# Patient Record
Sex: FEMALE | Race: WHITE | NOT HISPANIC OR LATINO | Employment: UNEMPLOYED | URBAN - METROPOLITAN AREA
[De-identification: names, ages, dates, MRNs, and addresses within clinical notes are randomized per-mention and may not be internally consistent; named-entity substitution may affect disease eponyms.]

---

## 2018-01-08 LAB — EXTERNAL GROUP B STREP ANTIGEN: NEGATIVE

## 2018-06-20 LAB
EXTERNAL RUBELLA QUALITATIVE: NORMAL
EXTERNAL SYPHILIS RPR SCREEN: NORMAL
HIV1 P24 AG SERPL QL IA: NEGATIVE

## 2018-11-07 ENCOUNTER — HOSPITAL ENCOUNTER (INPATIENT)
Facility: HOSPITAL | Age: 28
LOS: 3 days | Discharge: HOME | End: 2018-11-10
Attending: OBSTETRICS & GYNECOLOGY | Admitting: SURGERY

## 2018-11-07 ENCOUNTER — APPOINTMENT (OUTPATIENT)
Dept: ULTRASOUND IMAGING | Facility: HOSPITAL | Age: 28
End: 2018-11-07

## 2018-11-07 DIAGNOSIS — K80.40 CALCULUS OF BILE DUCT WITH CHOLECYSTITIS WITHOUT OBSTRUCTION, UNSPECIFIED CHOLECYSTITIS ACUITY: Primary | ICD-10-CM

## 2018-11-07 PROBLEM — R74.01 TRANSAMINITIS: Status: ACTIVE | Noted: 2018-11-07

## 2018-11-07 LAB
ABO GROUP BLD: NORMAL
ALBUMIN SERPL-MCNC: 3.1 G/DL (ref 3.5–5.2)
ALBUMIN/GLOB SERPL: 1 G/DL
ALP SERPL-CCNC: 172 U/L (ref 39–117)
ALT SERPL W P-5'-P-CCNC: 68 U/L (ref 1–33)
AMYLASE SERPL-CCNC: 102 U/L (ref 28–100)
ANION GAP SERPL CALCULATED.3IONS-SCNC: 11.8 MMOL/L
AST SERPL-CCNC: 110 U/L (ref 1–32)
BASOPHILS # BLD AUTO: 0.01 10*3/MM3 (ref 0–0.2)
BASOPHILS NFR BLD AUTO: 0.1 % (ref 0–1.5)
BILIRUB SERPL-MCNC: 0.5 MG/DL (ref 0.1–1.2)
BLD GP AB SCN SERPL QL: NEGATIVE
BUN BLD-MCNC: 7 MG/DL (ref 6–20)
BUN/CREAT SERPL: 13 (ref 7–25)
CALCIUM SPEC-SCNC: 9 MG/DL (ref 8.6–10.5)
CHLORIDE SERPL-SCNC: 104 MMOL/L (ref 98–107)
CO2 SERPL-SCNC: 25.2 MMOL/L (ref 22–29)
CREAT BLD-MCNC: 0.54 MG/DL (ref 0.57–1)
DEPRECATED RDW RBC AUTO: 41.8 FL (ref 37–54)
EOSINOPHIL # BLD AUTO: 0.11 10*3/MM3 (ref 0–0.7)
EOSINOPHIL NFR BLD AUTO: 1 % (ref 0.3–6.2)
ERYTHROCYTE [DISTWIDTH] IN BLOOD BY AUTOMATED COUNT: 12.4 % (ref 11.7–13)
GFR SERPL CREATININE-BSD FRML MDRD: 133 ML/MIN/1.73
GLOBULIN UR ELPH-MCNC: 3.2 GM/DL
GLUCOSE BLD-MCNC: 72 MG/DL (ref 65–99)
HAV IGM SERPL QL IA: NORMAL
HBV CORE IGM SERPL QL IA: NORMAL
HBV SURFACE AG SERPL QL IA: NORMAL
HCT VFR BLD AUTO: 31.7 % (ref 35.6–45.5)
HCV AB SER DONR QL: NORMAL
HGB BLD-MCNC: 10.7 G/DL (ref 11.9–15.5)
IMM GRANULOCYTES # BLD: 0.03 10*3/MM3 (ref 0–0.03)
IMM GRANULOCYTES NFR BLD: 0.3 % (ref 0–0.5)
LIPASE SERPL-CCNC: 63 U/L (ref 13–60)
LYMPHOCYTES # BLD AUTO: 1.64 10*3/MM3 (ref 0.9–4.8)
LYMPHOCYTES NFR BLD AUTO: 15.3 % (ref 19.6–45.3)
MCH RBC QN AUTO: 31.1 PG (ref 26.9–32)
MCHC RBC AUTO-ENTMCNC: 33.8 G/DL (ref 32.4–36.3)
MCV RBC AUTO: 92.2 FL (ref 80.5–98.2)
MONOCYTES # BLD AUTO: 0.57 10*3/MM3 (ref 0.2–1.2)
MONOCYTES NFR BLD AUTO: 5.3 % (ref 5–12)
NEUTROPHILS # BLD AUTO: 8.42 10*3/MM3 (ref 1.9–8.1)
NEUTROPHILS NFR BLD AUTO: 78.3 % (ref 42.7–76)
PLATELET # BLD AUTO: 148 10*3/MM3 (ref 140–500)
PMV BLD AUTO: 9.3 FL (ref 6–12)
POTASSIUM BLD-SCNC: 3.9 MMOL/L (ref 3.5–5.2)
PROT SERPL-MCNC: 6.3 G/DL (ref 6–8.5)
RBC # BLD AUTO: 3.44 10*6/MM3 (ref 3.9–5.2)
RH BLD: POSITIVE
SODIUM BLD-SCNC: 141 MMOL/L (ref 136–145)
T&S EXPIRATION DATE: NORMAL
WBC NRBC COR # BLD: 10.75 10*3/MM3 (ref 4.5–10.7)

## 2018-11-07 PROCEDURE — 59025 FETAL NON-STRESS TEST: CPT

## 2018-11-07 PROCEDURE — 76705 ECHO EXAM OF ABDOMEN: CPT

## 2018-11-07 PROCEDURE — 86850 RBC ANTIBODY SCREEN: CPT | Performed by: OBSTETRICS & GYNECOLOGY

## 2018-11-07 PROCEDURE — 86900 BLOOD TYPING SEROLOGIC ABO: CPT | Performed by: OBSTETRICS & GYNECOLOGY

## 2018-11-07 PROCEDURE — 85025 COMPLETE CBC W/AUTO DIFF WBC: CPT | Performed by: OBSTETRICS & GYNECOLOGY

## 2018-11-07 PROCEDURE — 82150 ASSAY OF AMYLASE: CPT | Performed by: OBSTETRICS & GYNECOLOGY

## 2018-11-07 PROCEDURE — 80053 COMPREHEN METABOLIC PANEL: CPT | Performed by: OBSTETRICS & GYNECOLOGY

## 2018-11-07 PROCEDURE — 83690 ASSAY OF LIPASE: CPT | Performed by: OBSTETRICS & GYNECOLOGY

## 2018-11-07 PROCEDURE — 86901 BLOOD TYPING SEROLOGIC RH(D): CPT | Performed by: OBSTETRICS & GYNECOLOGY

## 2018-11-07 PROCEDURE — 80074 ACUTE HEPATITIS PANEL: CPT | Performed by: OBSTETRICS & GYNECOLOGY

## 2018-11-07 RX ORDER — LIDOCAINE HYDROCHLORIDE 10 MG/ML
5 INJECTION, SOLUTION EPIDURAL; INFILTRATION; INTRACAUDAL; PERINEURAL AS NEEDED
Status: DISCONTINUED | OUTPATIENT
Start: 2018-11-07 | End: 2018-11-11 | Stop reason: HOSPADM

## 2018-11-07 RX ORDER — SERTRALINE HYDROCHLORIDE 100 MG/1
100 TABLET, FILM COATED ORAL DAILY
COMMUNITY
End: 2019-09-24

## 2018-11-07 RX ORDER — SODIUM CHLORIDE 0.9 % (FLUSH) 0.9 %
3 SYRINGE (ML) INJECTION EVERY 12 HOURS SCHEDULED
Status: DISCONTINUED | OUTPATIENT
Start: 2018-11-07 | End: 2018-11-11 | Stop reason: HOSPADM

## 2018-11-07 RX ORDER — SODIUM CHLORIDE, SODIUM LACTATE, POTASSIUM CHLORIDE, CALCIUM CHLORIDE 600; 310; 30; 20 MG/100ML; MG/100ML; MG/100ML; MG/100ML
125 INJECTION, SOLUTION INTRAVENOUS CONTINUOUS
Status: DISCONTINUED | OUTPATIENT
Start: 2018-11-07 | End: 2018-11-07

## 2018-11-07 RX ORDER — SODIUM CHLORIDE 0.9 % (FLUSH) 0.9 %
3-10 SYRINGE (ML) INJECTION AS NEEDED
Status: DISCONTINUED | OUTPATIENT
Start: 2018-11-07 | End: 2018-11-11 | Stop reason: HOSPADM

## 2018-11-07 RX ORDER — PRENATAL VIT NO.126/IRON/FOLIC 28MG-0.8MG
1 TABLET ORAL DAILY
COMMUNITY
End: 2019-09-24

## 2018-11-07 RX ORDER — RANITIDINE 150 MG/1
150 TABLET ORAL 2 TIMES DAILY PRN
COMMUNITY
End: 2019-09-24

## 2018-11-07 RX ORDER — FAMOTIDINE 20 MG/1
20 TABLET, FILM COATED ORAL 2 TIMES DAILY
Status: DISCONTINUED | OUTPATIENT
Start: 2018-11-07 | End: 2018-11-11 | Stop reason: HOSPADM

## 2018-11-07 RX ADMIN — FAMOTIDINE 20 MG: 20 TABLET, FILM COATED ORAL at 21:20

## 2018-11-07 RX ADMIN — SODIUM CHLORIDE, POTASSIUM CHLORIDE, SODIUM LACTATE AND CALCIUM CHLORIDE 125 ML/HR: 600; 310; 30; 20 INJECTION, SOLUTION INTRAVENOUS at 18:08

## 2018-11-07 NOTE — H&P
[facility]  Obstetric History and Physical    Patient Name: Sherry Quevedo  :  1990  MRN:  [id number]        RUQ pain    Subjective     Patient is a 28 y.o. female  currently at 27wks seen in the office this morning for RUQ and epigastric pain, worsening over the last month and especially the last few days.  Exacerbated by eating, relieved with tylenol (has had OTC strength about twice daily for last couple of days). Not in significant pain currently but hasn't had much to eat today, last PO at noon.  Radiates in to back and right shoulder. Denies fevers/chills. No sick contacts.Labs done in clinic this morning significant for elevated LFTs, elevated amylase and lipase, appropriate mild leukocytosis for pregnancy.  Good FM. No UCs.           Her prenatal care is otherwise uncomplicated       Past Medical History: No past medical history on file.   Past Surgical History No past surgical history on file.   Family History: No family history on file.   Social History:  has no tobacco history on file.   has no alcohol history on file.   has no drug history on file.    Allergies:     Patient has no allergy information on record.     Past OB History:       Obstetric History       T0      L0     SAB0   TAB0   Ectopic0   Molar0   Multiple0   Live Births0       # Outcome Date GA Lbr Darian/2nd Weight Sex Delivery Anes PTL Lv   1 Current                     Objective       Vital Signs Range for the last 24 hours  Temperature:     Temp Source:     BP: BP: ()/()    Pulse:     Respirations:               Physical Exam:        General :    Alert and cooperative in NAD   Lungs:   Clear to auscultation bilaterally   CV:   Regular rate and rhythm   Abdomen:  Gravid, non-tender, no masses. Mild TTP epigastric and RUQ   Vaginal exam: deferred     LE:   min edema                   A/P:  27wks gestation with RUQ/epigastric pain, transaminitis, and elevated LFTs.  Possible etiologies include gallstone  pancreatitis.  Will order US RUQ and pancreas, trend labs, consult GI and/or gen surg in AM depending on imaging        Problem List Items Addressed This Visit     None                  Temi Burns MD  11/7/2018  4:38 PM

## 2018-11-08 PROBLEM — K80.40 CALCULUS OF BILE DUCT WITH CHOLECYSTITIS WITHOUT OBSTRUCTION: Status: ACTIVE | Noted: 2018-11-07

## 2018-11-08 LAB
ALBUMIN SERPL-MCNC: 2.8 G/DL (ref 3.5–5.2)
ALBUMIN/GLOB SERPL: 0.9 G/DL
ALP SERPL-CCNC: 138 U/L (ref 39–117)
ALT SERPL W P-5'-P-CCNC: 48 U/L (ref 1–33)
AMYLASE SERPL-CCNC: 75 U/L (ref 28–100)
ANION GAP SERPL CALCULATED.3IONS-SCNC: 11 MMOL/L
AST SERPL-CCNC: 57 U/L (ref 1–32)
BILIRUB SERPL-MCNC: 0.3 MG/DL (ref 0.1–1.2)
BUN BLD-MCNC: 6 MG/DL (ref 6–20)
BUN/CREAT SERPL: 12 (ref 7–25)
CALCIUM SPEC-SCNC: 8.2 MG/DL (ref 8.6–10.5)
CHLORIDE SERPL-SCNC: 106 MMOL/L (ref 98–107)
CO2 SERPL-SCNC: 23 MMOL/L (ref 22–29)
CREAT BLD-MCNC: 0.5 MG/DL (ref 0.57–1)
GFR SERPL CREATININE-BSD FRML MDRD: 146 ML/MIN/1.73
GLOBULIN UR ELPH-MCNC: 3 GM/DL
GLUCOSE BLD-MCNC: 84 MG/DL (ref 65–99)
LIPASE SERPL-CCNC: 45 U/L (ref 13–60)
POTASSIUM BLD-SCNC: 3.3 MMOL/L (ref 3.5–5.2)
PROT SERPL-MCNC: 5.8 G/DL (ref 6–8.5)
SODIUM BLD-SCNC: 140 MMOL/L (ref 136–145)

## 2018-11-08 PROCEDURE — 59025 FETAL NON-STRESS TEST: CPT

## 2018-11-08 PROCEDURE — 80053 COMPREHEN METABOLIC PANEL: CPT | Performed by: OBSTETRICS & GYNECOLOGY

## 2018-11-08 PROCEDURE — 83690 ASSAY OF LIPASE: CPT | Performed by: OBSTETRICS & GYNECOLOGY

## 2018-11-08 PROCEDURE — 99253 IP/OBS CNSLTJ NEW/EST LOW 45: CPT | Performed by: SURGERY

## 2018-11-08 PROCEDURE — 82150 ASSAY OF AMYLASE: CPT | Performed by: OBSTETRICS & GYNECOLOGY

## 2018-11-08 PROCEDURE — 99253 IP/OBS CNSLTJ NEW/EST LOW 45: CPT | Performed by: INTERNAL MEDICINE

## 2018-11-08 RX ORDER — POLYETHYLENE GLYCOL 3350 17 G/17G
17 POWDER, FOR SOLUTION ORAL DAILY
Status: DISCONTINUED | OUTPATIENT
Start: 2018-11-08 | End: 2018-11-11 | Stop reason: HOSPADM

## 2018-11-08 RX ORDER — SERTRALINE HYDROCHLORIDE 100 MG/1
100 TABLET, FILM COATED ORAL NIGHTLY
Status: DISCONTINUED | OUTPATIENT
Start: 2018-11-08 | End: 2018-11-11 | Stop reason: HOSPADM

## 2018-11-08 RX ORDER — SODIUM CHLORIDE, SODIUM LACTATE, POTASSIUM CHLORIDE, CALCIUM CHLORIDE 600; 310; 30; 20 MG/100ML; MG/100ML; MG/100ML; MG/100ML
150 INJECTION, SOLUTION INTRAVENOUS CONTINUOUS
Status: DISCONTINUED | OUTPATIENT
Start: 2018-11-09 | End: 2018-11-11 | Stop reason: HOSPADM

## 2018-11-08 RX ADMIN — POLYETHYLENE GLYCOL 3350 17 G: 17 POWDER, FOR SOLUTION ORAL at 16:08

## 2018-11-08 RX ADMIN — DOXYLAMINE SUCCINATE 25 MG: 25 TABLET ORAL at 00:29

## 2018-11-08 RX ADMIN — FAMOTIDINE 20 MG: 20 TABLET, FILM COATED ORAL at 08:48

## 2018-11-08 RX ADMIN — Medication 3 ML: at 08:49

## 2018-11-08 RX ADMIN — FAMOTIDINE 20 MG: 20 TABLET, FILM COATED ORAL at 20:26

## 2018-11-08 RX ADMIN — SERTRALINE HYDROCHLORIDE 100 MG: 100 TABLET, FILM COATED ORAL at 20:27

## 2018-11-08 NOTE — NURSING NOTE
Dr. Fraire at patient's bedside for evaluation. Discussed with patient that Dr. Guzman recommended that a general surgeon see the patient for their opinion for her gallbladder. All questions answered, patient states understanding. Order received for general surgery consult with Dr. Ramos and miralax once a day scheduled. r/v

## 2018-11-08 NOTE — PROGRESS NOTES
[facility]  Obstetric Progress Note    Patient Name: Sherry Quevedo  :  1990  MRN:  [id number]  Hospital Day: 1  EGA: 27w2d      Subjective  27w2d OB admitted yesterday for severe RUQ pain. LFT's, amylase and lipase elevated. Pt states she is feeling better today, reports no pain at this time. Denies N/V/D, states she feels fine. Reports good FM.         Objective     VS:  Vital Signs Range for the last 24 hours  Temperature: Temp:  [97.8 °F (36.6 °C)-98.7 °F (37.1 °C)] 97.8 °F (36.6 °C)   Temp Source: Temp src: Oral   BP: BP: ()/(57-72) 104/58   Pulse: Heart Rate:  [81-91] 85   Respirations: Resp:  [18] 18                   Physical Examination:     General :  Alert in NAD  Abdomen: Gravid, Fundus -        Lab results reviewed:  CBC:   Lab Results   Component Value Date    WBC 10.75 (H) 2018    HGB 10.7 (L) 2018    HCT 31.7 (L) 2018            A/P:  27w2d OB. Feels better today, denies GI symptoms and pain. Gallbladder US with cholelithiasis. Liver enzymes improved today. GI consulted, waiting on recs. NST pending.      Transaminitis              LEONELA Goodman  2018  10:24 AM   Patient seen and examined. Agree with above assessment. Rec to see Gen Surgeon to review u/s findings and rec of treatment/  Britta Fraire MD  2018  12:27 PM

## 2018-11-08 NOTE — PLAN OF CARE
Problem: Patient Care Overview  Goal: Plan of Care Review  Outcome: Ongoing (interventions implemented as appropriate)   18   Plan of Care Review   Progress improving   Coping/Psychosocial   Plan of Care Reviewed With patient;spouse   OTHER   Outcome Summary RUQ pain pt. reports now that she has no pain when asked, us of gallbladder done, am labs drawn. once results of am labs in then arrange for a GI consult today, pt. denies h/a or blurred vision, pt. denies VB, LOF or contractions     Goal: Individualization and Mutuality  Outcome: Ongoing (interventions implemented as appropriate)   18 1850   Individualization   Patient Specific Preferences Pain control less than 5 out of 10 on pain scale   Patient Specific Goals (Include Timeframe) Remain pain free   Patient Specific Interventions Ultrasound and labs as ordered   Mutuality/Individual Preferences   What Anxieties, Fears, Concerns, or Questions Do You Have About Your Care? Return of debilitating pain   How Would You and/or Your Support Person Like to Participate in Your Care? Remain at bedside; be involved in care   Mutuality/Individual Preferences   How to Address Anxieties/Fears Keep informed of plan of care     Goal: Interprofessional Rounds/Family Conf  Outcome: Ongoing (interventions implemented as appropriate)   18   Interdisciplinary Rounds/Family Conf   Participants nursing;patient;physician       Problem: Prenatal Patient, Hospitalized (Adult,Obstetrics,Pediatric)  Goal: Signs and Symptoms of Listed Potential Problems Will be Absent, Minimized or Managed (Prenatal Patient, Hospitalized)  Outcome: Ongoing (interventions implemented as appropriate)   18   Goal/Outcome Evaluation   Problems Assessed (Prenatal Patient) all   Problems Present (Prenatal Patient)  mood and anxiety disorders;other (see comments)  (RUQ pain)

## 2018-11-08 NOTE — NON STRESS TEST
Sherry Quevedo, a  at 27w1d with an ZACKERY of 2019, by Patient Reported, was seen at [facility] for a nonstress test.    Chief Complaint   Patient presents with   • Abdominal Pain     Pt states that she started having epigastric pain that woke her up around 0300 today.  Pt states that it felt like pressure on her chest that radiated to her righ shoulder and back. Pt reports that pain was 10/10 on pain scale.  Rates pain 3/10 right now.  Pt went to office, had labs drawn, and was sent over to L&D for inpatient observation.       Patient Active Problem List   Diagnosis   • Transaminitis       Start Time:   Stop Time:     Interpretation A  Nonstress Test Interpretation A: Reactive (18 1800 : Tiffani Cuellar RN)

## 2018-11-08 NOTE — CONSULTS
Cc: Right upper quadrant pain, cholelithiasis    History of presenting illness:   This is a very nice, generally healthy 28-year-old lady who is 27 weeks pregnant and who describes some occasional on and off epigastric abdominal discomfort but the severe onset of right upper quadrant pain with shortness of breath and pain into her back after eating pizza about 4 days ago.  It lasted about 2 hours and then resolved and then returned a couple of days later, lasting longer and feeling even more intense.  This prompted a visit to her obstetrician and subsequent admission for evaluation.  She was found to have elevation of her liver function studies and on ultrasound a finding of stones and possible sludge although no clear evidence for acute cholecystitis.  Currently she is feeling better although she has not really been eating much.    Past Medical History: Anxiety, depression, gestational diabetes    Past Surgical History: Laparoscopic ovarian cyst resection, no other abdominal surgery    Medications: Reviewed and reconciled in Epic    Allergies: Reviewed and reconciled in Epic    Social History: Patient is a nonsmoker, denies alcohol use    Family History: Negative for colorectal cancer, patient is unaware if there is any gallbladder disease in her family.    Review of Systems:  Constitutional: Positive for decreased appetite, negative for fever or chills  Neck: no swollen glands or dysphagia or odynophagia  Respiratory: negative for SOB, cough, hemoptysis or wheezing  Cardiovascular: negative for chest pain, palpitations or peripheral edema  Gastrointestinal: Positive for nausea, abdominal pain, bloating, negative for constipation or diarrhea      Physical Exam:    General: alert and oriented, appropriate, no acute distress  Neck: Supple without lymphadenopathy or thyromegaly, trachea is in the midline  Respiratory: Lungs are clear bilaterally without wheezing, no use of accessory muscles is noted  Cardiovascular:  Regular rate and rhythm without murmur, no peripheral edema  Gastrointestinal: Soft, benign, prominent uterus several centimeters above the umbilicus, but no mass, no guarding, no rebound and no evidence of jaundice    Laboratory data: Alkaline phosphatase 138 AST 57 ALT 48 albumin 2.8.  Lipase was very mildly elevated at 63 8 admission but has since normalized.  CBC unremarkable.    Imaging data: Gallbladder ultrasound reviewed and demonstrates cholelithiasis.  Question of sludge is also noted.  No significant gallbladder wall thickening is seen.      Assessment and plan:   -Symptomatic cholelithiasis  -27 week intrauterine pregnancy    Options discussed with the patient.  I explained to her that there is solid evidence suggesting the relative safety of cholecystectomy in the second trimester.  There is of course an increased possibility for  labor, but this is also the case with recurrent symptomatic cholelithiasis and worse with acute cholecystitis or other sequela of untreated gallbladder disease.  I told the patient that an attempt at dietary management is an option, but the patient and her  prefer to go ahead with cholecystectomy.  I have scheduled her for tomorrow.  The risks in addition to  labor including bleeding, infection, injury to the liver, gallbladder, biliary system, stomach, intestinal tract, vascular structures noted potential problems including the possible need for conversion to an open operation were discussed and they're willing to proceed.      Khanh Lassiter MD, FACS  General, Minimally Invasive and Endoscopic Surgery  [facility]    [address], [address]  [place], [place], [address]  P: [phone]  F: [phone]

## 2018-11-08 NOTE — PLAN OF CARE
Problem: Patient Care Overview  Goal: Plan of Care Review  Outcome: Ongoing (interventions implemented as appropriate)   11/08/18 1835   Plan of Care Review   Progress improving   Coping/Psychosocial   Plan of Care Reviewed With patient     Goal: Individualization and Mutuality  Outcome: Ongoing (interventions implemented as appropriate)   11/07/18 1850 11/08/18 1835   Individualization   Patient Specific Goals (Include Timeframe) Remain pain free --    Patient Specific Interventions --  npo after midnight, lr at 150 cc/hr after midnight tonight, patient will have lap choley tomorrow per general surgery   Mutuality/Individual Preferences   How to Address Anxieties/Fears Keep informed of plan of care --        Problem: Prenatal Patient, Hospitalized (Adult,Obstetrics,Pediatric)  Goal: Signs and Symptoms of Listed Potential Problems Will be Absent, Minimized or Managed (Prenatal Patient, Hospitalized)  Outcome: Ongoing (interventions implemented as appropriate)   11/08/18 1835   Goal/Outcome Evaluation   Problems Assessed (Prenatal Patient) all   Problems Present (Prenatal Patient) none

## 2018-11-08 NOTE — CONSULTS
[facility]  Initial Inpatient Consult Note    Referring Provider: Dr. MATTHEW Santana    Reason for Consultation: Epigastric pain, elevated lipase    Subjective     History of present illness:    28 y.o. female , 27 week pregnant admitted 18 with a one month h/o RUQ abdominal pain with worsening that started this past . The two times that her RUQ abdominal pain got worse in the past 4 days was after she ate pizza. She had nausea with this but no vomiting and no fevers. No jaundice. She is chronically constipated. No diarrhea, some rectal bleeding. She has had 3 colonoscopies in her life, the last one about 6 yrs ago. No melena. Denies NSAID use. Rare ETOH. Her weight has been increasing. Rare Tylenol use. Yesterday her total bili was 0.5, alk phos 172, [id number] and ALT 68, amylase 102 and lipase 63. Today total bili 0.3; alk [id number]; ast 57; alt 48, amylase and lipase are normal. She had an US of the gallbladder last night that showed gallstones with sludge. No ductal dilation. Hepatitis profile negative for hep A, hep B, and hep C. No itching. She did not have anything like this in previous pregnancies. NO itching or elevated LFT's in previous pregnancies.    Past Medical History:  Past Medical History:   Diagnosis Date   • Anxiety    • Depression     History of PPD   • Gestational diabetes     with first baby     Past Surgical History:  Past Surgical History:   Procedure Laterality Date   • ADENOIDECTOMY     • OVARIAN CYST SURGERY     • TONSILLECTOMY     • WISDOM TOOTH EXTRACTION        Social History:   Social History   Substance Use Topics   • Smoking status: Never Smoker   • Smokeless tobacco: Never Used   • Alcohol use No      Family History:  History reviewed. No pertinent family history.    Home Meds:  Prescriptions Prior to Admission   Medication Sig Dispense Refill Last Dose   • Prenatal Vit-Fe Fumarate-FA (PRENATAL, CLASSIC, VITAMIN) 28-0.8 MG tablet tablet  Take 1 tablet by mouth Daily.   11/6/2018 at Unknown time   • raNITIdine (ZANTAC) 150 MG tablet Take 150 mg by mouth 2 (Two) Times a Day As Needed for Heartburn.   11/7/2018 at Unknown time   • sertraline (ZOLOFT) 100 MG tablet Take 100 mg by mouth Daily.   11/6/2018 at Unknown time     Current Meds:     famotidine 20 mg Oral BID   sodium chloride 3 mL Intravenous Q12H     Allergies:  Allergies   Allergen Reactions   • Lortab [Hydrocodone-Acetaminophen] Itching   • Tramadol Nausea And Vomiting     Review of Systems  The following systems were reviewed and negative;  respiratory, cardiovascular, musculoskeletal and neurological     Objective     Vital Signs  Temp:  [97.8 °F (36.6 °C)-98.7 °F (37.1 °C)] 97.8 °F (36.6 °C)  Heart Rate:  [81-91] 85  Resp:  [18] 18  BP: ()/(57-72) 104/58  Physical Exam:  General Appearance:    Alert, cooperative, in no acute distress   Head:    Normocephalic, without obvious abnormality, atraumatic   Eyes:            Lids and lashes normal, conjunctivae and sclerae normal, no   icterus   Throat:   No oral lesions, no thrush, oral mucosa moist   Neck:   No adenopathy, supple, trachea midline, no thyromegaly, no   carotid bruit, no JVD   Lungs:     Clear to auscultation,respirations regular, even and                   unlabored    Heart:    Regular rhythm and normal rate, normal S1 and S2, no            murmur, no gallop, no rub, no click   Chest Wall:    No abnormalities observed   Abdomen:     Normal bowel sounds, no masses, no organomegaly, soft        non-tender, non-distended, no guarding, no rebound                 tenderness   Rectal:     Deferred   Extremities:   no edema, no cyanosis, no redness   Skin:   No bleeding, bruising or rash   Lymph nodes:   No palpable adenopathy   Psychiatric:  Judgement and insight: normal   Orientation to person place and time: normal   Mood and affect: normal   Results Review:   I reviewed the patient's new clinical results.      Results from  last 7 days  Lab Units 18  1807   WBC 10*3/mm3 10.75*   HEMOGLOBIN g/dL 10.7*   HEMATOCRIT % 31.7*   PLATELETS 10*3/mm3 148       Results from last 7 days  Lab Units 18  0619 18  1807   SODIUM mmol/L 140 141   POTASSIUM mmol/L 3.3* 3.9   CHLORIDE mmol/L 106 104   CO2 mmol/L 23.0 25.2   BUN mg/dL 6 7   CREATININE mg/dL 0.50* 0.54*   CALCIUM mg/dL 8.2* 9.0   BILIRUBIN mg/dL 0.3 0.5   ALK PHOS U/L 138* 172*   ALT (SGPT) U/L 48* 68*   AST (SGOT) U/L 57* 110*   GLUCOSE mg/dL 84 72           Lab Results  Lab Value Date/Time   LIPASE 45 2018 06   LIPASE 63 (H) 2018 1807       Radiology:  US Gallbladder   Final Result   1. Cholelithiasis.           This report was finalized on 2018 5:52 AM by Macario Mohan M.D.              Assessment/Plan   Patient Active Problem List   Diagnosis   • Transaminitis       I discussed the patients findings and my recommendations with patient, family and nursing staff.    Assessment:  1) 29 yo female  27 week pregnant female  2) One month h/o RUQ abdominal pain worse in the last 4 days. Could this be symptomatic gallbladder disease?    Recommendations:  1) I would have a General Surgeon see the patient to consider cholecystectomy?  2) I will check ebstein sparks viral serology  3) Recheck lft's.    Delano Guzman MD

## 2018-11-08 NOTE — PLAN OF CARE
Problem: Patient Care Overview  Goal: Plan of Care Review  Outcome: Ongoing (interventions implemented as appropriate)   11/07/18 1700 11/07/18 1850   Plan of Care Review   Progress --  improving   Coping/Psychosocial   Plan of Care Reviewed With patient;spouse --      Goal: Individualization and Mutuality  Outcome: Ongoing (interventions implemented as appropriate)   11/07/18 1850   Individualization   Patient Specific Preferences Pain control less than 5 out of 10 on pain scale   Patient Specific Goals (Include Timeframe) Remain pain free   Patient Specific Interventions Ultrasound and labs as ordered   Mutuality/Individual Preferences   What Anxieties, Fears, Concerns, or Questions Do You Have About Your Care? Return of debilitating pain   How Would You and/or Your Support Person Like to Participate in Your Care? Remain at bedside; be involved in care   Mutuality/Individual Preferences   How to Address Anxieties/Fears Keep informed of plan of care     Goal: Discharge Needs Assessment  Outcome: Ongoing (interventions implemented as appropriate)   11/07/18 1907   Discharge Needs Assessment   Readmission Within the Last 30 Days no previous admission in last 30 days   Concerns to be Addressed no discharge needs identified   Patient/Family Anticipates Transition to home   Patient/Family Anticipated Services at Transition none   Transportation Concerns car, none   Transportation Anticipated car, drives self;family or friend will provide   Anticipated Changes Related to Illness none   Disability   Equipment Currently Used at Home none       Problem: Prenatal Patient, Hospitalized (Adult,Obstetrics,Pediatric)  Goal: Signs and Symptoms of Listed Potential Problems Will be Absent, Minimized or Managed (Prenatal Patient, Hospitalized)  Outcome: Ongoing (interventions implemented as appropriate)   11/07/18 1850   Goal/Outcome Evaluation   Problems Assessed (Prenatal Patient) all   Problems Present (Prenatal Patient) none

## 2018-11-09 ENCOUNTER — ANESTHESIA EVENT (OUTPATIENT)
Dept: PERIOP | Facility: HOSPITAL | Age: 28
End: 2018-11-09

## 2018-11-09 ENCOUNTER — ANESTHESIA (OUTPATIENT)
Dept: PERIOP | Facility: HOSPITAL | Age: 28
End: 2018-11-09

## 2018-11-09 LAB
ALBUMIN SERPL-MCNC: 2.9 G/DL (ref 3.5–5.2)
ALBUMIN/GLOB SERPL: 1 G/DL
ALP SERPL-CCNC: 116 U/L (ref 39–117)
ALT SERPL W P-5'-P-CCNC: 30 U/L (ref 1–33)
ANION GAP SERPL CALCULATED.3IONS-SCNC: 10.3 MMOL/L
AST SERPL-CCNC: 22 U/L (ref 1–32)
BILIRUB SERPL-MCNC: <0.2 MG/DL (ref 0.1–1.2)
BUN BLD-MCNC: 5 MG/DL (ref 6–20)
BUN/CREAT SERPL: 10 (ref 7–25)
CALCIUM SPEC-SCNC: 8.4 MG/DL (ref 8.6–10.5)
CHLORIDE SERPL-SCNC: 105 MMOL/L (ref 98–107)
CO2 SERPL-SCNC: 24.7 MMOL/L (ref 22–29)
CREAT BLD-MCNC: 0.5 MG/DL (ref 0.57–1)
GFR SERPL CREATININE-BSD FRML MDRD: 146 ML/MIN/1.73
GLOBULIN UR ELPH-MCNC: 2.8 GM/DL
GLUCOSE BLD-MCNC: 80 MG/DL (ref 65–99)
POTASSIUM BLD-SCNC: 3.5 MMOL/L (ref 3.5–5.2)
PROT SERPL-MCNC: 5.7 G/DL (ref 6–8.5)
SODIUM BLD-SCNC: 140 MMOL/L (ref 136–145)

## 2018-11-09 PROCEDURE — 86665 EPSTEIN-BARR CAPSID VCA: CPT | Performed by: INTERNAL MEDICINE

## 2018-11-09 PROCEDURE — 47562 LAPAROSCOPIC CHOLECYSTECTOMY: CPT | Performed by: SURGERY

## 2018-11-09 PROCEDURE — 25010000002 FENTANYL CITRATE (PF) 100 MCG/2ML SOLUTION

## 2018-11-09 PROCEDURE — 25010000002 PROPOFOL 10 MG/ML EMULSION: Performed by: NURSE ANESTHETIST, CERTIFIED REGISTERED

## 2018-11-09 PROCEDURE — 25010000002 FENTANYL CITRATE (PF) 100 MCG/2ML SOLUTION: Performed by: ANESTHESIOLOGY

## 2018-11-09 PROCEDURE — 25010000002 MIDAZOLAM PER 1 MG: Performed by: ANESTHESIOLOGY

## 2018-11-09 PROCEDURE — 80053 COMPREHEN METABOLIC PANEL: CPT | Performed by: INTERNAL MEDICINE

## 2018-11-09 PROCEDURE — 99232 SBSQ HOSP IP/OBS MODERATE 35: CPT | Performed by: INTERNAL MEDICINE

## 2018-11-09 PROCEDURE — 25010000002 FENTANYL CITRATE (PF) 100 MCG/2ML SOLUTION: Performed by: NURSE ANESTHETIST, CERTIFIED REGISTERED

## 2018-11-09 PROCEDURE — 25010000002 PHENYLEPHRINE PER 1 ML: Performed by: NURSE ANESTHETIST, CERTIFIED REGISTERED

## 2018-11-09 PROCEDURE — 25010000002 ONDANSETRON PER 1 MG: Performed by: NURSE ANESTHETIST, CERTIFIED REGISTERED

## 2018-11-09 PROCEDURE — 0 IOTHALAMATE 60 % SOLUTION: Performed by: SURGERY

## 2018-11-09 PROCEDURE — 86645 CMV ANTIBODY IGM: CPT | Performed by: INTERNAL MEDICINE

## 2018-11-09 PROCEDURE — 25010000003 CEFAZOLIN IN DEXTROSE 2-4 GM/100ML-% SOLUTION: Performed by: SURGERY

## 2018-11-09 PROCEDURE — 88304 TISSUE EXAM BY PATHOLOGIST: CPT | Performed by: SURGERY

## 2018-11-09 PROCEDURE — 0FT44ZZ RESECTION OF GALLBLADDER, PERCUTANEOUS ENDOSCOPIC APPROACH: ICD-10-PCS | Performed by: SURGERY

## 2018-11-09 PROCEDURE — 86644 CMV ANTIBODY: CPT | Performed by: INTERNAL MEDICINE

## 2018-11-09 PROCEDURE — 25010000002 DEXAMETHASONE PER 1 MG: Performed by: NURSE ANESTHETIST, CERTIFIED REGISTERED

## 2018-11-09 PROCEDURE — 25010000002 BUTORPHANOL PER 1 MG: Performed by: SURGERY

## 2018-11-09 RX ORDER — LIDOCAINE HYDROCHLORIDE 10 MG/ML
0.5 INJECTION, SOLUTION EPIDURAL; INFILTRATION; INTRACAUDAL; PERINEURAL ONCE AS NEEDED
Status: DISCONTINUED | OUTPATIENT
Start: 2018-11-09 | End: 2018-11-09 | Stop reason: HOSPADM

## 2018-11-09 RX ORDER — IBUPROFEN 600 MG/1
600 TABLET ORAL EVERY 8 HOURS SCHEDULED
Status: DISCONTINUED | OUTPATIENT
Start: 2018-11-09 | End: 2018-11-11 | Stop reason: HOSPADM

## 2018-11-09 RX ORDER — SODIUM CHLORIDE 0.9 % (FLUSH) 0.9 %
1-10 SYRINGE (ML) INJECTION AS NEEDED
Status: DISCONTINUED | OUTPATIENT
Start: 2018-11-09 | End: 2018-11-09 | Stop reason: HOSPADM

## 2018-11-09 RX ORDER — FENTANYL CITRATE 50 UG/ML
INJECTION, SOLUTION INTRAMUSCULAR; INTRAVENOUS
Status: COMPLETED
Start: 2018-11-09 | End: 2018-11-09

## 2018-11-09 RX ORDER — LIDOCAINE HYDROCHLORIDE 20 MG/ML
INJECTION, SOLUTION INFILTRATION; PERINEURAL AS NEEDED
Status: DISCONTINUED | OUTPATIENT
Start: 2018-11-09 | End: 2018-11-09 | Stop reason: SURG

## 2018-11-09 RX ORDER — NALOXONE HCL 0.4 MG/ML
0.2 VIAL (ML) INJECTION AS NEEDED
Status: DISCONTINUED | OUTPATIENT
Start: 2018-11-09 | End: 2018-11-09 | Stop reason: HOSPADM

## 2018-11-09 RX ORDER — PROPOFOL 10 MG/ML
VIAL (ML) INTRAVENOUS AS NEEDED
Status: DISCONTINUED | OUTPATIENT
Start: 2018-11-09 | End: 2018-11-09 | Stop reason: SURG

## 2018-11-09 RX ORDER — CEFAZOLIN SODIUM 2 G/100ML
2 INJECTION, SOLUTION INTRAVENOUS ONCE
Status: COMPLETED | OUTPATIENT
Start: 2018-11-09 | End: 2018-11-09

## 2018-11-09 RX ORDER — ROCURONIUM BROMIDE 10 MG/ML
INJECTION, SOLUTION INTRAVENOUS AS NEEDED
Status: DISCONTINUED | OUTPATIENT
Start: 2018-11-09 | End: 2018-11-09 | Stop reason: SURG

## 2018-11-09 RX ORDER — SODIUM CHLORIDE 9 MG/ML
INJECTION, SOLUTION INTRAVENOUS AS NEEDED
Status: DISCONTINUED | OUTPATIENT
Start: 2018-11-09 | End: 2018-11-09 | Stop reason: HOSPADM

## 2018-11-09 RX ORDER — PROMETHAZINE HYDROCHLORIDE 25 MG/ML
12.5 INJECTION, SOLUTION INTRAMUSCULAR; INTRAVENOUS ONCE AS NEEDED
Status: DISCONTINUED | OUTPATIENT
Start: 2018-11-09 | End: 2018-11-09 | Stop reason: HOSPADM

## 2018-11-09 RX ORDER — FENTANYL CITRATE 50 UG/ML
50 INJECTION, SOLUTION INTRAMUSCULAR; INTRAVENOUS
Status: DISCONTINUED | OUTPATIENT
Start: 2018-11-09 | End: 2018-11-09 | Stop reason: HOSPADM

## 2018-11-09 RX ORDER — MIDAZOLAM HYDROCHLORIDE 1 MG/ML
1 INJECTION INTRAMUSCULAR; INTRAVENOUS
Status: DISCONTINUED | OUTPATIENT
Start: 2018-11-09 | End: 2018-11-09 | Stop reason: HOSPADM

## 2018-11-09 RX ORDER — PROMETHAZINE HYDROCHLORIDE 25 MG/1
25 TABLET ORAL ONCE AS NEEDED
Status: DISCONTINUED | OUTPATIENT
Start: 2018-11-09 | End: 2018-11-09 | Stop reason: HOSPADM

## 2018-11-09 RX ORDER — PROMETHAZINE HYDROCHLORIDE 25 MG/1
25 SUPPOSITORY RECTAL ONCE AS NEEDED
Status: DISCONTINUED | OUTPATIENT
Start: 2018-11-09 | End: 2018-11-09 | Stop reason: HOSPADM

## 2018-11-09 RX ORDER — LABETALOL HYDROCHLORIDE 5 MG/ML
5 INJECTION, SOLUTION INTRAVENOUS
Status: DISCONTINUED | OUTPATIENT
Start: 2018-11-09 | End: 2018-11-09 | Stop reason: HOSPADM

## 2018-11-09 RX ORDER — DIPHENHYDRAMINE HYDROCHLORIDE 50 MG/ML
12.5 INJECTION INTRAMUSCULAR; INTRAVENOUS
Status: DISCONTINUED | OUTPATIENT
Start: 2018-11-09 | End: 2018-11-09 | Stop reason: HOSPADM

## 2018-11-09 RX ORDER — FLUMAZENIL 0.1 MG/ML
0.2 INJECTION INTRAVENOUS AS NEEDED
Status: DISCONTINUED | OUTPATIENT
Start: 2018-11-09 | End: 2018-11-09 | Stop reason: HOSPADM

## 2018-11-09 RX ORDER — EPHEDRINE SULFATE 50 MG/ML
INJECTION, SOLUTION INTRAVENOUS AS NEEDED
Status: DISCONTINUED | OUTPATIENT
Start: 2018-11-09 | End: 2018-11-09 | Stop reason: SURG

## 2018-11-09 RX ORDER — HYDROMORPHONE HYDROCHLORIDE 1 MG/ML
0.5 INJECTION, SOLUTION INTRAMUSCULAR; INTRAVENOUS; SUBCUTANEOUS
Status: DISCONTINUED | OUTPATIENT
Start: 2018-11-09 | End: 2018-11-09 | Stop reason: HOSPADM

## 2018-11-09 RX ORDER — FAMOTIDINE 10 MG/ML
20 INJECTION, SOLUTION INTRAVENOUS ONCE
Status: COMPLETED | OUTPATIENT
Start: 2018-11-09 | End: 2018-11-09

## 2018-11-09 RX ORDER — ONDANSETRON 2 MG/ML
INJECTION INTRAMUSCULAR; INTRAVENOUS AS NEEDED
Status: DISCONTINUED | OUTPATIENT
Start: 2018-11-09 | End: 2018-11-09 | Stop reason: SURG

## 2018-11-09 RX ORDER — EPHEDRINE SULFATE 50 MG/ML
5 INJECTION, SOLUTION INTRAVENOUS ONCE AS NEEDED
Status: DISCONTINUED | OUTPATIENT
Start: 2018-11-09 | End: 2018-11-09 | Stop reason: HOSPADM

## 2018-11-09 RX ORDER — BUPIVACAINE HYDROCHLORIDE 2.5 MG/ML
INJECTION, SOLUTION EPIDURAL; INFILTRATION; INTRACAUDAL AS NEEDED
Status: DISCONTINUED | OUTPATIENT
Start: 2018-11-09 | End: 2018-11-09 | Stop reason: HOSPADM

## 2018-11-09 RX ORDER — DEXAMETHASONE SODIUM PHOSPHATE 10 MG/ML
INJECTION INTRAMUSCULAR; INTRAVENOUS AS NEEDED
Status: DISCONTINUED | OUTPATIENT
Start: 2018-11-09 | End: 2018-11-09 | Stop reason: SURG

## 2018-11-09 RX ORDER — SODIUM CHLORIDE, SODIUM LACTATE, POTASSIUM CHLORIDE, CALCIUM CHLORIDE 600; 310; 30; 20 MG/100ML; MG/100ML; MG/100ML; MG/100ML
9 INJECTION, SOLUTION INTRAVENOUS CONTINUOUS
Status: DISCONTINUED | OUTPATIENT
Start: 2018-11-09 | End: 2018-11-09

## 2018-11-09 RX ORDER — ONDANSETRON 2 MG/ML
4 INJECTION INTRAMUSCULAR; INTRAVENOUS ONCE AS NEEDED
Status: DISCONTINUED | OUTPATIENT
Start: 2018-11-09 | End: 2018-11-09 | Stop reason: HOSPADM

## 2018-11-09 RX ORDER — MAGNESIUM HYDROXIDE 1200 MG/15ML
LIQUID ORAL AS NEEDED
Status: DISCONTINUED | OUTPATIENT
Start: 2018-11-09 | End: 2018-11-09 | Stop reason: HOSPADM

## 2018-11-09 RX ORDER — MIDAZOLAM HYDROCHLORIDE 1 MG/ML
2 INJECTION INTRAMUSCULAR; INTRAVENOUS
Status: DISCONTINUED | OUTPATIENT
Start: 2018-11-09 | End: 2018-11-09 | Stop reason: HOSPADM

## 2018-11-09 RX ORDER — PROMETHAZINE HYDROCHLORIDE 25 MG/1
12.5 TABLET ORAL ONCE AS NEEDED
Status: DISCONTINUED | OUTPATIENT
Start: 2018-11-09 | End: 2018-11-09 | Stop reason: HOSPADM

## 2018-11-09 RX ADMIN — BUTORPHANOL TARTRATE 2 MG: 2 INJECTION, SOLUTION INTRAMUSCULAR; INTRAVENOUS at 23:29

## 2018-11-09 RX ADMIN — SODIUM CHLORIDE, POTASSIUM CHLORIDE, SODIUM LACTATE AND CALCIUM CHLORIDE 150 ML/HR: 600; 310; 30; 20 INJECTION, SOLUTION INTRAVENOUS at 00:10

## 2018-11-09 RX ADMIN — SODIUM CHLORIDE, POTASSIUM CHLORIDE, SODIUM LACTATE AND CALCIUM CHLORIDE 150 ML/HR: 600; 310; 30; 20 INJECTION, SOLUTION INTRAVENOUS at 18:18

## 2018-11-09 RX ADMIN — ROCURONIUM BROMIDE 35 MG: 10 INJECTION INTRAVENOUS at 14:29

## 2018-11-09 RX ADMIN — SODIUM CHLORIDE, POTASSIUM CHLORIDE, SODIUM LACTATE AND CALCIUM CHLORIDE 150 ML/HR: 600; 310; 30; 20 INJECTION, SOLUTION INTRAVENOUS at 08:43

## 2018-11-09 RX ADMIN — FENTANYL CITRATE 25 MCG: 50 INJECTION INTRAMUSCULAR; INTRAVENOUS at 14:53

## 2018-11-09 RX ADMIN — EPHEDRINE SULFATE 10 MG: 50 INJECTION INTRAMUSCULAR; INTRAVENOUS; SUBCUTANEOUS at 14:41

## 2018-11-09 RX ADMIN — MIDAZOLAM 1 MG: 1 INJECTION INTRAMUSCULAR; INTRAVENOUS at 13:35

## 2018-11-09 RX ADMIN — FENTANYL CITRATE 75 MCG: 50 INJECTION INTRAMUSCULAR; INTRAVENOUS at 14:28

## 2018-11-09 RX ADMIN — BUTORPHANOL TARTRATE 2 MG: 2 INJECTION, SOLUTION INTRAMUSCULAR; INTRAVENOUS at 17:40

## 2018-11-09 RX ADMIN — PHENYLEPHRINE HYDROCHLORIDE 100 MCG: 10 INJECTION INTRAVENOUS at 14:43

## 2018-11-09 RX ADMIN — Medication 3 ML: at 00:08

## 2018-11-09 RX ADMIN — FENTANYL CITRATE 50 MCG: 50 INJECTION, SOLUTION INTRAMUSCULAR; INTRAVENOUS at 16:05

## 2018-11-09 RX ADMIN — CEFAZOLIN SODIUM 2 G: 2 INJECTION, SOLUTION INTRAVENOUS at 14:31

## 2018-11-09 RX ADMIN — FAMOTIDINE 20 MG: 20 TABLET, FILM COATED ORAL at 21:19

## 2018-11-09 RX ADMIN — LIDOCAINE HYDROCHLORIDE 100 MG: 20 INJECTION, SOLUTION INFILTRATION; PERINEURAL at 14:28

## 2018-11-09 RX ADMIN — SODIUM CHLORIDE, POTASSIUM CHLORIDE, SODIUM LACTATE AND CALCIUM CHLORIDE 9 ML/HR: 600; 310; 30; 20 INJECTION, SOLUTION INTRAVENOUS at 11:07

## 2018-11-09 RX ADMIN — FAMOTIDINE 20 MG: 10 INJECTION, SOLUTION INTRAVENOUS at 11:33

## 2018-11-09 RX ADMIN — SERTRALINE HYDROCHLORIDE 100 MG: 100 TABLET, FILM COATED ORAL at 21:21

## 2018-11-09 RX ADMIN — SUGAMMADEX 200 MG: 100 INJECTION, SOLUTION INTRAVENOUS at 15:21

## 2018-11-09 RX ADMIN — ONDANSETRON 4 MG: 2 INJECTION INTRAMUSCULAR; INTRAVENOUS at 15:16

## 2018-11-09 RX ADMIN — DEXAMETHASONE SODIUM PHOSPHATE 8 MG: 10 INJECTION INTRAMUSCULAR; INTRAVENOUS at 14:48

## 2018-11-09 RX ADMIN — PROPOFOL 150 MG: 10 INJECTION, EMULSION INTRAVENOUS at 14:28

## 2018-11-09 RX ADMIN — IBUPROFEN 600 MG: 600 TABLET ORAL at 21:20

## 2018-11-09 RX ADMIN — FAMOTIDINE 20 MG: 20 TABLET, FILM COATED ORAL at 09:29

## 2018-11-09 RX ADMIN — ROCURONIUM BROMIDE 15 MG: 10 INJECTION INTRAVENOUS at 14:28

## 2018-11-09 RX ADMIN — FENTANYL CITRATE 25 MCG: 50 INJECTION, SOLUTION INTRAMUSCULAR; INTRAVENOUS at 16:30

## 2018-11-09 NOTE — NON STRESS TEST
Sherry Quevedo, a  at 27w2d with an ZACKERY of 2019, by Patient Reported, was seen at [facility] for a nonstress test.    Chief Complaint   Patient presents with   • Abdominal Pain     Pt states that she started having epigastric pain that woke her up around 0300 today.  Pt states that it felt like pressure on her chest that radiated to her righ shoulder and back. Pt reports that pain was 10/10 on pain scale.  Rates pain 3/10 right now.  Pt went to office, had labs drawn, and was sent over to L&D for inpatient observation.       Patient Active Problem List   Diagnosis   • Transaminitis   • Calculus of bile duct with cholecystitis without obstruction       Start Time: 1032  Stop Time: 1131

## 2018-11-09 NOTE — PLAN OF CARE
Problem: Patient Care Overview  Goal: Plan of Care Review  Outcome: Ongoing (interventions implemented as appropriate)   11/09/18 0427   Plan of Care Review   Progress no change   Coping/Psychosocial   Plan of Care Reviewed With patient;spouse   OTHER   Outcome Summary RNST. +FM. Denies LOF, VB, CYXs. Denies pain at this time. Medications as ordered. AM labs to be drawn. Scheduled for cholecystectomy this afternoon. Pt NPO since 0000.      Goal: Individualization and Mutuality  Outcome: Ongoing (interventions implemented as appropriate)   11/07/18 1850 11/08/18 1835   Individualization   Patient Specific Preferences Pain control less than 5 out of 10 on pain scale --    Patient Specific Goals (Include Timeframe) Remain pain free --    Patient Specific Interventions --  npo after midnight, lr at 150 cc/hr after midnight tonight, patient will have lap choley tomorrow per general surgery   Mutuality/Individual Preferences   What Anxieties, Fears, Concerns, or Questions Do You Have About Your Care? Return of debilitating pain --    How Would You and/or Your Support Person Like to Participate in Your Care? Remain at bedside; be involved in care --    Mutuality/Individual Preferences   How to Address Anxieties/Fears Keep informed of plan of care --      Goal: Discharge Needs Assessment  Outcome: Ongoing (interventions implemented as appropriate)   11/07/18 1907 11/07/18 2325   Discharge Needs Assessment   Readmission Within the Last 30 Days no previous admission in last 30 days --    Concerns to be Addressed no discharge needs identified --    Patient/Family Anticipates Transition to --  home   Patient/Family Anticipated Services at Transition --  none   Transportation Concerns --  car, none   Transportation Anticipated --  car, drives self   Anticipated Changes Related to Illness none --    Disability   Equipment Currently Used at Home none --      Goal: Interprofessional Rounds/Family Conf  Outcome: Ongoing  (interventions implemented as appropriate)   11/09/18 0427   Interdisciplinary Rounds/Family Conf   Participants nursing;patient;pharmacy;physician       Problem: Prenatal Patient, Hospitalized (Adult,Obstetrics,Pediatric)  Goal: Signs and Symptoms of Listed Potential Problems Will be Absent, Minimized or Managed (Prenatal Patient, Hospitalized)  Outcome: Ongoing (interventions implemented as appropriate)   11/09/18 0427   Goal/Outcome Evaluation   Problems Assessed (Prenatal Patient) all   Problems Present (Prenatal Patient) other (see comments)  (CHoolecystitis)       Problem: Pain, Acute (Adult)  Goal: Identify Related Risk Factors and Signs and Symptoms  Outcome: Ongoing (interventions implemented as appropriate)   11/09/18 0427   Pain, Acute (Adult)   Related Risk Factors (Acute Pain) disease process   Signs and Symptoms (Acute Pain) verbalization of pain descriptors     Goal: Acceptable Pain Control/Comfort Level  Outcome: Ongoing (interventions implemented as appropriate)   11/09/18 0427   Pain, Acute (Adult)   Acceptable Pain Control/Comfort Level making progress toward outcome

## 2018-11-09 NOTE — ANESTHESIA PREPROCEDURE EVALUATION
Anesthesia Evaluation     Patient summary reviewed and Nursing notes reviewed   NPO Solid Status: > 8 hours  NPO Liquid Status: > 2 hours           Airway   Mallampati: II  TM distance: >3 FB  Neck ROM: full  Dental - normal exam     Pulmonary - negative pulmonary ROS and normal exam   Cardiovascular - negative cardio ROS and normal exam        Neuro/Psych  (+) psychiatric history Anxiety and Depression,     GI/Hepatic/Renal/Endo - negative ROS     Musculoskeletal (-) negative ROS    Abdominal    Substance History - negative use     OB/GYN    (+) Pregnant,     Comment: 27 wks      Other                      Anesthesia Plan    ASA 2     general   (27 wks Pregnant)  Anesthetic plan, all risks, benefits, and alternatives have been provided, discussed and informed consent has been obtained with: patient.

## 2018-11-09 NOTE — ANESTHESIA PROCEDURE NOTES
Airway  Urgency: elective    Airway not difficult    General Information and Staff    Patient location during procedure: OR  CRNA: ROBERTO CARLOS CHILDRESS    Indications and Patient Condition  Indications for airway management: airway protection    Preoxygenated: yes  MILS maintained throughout  Mask difficulty assessment: 0 - not attempted    Final Airway Details  Final airway type: endotracheal airway      Successful airway: ETT    Successful intubation technique: direct laryngoscopy and RSI  Blade: Humaira  Blade size: 3  ETT size: 7.0 mm  Cormack-Lehane Classification: grade I - full view of glottis  Placement verified by: chest auscultation   Measured from: teeth  ETT to teeth (cm): 20  Number of attempts at approach: 1

## 2018-11-09 NOTE — PROGRESS NOTES
[facility]  Inpatient Progress Note    Reason for Follow Up:  Cholelithiasis, biliary colic    Subjective     Interval History:   She feels well this morning, discussed her case with Dr. Lassiter last night and she will proceed with surgery    Current Facility-Administered Medications:   •  doxylamine (UNISOM) tablet 25 mg, 25 mg, Oral, Nightly PRN, Temi Burns MD, 25 mg at 11/08/18 0029  •  famotidine (PEPCID) tablet 20 mg, 20 mg, Oral, BID, Temi Burns MD, 20 mg at 11/08/18 2026  •  lactated ringers infusion, 150 mL/hr, Intravenous, Continuous, Britta Fraire MD, Last Rate: 150 mL/hr at 11/09/18 0843, 150 mL/hr at 11/09/18 0843  •  lidocaine PF 1% (XYLOCAINE) injection 5 mL, 5 mL, Intradermal, PRN, Temi Burns MD  •  polyethylene glycol (MIRALAX) powder 17 g, 17 g, Oral, Daily, Britta Fraire MD, 17 g at 11/08/18 1608  •  sertraline (ZOLOFT) tablet 100 mg, 100 mg, Oral, Nightly, Britta Fraire MD, 100 mg at 11/08/18 2027  •  sodium chloride 0.9 % flush 3 mL, 3 mL, Intravenous, Q12H, Temi Burns MD, 3 mL at 11/09/18 0008  •  sodium chloride 0.9 % flush 3-10 mL, 3-10 mL, Intravenous, PRN, Temi Burns MD  Review of Systems:    She has a bit of nausea all other systems reviewed and negative    Objective     Vital Signs  Temp:  [98 °F (36.7 °C)-98.4 °F (36.9 °C)] 98.4 °F (36.9 °C)  Heart Rate:  [80-97] 86  Resp:  [16-18] 16  BP: ()/(61-70) 99/61  Body mass index is 31.53 kg/m².    Intake/Output Summary (Last 24 hours) at 11/09/18 0855  Last data filed at 11/09/18 0633   Gross per 24 hour   Intake              788 ml   Output                0 ml   Net              788 ml     No intake/output data recorded.     Physical Exam:   General: patient awake, alert and cooperative   Eyes: Normal lids and lashes, no scleral icterus   Neck: supple, normal ROM   Skin: warm and dry, not jaundiced   Cardiovascular: regular rhythm and rate, no murmurs  auscultated   Pulm: clear to auscultation bilaterally, regular and unlabored   Abdomen: soft, nontender, nondistended; normal bowel sounds   Rectal: deferred   Extremities: no rash or edema   Psychiatric: Normal mood and behavior; memory intact     Results Review:     I reviewed the patient's new clinical results.      Results from last 7 days  Lab Units 18  1807   WBC 10*3/mm3 10.75*   HEMOGLOBIN g/dL 10.7*   HEMATOCRIT % 31.7*   PLATELETS 10*3/mm3 148       Results from last 7 days  Lab Units 18  0601 18  0619 18  1807   SODIUM mmol/L 140 140 141   POTASSIUM mmol/L 3.5 3.3* 3.9   CHLORIDE mmol/L 105 106 104   CO2 mmol/L 24.7 23.0 25.2   BUN mg/dL 5* 6 7   CREATININE mg/dL 0.50* 0.50* 0.54*   CALCIUM mg/dL 8.4* 8.2* 9.0   BILIRUBIN mg/dL <0.2 0.3 0.5   ALK PHOS U/L 116 138* 172*   ALT (SGPT) U/L 30 48* 68*   AST (SGOT) U/L 22 57* 110*   GLUCOSE mg/dL 80 84 72           Lab Results  Lab Value Date/Time   LIPASE 45 2018 0619   LIPASE 63 (H) 2018 1807       Radiology:  US Gallbladder   Final Result   1. Cholelithiasis.           This report was finalized on 2018 5:52 AM by Macario Mohan M.D.              Assessment/Plan     Patient Active Problem List   Diagnosis   • Transaminitis   • Calculus of bile duct with cholecystitis without obstruction      Assessment:  1) 29 yo female  27 week pregnant female  2) One month h/o RUQ abdominal pain worse in the last 4 days.   3) symptomatic cholelithiasis    Proceed with surgery today, we will see as needed, likely home tomorrow    I discussed the patients findings and my recommendations with patient, family and nursing staff.    Bassam Salazar MD

## 2018-11-09 NOTE — NON STRESS TEST
Reason for test: Antepartum  Date of Test: 2018  Time frame of test:   RN NST Interpretation: Reactive    Sherry Quevedo, donny  at 27w2d with an ZACKERY of 2019, by Patient Reported, was seen at [facility] ANTEPARTUM UNIT for a nonstress test.    Chief Complaint   Patient presents with   • Abdominal Pain     Pt states that she started having epigastric pain that woke her up around 0300 today.  Pt states that it felt like pressure on her chest that radiated to her righ shoulder and back. Pt reports that pain was 10/10 on pain scale.  Rates pain 3/10 right now.  Pt went to office, had labs drawn, and was sent over to L&D for inpatient observation.       Patient Active Problem List   Diagnosis   • Transaminitis   • Calculus of bile duct with cholecystitis without obstruction

## 2018-11-09 NOTE — ANESTHESIA POSTPROCEDURE EVALUATION
"Patient: Sherry Quevedo    Procedure Summary     Date:  11/09/18 Room / Location:  [facility] OR  / [facility] MAIN OR    Anesthesia Start:  1425 Anesthesia Stop:  1545    Procedure:  Laparoscopic cholecystectomy with intraoperative cholangiogram, possible open (N/A Abdomen) Diagnosis:       Calculus of bile duct with cholecystitis without obstruction, unspecified cholecystitis acuity      (Calculus of bile duct with cholecystitis without obstruction, unspecified cholecystitis acuity [K80.40])    Surgeon:  Khanh Lassiter MD Provider:  Hernandez Bailon MD    Anesthesia Type:  general ASA Status:  2          Anesthesia Type: general  Last vitals  BP   108/65 (11/09/18 1630)   Temp   37.1 °C (98.7 °F) (11/09/18 1630)   Pulse   101 (11/09/18 1630)   Resp   16 (11/09/18 1630)     SpO2   94 % (11/09/18 1630)     Post Anesthesia Care and Evaluation    Patient location during evaluation: bedside  Patient participation: complete - patient participated  Level of consciousness: awake and alert  Pain management: adequate  Airway patency: patent  Anesthetic complications: No anesthetic complications    Cardiovascular status: acceptable  Respiratory status: acceptable  Hydration status: acceptable    Comments: /65   Pulse 101   Temp 37.1 °C (98.7 °F)   Resp 16   Ht 160 cm (63\")   Wt 80.7 kg (178 lb)   SpO2 94%   Breastfeeding? No   BMI 31.53 kg/m²       "

## 2018-11-09 NOTE — NURSING NOTE
Preop holding called and RN informed nurse there that pt showered last night but did not use hibiclens or chlorhexadine wipes.  RN reported pt did not need to shower again this AM due to showered last night using her own soap.

## 2018-11-09 NOTE — OP NOTE
Operative Note :   Khanh Lassiter MD    Sherry Quevedo  1990    Procedure Date: 11/09/18    Pre-op Diagnosis:  Calculus of bile duct with cholecystitis without obstruction, unspecified cholecystitis acuity [K80.40]    Post-Op Diagnosis:  Same    Procedure:   · Laparoscopic cholecystectomy    Surgeon: Khanh Lassiter MD    Assistant: Moose Isbell MD    Anesthesia:  General (general endotracheal tube)    EBL:   minimal    Specimens:   Gallbladder and contents    Indications:  · 28-year-old young lady who is just over 27 weeks pregnant with symptomatic cholelithiasis.  The risks and benefits of surgical intervention were discussed in detail and the patient expressed understanding.    Findings:   · None    Recommendations:   · Routine postoperative care, including monitoring as felt appropriate by the obstetric service.    Description of procedure:    After obtaining informed consent, patient was brought to the operating room and placed under a general anesthetic.  The patient's abdomen was sterilely prepped and draped.  Supraumbilical incision was made and carried through the subcutaneous tissue.  The midline fascia was identified and incised for approximately 1 cm.  #1 Vicryl suture was placed on each side of the fascia in a U stitch pattern.  The peritoneum was then bluntly penetrated and a Cowan trocar was inserted and the abdomen was insufflated with CO2.  Cursory examination of the abdomen was unremarkable.  The uterus was noted to be several centimeters above the umbilicus.  The round ligament on the right side did appear to be stretched.  There are no distinct abnormalities noted.  Additional 5 mm trochars were then added, one in the epigastrium and 2 in the right upper quadrant.  The gallbladder was then grasped and retracted cephalad.  A second retractor was placed on the infundibulum and retracted laterally.  The peritoneum overlying the cystic triangle was incised and peeled down.  The cystic  duct and cystic artery were exposed.  There was actually a large cystic artery branch running along the cystic duct.  This was  from the cystic duct and divided.  The remainder of the triangle was completely cleared out and then the lower half of the gallbladder was mobilized off the bed of the liver, giving us the critical view of safety.  2 clips were then placed proximally and one distally on both the cystic duct and the cystic artery and these were then divided between the clips.  Because of the patient's pregnancy status and normal total bilirubin I elected to forego cholangiogram at this time.  The gallbladder was then removed from the bed of the liver using electrocautery and then was placed in an Endo Catch bag and was removed.  The liver was then lifted up and inspected.  Any small bleeders on the surface were cauterized.  The clips on the cystic duct and cystic artery stumps were in good position.  Trochars were then removed under direct visualization.  The abdomen was desufflated.  The supraumbilical fascial incision was reapproximated with #1 Vicryl suture.  Skin incisions were closed with 4-0 Monocryl.  Sterile dressings were applied and the patient was awakened and brought back to recovery in stable condition.    Khanh Lassiter MD  General and Endoscopic Surgery  [facility]    [address], [address]  [place], [place], [address]  P: [phone]  F: [phone]

## 2018-11-09 NOTE — NURSING NOTE
RN spoke with April in preop holding and informed her patient and  looked over consents but they did not sign them yet due to still had questions for Dr Lassiter.  RN also informed temp currently 99.3 but pt has not been running a fever.  RN also informed no H&P from Dr Lassiter on chart yet.  RN informed MD told this RN he would speak again to patient and  before surgery to answer their questions then consents could be signed.

## 2018-11-09 NOTE — NON STRESS TEST
Sherry Quevedo, a  at 27w3d with an ZACKERY of 2019, by Patient Reported, was seen at [facility] for a nonstress test.    Chief Complaint   Patient presents with   • Abdominal Pain     Pt states that she started having epigastric pain that woke her up around 0300 today.  Pt states that it felt like pressure on her chest that radiated to her righ shoulder and back. Pt reports that pain was 10/10 on pain scale.  Rates pain 3/10 right now.  Pt went to office, had labs drawn, and was sent over to L&D for inpatient observation.       Patient Active Problem List   Diagnosis   • Transaminitis   • Calculus of bile duct with cholecystitis without obstruction       Start Time: 933 Stop Time: 100  Interpretation A  Nonstress Test Interpretation A: Reactive (18 1004 : Margarita García, RN)

## 2018-11-10 VITALS
SYSTOLIC BLOOD PRESSURE: 109 MMHG | HEART RATE: 93 BPM | HEIGHT: 63 IN | BODY MASS INDEX: 31.54 KG/M2 | DIASTOLIC BLOOD PRESSURE: 70 MMHG | TEMPERATURE: 98.2 F | OXYGEN SATURATION: 98 % | WEIGHT: 178 LBS | RESPIRATION RATE: 18 BRPM

## 2018-11-10 PROBLEM — K80.40 CALCULUS OF BILE DUCT WITH CHOLECYSTITIS WITHOUT OBSTRUCTION: Status: RESOLVED | Noted: 2018-11-07 | Resolved: 2018-11-10

## 2018-11-10 PROBLEM — R74.01 TRANSAMINITIS: Status: RESOLVED | Noted: 2018-11-07 | Resolved: 2018-11-10

## 2018-11-10 LAB
CMV IGG SERPL IA-ACNC: <0.6 U/ML (ref 0–0.59)
CMV IGM SERPL IA-ACNC: <30 AU/ML (ref 0–29.9)
EBV VCA IGG SER-ACNC: 354 U/ML (ref 0–17.9)
EBV VCA IGM SER-ACNC: <36 U/ML (ref 0–35.9)

## 2018-11-10 PROCEDURE — 25010000002 ONDANSETRON PER 1 MG

## 2018-11-10 PROCEDURE — 99024 POSTOP FOLLOW-UP VISIT: CPT | Performed by: SURGERY

## 2018-11-10 PROCEDURE — 25010000002 BUTORPHANOL PER 1 MG: Performed by: SURGERY

## 2018-11-10 RX ORDER — ONDANSETRON 2 MG/ML
INJECTION INTRAMUSCULAR; INTRAVENOUS
Status: COMPLETED
Start: 2018-11-10 | End: 2018-11-10

## 2018-11-10 RX ORDER — OXYCODONE HYDROCHLORIDE AND ACETAMINOPHEN 5; 325 MG/1; MG/1
2 TABLET ORAL EVERY 4 HOURS PRN
Status: DISCONTINUED | OUTPATIENT
Start: 2018-11-10 | End: 2018-11-11 | Stop reason: HOSPADM

## 2018-11-10 RX ORDER — SIMETHICONE 80 MG
80 TABLET,CHEWABLE ORAL 4 TIMES DAILY PRN
Status: DISCONTINUED | OUTPATIENT
Start: 2018-11-10 | End: 2018-11-11 | Stop reason: HOSPADM

## 2018-11-10 RX ORDER — OXYCODONE HYDROCHLORIDE AND ACETAMINOPHEN 5; 325 MG/1; MG/1
2 TABLET ORAL EVERY 4 HOURS PRN
Qty: 30 TABLET | Refills: 0 | Status: SHIPPED | OUTPATIENT
Start: 2018-11-10 | End: 2018-11-21

## 2018-11-10 RX ORDER — DIPHENHYDRAMINE HCL 25 MG
25 CAPSULE ORAL EVERY 6 HOURS PRN
Status: DISCONTINUED | OUTPATIENT
Start: 2018-11-10 | End: 2018-11-11 | Stop reason: HOSPADM

## 2018-11-10 RX ORDER — ONDANSETRON 2 MG/ML
4 INJECTION INTRAMUSCULAR; INTRAVENOUS EVERY 6 HOURS PRN
Status: DISCONTINUED | OUTPATIENT
Start: 2018-11-10 | End: 2018-11-11 | Stop reason: HOSPADM

## 2018-11-10 RX ADMIN — OXYCODONE HYDROCHLORIDE AND ACETAMINOPHEN 2 TABLET: 5; 325 TABLET ORAL at 10:19

## 2018-11-10 RX ADMIN — BUTORPHANOL TARTRATE 2 MG: 2 INJECTION, SOLUTION INTRAMUSCULAR; INTRAVENOUS at 12:09

## 2018-11-10 RX ADMIN — IBUPROFEN 600 MG: 600 TABLET ORAL at 05:41

## 2018-11-10 RX ADMIN — SODIUM CHLORIDE, POTASSIUM CHLORIDE, SODIUM LACTATE AND CALCIUM CHLORIDE 150 ML/HR: 600; 310; 30; 20 INJECTION, SOLUTION INTRAVENOUS at 15:57

## 2018-11-10 RX ADMIN — SODIUM CHLORIDE, POTASSIUM CHLORIDE, SODIUM LACTATE AND CALCIUM CHLORIDE 150 ML/HR: 600; 310; 30; 20 INJECTION, SOLUTION INTRAVENOUS at 08:44

## 2018-11-10 RX ADMIN — SIMETHICONE CHEW TAB 80 MG 80 MG: 80 TABLET ORAL at 10:20

## 2018-11-10 RX ADMIN — ONDANSETRON 2 MG: 2 INJECTION INTRAMUSCULAR; INTRAVENOUS at 03:19

## 2018-11-10 RX ADMIN — OXYCODONE HYDROCHLORIDE AND ACETAMINOPHEN 2 TABLET: 5; 325 TABLET ORAL at 20:41

## 2018-11-10 RX ADMIN — POLYETHYLENE GLYCOL 3350 17 G: 17 POWDER, FOR SOLUTION ORAL at 08:41

## 2018-11-10 RX ADMIN — BUTORPHANOL TARTRATE 2 MG: 2 INJECTION, SOLUTION INTRAMUSCULAR; INTRAVENOUS at 03:25

## 2018-11-10 RX ADMIN — IBUPROFEN 600 MG: 600 TABLET ORAL at 15:57

## 2018-11-10 RX ADMIN — FAMOTIDINE 20 MG: 20 TABLET, FILM COATED ORAL at 08:41

## 2018-11-10 RX ADMIN — BUTORPHANOL TARTRATE 2 MG: 2 INJECTION, SOLUTION INTRAMUSCULAR; INTRAVENOUS at 07:23

## 2018-11-10 RX ADMIN — SODIUM CHLORIDE, POTASSIUM CHLORIDE, SODIUM LACTATE AND CALCIUM CHLORIDE 150 ML/HR: 600; 310; 30; 20 INJECTION, SOLUTION INTRAVENOUS at 01:01

## 2018-11-10 RX ADMIN — ONDANSETRON HYDROCHLORIDE 2 MG: 2 SOLUTION INTRAMUSCULAR; INTRAVENOUS at 03:19

## 2018-11-10 NOTE — NON STRESS TEST
Sherry Quevedo, a  at 27w3d with an ZACKERY of 2019, by Patient Reported, was seen at [facility] ANTEPARTUM UNIT for a nonstress test.    Chief Complaint   Patient presents with   • Abdominal Pain     Pt states that she started having epigastric pain that woke her up around 0300 today.  Pt states that it felt like pressure on her chest that radiated to her righ shoulder and back. Pt reports that pain was 10/10 on pain scale.  Rates pain 3/10 right now.  Pt went to office, had labs drawn, and was sent over to L&D for inpatient observation.       Patient Active Problem List   Diagnosis   • Transaminitis   • Calculus of bile duct with cholecystitis without obstruction       Start Time:   Stop Time:      reactive

## 2018-11-10 NOTE — NON STRESS TEST
Sherry Quevedo, a  at 27w4d with an ZACKERY of 2019, by Patient Reported, was seen at [facility] ANTEPARTUM UNIT for a nonstress test.    Chief Complaint   Patient presents with   • Abdominal Pain     Pt states that she started having epigastric pain that woke her up around 0300 today.  Pt states that it felt like pressure on her chest that radiated to her righ shoulder and back. Pt reports that pain was 10/10 on pain scale.  Rates pain 3/10 right now.  Pt went to office, had labs drawn, and was sent over to L&D for inpatient observation.       Patient Active Problem List   Diagnosis   • Transaminitis   • Calculus of bile duct with cholecystitis without obstruction       Start Time: 1116  Stop Time: 1210

## 2018-11-10 NOTE — NON STRESS TEST
Sherry Quevedo, a  at 27w4d with an ZACKERY of 2019, by Patient Reported, was seen at [facility] ANTEPARTUM UNIT for a nonstress test.    Chief Complaint   Patient presents with   • Abdominal Pain     Pt states that she started having epigastric pain that woke her up around 0300 today.  Pt states that it felt like pressure on her chest that radiated to her righ shoulder and back. Pt reports that pain was 10/10 on pain scale.  Rates pain 3/10 right now.  Pt went to office, had labs drawn, and was sent over to L&D for inpatient observation.       Patient Active Problem List   Diagnosis   • Transaminitis   • Calculus of bile duct with cholecystitis without obstruction       Start Time:   Stop Time:     Interpretation A  Nonstress Test Interpretation A: Reactive (11/10/18 1840 : Tiffani Cuellar, RN)        Pt placed on monitors because she felt tightening that she felt could be fetal movement, but wanted reassurance she was not having ctx prior to being discharged home tonight.

## 2018-11-10 NOTE — PLAN OF CARE
Problem: Patient Care Overview  Goal: Plan of Care Review  Outcome: Ongoing (interventions implemented as appropriate)   11/09/18 1855   Plan of Care Review   Progress improving   Coping/Psychosocial   Plan of Care Reviewed With patient;spouse   OTHER   Outcome Summary lap julita today, fetal moitoring reactive     Goal: Individualization and Mutuality  Outcome: Ongoing (interventions implemented as appropriate)   11/09/18 1855   Individualization   Patient Specific Preferences healthy baby and Mom after surgery, pain controlled   Patient Specific Goals (Include Timeframe) pain control, VSS   Patient Specific Interventions lap julita today, PRN meds for pain   Mutuality/Individual Preferences   What Anxieties, Fears, Concerns, or Questions Do You Have About Your Care? surgery while pregnant   What Information Would Help Us Give You More Personalized Care? none   How Would You and/or Your Support Person Like to Participate in Your Care? explanations   Mutuality/Individual Preferences   How to Address Anxieties/Fears keep informed of POC     Goal: Discharge Needs Assessment  Outcome: Ongoing (interventions implemented as appropriate)   11/09/18 1855   Discharge Needs Assessment   Readmission Within the Last 30 Days no previous admission in last 30 days   Concerns to be Addressed no discharge needs identified   Patient/Family Anticipates Transition to home with family   Patient/Family Anticipated Services at Transition none   Transportation Anticipated car, drives self   Anticipated Changes Related to Illness none   Equipment Needed After Discharge none   Offered/Gave Vendor List no   Disability   Equipment Currently Used at Home none     Goal: Interprofessional Rounds/Family Conf  Outcome: Ongoing (interventions implemented as appropriate)   11/09/18 1855   Interdisciplinary Rounds/Family Conf   Participants family;nursing;patient;pharmacy;physician       Problem: Prenatal Patient, Hospitalized  (Adult,Obstetrics,Pediatric)  Goal: Signs and Symptoms of Listed Potential Problems Will be Absent, Minimized or Managed (Prenatal Patient, Hospitalized)  Outcome: Ongoing (interventions implemented as appropriate)   11/09/18 1855   Goal/Outcome Evaluation   Problems Assessed (Prenatal Patient) all   Problems Present (Prenatal Patient) none       Problem: Pain, Acute (Adult)  Goal: Identify Related Risk Factors and Signs and Symptoms  Outcome: Ongoing (interventions implemented as appropriate)   11/09/18 1855   Pain, Acute (Adult)   Related Risk Factors (Acute Pain) procedure/treatment     Goal: Acceptable Pain Control/Comfort Level  Outcome: Ongoing (interventions implemented as appropriate)   11/09/18 1855   Pain, Acute (Adult)   Acceptable Pain Control/Comfort Level making progress toward outcome       Problem: Cholecystectomy (Adult)  Goal: Anesthesia/Sedation Recovery  Outcome: Ongoing (interventions implemented as appropriate)   11/09/18 1855   Goal/Outcome Evaluation   Anesthesia/Sedation Recovery progressing toward baseline

## 2018-11-10 NOTE — PLAN OF CARE
Problem: Patient Care Overview  Goal: Plan of Care Review   11/10/18 1846   Plan of Care Review   Progress improving   Coping/Psychosocial   Plan of Care Reviewed With patient;spouse   OTHER   Outcome Summary Pt pain under control with po percocets. Eating and urinating well. Passing flatus. Reactive NST x2. Pt to discharge home tonight.     Goal: Individualization and Mutuality  Outcome: Ongoing (interventions implemented as appropriate)   11/09/18 1855   Individualization   Patient Specific Preferences healthy baby and Mom after surgery, pain controlled   Patient Specific Goals (Include Timeframe) pain control, VSS   Patient Specific Interventions lap julita today, PRN meds for pain   Mutuality/Individual Preferences   What Anxieties, Fears, Concerns, or Questions Do You Have About Your Care? surgery while pregnant   What Information Would Help Us Give You More Personalized Care? none   How Would You and/or Your Support Person Like to Participate in Your Care? explanations   Mutuality/Individual Preferences   How to Address Anxieties/Fears keep informed of POC     Goal: Discharge Needs Assessment  Outcome: Ongoing (interventions implemented as appropriate)   11/07/18 1364 11/09/18 1855   Discharge Needs Assessment   Readmission Within the Last 30 Days --  no previous admission in last 30 days   Concerns to be Addressed --  no discharge needs identified   Patient/Family Anticipates Transition to --  home with family   Patient/Family Anticipated Services at Transition --  none   Transportation Concerns car, none --    Transportation Anticipated --  car, drives self   Anticipated Changes Related to Illness --  none   Equipment Needed After Discharge --  none   Offered/Gave Vendor List --  no   Disability   Equipment Currently Used at Home --  none     Goal: Interprofessional Rounds/Family Conf  Outcome: Ongoing (interventions implemented as appropriate)   11/09/18 1855   Interdisciplinary Rounds/Family Conf    Participants family;nursing;patient;pharmacy;physician       Problem: Prenatal Patient, Hospitalized (Adult,Obstetrics,Pediatric)  Goal: Signs and Symptoms of Listed Potential Problems Will be Absent, Minimized or Managed (Prenatal Patient, Hospitalized)   11/09/18 1855   Goal/Outcome Evaluation   Problems Assessed (Prenatal Patient) all   Problems Present (Prenatal Patient) none       Problem: Pain, Acute (Adult)  Goal: Identify Related Risk Factors and Signs and Symptoms  Outcome: Ongoing (interventions implemented as appropriate)   11/10/18 1846   Pain, Acute (Adult)   Related Risk Factors (Acute Pain) surgery   Signs and Symptoms (Acute Pain) verbalization of pain descriptors     Goal: Acceptable Pain Control/Comfort Level  Outcome: Ongoing (interventions implemented as appropriate)   11/10/18 1846   Pain, Acute (Adult)   Acceptable Pain Control/Comfort Level making progress toward outcome       Problem: Cholecystectomy (Adult)  Goal: Signs and Symptoms of Listed Potential Problems Will be Absent, Minimized or Managed (Cholecystectomy)  Outcome: Ongoing (interventions implemented as appropriate)   11/10/18 1846   Goal/Outcome Evaluation   Problems Assessed (Cholecystectomy) all   Problems Present (Cholecystectomy) decreased bowel motility;pain

## 2018-11-10 NOTE — PROGRESS NOTES
[facility]  Obstetric Progress Note    Patient Name: Sherry Quevedo  :  1990  MRN:  [id number]  Hospital Day: 3  EGA: 27w4d      Subjective     Now POD 1 from cholecystectomy.  No contractions noted.    Objective     VS:  Vital Signs Range for the last 24 hours  Temperature: Temp:  [97.8 °F (36.6 °C)-99.3 °F (37.4 °C)] 98.9 °F (37.2 °C)   Temp Source: Temp src: Oral   BP: BP: ()/(48-78) 99/50   Pulse: Heart Rate:  [] 104   Respirations: Resp:  [16-23] 18                   Physical Examination:     General :  Alert in NAD  Abdomen: Gravid, Fundus - NT       Lab results reviewed:  CBC:   Lab Results   Component Value Date    WBC 10.75 (H) 2018    HGB 10.7 (L) 2018    HCT 31.7 (L) 2018            A/P:      Calculus of bile duct with cholecystitis without obstruction    Transaminitis    Management now per general surgery.  Okay for DC from our standpoint.  Is going to try oral pain meds and food today.  Most pain from intraperitoneal CO2- explained very normal.  I'll check back later to see if going home today or not.          Hillary Nunn MD  11/10/2018  8:11 AM

## 2018-11-10 NOTE — PROGRESS NOTES
[facility]  Obstetric Progress Note    Patient Name: Sherry Quevedo  :  1990  MRN:  [id number]  Hospital Day: 3  EGA: 27w5d    DELAYED ENTRY NOTE- epic down for upgrade so unable to complete charting.     Subjective    S/p laparoscopic cholecystectomy today. Few hrs post op. Feeling better. Pain well controlled.     Pt/fob upset because baby was not monitored in recovery for 1 1/2 hrs. They knew would not monitor during surgery but thought was to be checked on In recovery.     No ob c/o. Good FM, no vb. No lof. Not feeling contns/ little crampy.      Objective     VS:  Vital Signs Range for the last 24 hours  Temperature: Temp:  [98.2 °F (36.8 °C)] 98.2 °F (36.8 °C)   Temp Source: Temp src: Oral   BP: BP: (109)/(70) 109/70   Pulse: Heart Rate:  [93-96] 93   Respirations: Resp:  [18] 18                   Physical Examination:     General :  Alert in NAD  Abdomen: Gravid, Fundus - nontender... Incision- dressings CDI. approp tenderness.    NST-  NR but reasurring for 27 wks. No worrisome decels. Good variability    toco- irritability but not denise..    After several hrs of fetal monitoring, just did toco most of night         Lab results reviewed:  CBC:   Lab Results   Component Value Date    WBC 10.75 (H) 2018    HGB 10.7 (L) 2018    HCT 31.7 (L) 2018            A/P:    1-27.5 wk pregnant   2- s/p lap cholecystectomy few hrs ago ---    baby looks good. Will give up to 24hrs motrin for pain and uterine irritability. Hope to send home tomorrow if gen surgery gives the ok.            Mehnaz Maravilla MD  2018  2:40 PM

## 2018-11-10 NOTE — NURSING NOTE
Pt back in room from PACU.  VSS.  Pt wanting to void.  Pt assisted onto bedpan.  Pt sat up in bed to try to void.  EFM and toco to be placed after pt finishes on bedpan.

## 2018-11-10 NOTE — PROGRESS NOTES
Postoperative day 1 laparoscopic cholecystectomy    Subjective:  Overall feels okay.  Also complains of pain into her right shoulder.  Tolerated a diet.    Objective:  Patient is afebrile, vitals are stable  Gen.: Awake alert and oriented, no acute distress  Abdomen: Soft, appropriately tender, dressings are clean.    Assessment and plan:  Postop day 1 laparoscopic cholecystectomy  Overall looks pretty well.  I told her that if she is feeling better this afternoon he would probably be okay from my standpoint for discharge.  If she still a little too sore or uncomfortable it would certainly be reasonable to watch for another night.    Khanh Lassiter MD  General and Endoscopic Surgery  [facility]    [address], [address]  [place], [place], [address]  P: [phone]  F: [phone]

## 2018-11-11 NOTE — DISCHARGE SUMMARY
Date of Discharge:  11/10/2018    Discharge Diagnosis: 27 weeks, cholecystitis with cholecystectomy    Presenting Problem/History of Present Illness  Transaminitis [R74.0]  Pregnancy [Z34.90]       Hospital Course  Patient is a 28 y.o. female presented with abd pain and abnormal LFTs. GI and gen surg consult obtained and decision was made to do cholecystectomy.  She is recovering well today and ready to go.      Procedures Performed  Procedure(s):  Laparoscopic cholecystectomy       Consults:   Consults     Date and Time Order Name Status Description    11/8/2018 1153 Inpatient General Surgery Consult Completed     11/8/2018 0807 Inpatient Gastroenterology Consult Completed             Condition on Discharge:  Feels well.  Reports good FM.  No vb.  Ready for discharge    Vital Signs  Temp:  [97 °F (36.1 °C)-98.9 °F (37.2 °C)] 97 °F (36.1 °C)  Heart Rate:  [] 96  Resp:  [18] 18  BP: ()/(50-59) 102/54    Physical Exam:  General: Awake and alert  Abdomen: Gravid, soft,  non tender  Extremities:  Calves NT bilaterally  Bandages dry      Discharge Disposition  Home or Self Carehome    Discharge Medications     Discharge Medications      New Medications      Instructions Start Date   oxyCODONE-acetaminophen 5-325 MG per tablet  Commonly known as:  PERCOCET   2 tablets, Oral, Every 4 Hours PRN         Continue These Medications      Instructions Start Date   prenatal (CLASSIC) vitamin 28-0.8 MG tablet tablet   1 tablet, Oral, Daily         ASK your doctor about these medications      Instructions Start Date   raNITIdine 150 MG tablet  Commonly known as:  ZANTAC   150 mg, Oral, 2 Times Daily PRN      sertraline 100 MG tablet  Commonly known as:  ZOLOFT   100 mg, Oral, Daily             Discharge Diet:   regular  Activity at Discharge:   No heavy lifting  Follow-up Appointments  11/13/18      Test Results Pending at Discharge   Order Current Status    Tissue Pathology Exam In process      louise MELO  MD Edouard  11/10/18  7:19 PM

## 2018-11-11 NOTE — DISCHARGE INSTR - ACTIVITY
Activity as tolerated, avoid lifting anything over 5 lbs x 2 weeks. May shower, no tub baths until see Dr. Lassiter. No diet restrictions. Kick count instructions, when to seek care discussed, Keep journal and return to regular OB visit.  RX for Percocet written (Dr. Nunn)-given at discharge, do not drive or sign legal documents while taking.   Follow up in 2 weeks with Dr. Lassiter, or sooner if needed.  Call office on Monday 11/12/2018 to schedule appointment.

## 2018-11-12 ENCOUNTER — TELEPHONE (OUTPATIENT)
Dept: LABOR AND DELIVERY | Facility: HOSPITAL | Age: 28
End: 2018-11-12

## 2018-11-12 LAB
CYTO UR: NORMAL
LAB AP CASE REPORT: NORMAL
PATH REPORT.FINAL DX SPEC: NORMAL
PATH REPORT.GROSS SPEC: NORMAL

## 2018-11-21 ENCOUNTER — OFFICE VISIT (OUTPATIENT)
Dept: SURGERY | Facility: CLINIC | Age: 28
End: 2018-11-21

## 2018-11-21 DIAGNOSIS — K80.20 CALCULUS OF GALLBLADDER WITHOUT CHOLECYSTITIS WITHOUT OBSTRUCTION: Primary | ICD-10-CM

## 2018-11-21 PROCEDURE — 99024 POSTOP FOLLOW-UP VISIT: CPT | Performed by: SURGERY

## 2018-11-27 NOTE — PROGRESS NOTES
Follow-up cholecystectomy    Subjective:  Symptoms are much improved.  Eating well.  No other complaints at this time.    Objective:  Abdomen is soft and benign, incisions are nicely healed    Assessment and plan:  -Symptomatic cholelithiasis, now status post cholecystectomy and recovering well without evidence for complication.  -Intrauterine pregnancy, no evidence of complications related to surgical intervention.  Per obstetric service.    Khanh Lassiter MD  General and Endoscopic Surgery  [facility]    [address], [address]  [place], [place], [address]  P: [phone]  F: [phone]

## 2019-01-08 LAB — EXTERNAL GROUP B STREP ANTIGEN: NEGATIVE

## 2019-01-15 ENCOUNTER — HOSPITAL ENCOUNTER (INPATIENT)
Dept: LABOR AND DELIVERY | Facility: HOSPITAL | Age: 29
LOS: 2 days | Discharge: HOME | End: 2019-01-17
Attending: OBSTETRICS & GYNECOLOGY | Admitting: OBSTETRICS & GYNECOLOGY

## 2019-01-15 PROBLEM — Z34.90 PREGNANCY: Status: ACTIVE | Noted: 2019-01-15

## 2019-01-15 LAB
ABO GROUP BLD: NORMAL
ALBUMIN SERPL-MCNC: 3.1 G/DL (ref 3.5–5.2)
ALBUMIN/GLOB SERPL: 1 G/DL
ALP SERPL-CCNC: 123 U/L (ref 39–117)
ALT SERPL W P-5'-P-CCNC: 6 U/L (ref 1–33)
ANION GAP SERPL CALCULATED.3IONS-SCNC: 17.1 MMOL/L
AST SERPL-CCNC: 13 U/L (ref 1–32)
BASOPHILS # BLD AUTO: 0.02 10*3/MM3 (ref 0–0.2)
BASOPHILS NFR BLD AUTO: 0.2 % (ref 0–1.5)
BILIRUB SERPL-MCNC: 0.2 MG/DL (ref 0.1–1.2)
BLD GP AB SCN SERPL QL: NEGATIVE
BUN BLD-MCNC: 4 MG/DL (ref 6–20)
BUN/CREAT SERPL: 6.6 (ref 7–25)
CALCIUM SPEC-SCNC: 8.6 MG/DL (ref 8.6–10.5)
CHLORIDE SERPL-SCNC: 104 MMOL/L (ref 98–107)
CO2 SERPL-SCNC: 18.9 MMOL/L (ref 22–29)
CREAT BLD-MCNC: 0.61 MG/DL (ref 0.57–1)
DEPRECATED RDW RBC AUTO: 42.6 FL (ref 37–54)
EOSINOPHIL # BLD AUTO: 0.1 10*3/MM3 (ref 0–0.7)
EOSINOPHIL NFR BLD AUTO: 1 % (ref 0.3–6.2)
ERYTHROCYTE [DISTWIDTH] IN BLOOD BY AUTOMATED COUNT: 13.6 % (ref 11.7–13)
GFR SERPL CREATININE-BSD FRML MDRD: 116 ML/MIN/1.73
GLOBULIN UR ELPH-MCNC: 3.1 GM/DL
GLUCOSE BLD-MCNC: 146 MG/DL (ref 65–99)
HCT VFR BLD AUTO: 30.3 % (ref 35.6–45.5)
HGB BLD-MCNC: 9.3 G/DL (ref 11.9–15.5)
IMM GRANULOCYTES # BLD AUTO: 0.03 10*3/MM3 (ref 0–0.03)
IMM GRANULOCYTES NFR BLD AUTO: 0.3 % (ref 0–0.5)
LYMPHOCYTES # BLD AUTO: 1.85 10*3/MM3 (ref 0.9–4.8)
LYMPHOCYTES NFR BLD AUTO: 19.1 % (ref 19.6–45.3)
MCH RBC QN AUTO: 26.6 PG (ref 26.9–32)
MCHC RBC AUTO-ENTMCNC: 30.7 G/DL (ref 32.4–36.3)
MCV RBC AUTO: 86.6 FL (ref 80.5–98.2)
MONOCYTES # BLD AUTO: 0.48 10*3/MM3 (ref 0.2–1.2)
MONOCYTES NFR BLD AUTO: 5 % (ref 5–12)
NEUTROPHILS # BLD AUTO: 7.19 10*3/MM3 (ref 1.9–8.1)
NEUTROPHILS NFR BLD AUTO: 74.4 % (ref 42.7–76)
PLATELET # BLD AUTO: 180 10*3/MM3 (ref 140–500)
PMV BLD AUTO: 9.3 FL (ref 6–12)
POTASSIUM BLD-SCNC: 3.4 MMOL/L (ref 3.5–5.2)
PROT SERPL-MCNC: 6.2 G/DL (ref 6–8.5)
RBC # BLD AUTO: 3.5 10*6/MM3 (ref 3.9–5.2)
RH BLD: POSITIVE
SODIUM BLD-SCNC: 140 MMOL/L (ref 136–145)
T&S EXPIRATION DATE: NORMAL
WBC NRBC COR # BLD: 9.67 10*3/MM3 (ref 4.5–10.7)

## 2019-01-15 PROCEDURE — 25010000002 METHYLERGONOVINE MALEATE PER 0.2 MG

## 2019-01-15 PROCEDURE — 86901 BLOOD TYPING SEROLOGIC RH(D): CPT | Performed by: OBSTETRICS & GYNECOLOGY

## 2019-01-15 PROCEDURE — 86900 BLOOD TYPING SEROLOGIC ABO: CPT | Performed by: OBSTETRICS & GYNECOLOGY

## 2019-01-15 PROCEDURE — 10907ZC DRAINAGE OF AMNIOTIC FLUID, THERAPEUTIC FROM PRODUCTS OF CONCEPTION, VIA NATURAL OR ARTIFICIAL OPENING: ICD-10-PCS | Performed by: OBSTETRICS & GYNECOLOGY

## 2019-01-15 PROCEDURE — 86850 RBC ANTIBODY SCREEN: CPT | Performed by: OBSTETRICS & GYNECOLOGY

## 2019-01-15 PROCEDURE — 80053 COMPREHEN METABOLIC PANEL: CPT | Performed by: OBSTETRICS & GYNECOLOGY

## 2019-01-15 PROCEDURE — 36415 COLL VENOUS BLD VENIPUNCTURE: CPT | Performed by: OBSTETRICS & GYNECOLOGY

## 2019-01-15 PROCEDURE — 85025 COMPLETE CBC W/AUTO DIFF WBC: CPT | Performed by: OBSTETRICS & GYNECOLOGY

## 2019-01-15 PROCEDURE — 3E033VJ INTRODUCTION OF OTHER HORMONE INTO PERIPHERAL VEIN, PERCUTANEOUS APPROACH: ICD-10-PCS | Performed by: OBSTETRICS & GYNECOLOGY

## 2019-01-15 RX ORDER — URSODIOL 300 MG/1
300 CAPSULE ORAL 2 TIMES DAILY
COMMUNITY
End: 2019-01-17 | Stop reason: HOSPADM

## 2019-01-15 RX ORDER — OXYTOCIN-SODIUM CHLORIDE 0.9% IV SOLN 30 UNIT/500ML 30-0.9/5 UT/ML-%
125 SOLUTION INTRAVENOUS CONTINUOUS PRN
Status: COMPLETED | OUTPATIENT
Start: 2019-01-15 | End: 2019-01-15

## 2019-01-15 RX ORDER — PRENATAL VIT NO.126/IRON/FOLIC 28MG-0.8MG
1 TABLET ORAL DAILY
Status: DISCONTINUED | OUTPATIENT
Start: 2019-01-15 | End: 2019-01-17 | Stop reason: HOSPADM

## 2019-01-15 RX ORDER — DOCUSATE SODIUM 100 MG/1
100 CAPSULE, LIQUID FILLED ORAL 2 TIMES DAILY PRN
Status: DISCONTINUED | OUTPATIENT
Start: 2019-01-15 | End: 2019-01-17 | Stop reason: HOSPADM

## 2019-01-15 RX ORDER — ONDANSETRON 4 MG/1
4 TABLET, ORALLY DISINTEGRATING ORAL EVERY 6 HOURS PRN
Status: DISCONTINUED | OUTPATIENT
Start: 2019-01-15 | End: 2019-01-15 | Stop reason: HOSPADM

## 2019-01-15 RX ORDER — CARBOPROST TROMETHAMINE 250 UG/ML
250 INJECTION, SOLUTION INTRAMUSCULAR AS NEEDED
Status: DISCONTINUED | OUTPATIENT
Start: 2019-01-15 | End: 2019-01-15 | Stop reason: HOSPADM

## 2019-01-15 RX ORDER — DIPHENHYDRAMINE HCL 25 MG
25 CAPSULE ORAL EVERY 6 HOURS PRN
Status: DISCONTINUED | OUTPATIENT
Start: 2019-01-15 | End: 2019-01-15 | Stop reason: HOSPADM

## 2019-01-15 RX ORDER — ONDANSETRON 2 MG/ML
4 INJECTION INTRAMUSCULAR; INTRAVENOUS ONCE AS NEEDED
Status: DISCONTINUED | OUTPATIENT
Start: 2019-01-15 | End: 2019-01-15 | Stop reason: HOSPADM

## 2019-01-15 RX ORDER — ONDANSETRON 4 MG/1
4 TABLET, FILM COATED ORAL EVERY 6 HOURS PRN
Status: DISCONTINUED | OUTPATIENT
Start: 2019-01-15 | End: 2019-01-15 | Stop reason: HOSPADM

## 2019-01-15 RX ORDER — ONDANSETRON 2 MG/ML
4 INJECTION INTRAMUSCULAR; INTRAVENOUS EVERY 6 HOURS PRN
Status: DISCONTINUED | OUTPATIENT
Start: 2019-01-15 | End: 2019-01-15 | Stop reason: HOSPADM

## 2019-01-15 RX ORDER — SODIUM CHLORIDE 0.9 % (FLUSH) 0.9 %
3-10 SYRINGE (ML) INJECTION AS NEEDED
Status: DISCONTINUED | OUTPATIENT
Start: 2019-01-15 | End: 2019-01-15 | Stop reason: HOSPADM

## 2019-01-15 RX ORDER — TERBUTALINE SULFATE 1 MG/ML
0.25 INJECTION, SOLUTION SUBCUTANEOUS AS NEEDED
Status: DISCONTINUED | OUTPATIENT
Start: 2019-01-15 | End: 2019-01-15 | Stop reason: HOSPADM

## 2019-01-15 RX ORDER — IBUPROFEN 600 MG/1
600 TABLET ORAL EVERY 8 HOURS PRN
Status: DISCONTINUED | OUTPATIENT
Start: 2019-01-15 | End: 2019-01-17 | Stop reason: HOSPADM

## 2019-01-15 RX ORDER — FAMOTIDINE 10 MG/ML
20 INJECTION, SOLUTION INTRAVENOUS ONCE AS NEEDED
Status: DISCONTINUED | OUTPATIENT
Start: 2019-01-15 | End: 2019-01-15 | Stop reason: HOSPADM

## 2019-01-15 RX ORDER — SODIUM CHLORIDE, SODIUM LACTATE, POTASSIUM CHLORIDE, CALCIUM CHLORIDE 600; 310; 30; 20 MG/100ML; MG/100ML; MG/100ML; MG/100ML
125 INJECTION, SOLUTION INTRAVENOUS CONTINUOUS
Status: DISCONTINUED | OUTPATIENT
Start: 2019-01-15 | End: 2019-01-15

## 2019-01-15 RX ORDER — SERTRALINE HYDROCHLORIDE 100 MG/1
100 TABLET, FILM COATED ORAL DAILY
Status: DISCONTINUED | OUTPATIENT
Start: 2019-01-15 | End: 2019-01-17 | Stop reason: HOSPADM

## 2019-01-15 RX ORDER — HYDROXYZINE PAMOATE 50 MG/1
50 CAPSULE ORAL 3 TIMES DAILY PRN
COMMUNITY
End: 2019-01-17 | Stop reason: HOSPADM

## 2019-01-15 RX ORDER — BISACODYL 10 MG
10 SUPPOSITORY, RECTAL RECTAL DAILY PRN
Status: DISCONTINUED | OUTPATIENT
Start: 2019-01-16 | End: 2019-01-17 | Stop reason: HOSPADM

## 2019-01-15 RX ORDER — MISOPROSTOL 200 UG/1
800 TABLET ORAL AS NEEDED
Status: DISCONTINUED | OUTPATIENT
Start: 2019-01-15 | End: 2019-01-15 | Stop reason: HOSPADM

## 2019-01-15 RX ORDER — OXYTOCIN-SODIUM CHLORIDE 0.9% IV SOLN 30 UNIT/500ML 30-0.9/5 UT/ML-%
250 SOLUTION INTRAVENOUS CONTINUOUS
Status: ACTIVE | OUTPATIENT
Start: 2019-01-15 | End: 2019-01-15

## 2019-01-15 RX ORDER — ACETAMINOPHEN 325 MG/1
650 TABLET ORAL EVERY 4 HOURS PRN
Status: DISCONTINUED | OUTPATIENT
Start: 2019-01-15 | End: 2019-01-17 | Stop reason: HOSPADM

## 2019-01-15 RX ORDER — ERYTHROMYCIN 5 MG/G
OINTMENT OPHTHALMIC
Status: DISPENSED
Start: 2019-01-15 | End: 2019-01-16

## 2019-01-15 RX ORDER — SODIUM CHLORIDE 0.9 % (FLUSH) 0.9 %
3 SYRINGE (ML) INJECTION EVERY 12 HOURS SCHEDULED
Status: DISCONTINUED | OUTPATIENT
Start: 2019-01-15 | End: 2019-01-15 | Stop reason: HOSPADM

## 2019-01-15 RX ORDER — LIDOCAINE HYDROCHLORIDE 10 MG/ML
5 INJECTION, SOLUTION EPIDURAL; INFILTRATION; INTRACAUDAL; PERINEURAL AS NEEDED
Status: DISCONTINUED | OUTPATIENT
Start: 2019-01-15 | End: 2019-01-15 | Stop reason: HOSPADM

## 2019-01-15 RX ORDER — OXYCODONE HYDROCHLORIDE AND ACETAMINOPHEN 5; 325 MG/1; MG/1
1 TABLET ORAL EVERY 4 HOURS PRN
Status: DISCONTINUED | OUTPATIENT
Start: 2019-01-15 | End: 2019-01-17 | Stop reason: HOSPADM

## 2019-01-15 RX ORDER — METHYLERGONOVINE MALEATE 0.2 MG/ML
INJECTION INTRAVENOUS
Status: COMPLETED
Start: 2019-01-15 | End: 2019-01-15

## 2019-01-15 RX ORDER — PHYTONADIONE 1 MG/.5ML
INJECTION, EMULSION INTRAMUSCULAR; INTRAVENOUS; SUBCUTANEOUS
Status: DISPENSED
Start: 2019-01-15 | End: 2019-01-16

## 2019-01-15 RX ORDER — OXYTOCIN-SODIUM CHLORIDE 0.9% IV SOLN 30 UNIT/500ML 30-0.9/5 UT/ML-%
999 SOLUTION INTRAVENOUS ONCE
Status: COMPLETED | OUTPATIENT
Start: 2019-01-15 | End: 2019-01-15

## 2019-01-15 RX ORDER — EPHEDRINE SULFATE 50 MG/ML
5 INJECTION, SOLUTION INTRAVENOUS AS NEEDED
Status: DISCONTINUED | OUTPATIENT
Start: 2019-01-15 | End: 2019-01-15 | Stop reason: HOSPADM

## 2019-01-15 RX ORDER — LANOLIN
CREAM (ML) TOPICAL
Status: DISCONTINUED | OUTPATIENT
Start: 2019-01-15 | End: 2019-01-17 | Stop reason: HOSPADM

## 2019-01-15 RX ORDER — METHYLERGONOVINE MALEATE 0.2 MG/ML
200 INJECTION INTRAVENOUS ONCE AS NEEDED
Status: COMPLETED | OUTPATIENT
Start: 2019-01-15 | End: 2019-01-15

## 2019-01-15 RX ORDER — OXYTOCIN-SODIUM CHLORIDE 0.9% IV SOLN 30 UNIT/500ML 30-0.9/5 UT/ML-%
2-30 SOLUTION INTRAVENOUS
Status: DISCONTINUED | OUTPATIENT
Start: 2019-01-15 | End: 2019-01-15 | Stop reason: HOSPADM

## 2019-01-15 RX ORDER — SODIUM CHLORIDE 0.9 % (FLUSH) 0.9 %
1-10 SYRINGE (ML) INJECTION AS NEEDED
Status: DISCONTINUED | OUTPATIENT
Start: 2019-01-15 | End: 2019-01-17 | Stop reason: HOSPADM

## 2019-01-15 RX ADMIN — METHYLERGONOVINE MALEATE 200 MCG: 0.2 INJECTION, SOLUTION INTRAMUSCULAR; INTRAVENOUS at 14:18

## 2019-01-15 RX ADMIN — MISOPROSTOL 800 MCG: 200 TABLET ORAL at 14:33

## 2019-01-15 RX ADMIN — OXYTOCIN 2 MILLI-UNITS/MIN: 10 INJECTION INTRAVENOUS at 09:02

## 2019-01-15 RX ADMIN — METHYLERGONOVINE MALEATE 200 MCG: 0.2 INJECTION INTRAVENOUS at 14:18

## 2019-01-15 RX ADMIN — OXYCODONE AND ACETAMINOPHEN 1 TABLET: 5; 325 TABLET ORAL at 14:35

## 2019-01-15 RX ADMIN — IBUPROFEN 600 MG: 600 TABLET ORAL at 15:09

## 2019-01-15 RX ADMIN — OXYTOCIN 125 ML/HR: 10 INJECTION INTRAVENOUS at 14:26

## 2019-01-15 RX ADMIN — SODIUM CHLORIDE, POTASSIUM CHLORIDE, SODIUM LACTATE AND CALCIUM CHLORIDE 125 ML/HR: 600; 310; 30; 20 INJECTION, SOLUTION INTRAVENOUS at 08:24

## 2019-01-15 RX ADMIN — OXYTOCIN 999 ML/HR: 10 INJECTION INTRAVENOUS at 13:06

## 2019-01-15 NOTE — PLAN OF CARE
Problem: Patient Care Overview  Goal: Plan of Care Review  Outcome: Ongoing (interventions implemented as appropriate)   01/15/19 1336   Coping/Psychosocial   Plan of Care Reviewed With patient;spouse   Plan of Care Review   Progress improving   OTHER   Outcome Summary s/p natural vaginal delivery     Goal: Individualization and Mutuality  Outcome: Ongoing (interventions implemented as appropriate)   01/15/19 1336   Individualization   Patient Specific Preferences BONY,breastfeed, natural childbirth   Patient Specific Goals (Include Timeframe) healthy baby   Patient Specific Interventions NCB   Mutuality/Individual Preferences   What Anxieties, Fears, Concerns, or Questions Do You Have About Your Care? denies   What Information Would Help Us Give You More Personalized Care?  sanjeev is EMS   How Would You and/or Your Support Person Like to Participate in Your Care? decision making   Mutuality/Individual Preferences   How to Address Anxieties/Fears reassure, educate     Goal: Discharge Needs Assessment  Outcome: Ongoing (interventions implemented as appropriate)   01/15/19 0746 01/15/19 0750   Disability   Equipment Currently Used at Home --  none   Discharge Needs Assessment   Patient/Family Anticipates Transition to home;home with family --    Patient/Family Anticipated Services at Transition none --    Transportation Concerns car, none --    Transportation Anticipated car, drives self;family or friend will provide --      Goal: Interprofessional Rounds/Family Conf  Outcome: Ongoing (interventions implemented as appropriate)   01/15/19 1336   Interdisciplinary Rounds/Family Conf   Summary s/p vaginal delivery   Participants physician;nursing;patient       Problem: Labor (Cervical Ripen, Induct, Augment) (Adult,Obstetrics,Pediatric)  Goal: Signs and Symptoms of Listed Potential Problems Will be Absent, Minimized or Managed (Labor)  Outcome: Outcome(s) achieved Date Met: 01/15/19   01/15/19 1336   Goal/Outcome  Evaluation   Problems Assessed (Labor) all   Problems Present (Labor) none

## 2019-01-15 NOTE — PLAN OF CARE
Problem: Patient Care Overview  Goal: Plan of Care Review  Outcome: Ongoing (interventions implemented as appropriate)   01/15/19 1824   Coping/Psychosocial   Plan of Care Reviewed With patient;spouse   Plan of Care Review   Progress improving   OTHER   Outcome Summary recovery checks completed; pain meds prn; plans to breastfeed     Goal: Individualization and Mutuality  Outcome: Ongoing (interventions implemented as appropriate)    Goal: Discharge Needs Assessment  Outcome: Ongoing (interventions implemented as appropriate)   01/15/19 1824   Discharge Needs Assessment   Readmission Within the Last 30 Days no previous admission in last 30 days   Concerns to be Addressed no discharge needs identified   Patient/Family Anticipates Transition to home   Patient/Family Anticipated Services at Transition none   Transportation Concerns car, none   Transportation Anticipated car, drives self   Anticipated Changes Related to Illness none   Equipment Needed After Discharge none   Disability   Equipment Currently Used at Home none       Problem: Breastfeeding (Adult,Obstetrics,Pediatric)  Goal: Signs and Symptoms of Listed Potential Problems Will be Absent, Minimized or Managed (Breastfeeding)  Outcome: Ongoing (interventions implemented as appropriate)   01/15/19 1824   Goal/Outcome Evaluation   Problems Assessed (Breastfeeding) all   Problems Present (Breastfeeding) none       Problem: Postpartum (Vaginal Delivery) (Adult,Obstetrics,Pediatric)  Goal: Signs and Symptoms of Listed Potential Problems Will be Absent, Minimized or Managed (Postpartum)  Outcome: Ongoing (interventions implemented as appropriate)   01/15/19 1824   Goal/Outcome Evaluation   Problems Assessed (Postpartum Vaginal Delivery) all   Problems Present (Postpartum Vag Deliv) none

## 2019-01-15 NOTE — H&P
[facility]  Obstetric History and Physical    Patient Name: Sherry Quevedo  :  1990  MRN:  [id number]      Chief Complaint   Patient presents with   • Scheduled Induction     IOL for cholestasis, denies ctx, +FM, denies LOF or VB       Subjective     Patient is a 28 y.o. female  currently at 37w0d, who presents for IOL due to cholestasis.          Objective       Vital Signs Range for the last 24 hours  Temperature: Temp:  [99 °F (37.2 °C)] 99 °F (37.2 °C)   Temp Source: Temp src: Oral   BP: BP: (123-140)/(75-82) 129/75   Pulse: Heart Rate:  [104-119] 112   Respirations: Resp:  [16] 16                   Physical Examination:     General :  Alert in NAD  Abdomen: Gravid, EFW 7lbs by leopolds    Presentation: ceph   Cervix: Exam by: Method: sterile exam per physician   Dilation: Cervical Dilation (cm): 5   Effacement: Cervical Effacement: 80%   Station: Fetal Station: -2       Fetal Heart Rate Assessment   Method: Fetal HR Assessment Method: external   Beats/min: Fetal HR (beats/min): 150   Baseline: Fetal Heart Baseline Rate: normal range   Varibility: Fetal HR Variability: moderate (amplitude range 6 to 25 bpm)   Accels: Fetal HR Accelerations: greater than/equal to 15 bpm, lasting at least 15 seconds   Decels: Fetal HR Decelerations: absent   Tracing Category:       Uterine Assessment   Method: Method: external tocotransducer   Frequency (min): Contraction Frequency (Minutes): 2 noted   Ctx Count in 10 min:     Duration:     Intensity: Contraction Intensity: mild by palpation   Intensity by IUPC:     Resting Tone: Uterine Resting Tone: soft by palpation   Resting Tone by IUPC:     [place] Units:           Results from last 7 days   Lab Units  01/15/19   0824   WBC 10*3/mm3  9.67   HEMOGLOBIN g/dL  9.3*   HEMATOCRIT %  30.3*   PLATELETS 10*3/mm3  180       Assessment/Plan     1.  Intrauterine pregnancy at 37w0d weeks gestation for IOL due to cholestasis.   reactive fetal status.   Cervix  very favorable, AROM with clear fluid.  Pitocin as needed. GBS neg. Anticipate .  Plan of care has been reviewed with patient and family.  All questions answered.      Pregnancy        H&P updated. The patient was examined and the following changes are noted above.         Temi Burns MD  1/15/2019  9:40 AM

## 2019-01-15 NOTE — NURSING NOTE
1355- moderate bleeding and small clots noted  1410- moderate bleeding continues  1418- 200mcg methergine given to patient. MD aware.  1433- 800mcg cytotec given.   1445- pt walked to bathroom and voided  1500- scant bleeding with fundal exam.    Chux and pads have been weighed and equal 230ml EBL for recovery period from 5751-8523. Pt had EBL of 100 at delivery, giving a total of 320ml.

## 2019-01-15 NOTE — L&D DELIVERY NOTE
[facility]  Vaginal Delivery Note    Patient Name: Sherry Quevedo  :  1990  MRN:  [id number]      Delivery     Delivery: Vaginal, Spontaneous     YOB: 2020    Time of Birth: 12:59 PM      Anesthesia: None     Delivering clinician: Temi Burns MD       Infant    Findings: Viable female  infant    Infant observations: Weight: No birth weight on file.   Observations/Comments:  scale 2      Apgars: 9   @ 1 minute /    9   @ 5 minutes     Placenta, Cord, and Fluid    Placenta delivered  Spontaneous   at  1/15/2019  1:06 PM     Cord: 3 vessels  present.   Cord blood obtained: Yes    Cord gases obtained:  No      Repair    Episiotomy: No   Lacerations: none   Estimated Blood Loss: 100  mls.       Delivery narrative: The patient is a 28 y.o.  at 37w0d.  Presented  For IOL due to cholestasis. Progressed normally to C/C/+1 with AROM. Fetal status reassuring throughout.  of viable female infant  @ 12:59 PM  over an intact perineum. ASDE. No birth weight on file. .  Placenta delivered spontaneously, intact with 3 vessel cord. Cervix and rectum intact.  Mother and baby recovering good condition.       Temi Burns MD  01/15/19  1:16 PM

## 2019-01-16 PROBLEM — D64.9 ANEMIA: Status: ACTIVE | Noted: 2019-01-16

## 2019-01-16 PROBLEM — Z34.90 PREGNANCY: Status: RESOLVED | Noted: 2019-01-15 | Resolved: 2019-01-16

## 2019-01-16 LAB
HCT VFR BLD AUTO: 27.2 % (ref 35.6–45.5)
HGB BLD-MCNC: 8.4 G/DL (ref 11.9–15.5)

## 2019-01-16 PROCEDURE — 85014 HEMATOCRIT: CPT | Performed by: OBSTETRICS & GYNECOLOGY

## 2019-01-16 PROCEDURE — 85018 HEMOGLOBIN: CPT | Performed by: OBSTETRICS & GYNECOLOGY

## 2019-01-16 RX ADMIN — Medication: at 21:48

## 2019-01-16 RX ADMIN — DOCUSATE SODIUM 100 MG: 100 CAPSULE, LIQUID FILLED ORAL at 09:31

## 2019-01-16 RX ADMIN — IBUPROFEN 600 MG: 600 TABLET ORAL at 09:31

## 2019-01-16 RX ADMIN — Medication 1 TABLET: at 09:31

## 2019-01-16 RX ADMIN — PRAMOXINE HYDROCHLORIDE HYDROCORTISONE ACETATE: 100; 100 AEROSOL, FOAM TOPICAL at 15:51

## 2019-01-16 RX ADMIN — Medication: at 09:31

## 2019-01-16 RX ADMIN — DOCUSATE SODIUM 100 MG: 100 CAPSULE, LIQUID FILLED ORAL at 21:48

## 2019-01-16 RX ADMIN — IBUPROFEN 600 MG: 600 TABLET ORAL at 21:48

## 2019-01-16 NOTE — PLAN OF CARE
Problem: Patient Care Overview  Goal: Plan of Care Review  Outcome: Ongoing (interventions implemented as appropriate)   01/15/19 2316   Coping/Psychosocial   Plan of Care Reviewed With patient   Plan of Care Review   Progress improving   OTHER   Outcome Summary pt stable, passed large clot but fundus remains firm and VSS, walking to NICU to visit baby, encouraged to pump and rest tonight       Problem: Breastfeeding (Adult,Obstetrics,Pediatric)  Goal: Signs and Symptoms of Listed Potential Problems Will be Absent, Minimized or Managed (Breastfeeding)  Outcome: Ongoing (interventions implemented as appropriate)   01/15/19 2316   Goal/Outcome Evaluation   Problems Assessed (Breastfeeding) all   Problems Present (Breastfeeding) none       Problem: Postpartum (Vaginal Delivery) (Adult,Obstetrics,Pediatric)  Goal: Signs and Symptoms of Listed Potential Problems Will be Absent, Minimized or Managed (Postpartum)  Outcome: Ongoing (interventions implemented as appropriate)   01/15/19 2316   Goal/Outcome Evaluation   Problems Assessed (Postpartum Vaginal Delivery) all   Problems Present (Postpartum Vag Deliv) none

## 2019-01-16 NOTE — LACTATION NOTE
P3 37 week baby in NICU. HGP bedside, pt sleeping. Encouraged Dad to call if she is needing any assist.

## 2019-01-16 NOTE — NURSING NOTE
Pt called out while on toilet that a clot was coming out. Able to collect it in a potty hat to be weighed. Large clot measured 42 grams. Pt had some increased bleeding after delivery and received methergine and cytotec. Informed pt to let me know if she saw any more clots. Fundus still firm and U/1. Will continue to monitor.

## 2019-01-16 NOTE — PROGRESS NOTES
[facility]  Vaginal Delivery Progress Note    Patient Name: Sherry Quevedo  :  1990  MRN:  [id number]      Subjective   Postpartum Day 1: Vaginal Delivery of a female infant.   Pumping as baby in NICU        Objective     Vital Signs Range for the last 24 hours  Temperature: Temp:  [98.4 °F (36.9 °C)-99.4 °F (37.4 °C)] 98.4 °F (36.9 °C)       BP: BP: (112-148)/(60-91) 123/74   Pulse: Heart Rate:  [] 84   Respirations: Resp:  [16-18] 16                       Physical Exam:  General: Awake and alert  Abdomen: Fundus: firm, non tender, 1 below umbilicus  Extremities:  trace edema, NT     Labs:     Results from last 7 days   Lab Units 19  0535 01/15/19  0824   WBC 10*3/mm3  --  9.67   HEMOGLOBIN g/dL 8.4* 9.3*   HEMATOCRIT % 27.2* 30.3*   PLATELETS 10*3/mm3  --  180       Prenatal labs results reviewed:  Yes   Rubella:  Immune  Rh Status:    RH type   Date Value Ref Range Status   01/15/2019 Positive  Final         Assessment/Plan  : 1. PPD1 S/P  - doing well    Baby girl stable in NICU          Anemia          Ly Marcial, APRN  2019  9:10 AM

## 2019-01-17 VITALS
SYSTOLIC BLOOD PRESSURE: 123 MMHG | HEIGHT: 63 IN | OXYGEN SATURATION: 100 % | DIASTOLIC BLOOD PRESSURE: 77 MMHG | RESPIRATION RATE: 18 BRPM | BODY MASS INDEX: 34.59 KG/M2 | WEIGHT: 195.2 LBS | HEART RATE: 76 BPM | TEMPERATURE: 98 F

## 2019-01-17 RX ORDER — IBUPROFEN 600 MG/1
600 TABLET ORAL EVERY 8 HOURS PRN
Qty: 30 TABLET | Refills: 3 | Status: SHIPPED | OUTPATIENT
Start: 2019-01-17 | End: 2019-09-24

## 2019-01-17 RX ADMIN — IBUPROFEN 600 MG: 600 TABLET ORAL at 09:25

## 2019-01-17 RX ADMIN — Medication: at 13:38

## 2019-01-17 RX ADMIN — BENZOCAINE 1 APPLICATION: 5.6 OINTMENT TOPICAL at 13:38

## 2019-01-17 RX ADMIN — Medication 1 TABLET: at 09:25

## 2019-01-17 NOTE — LACTATION NOTE
[phone] fxd demo,med list,H & P 8/27/18 Dr Desai,labs,EKG  Dx: procedure/ treatment of Right eyelid  Danni Oropeza     P3. Patient feels baby's latch is improving and is continuing to use HGP at bedside. Discussed renting the HGP for home use if mom mom and baby d/c tomorrow. Given handout aND HAS CARD FOR oplc

## 2019-01-17 NOTE — DISCHARGE SUMMARY
Date of Discharge:  2019    Discharge Diagnosis: vaginal delivery    Presenting Problem/History of Present Illness  Pregnancy [Z34.90]       Hospital Course  Patient is a 28 y.o. female presented for term IOL d/t cholestasis in pregnancy.  Delivered viable female infant per Dr. Burns.  Baby girl stable in NICU-- hopeful she will be d/c tomorrow.  Pt planning to board.      Procedures Performed         Consults:   Consults     No orders found from 2018 to 2019.          Condition on Discharge:   Subjective   Postpartum Day 2 Vaginal Delivery.    The patient feels well without complaints.    Vital Signs  Temp:  [97.8 °F (36.6 °C)-98.7 °F (37.1 °C)] 98 °F (36.7 °C)  Heart Rate:  [76-81] 76  Resp:  [16-18] 18  BP: (116-126)/(77-80) 123/77    Physical Exam:   General: Awake and alert   Abdomen: Fundus: firm, non tender    Extremities:  Calves NT bilaterally    Assessment/Plan     PPD2  S/P  -   Stable for discharge. Instructions reviewed      Discharge Disposition  Home or Self Care    Discharge Medications     Discharge Medications      New Medications      Instructions Start Date   ibuprofen 600 MG tablet  Commonly known as:  ADVIL,MOTRIN   600 mg, Oral, Every 8 Hours PRN         Continue These Medications      Instructions Start Date   guaiFENesin 100 MG/5ML syrup  Commonly known as:  ROBITUSSIN   200 mg, Oral, 3 Times Daily PRN      prenatal (CLASSIC) vitamin 28-0.8 MG tablet tablet   1 tablet, Oral, Daily      raNITIdine 150 MG tablet  Commonly known as:  ZANTAC   150 mg, Oral, 2 Times Daily PRN      sertraline 100 MG tablet  Commonly known as:  ZOLOFT   100 mg, Oral, Daily         Stop These Medications    hydrOXYzine 50 MG capsule  Commonly known as:  VISTARIL     ursodiol 300 MG capsule  Commonly known as:  ACTIGALL              The patient has been prescribed a controlled substance.  She has been counseled on the risks associated with using the medication.   The addictive potential of this  medication and alternatives were discussed carefully with this patient and she demonstrated understanding.  A ZEINAB report has been obtained and reviewed.       Activity at Discharge:     Follow-up Appointments  No future appointments.      Test Results Pending at Discharge       LEONELA Pichardo  01/17/19  10:53 AM

## 2019-06-21 ENCOUNTER — OFFICE (AMBULATORY)
Dept: URBAN - METROPOLITAN AREA CLINIC 75 | Facility: CLINIC | Age: 29
End: 2019-06-21
Payer: COMMERCIAL

## 2019-06-21 VITALS
HEART RATE: 96 BPM | DIASTOLIC BLOOD PRESSURE: 78 MMHG | SYSTOLIC BLOOD PRESSURE: 121 MMHG | WEIGHT: 165 LBS | HEIGHT: 64 IN

## 2019-06-21 DIAGNOSIS — K62.5 HEMORRHAGE OF ANUS AND RECTUM: ICD-10-CM

## 2019-06-21 DIAGNOSIS — K64.8 OTHER HEMORRHOIDS: ICD-10-CM

## 2019-06-21 DIAGNOSIS — K58.1 IRRITABLE BOWEL SYNDROME WITH CONSTIPATION: ICD-10-CM

## 2019-06-21 PROCEDURE — 99204 OFFICE O/P NEW MOD 45 MIN: CPT | Performed by: INTERNAL MEDICINE

## 2019-08-01 NOTE — NURSING NOTE
Pt unable to void at this time but bladder not distended.  EFM and toco monitors placed after pt assisted off bedpan.     PROCEDURES:  Myringotomy, unilateral 01-Aug-2019 13:44:03 right Michelle Lorenzana

## 2019-08-02 VITALS
DIASTOLIC BLOOD PRESSURE: 47 MMHG | DIASTOLIC BLOOD PRESSURE: 44 MMHG | SYSTOLIC BLOOD PRESSURE: 99 MMHG | TEMPERATURE: 96.8 F | SYSTOLIC BLOOD PRESSURE: 92 MMHG | HEART RATE: 69 BPM | OXYGEN SATURATION: 98 % | RESPIRATION RATE: 23 BRPM | HEART RATE: 93 BPM | SYSTOLIC BLOOD PRESSURE: 119 MMHG | SYSTOLIC BLOOD PRESSURE: 88 MMHG | RESPIRATION RATE: 17 BRPM | RESPIRATION RATE: 12 BRPM | SYSTOLIC BLOOD PRESSURE: 121 MMHG | HEIGHT: 64 IN | HEART RATE: 64 BPM | HEART RATE: 74 BPM | OXYGEN SATURATION: 99 % | HEART RATE: 66 BPM | DIASTOLIC BLOOD PRESSURE: 55 MMHG | HEART RATE: 72 BPM | DIASTOLIC BLOOD PRESSURE: 81 MMHG | SYSTOLIC BLOOD PRESSURE: 116 MMHG | HEART RATE: 92 BPM | RESPIRATION RATE: 14 BRPM | OXYGEN SATURATION: 97 % | SYSTOLIC BLOOD PRESSURE: 105 MMHG | DIASTOLIC BLOOD PRESSURE: 66 MMHG | TEMPERATURE: 96.7 F | HEART RATE: 65 BPM | OXYGEN SATURATION: 100 % | SYSTOLIC BLOOD PRESSURE: 90 MMHG | DIASTOLIC BLOOD PRESSURE: 82 MMHG | RESPIRATION RATE: 13 BRPM | DIASTOLIC BLOOD PRESSURE: 78 MMHG | WEIGHT: 165 LBS | RESPIRATION RATE: 16 BRPM

## 2019-08-05 ENCOUNTER — AMBULATORY SURGICAL CENTER (AMBULATORY)
Dept: URBAN - METROPOLITAN AREA SURGERY 17 | Facility: SURGERY | Age: 29
End: 2019-08-05
Payer: COMMERCIAL

## 2019-08-05 DIAGNOSIS — K62.5 HEMORRHAGE OF ANUS AND RECTUM: ICD-10-CM

## 2019-08-05 PROCEDURE — 45378 DIAGNOSTIC COLONOSCOPY: CPT | Performed by: INTERNAL MEDICINE

## 2019-09-24 ENCOUNTER — OFFICE VISIT (OUTPATIENT)
Dept: FAMILY MEDICINE CLINIC | Facility: CLINIC | Age: 29
End: 2019-09-24

## 2019-09-24 VITALS
WEIGHT: 169 LBS | DIASTOLIC BLOOD PRESSURE: 70 MMHG | HEART RATE: 90 BPM | RESPIRATION RATE: 16 BRPM | HEIGHT: 63 IN | OXYGEN SATURATION: 98 % | SYSTOLIC BLOOD PRESSURE: 110 MMHG | BODY MASS INDEX: 29.95 KG/M2 | TEMPERATURE: 98.2 F

## 2019-09-24 DIAGNOSIS — D50.0 IRON DEFICIENCY ANEMIA DUE TO CHRONIC BLOOD LOSS: Primary | ICD-10-CM

## 2019-09-24 DIAGNOSIS — F41.1 GENERALIZED ANXIETY DISORDER: ICD-10-CM

## 2019-09-24 DIAGNOSIS — J30.9 CHRONIC ALLERGIC RHINITIS: ICD-10-CM

## 2019-09-24 DIAGNOSIS — E01.0 THYROMEGALY: ICD-10-CM

## 2019-09-24 DIAGNOSIS — Z00.00 LABORATORY EXAMINATION ORDERED AS PART OF A ROUTINE GENERAL MEDICAL EXAMINATION: ICD-10-CM

## 2019-09-24 DIAGNOSIS — R59.0 LEFT CERVICAL LYMPHADENOPATHY: ICD-10-CM

## 2019-09-24 DIAGNOSIS — Z87.442 HISTORY OF RENAL STONE: ICD-10-CM

## 2019-09-24 DIAGNOSIS — Z86.32 HISTORY OF GESTATIONAL DIABETES: ICD-10-CM

## 2019-09-24 DIAGNOSIS — K58.2 IRRITABLE BOWEL SYNDROME WITH BOTH CONSTIPATION AND DIARRHEA: ICD-10-CM

## 2019-09-24 DIAGNOSIS — F33.41 RECURRENT MAJOR DEPRESSIVE DISORDER, IN PARTIAL REMISSION (HCC): ICD-10-CM

## 2019-09-24 PROCEDURE — 99204 OFFICE O/P NEW MOD 45 MIN: CPT | Performed by: PHYSICIAN ASSISTANT

## 2019-09-24 RX ORDER — DULOXETIN HYDROCHLORIDE 60 MG/1
60 CAPSULE, DELAYED RELEASE ORAL DAILY
Qty: 30 CAPSULE | Refills: 5 | Status: SHIPPED | OUTPATIENT
Start: 2019-09-24 | End: 2019-10-03

## 2019-09-24 RX ORDER — BUPROPION HYDROCHLORIDE 150 MG/1
TABLET ORAL
COMMUNITY
Start: 2019-09-18 | End: 2019-09-24

## 2019-09-24 RX ORDER — BUPROPION HYDROCHLORIDE 300 MG/1
TABLET ORAL
COMMUNITY
Start: 2019-09-21 | End: 2019-09-24 | Stop reason: SDUPTHER

## 2019-09-24 RX ORDER — CETIRIZINE HYDROCHLORIDE 10 MG/1
10 TABLET ORAL AS NEEDED
Refills: 11 | COMMUNITY
Start: 2019-06-18 | End: 2020-03-02

## 2019-09-24 RX ORDER — BUPROPION HYDROCHLORIDE 300 MG/1
300 TABLET ORAL EVERY MORNING
Qty: 30 TABLET | Refills: 5 | Status: SHIPPED | OUTPATIENT
Start: 2019-09-24 | End: 2020-06-15

## 2019-09-24 RX ORDER — TRIAMCINOLONE ACETONIDE 55 UG/1
1 SPRAY, METERED NASAL AS NEEDED
COMMUNITY
End: 2020-06-15

## 2019-09-24 RX ORDER — DULOXETIN HYDROCHLORIDE 30 MG/1
CAPSULE, DELAYED RELEASE ORAL
COMMUNITY
Start: 2019-09-19 | End: 2019-09-24

## 2019-09-24 RX ORDER — MONTELUKAST SODIUM 10 MG/1
10 TABLET ORAL AS NEEDED
COMMUNITY
End: 2020-03-02

## 2019-09-24 NOTE — PROGRESS NOTES
Subjective   Sherry Quevedo is a 29 y.o. female.     History of Present Illness   Sherry Quevedo 29 y.o. female who presents today for a new patient appointment.    she has a history of   Patient Active Problem List   Diagnosis   • Anemia   • Chronic allergic rhinitis   .  she is here to establish care I reviewed the PFSH recorded today by my MA/LPN staff.   she   She has been feeling tired.  Spouse vasectomy  Sees DR Moreland for IBS---GI   Hx renal stones in past    Had thyroid u/s about 5 years ago at [facility]--??? nodule  She is not breast feeding  Had gest DM X2  Seeing chiropractor for sciatic  Sees DR Sean Powell PINA Quevedo female 29 y.o. who presents today for follow up of Depression and Anxiety.  She reports some help with meds and still get break through anxiety and depression. . Onset of symptoms was approximately several years ago.  She denies current suicidal and homicidal ideation. Risk factors are family history of anxiety and or depression and lifestyle of multiple roles.  Previous treatment includes current Rx.  She complains of the following medication side effects: none.  The patient has previously been in counseling..    She has been on several meds and no help going up to 450mg Wellbutrin XL---no hx seizures.   I want to update labs  Watch for DMII  Bruises easy and will check; iron low in past; see GYN for heavy periods;     Check mole on back  Lips dry---try Hydrocortisone crm OTC with Vaseline  Stop licking lips     She does get migraines and want her to f/u on this with me;  Excedrin;   Want to consider triptan; but change psych meds first  The following portions of the patient's history were reviewed and updated as appropriate: allergies, current medications, past family history, past medical history, past social history, past surgical history and problem list.    Review of Systems   Constitutional: Positive for fatigue. Negative for activity change, appetite change and  unexpected weight change.   HENT: Positive for sinus pressure. Negative for nosebleeds and trouble swallowing.    Eyes: Negative for pain and visual disturbance.   Respiratory: Negative for chest tightness, shortness of breath and wheezing.    Cardiovascular: Negative for chest pain and palpitations.   Gastrointestinal: Positive for anal bleeding, constipation and diarrhea. Negative for abdominal pain and blood in stool.   Endocrine: Negative.    Genitourinary: Negative for difficulty urinating and hematuria.   Musculoskeletal: Negative for joint swelling.   Skin: Negative for color change and rash.   Allergic/Immunologic: Positive for environmental allergies.   Neurological: Positive for headaches. Negative for syncope and speech difficulty.   Hematological: Negative for adenopathy. Bruises/bleeds easily.   Psychiatric/Behavioral: Positive for dysphoric mood and sleep disturbance. Negative for agitation and confusion. The patient is nervous/anxious.    All other systems reviewed and are negative.      Objective   Physical Exam   Constitutional: She is oriented to person, place, and time. She appears well-developed and well-nourished. No distress.   HENT:   Head: Normocephalic and atraumatic.   pnd     Eyes: Conjunctivae and EOM are normal. Pupils are equal, round, and reactive to light. Right eye exhibits no discharge. Left eye exhibits no discharge. No scleral icterus.   Neck: Normal range of motion. Neck supple. No tracheal deviation present. Thyromegaly present.   Cardiovascular: Normal rate, regular rhythm, normal heart sounds, intact distal pulses and normal pulses. Exam reveals no gallop.   No murmur heard.  Pulmonary/Chest: Effort normal and breath sounds normal. No respiratory distress. She has no wheezes. She has no rales.   Abdominal:   No bruit   Musculoskeletal: Normal range of motion.   Lymphadenopathy:     She has cervical adenopathy (chronic cervical left tonsiliar ).   Neurological: She is alert and  oriented to person, place, and time. She exhibits normal muscle tone. Coordination normal.   Skin: Skin is warm. No rash noted. No erythema. No pallor.   Bruise right deltoid  Brown papular mole right post scapula; benign apprearing   Psychiatric: She has a normal mood and affect. Her behavior is normal. Judgment and thought content normal.   Nursing note and vitals reviewed.      Assessment/Plan   Sherry was seen today for breast problem.    Diagnoses and all orders for this visit:    Iron deficiency anemia due to chronic blood loss  -     Comprehensive metabolic panel  -     Lipid panel  -     CBC and Differential  -     TSH  -     T3, Free  -     T4, Free  -     Urinalysis With Microscopic - Urine, Clean Catch  -     Vitamin B12  -     Folate  -     Vitamin D 25 Hydroxy  -     Ferritin  -     Cancel: Celiac Disease Panel  -     Iron Profile    Generalized anxiety disorder  -     Comprehensive metabolic panel  -     Lipid panel  -     CBC and Differential  -     TSH  -     T3, Free  -     T4, Free  -     Urinalysis With Microscopic - Urine, Clean Catch  -     Vitamin B12  -     Folate  -     Vitamin D 25 Hydroxy  -     Ferritin  -     Cancel: Celiac Disease Panel  -     Iron Profile    Recurrent major depressive disorder, in partial remission (CMS/HCC)  -     Comprehensive metabolic panel  -     Lipid panel  -     CBC and Differential  -     TSH  -     T3, Free  -     T4, Free  -     Urinalysis With Microscopic - Urine, Clean Catch  -     Vitamin B12  -     Folate  -     Vitamin D 25 Hydroxy  -     Ferritin  -     Cancel: Celiac Disease Panel  -     Iron Profile    Chronic allergic rhinitis  -     Comprehensive metabolic panel  -     Lipid panel  -     CBC and Differential  -     TSH  -     T3, Free  -     T4, Free  -     Urinalysis With Microscopic - Urine, Clean Catch  -     Vitamin B12  -     Folate  -     Vitamin D 25 Hydroxy  -     Ferritin  -     Cancel: Celiac Disease Panel  -     Iron  Profile    Laboratory examination ordered as part of a routine general medical examination  -     Comprehensive metabolic panel  -     Lipid panel  -     CBC and Differential  -     TSH  -     T3, Free  -     T4, Free  -     Urinalysis With Microscopic - Urine, Clean Catch  -     Vitamin B12  -     Folate  -     Vitamin D 25 Hydroxy  -     Ferritin  -     Cancel: Celiac Disease Panel  -     Iron Profile    History of gestational diabetes  -     Comprehensive metabolic panel  -     Lipid panel  -     CBC and Differential  -     TSH  -     T3, Free  -     T4, Free  -     Urinalysis With Microscopic - Urine, Clean Catch  -     Vitamin B12  -     Folate  -     Vitamin D 25 Hydroxy  -     Ferritin  -     Cancel: Celiac Disease Panel  -     Iron Profile    Irritable bowel syndrome with both constipation and diarrhea  -     Comprehensive metabolic panel  -     Lipid panel  -     CBC and Differential  -     TSH  -     T3, Free  -     T4, Free  -     Urinalysis With Microscopic - Urine, Clean Catch  -     Vitamin B12  -     Folate  -     Vitamin D 25 Hydroxy  -     Ferritin  -     Cancel: Celiac Disease Panel  -     Iron Profile    Thyromegaly    Other orders  -     buPROPion XL (WELLBUTRIN XL) 300 MG 24 hr tablet; Take 1 tablet by mouth Every Morning. New dose for depression  -     DULoxetine (CYMBALTA) 60 MG capsule; Take 1 capsule by mouth Daily. For stress --new dose --take with 300mg Wellbutrin XL 300mg daily    go down WEllbutrin and go up Cymbalta for anxiety and depression break through and try L Methyl folate  See me about migraines and consider triptan  Labs  Also labs for anemia---hx and bruising  See ENT about thyroid size and left cervical node  Hx gest DMII

## 2019-09-24 NOTE — PATIENT INSTRUCTIONS
"\"[facility]\"  Homocystex and others for L Methyl Folate daily  I get them online only; not sold in store  Lips dry---try Hydrocortisone crm OTC with Vaseline  "

## 2019-09-28 LAB
25(OH)D3+25(OH)D2 SERPL-MCNC: 21.5 NG/ML (ref 30–100)
ALBUMIN SERPL-MCNC: 4.7 G/DL (ref 3.5–5.2)
ALBUMIN/GLOB SERPL: 1.8 G/DL
ALP SERPL-CCNC: 78 U/L (ref 39–117)
ALT SERPL-CCNC: 7 U/L (ref 1–33)
APPEARANCE UR: CLEAR
AST SERPL-CCNC: 10 U/L (ref 1–32)
BACTERIA #/AREA URNS HPF: ABNORMAL /HPF
BASOPHILS # BLD AUTO: 0.04 10*3/MM3 (ref 0–0.2)
BASOPHILS NFR BLD AUTO: 0.7 % (ref 0–1.5)
BILIRUB SERPL-MCNC: 0.3 MG/DL (ref 0.2–1.2)
BILIRUB UR QL STRIP: NEGATIVE
BUN SERPL-MCNC: 9 MG/DL (ref 6–20)
BUN/CREAT SERPL: 10.1 (ref 7–25)
CALCIUM SERPL-MCNC: 9.3 MG/DL (ref 8.6–10.5)
CASTS URNS MICRO: ABNORMAL
CHLORIDE SERPL-SCNC: 103 MMOL/L (ref 98–107)
CHOLEST SERPL-MCNC: 199 MG/DL (ref 0–200)
CO2 SERPL-SCNC: 29.9 MMOL/L (ref 22–29)
COLOR UR: YELLOW
CREAT SERPL-MCNC: 0.89 MG/DL (ref 0.57–1)
EOSINOPHIL # BLD AUTO: 0.08 10*3/MM3 (ref 0–0.4)
EOSINOPHIL NFR BLD AUTO: 1.3 % (ref 0.3–6.2)
EPI CELLS #/AREA URNS HPF: ABNORMAL /HPF
ERYTHROCYTE [DISTWIDTH] IN BLOOD BY AUTOMATED COUNT: 12.5 % (ref 12.3–15.4)
FERRITIN SERPL-MCNC: 8.69 NG/ML (ref 13–150)
FOLATE SERPL-MCNC: 4.01 NG/ML (ref 4.78–24.2)
GLOBULIN SER CALC-MCNC: 2.6 GM/DL
GLUCOSE SERPL-MCNC: 83 MG/DL (ref 65–99)
GLUCOSE UR QL: NEGATIVE
HCT VFR BLD AUTO: 40 % (ref 34–46.6)
HDLC SERPL-MCNC: 50 MG/DL (ref 40–60)
HGB BLD-MCNC: 13.2 G/DL (ref 12–15.9)
HGB UR QL STRIP: NEGATIVE
IMM GRANULOCYTES # BLD AUTO: 0.01 10*3/MM3 (ref 0–0.05)
IMM GRANULOCYTES NFR BLD AUTO: 0.2 % (ref 0–0.5)
IRON SATN MFR SERPL: 11 % (ref 20–50)
IRON SERPL-MCNC: 57 MCG/DL (ref 37–145)
KETONES UR QL STRIP: (no result)
LDLC SERPL CALC-MCNC: 112 MG/DL (ref 0–100)
LEUKOCYTE ESTERASE UR QL STRIP: NEGATIVE
LYMPHOCYTES # BLD AUTO: 2.13 10*3/MM3 (ref 0.7–3.1)
LYMPHOCYTES NFR BLD AUTO: 35.6 % (ref 19.6–45.3)
MCH RBC QN AUTO: 29.1 PG (ref 26.6–33)
MCHC RBC AUTO-ENTMCNC: 33 G/DL (ref 31.5–35.7)
MCV RBC AUTO: 88.1 FL (ref 79–97)
MONOCYTES # BLD AUTO: 0.38 10*3/MM3 (ref 0.1–0.9)
MONOCYTES NFR BLD AUTO: 6.4 % (ref 5–12)
NEUTROPHILS # BLD AUTO: 3.34 10*3/MM3 (ref 1.7–7)
NEUTROPHILS NFR BLD AUTO: 55.8 % (ref 42.7–76)
NITRITE UR QL STRIP: NEGATIVE
NRBC BLD AUTO-RTO: 0 /100 WBC (ref 0–0.2)
PH UR STRIP: 6 [PH] (ref 5–8)
PLATELET # BLD AUTO: 183 10*3/MM3 (ref 140–450)
POTASSIUM SERPL-SCNC: 4.7 MMOL/L (ref 3.5–5.2)
PROT SERPL-MCNC: 7.3 G/DL (ref 6–8.5)
PROT UR QL STRIP: NEGATIVE
RBC # BLD AUTO: 4.54 10*6/MM3 (ref 3.77–5.28)
RBC #/AREA URNS HPF: ABNORMAL /HPF
SODIUM SERPL-SCNC: 144 MMOL/L (ref 136–145)
SP GR UR: 1.02 (ref 1–1.03)
T3FREE SERPL-MCNC: 2.9 PG/ML (ref 2–4.4)
T4 FREE SERPL-MCNC: 0.98 NG/DL (ref 0.93–1.7)
TIBC SERPL-MCNC: 499 MCG/DL
TRIGL SERPL-MCNC: 183 MG/DL (ref 0–150)
TSH SERPL DL<=0.005 MIU/L-ACNC: 2 UIU/ML (ref 0.27–4.2)
UIBC SERPL-MCNC: 442 MCG/DL (ref 112–346)
UROBILINOGEN UR STRIP-MCNC: (no result) MG/DL
VIT B12 SERPL-MCNC: 321 PG/ML (ref 211–946)
VLDLC SERPL CALC-MCNC: 36.6 MG/DL
WBC # BLD AUTO: 5.98 10*3/MM3 (ref 3.4–10.8)
WBC #/AREA URNS HPF: ABNORMAL /HPF

## 2019-10-03 ENCOUNTER — OFFICE VISIT (OUTPATIENT)
Dept: FAMILY MEDICINE CLINIC | Facility: CLINIC | Age: 29
End: 2019-10-03

## 2019-10-03 VITALS
TEMPERATURE: 98.8 F | BODY MASS INDEX: 29.95 KG/M2 | OXYGEN SATURATION: 96 % | SYSTOLIC BLOOD PRESSURE: 110 MMHG | WEIGHT: 169 LBS | RESPIRATION RATE: 16 BRPM | HEART RATE: 100 BPM | HEIGHT: 63 IN | DIASTOLIC BLOOD PRESSURE: 80 MMHG

## 2019-10-03 DIAGNOSIS — E53.8 LOW SERUM VITAMIN B12: ICD-10-CM

## 2019-10-03 DIAGNOSIS — F33.1 MODERATE EPISODE OF RECURRENT MAJOR DEPRESSIVE DISORDER (HCC): ICD-10-CM

## 2019-10-03 DIAGNOSIS — E53.8 FOLIC ACID DEFICIENCY: ICD-10-CM

## 2019-10-03 DIAGNOSIS — E04.2 MULTINODULAR NON-TOXIC GOITER: ICD-10-CM

## 2019-10-03 DIAGNOSIS — D50.0 IRON DEFICIENCY ANEMIA DUE TO CHRONIC BLOOD LOSS: ICD-10-CM

## 2019-10-03 DIAGNOSIS — G43.119 INTRACTABLE MIGRAINE WITH AURA WITHOUT STATUS MIGRAINOSUS: Primary | ICD-10-CM

## 2019-10-03 DIAGNOSIS — F41.1 GENERALIZED ANXIETY DISORDER: ICD-10-CM

## 2019-10-03 PROCEDURE — 99214 OFFICE O/P EST MOD 30 MIN: CPT | Performed by: PHYSICIAN ASSISTANT

## 2019-10-03 RX ORDER — MAGNESIUM 200 MG
1000 TABLET ORAL DAILY
Qty: 90 EACH | Refills: 3 | Status: SHIPPED | OUTPATIENT
Start: 2019-10-03 | End: 2020-03-02

## 2019-10-03 RX ORDER — SUMATRIPTAN 100 MG/1
TABLET, FILM COATED ORAL
Qty: 9 TABLET | Refills: 5 | Status: SHIPPED | OUTPATIENT
Start: 2019-10-03 | End: 2020-03-02

## 2019-10-03 RX ORDER — FERROUS SULFATE 325(65) MG
TABLET ORAL
Qty: 30 TABLET | Refills: 11 | Status: SHIPPED | OUTPATIENT
Start: 2019-10-03 | End: 2020-02-27 | Stop reason: SDUPTHER

## 2019-10-03 RX ORDER — DESVENLAFAXINE SUCCINATE 50 MG/1
50 TABLET, EXTENDED RELEASE ORAL DAILY
Qty: 30 TABLET | Refills: 1 | Status: SHIPPED | OUTPATIENT
Start: 2019-10-03 | End: 2019-11-12 | Stop reason: ALTCHOICE

## 2019-10-03 RX ORDER — PLECANATIDE 3 MG/1
TABLET ORAL
Refills: 11 | COMMUNITY
Start: 2019-09-20 | End: 2019-11-12 | Stop reason: ALTCHOICE

## 2019-10-03 NOTE — PROGRESS NOTES
"Subjective   Sherry Quevedo is a 29 y.o. female.     History of Present Illness   Sherry Quevedo female 29 y.o., /80 (BP Location: Left arm, Patient Position: Sitting, Cuff Size: Large Adult)   Pulse 108   Temp 98.8 °F (37.1 °C) (Oral)   Resp 16   Ht 160 cm (63\")   Wt 76.7 kg (169 lb)   LMP 09/14/2019   SpO2 96%   BMI 29.94 kg/m²   who presents today for follow up of Depression and Anxiety.  She reports no help with mood or anxiety and somewhat worse.. Onset of symptoms was approximately several years ago.  She denies current suicidal and homicidal ideation. Risk factors are family history of anxiety and or depression and lifestyle of multiple roles.  Previous treatment includes current Rx.  She complains of the following medication side effects: making anxiety worse; no help depression and heart rate is up.  The patient has previously been in counseling..  I will try Pristiq and dose at 50mg; pulse is up from Cymbalta at 60mg; refer to zach if no help for further med consult.  Sherry Quevedo 29 y.o. female presents for evaluation of migraine. Symptoms began about several years ago. Generally, the headaches last about several hours and occur 2 or more times month. The headaches are usually throbbing. The location of the headache pain is left-sided unilateral, occipital and retro-orbital. Recently, the headaches have been stable. Work attendance or other daily activities are affected by the headaches. Precipitating factors include: none which have been determined. The headaches are usually preceded by an aura consisting of visual scintillations. Associated symptoms include headache pain worse with movement and photophobia. The patient denies numbness of extremities, speech difficulties and vision problems. Home treatment has included Excedrin with some improvement. Other history includes: Migraine headache. . Family history includes no known family members with significant headaches.  Prior " therapies tried include nothing with relief some help, but still symptoms.  Lab Results   Component Value Date    WBC 5.98 09/27/2019    HGB 13.2 09/27/2019    HCT 40.0 09/27/2019    MCV 88.1 09/27/2019     09/27/2019     Lab Results   Component Value Date    FERRITIN 8.69 (L) 09/27/2019     Lab Results   Component Value Date    XUJZNFNH58 321 09/27/2019     Lab Results   Component Value Date    FOLATE 4.01 (L) 09/27/2019       Start folic acid Rx ---this is low on labs  I will add B12 OTC to boost this  Start iron and build stores------these are low!! Has heavy periods and need to see GYN  Labs 3 mos to f/u on these; will also f/u and see if less bruising with taking iron    Did get copy thyroid u/s from 3-4-16; multiple thyroid nodules; she has enlg thyroid again on exam and lower range free T4----has appt with DR Hoffman tomorrow.  Needs f/u u/s thyroid.  The following portions of the patient's history were reviewed and updated as appropriate: allergies, current medications, past family history, past medical history, past social history, past surgical history and problem list.    Review of Systems   Constitutional: Positive for fatigue. Negative for activity change, appetite change and unexpected weight change.   HENT: Negative for nosebleeds and trouble swallowing.    Eyes: Negative for pain and visual disturbance.   Respiratory: Negative for chest tightness, shortness of breath and wheezing.    Cardiovascular: Positive for palpitations. Negative for chest pain.   Gastrointestinal: Negative for abdominal pain and blood in stool.   Endocrine: Positive for cold intolerance.   Genitourinary: Negative for difficulty urinating and hematuria.   Musculoskeletal: Negative for joint swelling.   Skin: Negative for color change and rash.   Allergic/Immunologic: Positive for environmental allergies.   Neurological: Negative for syncope and speech difficulty.   Hematological: Negative for adenopathy.    Psychiatric/Behavioral: Positive for sleep disturbance. Negative for agitation and confusion.   All other systems reviewed and are negative.      Objective   Physical Exam   Constitutional: She is oriented to person, place, and time. She appears well-developed and well-nourished. No distress.   HENT:   Head: Normocephalic and atraumatic.   Eyes: Conjunctivae and EOM are normal. Pupils are equal, round, and reactive to light. Right eye exhibits no discharge. Left eye exhibits no discharge. No scleral icterus.   Neck: Normal range of motion. Neck supple. No tracheal deviation present. Thyromegaly present.   Cardiovascular: Normal rate, regular rhythm, normal heart sounds, intact distal pulses and normal pulses. Exam reveals no gallop.   No murmur heard.  Pulmonary/Chest: Effort normal and breath sounds normal. No respiratory distress. She has no wheezes. She has no rales.   Musculoskeletal: Normal range of motion.   Neurological: She is alert and oriented to person, place, and time. She exhibits normal muscle tone. Coordination normal.   Skin: Skin is warm. No rash noted. No erythema. No pallor.   Psychiatric: She has a normal mood and affect. Her behavior is normal. Judgment and thought content normal.   Nursing note and vitals reviewed.      Assessment/Plan   Problems Addressed this Visit        Cardiovascular and Mediastinum    Intractable migraine with aura without status migrainosus - Primary      Other Visit Diagnoses     Multinodular non-toxic goiter        Moderate episode of recurrent major depressive disorder (CMS/HCC)        Generalized anxiety disorder                Change Cymbalta to Pristiq and watch pulse rate; keeping Wellbutrin; refer psych if no help  Start B12, Folic acid, iron and labs 3 mos; see GYN about periods  Migraine Rx ---take at onset and no more than 2 times a week  F/u 1 mo  See ENT tomorrow about thyroid enlg and those nodules

## 2019-10-04 RX ORDER — FOLIC ACID 1 MG/1
1 TABLET ORAL DAILY
Qty: 90 TABLET | Refills: 1 | Status: SHIPPED | OUTPATIENT
Start: 2019-10-04 | End: 2019-12-30 | Stop reason: SDUPTHER

## 2019-10-14 DIAGNOSIS — E53.8 LOW SERUM VITAMIN B12: ICD-10-CM

## 2019-10-14 DIAGNOSIS — F41.1 GENERALIZED ANXIETY DISORDER: ICD-10-CM

## 2019-10-14 DIAGNOSIS — G43.119 INTRACTABLE MIGRAINE WITH AURA WITHOUT STATUS MIGRAINOSUS: ICD-10-CM

## 2019-10-14 DIAGNOSIS — E53.8 FOLIC ACID DEFICIENCY: ICD-10-CM

## 2019-10-14 DIAGNOSIS — F33.1 MODERATE EPISODE OF RECURRENT MAJOR DEPRESSIVE DISORDER (HCC): ICD-10-CM

## 2019-10-14 DIAGNOSIS — E04.2 MULTINODULAR NON-TOXIC GOITER: ICD-10-CM

## 2019-10-28 ENCOUNTER — OFFICE VISIT (OUTPATIENT)
Dept: FAMILY MEDICINE CLINIC | Facility: CLINIC | Age: 29
End: 2019-10-28

## 2019-10-28 VITALS
BODY MASS INDEX: 31.54 KG/M2 | DIASTOLIC BLOOD PRESSURE: 68 MMHG | RESPIRATION RATE: 16 BRPM | HEIGHT: 63 IN | WEIGHT: 178 LBS | HEART RATE: 90 BPM | OXYGEN SATURATION: 100 % | SYSTOLIC BLOOD PRESSURE: 102 MMHG

## 2019-10-28 DIAGNOSIS — R00.2 PALPITATIONS: Primary | ICD-10-CM

## 2019-10-28 DIAGNOSIS — R00.0 TACHYCARDIA: ICD-10-CM

## 2019-10-28 DIAGNOSIS — Z23 NEED FOR IMMUNIZATION AGAINST INFLUENZA: Primary | ICD-10-CM

## 2019-10-28 PROCEDURE — 93000 ELECTROCARDIOGRAM COMPLETE: CPT | Performed by: PHYSICIAN ASSISTANT

## 2019-10-28 PROCEDURE — 90471 IMMUNIZATION ADMIN: CPT | Performed by: PHYSICIAN ASSISTANT

## 2019-10-28 PROCEDURE — 99214 OFFICE O/P EST MOD 30 MIN: CPT | Performed by: PHYSICIAN ASSISTANT

## 2019-10-28 PROCEDURE — 90674 CCIIV4 VAC NO PRSV 0.5 ML IM: CPT | Performed by: PHYSICIAN ASSISTANT

## 2019-10-28 RX ORDER — LANCETS
EACH MISCELLANEOUS
COMMUNITY
Start: 2019-10-20 | End: 2019-11-12 | Stop reason: ALTCHOICE

## 2019-10-28 RX ORDER — METOPROLOL SUCCINATE 25 MG/1
TABLET, EXTENDED RELEASE ORAL
Qty: 30 TABLET | Refills: 2 | Status: SHIPPED | OUTPATIENT
Start: 2019-10-28 | End: 2019-11-26

## 2019-10-28 RX ORDER — BLOOD SUGAR DIAGNOSTIC
STRIP MISCELLANEOUS
COMMUNITY
Start: 2019-10-20 | End: 2019-11-12 | Stop reason: ALTCHOICE

## 2019-10-28 NOTE — PROGRESS NOTES
Lilly Quevedo is a 29 y.o. female.     History of Present Illness   Patient complains of palpitations and shortness of breath. The symptoms are moderate, occur day and night and last minutes per episode. They tend to occur while at rest and with activity. Cardiac risk factors include: obesity (BMI >= 30 kg/m2) and sedentary lifestyle. Aggravating factors are none:  Alleviating factors: not noted and just goes away. Associated symptoms: palpitations, rapid heart beat and shortness of breath. Patient denies: chest pain and irregular heart beats.  She is seeing psych; Saw Sharmila D and she is on Viibyrd and WEllbutrin and may add mood stabilizer.     She did f/u on thyroid nodules with ENT    She went to cardio 6 years ago d/t tachycardia during pregnancy  Lab Results   Component Value Date    TSH 2.000 09/27/2019     Lab Results   Component Value Date    GLUCOSE 146 (H) 01/15/2019    BUN 9 09/27/2019    CREATININE 0.89 09/27/2019    EGFRIFNONA 74 09/27/2019    EGFRIFAFRI 90 09/27/2019    BCR 10.1 09/27/2019    K 4.7 09/27/2019    CO2 29.9 (H) 09/27/2019    CALCIUM 9.3 09/27/2019    PROTENTOTREF 7.3 09/27/2019    ALBUMIN 4.70 09/27/2019    LABIL2 1.8 09/27/2019    AST 10 09/27/2019    ALT 7 09/27/2019     Lab Results   Component Value Date    CHLPL 199 09/27/2019    TRIG 183 (H) 09/27/2019    HDL 50 09/27/2019     (H) 09/27/2019       The following portions of the patient's history were reviewed and updated as appropriate: allergies, current medications, past family history, past medical history, past social history, past surgical history and problem list.    Review of Systems   Constitutional: Positive for fatigue and unexpected weight change. Negative for activity change and appetite change.   HENT: Negative for nosebleeds and trouble swallowing.    Eyes: Negative for pain and visual disturbance.   Respiratory: Positive for shortness of breath. Negative for chest tightness and wheezing.     Cardiovascular: Positive for palpitations. Negative for chest pain.   Gastrointestinal: Negative for abdominal pain and blood in stool.   Endocrine: Negative.    Genitourinary: Negative for difficulty urinating and hematuria.   Musculoskeletal: Negative for joint swelling.   Skin: Negative for color change and rash.   Allergic/Immunologic: Negative.    Neurological: Positive for numbness. Negative for syncope and speech difficulty.   Hematological: Negative for adenopathy.   Psychiatric/Behavioral: Positive for sleep disturbance. Negative for agitation and confusion.   All other systems reviewed and are negative.      Objective   Physical Exam   Constitutional: She is oriented to person, place, and time. She appears well-developed and well-nourished. No distress.   HENT:   Head: Normocephalic and atraumatic.   Eyes: Conjunctivae and EOM are normal. Pupils are equal, round, and reactive to light. Right eye exhibits no discharge. Left eye exhibits no discharge. No scleral icterus.   Neck: Normal range of motion. Neck supple. No tracheal deviation present. No thyromegaly present.   Cardiovascular: Normal rate, regular rhythm, normal heart sounds, intact distal pulses and normal pulses. Exam reveals no gallop.   No murmur heard.  Pulmonary/Chest: Effort normal and breath sounds normal. No respiratory distress. She has no wheezes. She has no rales.   Musculoskeletal: Normal range of motion.   Neurological: She is alert and oriented to person, place, and time. She exhibits normal muscle tone. Coordination normal.   Skin: Skin is warm. No rash noted. No erythema. No pallor.   Psychiatric: She has a normal mood and affect. Her behavior is normal. Judgment and thought content normal.   Nursing note and vitals reviewed.      Assessment/Plan   Problems Addressed this Visit     None      Visit Diagnoses     Palpitations    -  Primary    Relevant Orders    Ambulatory Referral to Cardiology    Adult Transthoracic Echo Complete  W/ Cont if Necessary Per Protocol    Holter Monitor - 24 Hour    Tachycardia        Relevant Orders    Ambulatory Referral to Cardiology    Adult Transthoracic Echo Complete W/ Cont if Necessary Per Protocol    Holter Monitor - 24 Hour        She does agree with work up; need the Holter and echo  Get Holter first and then will let her try 1/2 dose Toprol XL 25mg  I am current on labs  See cardio  Get EKG

## 2019-10-28 NOTE — PROGRESS NOTES
Procedure     ECG 12 Lead  Date/Time: 10/28/2019 9:33 AM  Performed by: Argentina Hogan PA-C  Authorized by: Argetnina Hogan PA-C   Comparison: not compared with previous ECG   Previous ECG: no previous ECG available  Rhythm: sinus rhythm  Rate: normal  BPM: 73  Conduction: conduction normal  ST Segments: ST segments normal  T Waves: T waves normal  T inversion: V1  QRS axis: normal  Other findings: poor R wave progression    Clinical impression: normal ECG  Comments: EKG Interpretation Report    Heart rate:    73 beats/min, ND interval:  136 msec, QRS duration:  90 msec  QTu:396 msec, QTc:  436 msec

## 2019-11-07 ENCOUNTER — HOSPITAL ENCOUNTER (OUTPATIENT)
Dept: CARDIOLOGY | Facility: HOSPITAL | Age: 29
Discharge: HOME | End: 2019-11-07
Admitting: PHYSICIAN ASSISTANT

## 2019-11-07 VITALS
WEIGHT: 178 LBS | HEIGHT: 63 IN | HEART RATE: 93 BPM | DIASTOLIC BLOOD PRESSURE: 70 MMHG | BODY MASS INDEX: 31.54 KG/M2 | SYSTOLIC BLOOD PRESSURE: 100 MMHG

## 2019-11-07 DIAGNOSIS — R00.2 PALPITATIONS: ICD-10-CM

## 2019-11-07 DIAGNOSIS — R00.0 TACHYCARDIA: ICD-10-CM

## 2019-11-07 PROCEDURE — 93306 TTE W/DOPPLER COMPLETE: CPT | Performed by: INTERNAL MEDICINE

## 2019-11-07 PROCEDURE — 93306 TTE W/DOPPLER COMPLETE: CPT

## 2019-11-11 LAB
AORTIC ROOT ANNULUS: 2 CM
ASCENDING AORTA: 2.9 CM
BH CV ECHO MEAS - ACS: 2.1 CM
BH CV ECHO MEAS - AO MAX PG (FULL): 4.2 MMHG
BH CV ECHO MEAS - AO MAX PG: 7.8 MMHG
BH CV ECHO MEAS - AO MEAN PG (FULL): 2.5 MMHG
BH CV ECHO MEAS - AO MEAN PG: 4.8 MMHG
BH CV ECHO MEAS - AO V2 MAX: 139.9 CM/SEC
BH CV ECHO MEAS - AO V2 MEAN: 104 CM/SEC
BH CV ECHO MEAS - AO V2 VTI: 29.7 CM
BH CV ECHO MEAS - ASC AORTA: 2.9 CM
BH CV ECHO MEAS - AVA(I,A): 1.8 CM^2
BH CV ECHO MEAS - AVA(I,D): 1.8 CM^2
BH CV ECHO MEAS - AVA(V,A): 2.1 CM^2
BH CV ECHO MEAS - AVA(V,D): 2.1 CM^2
BH CV ECHO MEAS - BSA(HAYCOCK): 1.9 M^2
BH CV ECHO MEAS - BSA: 1.8 M^2
BH CV ECHO MEAS - BZI_BMI: 31.5 KILOGRAMS/M^2
BH CV ECHO MEAS - BZI_METRIC_HEIGHT: 160 CM
BH CV ECHO MEAS - BZI_METRIC_WEIGHT: 80.7 KG
BH CV ECHO MEAS - EDV(MOD-SP2): 128 ML
BH CV ECHO MEAS - EDV(MOD-SP4): 85 ML
BH CV ECHO MEAS - EDV(TEICH): 115.4 ML
BH CV ECHO MEAS - EF(CUBED): 50.9 %
BH CV ECHO MEAS - EF(MOD-BP): 59 %
BH CV ECHO MEAS - EF(MOD-SP2): 60.9 %
BH CV ECHO MEAS - EF(MOD-SP4): 57.6 %
BH CV ECHO MEAS - EF(TEICH): 42.8 %
BH CV ECHO MEAS - ESV(MOD-SP2): 50 ML
BH CV ECHO MEAS - ESV(MOD-SP4): 36 ML
BH CV ECHO MEAS - ESV(TEICH): 66 ML
BH CV ECHO MEAS - FS: 21.1 %
BH CV ECHO MEAS - IVS/LVPW: 1.1
BH CV ECHO MEAS - IVSD: 0.92 CM
BH CV ECHO MEAS - LAT PEAK E' VEL: 14 CM/SEC
BH CV ECHO MEAS - LV DIASTOLIC VOL/BSA (35-75): 46.2 ML/M^2
BH CV ECHO MEAS - LV MASS(C)D: 150 GRAMS
BH CV ECHO MEAS - LV MASS(C)DI: 81.5 GRAMS/M^2
BH CV ECHO MEAS - LV MAX PG: 3.7 MMHG
BH CV ECHO MEAS - LV MEAN PG: 2.3 MMHG
BH CV ECHO MEAS - LV SYSTOLIC VOL/BSA (12-30): 19.6 ML/M^2
BH CV ECHO MEAS - LV V1 MAX: 95.6 CM/SEC
BH CV ECHO MEAS - LV V1 MEAN: 73.4 CM/SEC
BH CV ECHO MEAS - LV V1 VTI: 17.3 CM
BH CV ECHO MEAS - LVIDD: 4.9 CM
BH CV ECHO MEAS - LVIDS: 3.9 CM
BH CV ECHO MEAS - LVLD AP2: 8.7 CM
BH CV ECHO MEAS - LVLD AP4: 8.6 CM
BH CV ECHO MEAS - LVLS AP2: 7.9 CM
BH CV ECHO MEAS - LVLS AP4: 7 CM
BH CV ECHO MEAS - LVOT AREA (M): 3.1 CM^2
BH CV ECHO MEAS - LVOT AREA: 3.1 CM^2
BH CV ECHO MEAS - LVOT DIAM: 2 CM
BH CV ECHO MEAS - LVPWD: 0.83 CM
BH CV ECHO MEAS - MED PEAK E' VEL: 10 CM/SEC
BH CV ECHO MEAS - MR MAX PG: 10.4 MMHG
BH CV ECHO MEAS - MR MAX VEL: 161.6 CM/SEC
BH CV ECHO MEAS - MV A DUR: 0.11 SEC
BH CV ECHO MEAS - MV A MAX VEL: 79.1 CM/SEC
BH CV ECHO MEAS - MV DEC SLOPE: 394.9 CM/SEC^2
BH CV ECHO MEAS - MV DEC TIME: 0.2 SEC
BH CV ECHO MEAS - MV E MAX VEL: 81.5 CM/SEC
BH CV ECHO MEAS - MV E/A: 1
BH CV ECHO MEAS - MV MAX PG: 4.3 MMHG
BH CV ECHO MEAS - MV MEAN PG: 2.1 MMHG
BH CV ECHO MEAS - MV P1/2T MAX VEL: 82 CM/SEC
BH CV ECHO MEAS - MV P1/2T: 60.8 MSEC
BH CV ECHO MEAS - MV V2 MAX: 103.4 CM/SEC
BH CV ECHO MEAS - MV V2 MEAN: 66.1 CM/SEC
BH CV ECHO MEAS - MV V2 VTI: 28.4 CM
BH CV ECHO MEAS - MVA P1/2T LCG: 2.7 CM^2
BH CV ECHO MEAS - MVA(P1/2T): 3.6 CM^2
BH CV ECHO MEAS - MVA(VTI): 1.9 CM^2
BH CV ECHO MEAS - PA ACC TIME: 0.13 SEC
BH CV ECHO MEAS - PA MAX PG (FULL): 0.41 MMHG
BH CV ECHO MEAS - PA MAX PG: 3.3 MMHG
BH CV ECHO MEAS - PA PR(ACCEL): 20.4 MMHG
BH CV ECHO MEAS - PA V2 MAX: 91.2 CM/SEC
BH CV ECHO MEAS - PULM A REVS DUR: 0.1 SEC
BH CV ECHO MEAS - PULM A REVS VEL: 40.3 CM/SEC
BH CV ECHO MEAS - PULM DIAS VEL: 50.9 CM/SEC
BH CV ECHO MEAS - PULM S/D: 0.81
BH CV ECHO MEAS - PULM SYS VEL: 41.2 CM/SEC
BH CV ECHO MEAS - PVA(V,A): 2.5 CM^2
BH CV ECHO MEAS - PVA(V,D): 2.5 CM^2
BH CV ECHO MEAS - QP/QS: 0.85
BH CV ECHO MEAS - RV MAX PG: 2.9 MMHG
BH CV ECHO MEAS - RV MEAN PG: 1.7 MMHG
BH CV ECHO MEAS - RV V1 MAX: 85.4 CM/SEC
BH CV ECHO MEAS - RV V1 MEAN: 60.4 CM/SEC
BH CV ECHO MEAS - RV V1 VTI: 17 CM
BH CV ECHO MEAS - RVOT AREA: 2.7 CM^2
BH CV ECHO MEAS - RVOT DIAM: 1.8 CM
BH CV ECHO MEAS - SI(CUBED): 33.5 ML/M^2
BH CV ECHO MEAS - SI(LVOT): 29.1 ML/M^2
BH CV ECHO MEAS - SI(MOD-SP2): 42.4 ML/M^2
BH CV ECHO MEAS - SI(MOD-SP4): 26.6 ML/M^2
BH CV ECHO MEAS - SI(TEICH): 26.8 ML/M^2
BH CV ECHO MEAS - SV(CUBED): 61.7 ML
BH CV ECHO MEAS - SV(LVOT): 53.5 ML
BH CV ECHO MEAS - SV(MOD-SP2): 60 ML
BH CV ECHO MEAS - SV(MOD-SP4): 58 ML
BH CV ECHO MEAS - SV(RVOT): 45.7 ML
BH CV ECHO MEAS - SV(TEICH): 49.4 ML
BH CV ECHO MEAS - TAPSE (>1.6): 1.9 CM2
BH CV ECHO MEASUREMENTS AVERAGE E/E' RATIO: 6.79
BH CV XLRA - RV BASE: 2.7 CM
BH CV XLRA - RV LENGTH: 7.7 CM
BH CV XLRA - RV MID: 2.5 CM
BH CV XLRA - TDI S': 11 CM/SEC
LEFT ATRIUM VOLUME INDEX: 20 ML/M2
LV EF 2D ECHO EST: 59 %
MAXIMAL PREDICTED HEART RATE: 190 BPM
SINUS: 3 CM
STJ: 2.7 CM
STRESS TARGET HR: 162 BPM

## 2019-11-12 ENCOUNTER — OFFICE VISIT (OUTPATIENT)
Dept: CARDIOLOGY | Facility: CLINIC | Age: 29
End: 2019-11-12

## 2019-11-12 VITALS
DIASTOLIC BLOOD PRESSURE: 78 MMHG | SYSTOLIC BLOOD PRESSURE: 124 MMHG | OXYGEN SATURATION: 95 % | BODY MASS INDEX: 31.89 KG/M2 | WEIGHT: 180 LBS | HEART RATE: 106 BPM | HEIGHT: 63 IN

## 2019-11-12 DIAGNOSIS — R00.2 PALPITATIONS: Primary | ICD-10-CM

## 2019-11-12 PROCEDURE — 99243 OFF/OP CNSLTJ NEW/EST LOW 30: CPT | Performed by: INTERNAL MEDICINE

## 2019-11-12 RX ORDER — LAMOTRIGINE 25 MG/1
25 TABLET ORAL NIGHTLY
COMMUNITY
Start: 2019-11-07 | End: 2021-03-08

## 2019-11-12 RX ORDER — LUBIPROSTONE 8 UG/1
8 CAPSULE, GELATIN COATED ORAL 2 TIMES DAILY WITH MEALS
COMMUNITY
Start: 2019-11-04 | End: 2020-03-02

## 2019-11-12 RX ORDER — VILAZODONE HYDROCHLORIDE 20 MG/1
20 TABLET ORAL EVERY MORNING
COMMUNITY
Start: 2019-11-11 | End: 2020-06-15

## 2019-11-18 NOTE — PROGRESS NOTES
Subjective:     Encounter Date:11/12/2019      Patient ID: Sherry Quevedo is a 29 y.o. female.    Dear Argentina thank you for referring this patient for evaluation of her palpitations  Chief Complaint:  Palpitations    This is a recurrent problem. The current episode started more than 1 month ago. The problem has been waxing and waning. Associated symptoms include anxiety, malaise/fatigue, nausea and shortness of breath.       29-year-old female who presents today for evaluation.  Patient has been having intermittent episodes of palpitations and tachycardia.  Patient says it feels like her heart is just going to beat out of her chest.  Is not irregular her rate is somewhere between 104 and 130.  Patient's been having some intermittent episodes of chest discomfort but has been random and not associated with her heart beating.  They usually last less than a 10 seconds.  She occasionally gets some shortness of breath she also has worsening fatigue.  Patient was seen for further evaluation.  Patient drinks 1 coke and one sweet tea a day on the average drinks about 1 glass of wine per week at the most.    Review of Systems   Constitution: Positive for malaise/fatigue.   Cardiovascular: Positive for palpitations.   Respiratory: Positive for shortness of breath.    Gastrointestinal: Positive for diarrhea and nausea.   Psychiatric/Behavioral: Positive for depression. The patient is nervous/anxious.        Procedures       Objective:     Physical Exam   Constitutional: She is oriented to person, place, and time. She appears well-developed.   HENT:   Head: Normocephalic.   Eyes: Conjunctivae are normal.   Neck: Normal range of motion.   Cardiovascular: Normal rate, regular rhythm and normal heart sounds.   Pulmonary/Chest: Breath sounds normal.   Abdominal: Soft. Bowel sounds are normal.   Musculoskeletal: Normal range of motion. She exhibits no edema.   Neurological: She is alert and oriented to person, place, and time.    Skin: Skin is warm and dry.   Psychiatric: She has a normal mood and affect. Her behavior is normal.   Vitals reviewed.      Lab Review:       Assessment:          Diagnosis Plan   1. Palpitations            Plan:       1.  Palpitations.  Patient had a monitor that showed a total of 5 PVCs and echocardiogram that was also unremarkable.  At this point I do not think there is anything pathologic from a cardiovascular standpoint.  And think it more is reactive from other issues that are noncardiac in nature.  At this point I would follow clinically and see what evolves.  I reassured her from a cardiovascular standpoint and hopefully that will help.

## 2019-11-26 ENCOUNTER — APPOINTMENT (OUTPATIENT)
Dept: PREADMISSION TESTING | Facility: HOSPITAL | Age: 29
End: 2019-11-26

## 2019-11-26 VITALS
OXYGEN SATURATION: 98 % | HEART RATE: 91 BPM | WEIGHT: 177.1 LBS | HEIGHT: 64 IN | RESPIRATION RATE: 16 BRPM | TEMPERATURE: 99 F | SYSTOLIC BLOOD PRESSURE: 114 MMHG | DIASTOLIC BLOOD PRESSURE: 76 MMHG | BODY MASS INDEX: 30.23 KG/M2

## 2019-11-26 LAB
ANION GAP SERPL CALCULATED.3IONS-SCNC: 12.5 MMOL/L (ref 5–15)
BASOPHILS # BLD AUTO: 0.05 10*3/MM3 (ref 0–0.2)
BASOPHILS NFR BLD AUTO: 0.8 % (ref 0–1.5)
BUN BLD-MCNC: 13 MG/DL (ref 6–20)
BUN/CREAT SERPL: 14.8 (ref 7–25)
CALCIUM SPEC-SCNC: 9.4 MG/DL (ref 8.6–10.5)
CHLORIDE SERPL-SCNC: 110 MMOL/L (ref 98–107)
CO2 SERPL-SCNC: 24.5 MMOL/L (ref 22–29)
CREAT BLD-MCNC: 0.88 MG/DL (ref 0.57–1)
DEPRECATED RDW RBC AUTO: 43.6 FL (ref 37–54)
EOSINOPHIL # BLD AUTO: 0.08 10*3/MM3 (ref 0–0.4)
EOSINOPHIL NFR BLD AUTO: 1.3 % (ref 0.3–6.2)
ERYTHROCYTE [DISTWIDTH] IN BLOOD BY AUTOMATED COUNT: 13.4 % (ref 12.3–15.4)
GFR SERPL CREATININE-BSD FRML MDRD: 75 ML/MIN/1.73
GLUCOSE BLD-MCNC: 79 MG/DL (ref 65–99)
HCG SERPL QL: NEGATIVE
HCT VFR BLD AUTO: 40.3 % (ref 34–46.6)
HGB BLD-MCNC: 13.7 G/DL (ref 12–15.9)
IMM GRANULOCYTES # BLD AUTO: 0.01 10*3/MM3 (ref 0–0.05)
IMM GRANULOCYTES NFR BLD AUTO: 0.2 % (ref 0–0.5)
LYMPHOCYTES # BLD AUTO: 1.82 10*3/MM3 (ref 0.7–3.1)
LYMPHOCYTES NFR BLD AUTO: 28.8 % (ref 19.6–45.3)
MCH RBC QN AUTO: 30.4 PG (ref 26.6–33)
MCHC RBC AUTO-ENTMCNC: 34 G/DL (ref 31.5–35.7)
MCV RBC AUTO: 89.4 FL (ref 79–97)
MONOCYTES # BLD AUTO: 0.36 10*3/MM3 (ref 0.1–0.9)
MONOCYTES NFR BLD AUTO: 5.7 % (ref 5–12)
NEUTROPHILS # BLD AUTO: 3.99 10*3/MM3 (ref 1.7–7)
NEUTROPHILS NFR BLD AUTO: 63.2 % (ref 42.7–76)
NRBC BLD AUTO-RTO: 0 /100 WBC (ref 0–0.2)
PLATELET # BLD AUTO: 177 10*3/MM3 (ref 140–450)
PMV BLD AUTO: 9.5 FL (ref 6–12)
POTASSIUM BLD-SCNC: 4.1 MMOL/L (ref 3.5–5.2)
RBC # BLD AUTO: 4.51 10*6/MM3 (ref 3.77–5.28)
SODIUM BLD-SCNC: 147 MMOL/L (ref 136–145)
WBC NRBC COR # BLD: 6.31 10*3/MM3 (ref 3.4–10.8)

## 2019-11-26 PROCEDURE — 80048 BASIC METABOLIC PNL TOTAL CA: CPT | Performed by: OBSTETRICS & GYNECOLOGY

## 2019-11-26 PROCEDURE — 84703 CHORIONIC GONADOTROPIN ASSAY: CPT | Performed by: OBSTETRICS & GYNECOLOGY

## 2019-11-26 PROCEDURE — 85025 COMPLETE CBC W/AUTO DIFF WBC: CPT | Performed by: OBSTETRICS & GYNECOLOGY

## 2019-11-26 PROCEDURE — 36415 COLL VENOUS BLD VENIPUNCTURE: CPT

## 2019-11-26 NOTE — DISCHARGE INSTRUCTIONS
PLEASE ARRIVE AT 10AM ON 12/4/2019      Take the following medications the morning of surgery with a small sip of water:  NO MEDS      General Instructions:  • Do not eat solid food after midnight the night before surgery.  • You may drink clear liquids day of surgery but must stop at least one hour before your hospital arrival time.  **STOP FLUIDS AT 9AM DAY OF SURGERY**  • It is beneficial for you to have a clear drink that contains carbohydrates the day of surgery.  We suggest a 12 to 20 ounce bottle of Gatorade or Powerade for non-diabetic patients or a 12 to 20 ounce bottle of G2 or Powerade Zero for diabetic patients. (Pediatric patients, are not advised to drink a 12 to 20 ounce carbohydrate drink)    Clear liquids are liquids you can see through.  Nothing red in color.     Plain water                               Sports drinks  Sodas                                   Gelatin (Jell-O)  Fruit juices without pulp such as white grape juice and apple juice  Popsicles that contain no fruit or yogurt  Tea or coffee (no cream or milk added)  Gatorade / Powerade  G2 / Powerade Zero    • Infants may have breast milk up to four hours before surgery.  • Infants drinking formula may drink formula up to six hours before surgery.   • Patients who avoid smoking, chewing tobacco and alcohol for 4 weeks prior to surgery have a reduced risk of post-operative complications.  Quit smoking as many days before surgery as you can.  • Do not smoke, use chewing tobacco or drink alcohol the day of surgery.   • If applicable bring your C-PAP/ BI-PAP machine.  • Bring any papers given to you in the doctor’s office.  • Wear clean comfortable clothes.  • Do not wear contact lenses, false eyelashes or make-up.  Bring a case for your glasses.   • Bring crutches or walker if applicable.  • Remove all piercings.  Leave jewelry and any other valuables at home.  • Hair extensions with metal clips must be removed prior to surgery.  • The  Pre-Admission Testing nurse will instruct you to bring medications if unable to obtain an accurate list in Pre-Admission Testing.            Preventing a Surgical Site Infection:  • For 2 to 3 days before surgery, avoid shaving with a razor because the razor can irritate skin and make it easier to develop an infection.    • Any areas of open skin can increase the risk of a post-operative wound infection by allowing bacteria to enter and travel throughout the body.  Notify your surgeon if you have any skin wounds / rashes even if it is not near the expected surgical site.  The area will need assessed to determine if surgery should be delayed until it is healed.  • The night prior to surgery sleep in a clean bed with clean clothing.  Do not allow pets to sleep with you.  • Shower on the morning of surgery using a fresh bar of anti-bacterial soap (such as Dial) and clean washcloth.  Dry with a clean towel and dress in clean clothing.  • Ask your surgeon if you will be receiving antibiotics prior to surgery.  • Make sure you, your family, and all healthcare providers clean their hands with soap and water or an alcohol based hand  before caring for you or your wound.    Day of surgery:  Your arrival time is approximately two hours before your scheduled surgery time.  Upon arrival, a Pre-op nurse and Anesthesiologist will review your health history, obtain vital signs, and answer questions you may have.  The only belongings needed at this time will be a list of your home medications and if applicable your C-PAP/BI-PAP machine.  If you are staying overnight your family can leave the rest of your belongings in the car and bring them to your room later.  A Pre-op nurse will start an IV and you may receive medication in preparation for surgery, including something to help you relax.  Your family will be able to see you in the Pre-op area.  Two visitors at a time will be allowed in the Pre-op room.  While you are in  surgery your family should notify the waiting room  if they leave the waiting room area and provide a contact phone number.    Please be aware that surgery does come with discomfort.  We want to make every effort to control your discomfort so please discuss any uncontrolled symptoms with your nurse.   Your doctor will most likely have prescribed pain medications.      If you are going home after surgery you will receive individualized written care instructions before being discharged.  A responsible adult must drive you to and from the hospital on the day of your surgery and stay with you for 24 hours.    If you are staying overnight following surgery, you will be transported to your hospital room following the recovery period.  [facility] has all private rooms.    You have received a list of surgical assistants for your reference.  If you have any questions please call Pre-Admission Testing at [phone].  Deductibles and co-payments are collected on the day of service. Please be prepared to pay the required co-pay, deductible or deposit on the day of service as defined by your plan.

## 2019-12-04 ENCOUNTER — HOSPITAL ENCOUNTER (OUTPATIENT)
Facility: HOSPITAL | Age: 29
Discharge: HOME | End: 2019-12-05
Attending: OBSTETRICS & GYNECOLOGY | Admitting: OBSTETRICS & GYNECOLOGY

## 2019-12-04 ENCOUNTER — ANESTHESIA (OUTPATIENT)
Dept: PERIOP | Facility: HOSPITAL | Age: 29
End: 2019-12-04

## 2019-12-04 ENCOUNTER — ANESTHESIA EVENT (OUTPATIENT)
Dept: PERIOP | Facility: HOSPITAL | Age: 29
End: 2019-12-04

## 2019-12-04 DIAGNOSIS — N92.0 MENORRHAGIA: ICD-10-CM

## 2019-12-04 PROBLEM — N94.6 DYSMENORRHEA: Status: ACTIVE | Noted: 2019-12-04

## 2019-12-04 LAB
B-HCG UR QL: NEGATIVE
INTERNAL NEGATIVE CONTROL: NEGATIVE
INTERNAL POSITIVE CONTROL: POSITIVE
Lab: NORMAL

## 2019-12-04 PROCEDURE — 88307 TISSUE EXAM BY PATHOLOGIST: CPT | Performed by: OBSTETRICS & GYNECOLOGY

## 2019-12-04 PROCEDURE — 25010000002 HYDROMORPHONE PER 4 MG: Performed by: ANESTHESIOLOGY

## 2019-12-04 PROCEDURE — 25010000002 PROPOFOL 10 MG/ML EMULSION: Performed by: NURSE ANESTHETIST, CERTIFIED REGISTERED

## 2019-12-04 PROCEDURE — 25010000002 MIDAZOLAM PER 1 MG: Performed by: ANESTHESIOLOGY

## 2019-12-04 PROCEDURE — 25010000002 FENTANYL CITRATE (PF) 100 MCG/2ML SOLUTION: Performed by: NURSE ANESTHETIST, CERTIFIED REGISTERED

## 2019-12-04 PROCEDURE — 25010000002 FENTANYL CITRATE (PF) 100 MCG/2ML SOLUTION: Performed by: ANESTHESIOLOGY

## 2019-12-04 PROCEDURE — 25010000002 HYDROMORPHONE PER 4 MG: Performed by: OBSTETRICS & GYNECOLOGY

## 2019-12-04 PROCEDURE — 25010000002 KETOROLAC TROMETHAMINE PER 15 MG: Performed by: NURSE ANESTHETIST, CERTIFIED REGISTERED

## 2019-12-04 PROCEDURE — 25010000003 CEFAZOLIN IN DEXTROSE 2-4 GM/100ML-% SOLUTION: Performed by: OBSTETRICS & GYNECOLOGY

## 2019-12-04 PROCEDURE — 81025 URINE PREGNANCY TEST: CPT | Performed by: ANESTHESIOLOGY

## 2019-12-04 PROCEDURE — 25010000002 NEOSTIGMINE PER 0.5 MG: Performed by: NURSE ANESTHETIST, CERTIFIED REGISTERED

## 2019-12-04 PROCEDURE — 25010000002 HYDROMORPHONE 1 MG/ML SOLUTION

## 2019-12-04 PROCEDURE — 25010000002 ONDANSETRON PER 1 MG: Performed by: NURSE ANESTHETIST, CERTIFIED REGISTERED

## 2019-12-04 PROCEDURE — 25010000002 HYDROMORPHONE PER 4 MG: Performed by: NURSE ANESTHETIST, CERTIFIED REGISTERED

## 2019-12-04 PROCEDURE — 25010000002 PHENYLEPHRINE PER 1 ML: Performed by: NURSE ANESTHETIST, CERTIFIED REGISTERED

## 2019-12-04 PROCEDURE — 25010000002 DEXAMETHASONE PER 1 MG: Performed by: NURSE ANESTHETIST, CERTIFIED REGISTERED

## 2019-12-04 RX ORDER — HYDRALAZINE HYDROCHLORIDE 20 MG/ML
5 INJECTION INTRAMUSCULAR; INTRAVENOUS
Status: DISCONTINUED | OUTPATIENT
Start: 2019-12-04 | End: 2019-12-04 | Stop reason: HOSPADM

## 2019-12-04 RX ORDER — CALCIUM CARBONATE 200(500)MG
2 TABLET,CHEWABLE ORAL 3 TIMES DAILY PRN
Status: DISCONTINUED | OUTPATIENT
Start: 2019-12-04 | End: 2019-12-04

## 2019-12-04 RX ORDER — PROMETHAZINE HYDROCHLORIDE 25 MG/1
25 SUPPOSITORY RECTAL ONCE AS NEEDED
Status: DISCONTINUED | OUTPATIENT
Start: 2019-12-04 | End: 2019-12-04 | Stop reason: HOSPADM

## 2019-12-04 RX ORDER — DOCUSATE SODIUM 100 MG/1
100 CAPSULE, LIQUID FILLED ORAL 2 TIMES DAILY PRN
Status: DISCONTINUED | OUTPATIENT
Start: 2019-12-04 | End: 2019-12-05 | Stop reason: HOSPADM

## 2019-12-04 RX ORDER — BUPIVACAINE HYDROCHLORIDE 5 MG/ML
INJECTION, SOLUTION EPIDURAL; INTRACAUDAL AS NEEDED
Status: DISCONTINUED | OUTPATIENT
Start: 2019-12-04 | End: 2019-12-04 | Stop reason: HOSPADM

## 2019-12-04 RX ORDER — CALCIUM CARBONATE 200(500)MG
2 TABLET,CHEWABLE ORAL 3 TIMES DAILY PRN
Status: DISCONTINUED | OUTPATIENT
Start: 2019-12-04 | End: 2019-12-05 | Stop reason: HOSPADM

## 2019-12-04 RX ORDER — MONTELUKAST SODIUM 10 MG/1
10 TABLET ORAL AS NEEDED
Status: DISCONTINUED | OUTPATIENT
Start: 2019-12-04 | End: 2019-12-04

## 2019-12-04 RX ORDER — ONDANSETRON 2 MG/ML
4 INJECTION INTRAMUSCULAR; INTRAVENOUS EVERY 6 HOURS PRN
Status: DISCONTINUED | OUTPATIENT
Start: 2019-12-04 | End: 2019-12-05 | Stop reason: HOSPADM

## 2019-12-04 RX ORDER — SODIUM CHLORIDE, SODIUM LACTATE, POTASSIUM CHLORIDE, CALCIUM CHLORIDE 600; 310; 30; 20 MG/100ML; MG/100ML; MG/100ML; MG/100ML
100 INJECTION, SOLUTION INTRAVENOUS CONTINUOUS
Status: DISCONTINUED | OUTPATIENT
Start: 2019-12-04 | End: 2019-12-05 | Stop reason: HOSPADM

## 2019-12-04 RX ORDER — PROMETHAZINE HYDROCHLORIDE 25 MG/ML
12.5 INJECTION, SOLUTION INTRAMUSCULAR; INTRAVENOUS ONCE
Status: DISCONTINUED | OUTPATIENT
Start: 2019-12-04 | End: 2019-12-04 | Stop reason: HOSPADM

## 2019-12-04 RX ORDER — ONDANSETRON 2 MG/ML
INJECTION INTRAMUSCULAR; INTRAVENOUS AS NEEDED
Status: DISCONTINUED | OUTPATIENT
Start: 2019-12-04 | End: 2019-12-04 | Stop reason: SURG

## 2019-12-04 RX ORDER — LIDOCAINE HYDROCHLORIDE 20 MG/ML
INJECTION, SOLUTION INFILTRATION; PERINEURAL AS NEEDED
Status: DISCONTINUED | OUTPATIENT
Start: 2019-12-04 | End: 2019-12-04 | Stop reason: SURG

## 2019-12-04 RX ORDER — DOCUSATE SODIUM 100 MG/1
100 CAPSULE, LIQUID FILLED ORAL 2 TIMES DAILY PRN
Status: DISCONTINUED | OUTPATIENT
Start: 2019-12-04 | End: 2019-12-04

## 2019-12-04 RX ORDER — MAGNESIUM HYDROXIDE 1200 MG/15ML
LIQUID ORAL AS NEEDED
Status: DISCONTINUED | OUTPATIENT
Start: 2019-12-04 | End: 2019-12-04 | Stop reason: HOSPADM

## 2019-12-04 RX ORDER — FOLIC ACID 1 MG/1
1 TABLET ORAL DAILY
Status: DISCONTINUED | OUTPATIENT
Start: 2019-12-04 | End: 2019-12-04

## 2019-12-04 RX ORDER — SODIUM CHLORIDE, SODIUM LACTATE, POTASSIUM CHLORIDE, CALCIUM CHLORIDE 600; 310; 30; 20 MG/100ML; MG/100ML; MG/100ML; MG/100ML
100 INJECTION, SOLUTION INTRAVENOUS CONTINUOUS
Status: DISCONTINUED | OUTPATIENT
Start: 2019-12-04 | End: 2019-12-04

## 2019-12-04 RX ORDER — ONDANSETRON 4 MG/1
4 TABLET, FILM COATED ORAL EVERY 6 HOURS PRN
Status: DISCONTINUED | OUTPATIENT
Start: 2019-12-04 | End: 2019-12-04

## 2019-12-04 RX ORDER — GLYCOPYRROLATE 0.2 MG/ML
INJECTION INTRAMUSCULAR; INTRAVENOUS AS NEEDED
Status: DISCONTINUED | OUTPATIENT
Start: 2019-12-04 | End: 2019-12-04 | Stop reason: SURG

## 2019-12-04 RX ORDER — KETOROLAC TROMETHAMINE 30 MG/ML
INJECTION, SOLUTION INTRAMUSCULAR; INTRAVENOUS AS NEEDED
Status: DISCONTINUED | OUTPATIENT
Start: 2019-12-04 | End: 2019-12-04 | Stop reason: SURG

## 2019-12-04 RX ORDER — ALUMINA, MAGNESIA, AND SIMETHICONE 2400; 2400; 240 MG/30ML; MG/30ML; MG/30ML
15 SUSPENSION ORAL EVERY 6 HOURS
Status: DISCONTINUED | OUTPATIENT
Start: 2019-12-04 | End: 2019-12-04

## 2019-12-04 RX ORDER — FENTANYL CITRATE 50 UG/ML
INJECTION, SOLUTION INTRAMUSCULAR; INTRAVENOUS AS NEEDED
Status: DISCONTINUED | OUTPATIENT
Start: 2019-12-04 | End: 2019-12-04 | Stop reason: SURG

## 2019-12-04 RX ORDER — OXYCODONE HYDROCHLORIDE AND ACETAMINOPHEN 5; 325 MG/1; MG/1
1 TABLET ORAL EVERY 4 HOURS PRN
Status: DISCONTINUED | OUTPATIENT
Start: 2019-12-04 | End: 2019-12-05 | Stop reason: HOSPADM

## 2019-12-04 RX ORDER — HYDROMORPHONE HYDROCHLORIDE 1 MG/ML
0.5 INJECTION, SOLUTION INTRAMUSCULAR; INTRAVENOUS; SUBCUTANEOUS
Status: DISCONTINUED | OUTPATIENT
Start: 2019-12-04 | End: 2019-12-04 | Stop reason: HOSPADM

## 2019-12-04 RX ORDER — PROMETHAZINE HYDROCHLORIDE 25 MG/ML
6.25 INJECTION, SOLUTION INTRAMUSCULAR; INTRAVENOUS ONCE AS NEEDED
Status: DISCONTINUED | OUTPATIENT
Start: 2019-12-04 | End: 2019-12-04 | Stop reason: HOSPADM

## 2019-12-04 RX ORDER — PROMETHAZINE HYDROCHLORIDE 25 MG/1
25 TABLET ORAL ONCE AS NEEDED
Status: DISCONTINUED | OUTPATIENT
Start: 2019-12-04 | End: 2019-12-04 | Stop reason: HOSPADM

## 2019-12-04 RX ORDER — NALOXONE HCL 0.4 MG/ML
0.1 VIAL (ML) INJECTION
Status: DISCONTINUED | OUTPATIENT
Start: 2019-12-04 | End: 2019-12-05 | Stop reason: HOSPADM

## 2019-12-04 RX ORDER — NALOXONE HCL 0.4 MG/ML
0.1 VIAL (ML) INJECTION
Status: DISCONTINUED | OUTPATIENT
Start: 2019-12-04 | End: 2019-12-04

## 2019-12-04 RX ORDER — SODIUM CHLORIDE, SODIUM LACTATE, POTASSIUM CHLORIDE, CALCIUM CHLORIDE 600; 310; 30; 20 MG/100ML; MG/100ML; MG/100ML; MG/100ML
9 INJECTION, SOLUTION INTRAVENOUS CONTINUOUS
Status: DISCONTINUED | OUTPATIENT
Start: 2019-12-04 | End: 2019-12-04

## 2019-12-04 RX ORDER — ALUMINA, MAGNESIA, AND SIMETHICONE 2400; 2400; 240 MG/30ML; MG/30ML; MG/30ML
15 SUSPENSION ORAL EVERY 6 HOURS
Status: DISCONTINUED | OUTPATIENT
Start: 2019-12-04 | End: 2019-12-05 | Stop reason: HOSPADM

## 2019-12-04 RX ORDER — FAMOTIDINE 10 MG/ML
20 INJECTION, SOLUTION INTRAVENOUS ONCE
Status: COMPLETED | OUTPATIENT
Start: 2019-12-04 | End: 2019-12-04

## 2019-12-04 RX ORDER — MIDAZOLAM HYDROCHLORIDE 1 MG/ML
2 INJECTION INTRAMUSCULAR; INTRAVENOUS
Status: DISCONTINUED | OUTPATIENT
Start: 2019-12-04 | End: 2019-12-04 | Stop reason: HOSPADM

## 2019-12-04 RX ORDER — IBUPROFEN 200 MG
800 TABLET ORAL EVERY 6 HOURS PRN
Status: DISCONTINUED | OUTPATIENT
Start: 2019-12-04 | End: 2019-12-04

## 2019-12-04 RX ORDER — FLUMAZENIL 0.1 MG/ML
0.2 INJECTION INTRAVENOUS AS NEEDED
Status: DISCONTINUED | OUTPATIENT
Start: 2019-12-04 | End: 2019-12-04 | Stop reason: HOSPADM

## 2019-12-04 RX ORDER — FENTANYL CITRATE 50 UG/ML
100 INJECTION, SOLUTION INTRAMUSCULAR; INTRAVENOUS
Status: DISCONTINUED | OUTPATIENT
Start: 2019-12-04 | End: 2019-12-04 | Stop reason: HOSPADM

## 2019-12-04 RX ORDER — MIDAZOLAM HYDROCHLORIDE 1 MG/ML
1 INJECTION INTRAMUSCULAR; INTRAVENOUS
Status: DISCONTINUED | OUTPATIENT
Start: 2019-12-04 | End: 2019-12-04 | Stop reason: HOSPADM

## 2019-12-04 RX ORDER — ONDANSETRON 2 MG/ML
4 INJECTION INTRAMUSCULAR; INTRAVENOUS ONCE AS NEEDED
Status: DISCONTINUED | OUTPATIENT
Start: 2019-12-04 | End: 2019-12-04 | Stop reason: HOSPADM

## 2019-12-04 RX ORDER — HYDROMORPHONE HCL 110MG/55ML
PATIENT CONTROLLED ANALGESIA SYRINGE INTRAVENOUS AS NEEDED
Status: DISCONTINUED | OUTPATIENT
Start: 2019-12-04 | End: 2019-12-04 | Stop reason: SURG

## 2019-12-04 RX ORDER — ZOLPIDEM TARTRATE 5 MG/1
5 TABLET ORAL NIGHTLY PRN
Status: DISCONTINUED | OUTPATIENT
Start: 2019-12-04 | End: 2019-12-04

## 2019-12-04 RX ORDER — PROPOFOL 10 MG/ML
VIAL (ML) INTRAVENOUS AS NEEDED
Status: DISCONTINUED | OUTPATIENT
Start: 2019-12-04 | End: 2019-12-04 | Stop reason: SURG

## 2019-12-04 RX ORDER — ROCURONIUM BROMIDE 10 MG/ML
INJECTION, SOLUTION INTRAVENOUS AS NEEDED
Status: DISCONTINUED | OUTPATIENT
Start: 2019-12-04 | End: 2019-12-04 | Stop reason: SURG

## 2019-12-04 RX ORDER — EPHEDRINE SULFATE 50 MG/ML
5 INJECTION, SOLUTION INTRAVENOUS ONCE AS NEEDED
Status: DISCONTINUED | OUTPATIENT
Start: 2019-12-04 | End: 2019-12-04 | Stop reason: HOSPADM

## 2019-12-04 RX ORDER — LAMOTRIGINE 25 MG/1
25 TABLET ORAL NIGHTLY
Status: DISCONTINUED | OUTPATIENT
Start: 2019-12-04 | End: 2019-12-04

## 2019-12-04 RX ORDER — OXYCODONE HYDROCHLORIDE AND ACETAMINOPHEN 5; 325 MG/1; MG/1
1 TABLET ORAL EVERY 4 HOURS PRN
Status: DISCONTINUED | OUTPATIENT
Start: 2019-12-04 | End: 2019-12-04

## 2019-12-04 RX ORDER — SCOLOPAMINE TRANSDERMAL SYSTEM 1 MG/1
1 PATCH, EXTENDED RELEASE TRANSDERMAL ONCE
Status: DISCONTINUED | OUTPATIENT
Start: 2019-12-04 | End: 2019-12-04

## 2019-12-04 RX ORDER — IBUPROFEN 400 MG/1
800 TABLET ORAL EVERY 6 HOURS PRN
Status: DISCONTINUED | OUTPATIENT
Start: 2019-12-04 | End: 2019-12-05 | Stop reason: HOSPADM

## 2019-12-04 RX ORDER — SODIUM CHLORIDE 0.9 % (FLUSH) 0.9 %
3 SYRINGE (ML) INJECTION EVERY 12 HOURS SCHEDULED
Status: DISCONTINUED | OUTPATIENT
Start: 2019-12-04 | End: 2019-12-04 | Stop reason: HOSPADM

## 2019-12-04 RX ORDER — ZOLPIDEM TARTRATE 5 MG/1
5 TABLET ORAL NIGHTLY PRN
Status: DISCONTINUED | OUTPATIENT
Start: 2019-12-04 | End: 2019-12-05 | Stop reason: HOSPADM

## 2019-12-04 RX ORDER — SODIUM CHLORIDE 0.9 % (FLUSH) 0.9 %
3-10 SYRINGE (ML) INJECTION AS NEEDED
Status: DISCONTINUED | OUTPATIENT
Start: 2019-12-04 | End: 2019-12-04 | Stop reason: HOSPADM

## 2019-12-04 RX ORDER — FENTANYL CITRATE 50 UG/ML
50 INJECTION, SOLUTION INTRAMUSCULAR; INTRAVENOUS
Status: DISCONTINUED | OUTPATIENT
Start: 2019-12-04 | End: 2019-12-04 | Stop reason: HOSPADM

## 2019-12-04 RX ORDER — VILAZODONE HYDROCHLORIDE 40 MG/1
20 TABLET ORAL EVERY MORNING
Status: DISCONTINUED | OUTPATIENT
Start: 2019-12-04 | End: 2019-12-04

## 2019-12-04 RX ORDER — ONDANSETRON 2 MG/ML
4 INJECTION INTRAMUSCULAR; INTRAVENOUS EVERY 6 HOURS PRN
Status: DISCONTINUED | OUTPATIENT
Start: 2019-12-04 | End: 2019-12-04

## 2019-12-04 RX ORDER — DEXAMETHASONE SODIUM PHOSPHATE 10 MG/ML
INJECTION INTRAMUSCULAR; INTRAVENOUS AS NEEDED
Status: DISCONTINUED | OUTPATIENT
Start: 2019-12-04 | End: 2019-12-04 | Stop reason: SURG

## 2019-12-04 RX ORDER — HYDROMORPHONE HYDROCHLORIDE 1 MG/ML
0.5 INJECTION, SOLUTION INTRAMUSCULAR; INTRAVENOUS; SUBCUTANEOUS
Status: DISCONTINUED | OUTPATIENT
Start: 2019-12-04 | End: 2019-12-04

## 2019-12-04 RX ORDER — LIDOCAINE HYDROCHLORIDE 10 MG/ML
0.5 INJECTION, SOLUTION EPIDURAL; INFILTRATION; INTRACAUDAL; PERINEURAL ONCE AS NEEDED
Status: DISCONTINUED | OUTPATIENT
Start: 2019-12-04 | End: 2019-12-04 | Stop reason: HOSPADM

## 2019-12-04 RX ORDER — ONDANSETRON 4 MG/1
4 TABLET, FILM COATED ORAL EVERY 6 HOURS PRN
Status: DISCONTINUED | OUTPATIENT
Start: 2019-12-04 | End: 2019-12-05 | Stop reason: HOSPADM

## 2019-12-04 RX ORDER — SODIUM CHLORIDE 9 MG/ML
INJECTION, SOLUTION INTRAVENOUS AS NEEDED
Status: DISCONTINUED | OUTPATIENT
Start: 2019-12-04 | End: 2019-12-04 | Stop reason: HOSPADM

## 2019-12-04 RX ORDER — CEFAZOLIN SODIUM 2 G/100ML
2 INJECTION, SOLUTION INTRAVENOUS ONCE
Status: COMPLETED | OUTPATIENT
Start: 2019-12-04 | End: 2019-12-04

## 2019-12-04 RX ORDER — LUBIPROSTONE 8 UG/1
8 CAPSULE ORAL 2 TIMES DAILY WITH MEALS
Status: DISCONTINUED | OUTPATIENT
Start: 2019-12-04 | End: 2019-12-04

## 2019-12-04 RX ORDER — HYDROMORPHONE HYDROCHLORIDE 1 MG/ML
0.5 INJECTION, SOLUTION INTRAMUSCULAR; INTRAVENOUS; SUBCUTANEOUS
Status: DISCONTINUED | OUTPATIENT
Start: 2019-12-04 | End: 2019-12-05 | Stop reason: HOSPADM

## 2019-12-04 RX ADMIN — HYDROMORPHONE HYDROCHLORIDE 1 MG: 2 INJECTION, SOLUTION INTRAMUSCULAR; INTRAVENOUS; SUBCUTANEOUS at 08:53

## 2019-12-04 RX ADMIN — SCOPALAMINE 1 PATCH: 1 PATCH, EXTENDED RELEASE TRANSDERMAL at 06:46

## 2019-12-04 RX ADMIN — ALUMINUM HYDROXIDE, MAGNESIUM HYDROXIDE, AND DIMETHICONE 15 ML: 400; 400; 40 SUSPENSION ORAL at 20:42

## 2019-12-04 RX ADMIN — ROCURONIUM BROMIDE 5 MG: 10 INJECTION, SOLUTION INTRAVENOUS at 08:02

## 2019-12-04 RX ADMIN — FENTANYL CITRATE 50 MCG: 50 INJECTION, SOLUTION INTRAMUSCULAR; INTRAVENOUS at 07:36

## 2019-12-04 RX ADMIN — DEXAMETHASONE SODIUM PHOSPHATE 10 MG: 10 INJECTION INTRAMUSCULAR; INTRAVENOUS at 07:33

## 2019-12-04 RX ADMIN — ROCURONIUM BROMIDE 40 MG: 10 INJECTION, SOLUTION INTRAVENOUS at 07:22

## 2019-12-04 RX ADMIN — MIDAZOLAM 1 MG: 1 INJECTION INTRAMUSCULAR; INTRAVENOUS at 06:49

## 2019-12-04 RX ADMIN — FENTANYL CITRATE 50 MCG: 50 INJECTION, SOLUTION INTRAMUSCULAR; INTRAVENOUS at 07:19

## 2019-12-04 RX ADMIN — Medication 2 MG: at 08:34

## 2019-12-04 RX ADMIN — HYDROMORPHONE HYDROCHLORIDE 0.5 MG: 1 INJECTION, SOLUTION INTRAMUSCULAR; INTRAVENOUS; SUBCUTANEOUS at 09:19

## 2019-12-04 RX ADMIN — KETOROLAC TROMETHAMINE 30 MG: 30 INJECTION, SOLUTION INTRAMUSCULAR; INTRAVENOUS at 08:20

## 2019-12-04 RX ADMIN — ONDANSETRON HYDROCHLORIDE 4 MG: 2 SOLUTION INTRAMUSCULAR; INTRAVENOUS at 08:20

## 2019-12-04 RX ADMIN — FENTANYL CITRATE 50 MCG: 50 INJECTION, SOLUTION INTRAMUSCULAR; INTRAVENOUS at 07:50

## 2019-12-04 RX ADMIN — OXYCODONE HYDROCHLORIDE AND ACETAMINOPHEN 1 TABLET: 5; 325 TABLET ORAL at 14:16

## 2019-12-04 RX ADMIN — PHENYLEPHRINE HYDROCHLORIDE 50 MCG: 10 INJECTION INTRAVENOUS at 07:31

## 2019-12-04 RX ADMIN — ROCURONIUM BROMIDE 5 MG: 10 INJECTION, SOLUTION INTRAVENOUS at 08:14

## 2019-12-04 RX ADMIN — FAMOTIDINE 20 MG: 10 INJECTION INTRAVENOUS at 06:45

## 2019-12-04 RX ADMIN — FENTANYL CITRATE 50 MCG: 50 INJECTION, SOLUTION INTRAMUSCULAR; INTRAVENOUS at 09:33

## 2019-12-04 RX ADMIN — SODIUM CHLORIDE, POTASSIUM CHLORIDE, SODIUM LACTATE AND CALCIUM CHLORIDE: 600; 310; 30; 20 INJECTION, SOLUTION INTRAVENOUS at 07:17

## 2019-12-04 RX ADMIN — DOCUSATE SODIUM 100 MG: 100 CAPSULE, LIQUID FILLED ORAL at 20:42

## 2019-12-04 RX ADMIN — SODIUM CHLORIDE, POTASSIUM CHLORIDE, SODIUM LACTATE AND CALCIUM CHLORIDE 100 ML/HR: 600; 310; 30; 20 INJECTION, SOLUTION INTRAVENOUS at 22:14

## 2019-12-04 RX ADMIN — SODIUM CHLORIDE, POTASSIUM CHLORIDE, SODIUM LACTATE AND CALCIUM CHLORIDE 100 ML/HR: 600; 310; 30; 20 INJECTION, SOLUTION INTRAVENOUS at 12:28

## 2019-12-04 RX ADMIN — FENTANYL CITRATE 50 MCG: 50 INJECTION, SOLUTION INTRAMUSCULAR; INTRAVENOUS at 10:26

## 2019-12-04 RX ADMIN — CEFAZOLIN SODIUM 2 G: 2 INJECTION, SOLUTION INTRAVENOUS at 07:20

## 2019-12-04 RX ADMIN — FENTANYL CITRATE 50 MCG: 50 INJECTION, SOLUTION INTRAMUSCULAR; INTRAVENOUS at 08:35

## 2019-12-04 RX ADMIN — HYDROMORPHONE HYDROCHLORIDE 0.5 MG: 1 INJECTION, SOLUTION INTRAMUSCULAR; INTRAVENOUS; SUBCUTANEOUS at 09:39

## 2019-12-04 RX ADMIN — IBUPROFEN 800 MG: 400 TABLET, FILM COATED ORAL at 20:42

## 2019-12-04 RX ADMIN — MIDAZOLAM 1 MG: 1 INJECTION INTRAMUSCULAR; INTRAVENOUS at 06:46

## 2019-12-04 RX ADMIN — PROPOFOL 170 MG: 10 INJECTION, EMULSION INTRAVENOUS at 07:22

## 2019-12-04 RX ADMIN — PHENYLEPHRINE HYDROCHLORIDE 50 MCG: 10 INJECTION INTRAVENOUS at 08:23

## 2019-12-04 RX ADMIN — FENTANYL CITRATE 50 MCG: 50 INJECTION, SOLUTION INTRAMUSCULAR; INTRAVENOUS at 08:14

## 2019-12-04 RX ADMIN — GLYCOPYRROLATE 0.2 MG: 0.2 INJECTION INTRAMUSCULAR; INTRAVENOUS at 08:34

## 2019-12-04 RX ADMIN — IBUPROFEN 800 MG: 400 TABLET, FILM COATED ORAL at 14:16

## 2019-12-04 RX ADMIN — OXYCODONE HYDROCHLORIDE AND ACETAMINOPHEN 1 TABLET: 5; 325 TABLET ORAL at 20:42

## 2019-12-04 RX ADMIN — LIDOCAINE HYDROCHLORIDE 100 MG: 20 INJECTION, SOLUTION INFILTRATION; PERINEURAL at 07:22

## 2019-12-04 RX ADMIN — SODIUM CHLORIDE, POTASSIUM CHLORIDE, SODIUM LACTATE AND CALCIUM CHLORIDE 9 ML/HR: 600; 310; 30; 20 INJECTION, SOLUTION INTRAVENOUS at 06:46

## 2019-12-04 RX ADMIN — HYDROMORPHONE HYDROCHLORIDE 0.5 MG: 1 INJECTION, SOLUTION INTRAMUSCULAR; INTRAVENOUS; SUBCUTANEOUS at 12:28

## 2019-12-04 RX ADMIN — PHENYLEPHRINE HYDROCHLORIDE 50 MCG: 10 INJECTION INTRAVENOUS at 07:36

## 2019-12-04 NOTE — OP NOTE
Subjective     Date of Service:  12/04/19    Surgical Staff: Surgeon(s) and Role:     * Hillary Nunn MD - Primary   Assistant:  Cata Tobar MD     Pre-operative diagnosis(es): Menorrhagia, Dysmenorrhea     Post-operative diagnosis(es): same       Procedure(s): Procedure(s):  LAPAROSCOPIC ASSISTED VAGINAL HYSTERECTOMY, BILATERAL SALPINGECTOMY           Anesthesia: Type: General  ASA:  II     Objective      Operative findings: Normal with small implant of endometriosis on right ovary   Specimens removed: Uterus, tubes         EBL:    Specimens:   175 cc    Uterus, tubes   Antibiotics:                cefazolin (Ancef) ordered on call to OR   Drains:  Stover cath       Complications: none       Indication for surgery: Menorrhagia, dysmenorrhea       Description of Procedure: Patient was identified and taken to the operating room.  She was placed in supine position and sedated.  General endotracheal anesthesia was given.  Patient was prepped and draped and surgical timeout was done to assess correct patient and procedure.  In and out catheterization of the bladder was performed.   Weighted speculum was placed in the vagina and Hulka tenaculum was attached to the cervix.   Attention was then turned to the abdomen and half percent Marcaine was placed in the umbilicus.  A vertical incision was made in the skin and Veress needle was placed.  Good placement was noted by hanging drop saline test.  CO2 gas was allowed to enter the abdomen.  Using the 5 mm trocar under Optiview technique the laparoscopic trocar was placed.  Right and left lower quadrant puncture sites of 5 mm were made under direct visualization.  The patient was placed in Trendelenburg position and the uterus was manipulated and normal uterus normal tubes and ovaries.  Small endometriosis implant was noted on right ovary. Beginning on the patient's left the Enseal device was used to take down the tube from the mesosalpinx.  The round ligament was taken as  well and supporting ligaments of the uterus were taken on the left.  Peritoneum between the bladder and lower uterine segment was opened.  Hemostasis was good.  The same procedure was carried out on the right without difficulty.  We turned to the vaginal portion of the procedure at this point.  Patient was then taken out of extreme Trendelenburg position and lithotomy position was exaggerated.  Weighted speculum was placed in the vagina and the Hulka tenaculum was taken off the cervix.  2 single-tooth tenacula were placed on the cervix and the peritoneum between the cervix and vagina was opened sharply and the tissue was pushed off the cervix.   Rectovaginal septum was entered sharply and tagged with suture.  Uterosacral and cardinal ligaments were taken with a Gurvinder clamp cut and tie technique.  0 Vicryl was used unless noted otherwise.  The remaining supporting ligaments of the uterus were taken with a clamp cut tie technique.  The uterus and tubes were pulled from the pelvis when this was complete.  Inspection for bleeding was performed and hemostasis was good.  Additional sutures were placed on the cardinal ligaments.  0 chromic was used in a running locking stitch to close the vaginal cuff.  Stover catheter was placed.  Attention was then turned back to the abdominal portion of the procedure and CO2 gas was allowed to fill the pelvis.  Irrigation of the pelvis was performed to assess good hemostasis.  Kleppinger forceps and clips were used to acquire hemostasis on the right posterior cuff.  Endometriosis was treated on the right ovary as well.  When this was felt to be complete CO2 gas was allowed to escape from the abdomen and 4-0 Vicryl was placed at each skin incision. The course of the ureters was traced and peristalsis was noted.  Vaginal cuff was reinspected and a couple of sutures of 0 vicryl were used on bleeding edges. Sponge and instrument counts were correct and the patient was taken to recovery in  good condition.  Specimen is submitted for pathology.         Assessment/Plan               Hillary Nunn MD  12/04/19  9:04 AM

## 2019-12-04 NOTE — ANESTHESIA PROCEDURE NOTES
Airway  Urgency: elective    Date/Time: 12/4/2019 7:25 AM  Airway not difficult    General Information and Staff    Patient location during procedure: OR  Anesthesiologist: Jason Freeman MD  CRNA: Marcie Simpson CRNA    Indications and Patient Condition  Indications for airway management: airway protection    Preoxygenated: yes  Mask difficulty assessment: 1 - vent by mask    Final Airway Details  Final airway type: endotracheal airway      Successful airway: ETT  Cuffed: yes   Successful intubation technique: direct laryngoscopy  Facilitating devices/methods: intubating stylet  Endotracheal tube insertion site: oral  Blade: Humaira  Blade size: 3  ETT size (mm): 7.0  Cormack-Lehane Classification: grade I - full view of glottis  Placement verified by: chest auscultation and capnometry   Measured from: lips  ETT/EBT  to lips (cm): 22  Number of attempts at approach: 1  Assessment: lips, teeth, and gum same as pre-op and atraumatic intubation

## 2019-12-04 NOTE — ANESTHESIA PREPROCEDURE EVALUATION
Anesthesia Evaluation     Patient summary reviewed and Nursing notes reviewed   NPO Solid Status: > 8 hours             Airway   Mallampati: II  TM distance: >3 FB  Neck ROM: full  no difficulty expected  Dental - normal exam     Pulmonary - negative pulmonary ROS and normal exam   Cardiovascular - normal exam    (+) dysrhythmias PVC,       Neuro/Psych  (+) headaches, psychiatric history Depression and Anxiety,     GI/Hepatic/Renal/Endo      Musculoskeletal (-) negative ROS    Abdominal  - normal exam   Substance History - negative use     OB/GYN negative ob/gyn ROS         Other                        Anesthesia Plan    ASA 2     general     intravenous induction     Anesthetic plan, all risks, benefits, and alternatives have been provided, discussed and informed consent has been obtained with: patient.    Plan discussed with CRNA.

## 2019-12-04 NOTE — INTERVAL H&P NOTE
H&P reviewed. The patient was examined and there are no changes to the H&P.     Hillary Nunn MD  12/04/19  7:11 AM

## 2019-12-04 NOTE — ANESTHESIA POSTPROCEDURE EVALUATION
Patient: Sherry Quevedo    Procedure Summary     Date:  12/04/19 Room / Location:   [facility]  [facility]    Anesthesia Start:  0717 Anesthesia Stop:  0858    Procedure:  LAPAROSCOPIC ASSISTED VAGINAL HYSTERECTOMY, BILATERAL SALPINGECTOMY (Bilateral Abdomen) Diagnosis:      Surgeon:  Hillary Nunn MD Provider:  Jason Freeman MD    Anesthesia Type:  general ASA Status:  2          Anesthesia Type: general  Last vitals  BP   112/79 (12/04/19 1030)   Temp   36.9 °C (98.5 °F)(taking ice chips) (12/04/19 1030)   Pulse   100 (12/04/19 1030)   Resp   16 (12/04/19 1030)     SpO2   91 % (12/04/19 1030)     Post Anesthesia Care and Evaluation    Patient location during evaluation: bedside  Patient participation: complete - patient participated  Level of consciousness: awake  Pain score: 1  Pain management: adequate  Airway patency: patent  Anesthetic complications: No anesthetic complications  PONV Status: controlled  Cardiovascular status: acceptable  Respiratory status: acceptable  Hydration status: acceptable    Comments: ---------------------------               12/04/19                      1030         ---------------------------   BP:          112/79         Pulse:         100          Resp:          16           Temp:   36.9 °C (98.5 °F)   SpO2:          91%         ---------------------------

## 2019-12-04 NOTE — PLAN OF CARE
Problem: Patient Care Overview  Goal: Plan of Care Review  Outcome: Ongoing (interventions implemented as appropriate)   12/04/19 1016   Coping/Psychosocial   Plan of Care Reviewed With patient   Plan of Care Review   Progress improving   OTHER   Outcome Summary Stover d/c, voiding freely. Pain controlled with PO pain meds. Resting comfortably       Problem: Hysterectomy (Adult)  Goal: Signs and Symptoms of Listed Potential Problems Will be Absent, Minimized or Managed (Hysterectomy)  Outcome: Ongoing (interventions implemented as appropriate)

## 2019-12-05 VITALS
HEIGHT: 63 IN | RESPIRATION RATE: 16 BRPM | OXYGEN SATURATION: 100 % | DIASTOLIC BLOOD PRESSURE: 56 MMHG | HEART RATE: 62 BPM | WEIGHT: 171.3 LBS | BODY MASS INDEX: 30.35 KG/M2 | SYSTOLIC BLOOD PRESSURE: 92 MMHG | TEMPERATURE: 97.3 F

## 2019-12-05 PROBLEM — N94.6 DYSMENORRHEA: Status: RESOLVED | Noted: 2019-12-04 | Resolved: 2019-12-05

## 2019-12-05 LAB
ANION GAP SERPL CALCULATED.3IONS-SCNC: 10.1 MMOL/L (ref 5–15)
BUN BLD-MCNC: 10 MG/DL (ref 6–20)
BUN/CREAT SERPL: 13 (ref 7–25)
CALCIUM SPEC-SCNC: 7.9 MG/DL (ref 8.6–10.5)
CHLORIDE SERPL-SCNC: 104 MMOL/L (ref 98–107)
CO2 SERPL-SCNC: 25.9 MMOL/L (ref 22–29)
CREAT BLD-MCNC: 0.77 MG/DL (ref 0.57–1)
CYTO UR: NORMAL
DEPRECATED RDW RBC AUTO: 43.2 FL (ref 37–54)
ERYTHROCYTE [DISTWIDTH] IN BLOOD BY AUTOMATED COUNT: 13.1 % (ref 12.3–15.4)
GFR SERPL CREATININE-BSD FRML MDRD: 88 ML/MIN/1.73
GLUCOSE BLD-MCNC: 97 MG/DL (ref 65–99)
HCT VFR BLD AUTO: 29.4 % (ref 34–46.6)
HGB BLD-MCNC: 10.1 G/DL (ref 12–15.9)
LAB AP CASE REPORT: NORMAL
MCH RBC QN AUTO: 30.8 PG (ref 26.6–33)
MCHC RBC AUTO-ENTMCNC: 34.4 G/DL (ref 31.5–35.7)
MCV RBC AUTO: 89.6 FL (ref 79–97)
PATH REPORT.FINAL DX SPEC: NORMAL
PATH REPORT.GROSS SPEC: NORMAL
PLATELET # BLD AUTO: 126 10*3/MM3 (ref 140–450)
PMV BLD AUTO: 10.1 FL (ref 6–12)
POTASSIUM BLD-SCNC: 3.6 MMOL/L (ref 3.5–5.2)
RBC # BLD AUTO: 3.28 10*6/MM3 (ref 3.77–5.28)
SODIUM BLD-SCNC: 140 MMOL/L (ref 136–145)
WBC NRBC COR # BLD: 5.99 10*3/MM3 (ref 3.4–10.8)

## 2019-12-05 PROCEDURE — 80048 BASIC METABOLIC PNL TOTAL CA: CPT | Performed by: OBSTETRICS & GYNECOLOGY

## 2019-12-05 PROCEDURE — 85027 COMPLETE CBC AUTOMATED: CPT | Performed by: OBSTETRICS & GYNECOLOGY

## 2019-12-05 RX ORDER — OXYCODONE HYDROCHLORIDE AND ACETAMINOPHEN 5; 325 MG/1; MG/1
1 TABLET ORAL EVERY 4 HOURS PRN
Qty: 30 TABLET | Refills: 0 | Status: SHIPPED | OUTPATIENT
Start: 2019-12-05 | End: 2019-12-30

## 2019-12-05 RX ORDER — IBUPROFEN 800 MG/1
800 TABLET ORAL EVERY 6 HOURS PRN
Qty: 50 TABLET | Refills: 3 | Status: SHIPPED | OUTPATIENT
Start: 2019-12-05 | End: 2020-09-21

## 2019-12-05 RX ADMIN — OXYCODONE HYDROCHLORIDE AND ACETAMINOPHEN 1 TABLET: 5; 325 TABLET ORAL at 08:48

## 2019-12-05 RX ADMIN — IBUPROFEN 800 MG: 400 TABLET, FILM COATED ORAL at 10:22

## 2019-12-05 RX ADMIN — IBUPROFEN 800 MG: 400 TABLET, FILM COATED ORAL at 03:19

## 2019-12-05 RX ADMIN — ALUMINUM HYDROXIDE, MAGNESIUM HYDROXIDE, AND DIMETHICONE 15 ML: 400; 400; 40 SUSPENSION ORAL at 03:19

## 2019-12-05 RX ADMIN — OXYCODONE HYDROCHLORIDE AND ACETAMINOPHEN 1 TABLET: 5; 325 TABLET ORAL at 03:19

## 2019-12-05 RX ADMIN — DOCUSATE SODIUM 100 MG: 100 CAPSULE, LIQUID FILLED ORAL at 08:47

## 2019-12-05 RX ADMIN — ALUMINUM HYDROXIDE, MAGNESIUM HYDROXIDE, AND DIMETHICONE 15 ML: 400; 400; 40 SUSPENSION ORAL at 08:48

## 2019-12-05 NOTE — PROGRESS NOTES
Discharge Planning Assessment  [facility]     Patient Name: Sherry Quevedo  MRN: [id number]  Today's Date: 12/5/2019    Admit Date: 12/4/2019    Discharge Plan     Row Name 12/05/19 0814       Plan    Plan Comments  Discharge order, no discharge needs identified.    Final Discharge Disposition Code  01 - home or self-care     Expected Discharge Date and Time     Expected Discharge Date Expected Discharge Time    Dec 5, 2019      Mehnaz Carlson RN

## 2019-12-05 NOTE — PLAN OF CARE
Problem: Patient Care Overview  Goal: Plan of Care Review  Outcome: Ongoing (interventions implemented as appropriate)   12/05/19 4242   Coping/Psychosocial   Plan of Care Reviewed With patient   Plan of Care Review   Progress improving   OTHER   Outcome Summary vss, pain well controlled by po pain med, voiding freely, tolerating regualr diet, encourage to increase fluid intake and to use incentive spirometer, encourage to ambulate more in the hallway.     Goal: Individualization and Mutuality  Outcome: Ongoing (interventions implemented as appropriate)    Goal: Discharge Needs Assessment  Outcome: Ongoing (interventions implemented as appropriate)    Goal: Interprofessional Rounds/Family Conf  Outcome: Ongoing (interventions implemented as appropriate)      Problem: Hysterectomy (Adult)  Goal: Signs and Symptoms of Listed Potential Problems Will be Absent, Minimized or Managed (Hysterectomy)  Outcome: Ongoing (interventions implemented as appropriate)    Goal: Anesthesia/Sedation Recovery  Outcome: Ongoing (interventions implemented as appropriate)

## 2019-12-23 LAB
25(OH)D3+25(OH)D2 SERPL-MCNC: 22.4 NG/ML (ref 30–100)
BASOPHILS # BLD AUTO: 0 X10E3/UL (ref 0–0.2)
BASOPHILS NFR BLD AUTO: 1 %
EOSINOPHIL # BLD AUTO: 0.1 X10E3/UL (ref 0–0.4)
EOSINOPHIL NFR BLD AUTO: 1 %
ERYTHROCYTE [DISTWIDTH] IN BLOOD BY AUTOMATED COUNT: 13.9 % (ref 12.3–15.4)
FERRITIN SERPL-MCNC: 31 NG/ML (ref 15–150)
FOLATE SERPL-MCNC: 5.5 NG/ML
HCT VFR BLD AUTO: 39 % (ref 34–46.6)
HGB BLD-MCNC: 12.7 G/DL (ref 11.1–15.9)
IMM GRANULOCYTES # BLD AUTO: 0 X10E3/UL (ref 0–0.1)
IMM GRANULOCYTES NFR BLD AUTO: 0 %
IRON SATN MFR SERPL: 16 % (ref 15–55)
IRON SERPL-MCNC: 59 UG/DL (ref 27–159)
LYMPHOCYTES # BLD AUTO: 2 X10E3/UL (ref 0.7–3.1)
LYMPHOCYTES NFR BLD AUTO: 34 %
MCH RBC QN AUTO: 29.2 PG (ref 26.6–33)
MCHC RBC AUTO-ENTMCNC: 32.6 G/DL (ref 31.5–35.7)
MCV RBC AUTO: 90 FL (ref 79–97)
MONOCYTES # BLD AUTO: 0.3 X10E3/UL (ref 0.1–0.9)
MONOCYTES NFR BLD AUTO: 5 %
NEUTROPHILS # BLD AUTO: 3.6 X10E3/UL (ref 1.4–7)
NEUTROPHILS NFR BLD AUTO: 59 %
PLATELET # BLD AUTO: 233 X10E3/UL (ref 150–450)
RBC # BLD AUTO: 4.35 X10E6/UL (ref 3.77–5.28)
TIBC SERPL-MCNC: 368 UG/DL (ref 250–450)
UIBC SERPL-MCNC: 309 UG/DL (ref 131–425)
VIT B12 SERPL-MCNC: 401 PG/ML (ref 232–1245)
WBC # BLD AUTO: 6 X10E3/UL (ref 3.4–10.8)

## 2019-12-30 ENCOUNTER — OFFICE VISIT (OUTPATIENT)
Dept: FAMILY MEDICINE CLINIC | Facility: CLINIC | Age: 29
End: 2019-12-30

## 2019-12-30 VITALS
RESPIRATION RATE: 16 BRPM | WEIGHT: 171 LBS | SYSTOLIC BLOOD PRESSURE: 110 MMHG | OXYGEN SATURATION: 98 % | DIASTOLIC BLOOD PRESSURE: 68 MMHG | TEMPERATURE: 98.7 F | HEIGHT: 63 IN | HEART RATE: 94 BPM | BODY MASS INDEX: 30.3 KG/M2

## 2019-12-30 DIAGNOSIS — D50.0 IRON DEFICIENCY ANEMIA DUE TO CHRONIC BLOOD LOSS: ICD-10-CM

## 2019-12-30 DIAGNOSIS — E53.8 FOLIC ACID DEFICIENCY: Primary | ICD-10-CM

## 2019-12-30 DIAGNOSIS — E53.8 LOW SERUM VITAMIN B12: ICD-10-CM

## 2019-12-30 PROCEDURE — 99214 OFFICE O/P EST MOD 30 MIN: CPT | Performed by: PHYSICIAN ASSISTANT

## 2019-12-30 RX ORDER — AMOXICILLIN AND CLAVULANATE POTASSIUM 875; 125 MG/1; MG/1
1 TABLET, FILM COATED ORAL EVERY 12 HOURS SCHEDULED
Qty: 20 TABLET | Refills: 0 | Status: SHIPPED | OUTPATIENT
Start: 2019-12-30 | End: 2020-01-24

## 2019-12-30 RX ORDER — BENZONATATE 200 MG/1
200 CAPSULE ORAL 3 TIMES DAILY PRN
Qty: 30 CAPSULE | Refills: 0 | Status: SHIPPED | OUTPATIENT
Start: 2019-12-30 | End: 2020-03-02

## 2019-12-30 RX ORDER — FOLIC ACID 1 MG/1
TABLET ORAL
Qty: 180 TABLET | Refills: 1 | Status: SHIPPED | OUTPATIENT
Start: 2019-12-30 | End: 2020-03-02

## 2019-12-30 RX ORDER — FLUCONAZOLE 150 MG/1
150 TABLET ORAL ONCE
Qty: 1 TABLET | Refills: 2 | Status: SHIPPED | OUTPATIENT
Start: 2019-12-30 | End: 2019-12-30

## 2019-12-30 NOTE — PROGRESS NOTES
"Subjective   Sherry Quevedo is a 29 y.o. female.     History of Present Illness   Sherry Quevedo 29 y.o. female /68 (BP Location: Left arm, Patient Position: Sitting, Cuff Size: Large Adult)   Pulse 94   Temp 98.7 °F (37.1 °C) (Oral)   Resp 16   Ht 160 cm (63\")   Wt 77.6 kg (171 lb)   LMP  (LMP Unknown)   SpO2 98%   BMI 30.29 kg/m²  who presents today for follow-up on the iron deficiency along with the folic acid and B12 labs.  Folic acid is in the lower range I will go up to twice a day.   she has a history of   Patient Active Problem List   Diagnosis   • Anemia   • Chronic allergic rhinitis   • History of renal stone   • Left cervical lymphadenopathy   • Intractable migraine with aura without status migrainosus   • Iron deficiency anemia due to chronic blood loss   • Low serum vitamin B12   • Folic acid deficiency   • Generalized anxiety disorder   • Moderate episode of recurrent major depressive disorder (CMS/HCC)   • Multinodular non-toxic goiter   .    Lab Results   Component Value Date    FERRITIN 31 12/21/2019     Lab Results   Component Value Date    WBC 6.0 12/21/2019    HGB 12.7 12/21/2019    HCT 39.0 12/21/2019    MCV 90 12/21/2019     12/21/2019   Labs show low Vitamin D levels.  I want you to add OTC Vitamin D 2,000 I.U. Daily.  She is seeing Sharmila for psychiatry    GYN is treating her for UTI  She has history of multinodular goiter and will watch thyroid functions at least once a year.  Sherry Quevedo 29 y.o. female who presents for evaluation of upper respiratory congestion, wheezing, fatigue. Symptoms include congestion, sore throat, post nasal drip and sinus pain.  Onset of symptoms was 6 days ago, gradually worsening since that time. Patient denies fever.   Evaluation to date: none Treatment to date:  OTC cough suppressant.   He just saw Dr. Hoffman for her thyroid    The following portions of the patient's history were reviewed and updated as appropriate: " allergies, current medications, past family history, past medical history, past social history, past surgical history and problem list.    Review of Systems   Constitutional: Positive for fatigue. Negative for activity change and unexpected weight change.   HENT: Positive for congestion, postnasal drip and sore throat. Negative for ear pain.    Eyes: Negative for pain and discharge.   Respiratory: Positive for cough and chest tightness. Negative for shortness of breath and wheezing.    Cardiovascular: Negative for chest pain and palpitations.   Gastrointestinal: Negative for abdominal pain, diarrhea and vomiting.   Endocrine: Negative.    Genitourinary: Positive for decreased urine volume and difficulty urinating.   Musculoskeletal: Negative for joint swelling.   Skin: Negative for color change, rash and wound.   Allergic/Immunologic: Negative.    Neurological: Negative for seizures and syncope.   Psychiatric/Behavioral: Positive for agitation and sleep disturbance.       Objective   Physical Exam   Constitutional: She is oriented to person, place, and time. She appears well-developed and well-nourished.  Non-toxic appearance. No distress.   HENT:   Head: Normocephalic and atraumatic. Hair is normal.   Right Ear: External ear normal. No drainage, swelling or tenderness. Tympanic membrane is retracted.   Left Ear: External ear normal. No drainage, swelling or tenderness. Tympanic membrane is retracted.   Nose: Mucosal edema present. No epistaxis.   Mouth/Throat: Uvula is midline and mucous membranes are normal. No oral lesions. No uvula swelling. Posterior oropharyngeal erythema present. No oropharyngeal exudate.   Eyes: Pupils are equal, round, and reactive to light. Conjunctivae and EOM are normal. Right eye exhibits no discharge. Left eye exhibits no discharge. No scleral icterus.   Neck: Normal range of motion. Neck supple. Thyromegaly present.   Cardiovascular: Normal rate, regular rhythm and normal heart  sounds. Exam reveals no gallop.   No murmur heard.  Pulmonary/Chest: Breath sounds normal. No stridor. No respiratory distress. She has no wheezes. She has no rales. She exhibits no tenderness.   Abdominal: Soft. There is no tenderness.   Lymphadenopathy:     She has cervical adenopathy.   Neurological: She is alert and oriented to person, place, and time. She exhibits normal muscle tone.   Skin: Skin is warm and dry. No rash noted. She is not diaphoretic.   Psychiatric: She has a normal mood and affect. Her behavior is normal. Judgment and thought content normal.   Nursing note and vitals reviewed.      Assessment/Plan   Sherry was seen today for palpitations.    Diagnoses and all orders for this visit:    Folic acid deficiency  -     CBC & Differential; Future  -     Ferritin; Future  -     Vitamin B12; Future  -     Folate; Future  -     Iron Profile; Future    Low serum vitamin B12  -     CBC & Differential; Future  -     Ferritin; Future  -     Vitamin B12; Future  -     Folate; Future  -     Iron Profile; Future    Iron deficiency anemia due to chronic blood loss  -     CBC & Differential; Future  -     Ferritin; Future  -     Vitamin B12; Future  -     Folate; Future  -     Iron Profile; Future    Other orders  -     folic acid (FOLVITE) 1 MG tablet; 2 PO daily new dose        Did have a hysterectomy December 4  I will continue her iron supplementation to get her iron stores higher  Go up on folic acid to twice a day and keep B12 with follow-up labs for all in 2 to 3 months    Her GYN is given her Macrobid for UTI and again have her hold that and put her on Augmentin for the sinus infection and for the UTI

## 2019-12-30 NOTE — PATIENT INSTRUCTIONS

## 2020-01-08 RX ORDER — METHYLPREDNISOLONE 4 MG/1
TABLET ORAL
Qty: 21 TABLET | Refills: 0 | Status: SHIPPED | OUTPATIENT
Start: 2020-01-08 | End: 2020-03-02

## 2020-01-14 RX ORDER — SCOLOPAMINE TRANSDERMAL SYSTEM 1 MG/1
1 PATCH, EXTENDED RELEASE TRANSDERMAL
Qty: 4 PATCH | Refills: 1 | OUTPATIENT
Start: 2020-01-14 | End: 2020-06-15

## 2020-01-24 ENCOUNTER — OFFICE VISIT (OUTPATIENT)
Dept: RETAIL CLINIC | Facility: CLINIC | Age: 30
End: 2020-01-24

## 2020-01-24 VITALS
SYSTOLIC BLOOD PRESSURE: 108 MMHG | OXYGEN SATURATION: 98 % | DIASTOLIC BLOOD PRESSURE: 70 MMHG | HEART RATE: 92 BPM | TEMPERATURE: 99 F

## 2020-01-24 DIAGNOSIS — J01.41 ACUTE RECURRENT PANSINUSITIS: Primary | ICD-10-CM

## 2020-01-24 PROCEDURE — 99213 OFFICE O/P EST LOW 20 MIN: CPT | Performed by: NURSE PRACTITIONER

## 2020-01-24 RX ORDER — CEFDINIR 300 MG/1
300 CAPSULE ORAL 2 TIMES DAILY
Qty: 20 CAPSULE | Refills: 0 | Status: SHIPPED | OUTPATIENT
Start: 2020-01-24 | End: 2020-02-03

## 2020-01-24 RX ORDER — FLUCONAZOLE 150 MG/1
150 TABLET ORAL ONCE
Qty: 1 TABLET | Refills: 0 | Status: SHIPPED | OUTPATIENT
Start: 2020-01-24 | End: 2020-01-24

## 2020-01-24 NOTE — PROGRESS NOTES
Lilly Quevedo is a 29 y.o. female.     History of Present Illness Pt. Started a month ago with congestion, cough, and sinus pressure for which she was treated by PCP with augmentin. Pt reports  right sided sinus pressure worse than left, sore throat, headache, and cough started 4 days ago. Cough is productive of thick white-yellowish mucus at times, and worse at night time. Has tried essential oils, OTC cold medicine, and stahist with no relief. Denies fever, N/V/D.     The following portions of the patient's history were reviewed and updated as appropriate: allergies, current medications, past family history, past medical history, past social history, past surgical history and problem list.    Review of Systems   Constitutional: Positive for fatigue. Negative for chills and fever.   HENT: Positive for congestion, ear pain (mild), postnasal drip, sinus pressure, sore throat and voice change. Negative for rhinorrhea and sneezing.    Respiratory: Positive for cough and shortness of breath. Negative for wheezing.    Cardiovascular: Negative.    Gastrointestinal: Negative.    Musculoskeletal: Negative.    Neurological: Positive for headache.       Objective   Physical Exam   Constitutional: She is oriented to person, place, and time. She appears well-developed and well-nourished. No distress.   HENT:   Head: Normocephalic and atraumatic.   Right Ear: Hearing, external ear and ear canal normal. Tympanic membrane is bulging. Tympanic membrane is not erythematous. A middle ear effusion is present.   Left Ear: Hearing, external ear and ear canal normal. Tympanic membrane is bulging. Tympanic membrane is not erythematous. A middle ear effusion is present.   Nose: Congestion present. Right sinus exhibits maxillary sinus tenderness and frontal sinus tenderness. Left sinus exhibits maxillary sinus tenderness and frontal sinus tenderness.   Mouth/Throat: Uvula is midline and mucous membranes are normal. Uvula  swelling present. Oropharyngeal exudate, posterior oropharyngeal edema (small, clear postnasal drip) and posterior oropharyngeal erythema present. Tonsils are 0 on the right. Tonsils are 0 on the left. No tonsillar exudate.   Eyes: Pupils are equal, round, and reactive to light. Conjunctivae and EOM are normal.   Neck: Normal range of motion.   Cardiovascular: Normal rate, regular rhythm and normal heart sounds. Exam reveals no gallop and no friction rub.   No murmur heard.  Pulmonary/Chest: Effort normal. No respiratory distress. She has rhonchi in the right upper field and the left upper field.   Lymphadenopathy:        Head (right side): No submental, no submandibular, no tonsillar, no preauricular and no posterior auricular adenopathy present.        Head (left side): No submental, no submandibular, no tonsillar, no preauricular and no posterior auricular adenopathy present.     She has no cervical adenopathy.   Neurological: She is alert and oriented to person, place, and time.   Skin: Skin is warm and dry. She is not diaphoretic.   Psychiatric: She has a normal mood and affect.         Assessment/Plan   Diagnoses and all orders for this visit:    Acute recurrent pansinusitis    Other orders  -     cefdinir (OMNICEF) 300 MG capsule; Take 1 capsule by mouth 2 (Two) Times a Day for 10 days.  -     fluconazole (DIFLUCAN) 150 MG tablet; Take 1 tablet by mouth 1 (One) Time for 1 dose.    Instructed pt. To follow-up with PCP if no improvement in symptoms.

## 2020-02-24 ENCOUNTER — RESULTS ENCOUNTER (OUTPATIENT)
Dept: FAMILY MEDICINE CLINIC | Facility: CLINIC | Age: 30
End: 2020-02-24

## 2020-02-24 DIAGNOSIS — E53.8 FOLIC ACID DEFICIENCY: ICD-10-CM

## 2020-02-24 DIAGNOSIS — E53.8 LOW SERUM VITAMIN B12: ICD-10-CM

## 2020-02-24 DIAGNOSIS — D50.0 IRON DEFICIENCY ANEMIA DUE TO CHRONIC BLOOD LOSS: ICD-10-CM

## 2020-02-26 ENCOUNTER — TELEPHONE (OUTPATIENT)
Dept: FAMILY MEDICINE CLINIC | Facility: CLINIC | Age: 30
End: 2020-02-26

## 2020-02-26 NOTE — TELEPHONE ENCOUNTER
----- Message from Sherry Quevedo sent at 2/26/2020  2:50 PM EST -----  Regarding: RE: Non-Urgent Medical Question  Contact: [phone]  [place],       I just had my labs drawn and and they said there wasn't an order for Vit D. I know that it was low last time wasn't sure if we were supposed to re-check it    ----- Message -----  From: [place] Hogan PA-C  Sent: 2/23/20, 8:53 AM  To: Sherry Quevedo  Subject: RE: Non-Urgent Medical Question    It is time the check labs in the ready and they do not have to be fasting  Or Ortho Dr Hammond; [phone]      ----- Message -----     From: hSerry Quevedo     Sent: 2/22/2020  5:57 PM EST       To: [place] Hogan PA-C  Subject: Non-Urgent Medical Question    [place],     I have been having some bad knee pain for the last two weeks. I have tried a brace, ibuprofen , and muscle relaxers but nothing has helped and it's getting worse. I'm worried I may have torn my meniscus. Is this something I can see you for or do I need to be seen by a specialist?  If I need to see a specialist could you recommend one?  Thank you.     Also,  when I was in the office at the end of   December you had stated you wanted to do labs again in a couple months so I wasn't sure if I had an order to check my vitamin levels.        Please add on a vitamin D level

## 2020-02-27 DIAGNOSIS — D50.0 IRON DEFICIENCY ANEMIA DUE TO CHRONIC BLOOD LOSS: Primary | ICD-10-CM

## 2020-02-27 DIAGNOSIS — E55.9 VITAMIN D DEFICIENCY: ICD-10-CM

## 2020-02-27 LAB
25(OH)D3+25(OH)D2 SERPL-MCNC: 40.2 NG/ML (ref 30–100)
BASOPHILS # BLD AUTO: 0.03 10*3/MM3 (ref 0–0.2)
BASOPHILS NFR BLD AUTO: 0.4 % (ref 0–1.5)
EOSINOPHIL # BLD AUTO: 0.1 10*3/MM3 (ref 0–0.4)
EOSINOPHIL NFR BLD AUTO: 1.4 % (ref 0.3–6.2)
ERYTHROCYTE [DISTWIDTH] IN BLOOD BY AUTOMATED COUNT: 13.4 % (ref 12.3–15.4)
FERRITIN SERPL-MCNC: 24.1 NG/ML (ref 13–150)
FOLATE SERPL-MCNC: 17.8 NG/ML (ref 4.78–24.2)
HCT VFR BLD AUTO: 37.7 % (ref 34–46.6)
HGB BLD-MCNC: 12.8 G/DL (ref 12–15.9)
IMM GRANULOCYTES # BLD AUTO: 0.02 10*3/MM3 (ref 0–0.05)
IMM GRANULOCYTES NFR BLD AUTO: 0.3 % (ref 0–0.5)
IRON SATN MFR SERPL: 11 % (ref 20–50)
IRON SERPL-MCNC: 51 MCG/DL (ref 37–145)
LYMPHOCYTES # BLD AUTO: 2.18 10*3/MM3 (ref 0.7–3.1)
LYMPHOCYTES NFR BLD AUTO: 31.3 % (ref 19.6–45.3)
MCH RBC QN AUTO: 30.2 PG (ref 26.6–33)
MCHC RBC AUTO-ENTMCNC: 34 G/DL (ref 31.5–35.7)
MCV RBC AUTO: 88.9 FL (ref 79–97)
MONOCYTES # BLD AUTO: 0.35 10*3/MM3 (ref 0.1–0.9)
MONOCYTES NFR BLD AUTO: 5 % (ref 5–12)
NEUTROPHILS # BLD AUTO: 4.28 10*3/MM3 (ref 1.7–7)
NEUTROPHILS NFR BLD AUTO: 61.6 % (ref 42.7–76)
NRBC BLD AUTO-RTO: 0 /100 WBC (ref 0–0.2)
PLATELET # BLD AUTO: 204 10*3/MM3 (ref 140–450)
RBC # BLD AUTO: 4.24 10*6/MM3 (ref 3.77–5.28)
TIBC SERPL-MCNC: 447 MCG/DL
UIBC SERPL-MCNC: 396 MCG/DL (ref 112–346)
VIT B12 SERPL-MCNC: 500 PG/ML (ref 211–946)
WBC # BLD AUTO: 6.96 10*3/MM3 (ref 3.4–10.8)
WRITTEN AUTHORIZATION: NORMAL

## 2020-02-27 RX ORDER — FERROUS SULFATE 325(65) MG
TABLET ORAL
Qty: 60 TABLET | Refills: 11 | OUTPATIENT
Start: 2020-02-27 | End: 2020-06-15

## 2020-03-02 ENCOUNTER — OFFICE VISIT (OUTPATIENT)
Dept: RETAIL CLINIC | Facility: CLINIC | Age: 30
End: 2020-03-02

## 2020-03-02 VITALS
HEART RATE: 92 BPM | SYSTOLIC BLOOD PRESSURE: 114 MMHG | OXYGEN SATURATION: 99 % | TEMPERATURE: 98.6 F | DIASTOLIC BLOOD PRESSURE: 70 MMHG

## 2020-03-02 DIAGNOSIS — J02.0 STREP PHARYNGITIS: Primary | ICD-10-CM

## 2020-03-02 LAB
EXPIRATION DATE: ABNORMAL
INTERNAL CONTROL: ABNORMAL
Lab: ABNORMAL
S PYO AG THROAT QL: POSITIVE

## 2020-03-02 PROCEDURE — 99213 OFFICE O/P EST LOW 20 MIN: CPT | Performed by: NURSE PRACTITIONER

## 2020-03-02 PROCEDURE — 87880 STREP A ASSAY W/OPTIC: CPT | Performed by: NURSE PRACTITIONER

## 2020-03-02 RX ORDER — AMOXICILLIN 875 MG/1
875 TABLET, COATED ORAL 2 TIMES DAILY
Qty: 20 TABLET | Refills: 0 | Status: SHIPPED | OUTPATIENT
Start: 2020-03-02 | End: 2020-03-12

## 2020-03-02 NOTE — PROGRESS NOTES
Subjective:     Sherry Quevedo is a 29 y.o.     Influenza   Episode onset: started 3 days ago. Associated symptoms include headaches, nausea, a sore throat, swollen glands and vomiting. Pertinent negatives include no congestion, coughing, fever or myalgias. She has tried acetaminophen and NSAIDs for the symptoms. Improvement on treatment: minimal relief.         The following portions of the patient's history were reviewed and updated as appropriate: allergies, current medications, past family history, past medical history, past social history, past surgical history and problem list.      Review of Systems   Constitutional: Negative for fever.   HENT: Positive for sinus pressure and sore throat. Negative for congestion.    Respiratory: Negative for cough, shortness of breath and wheezing.    Gastrointestinal: Positive for nausea and vomiting.   Musculoskeletal: Negative for myalgias.   Neurological: Positive for headaches.         Objective:      Physical Exam   Constitutional: She appears well-developed and well-nourished.   HENT:   Head: Normocephalic and atraumatic.   Right Ear: Tympanic membrane and ear canal normal.   Left Ear: Tympanic membrane and ear canal normal.   Nose: Right sinus exhibits maxillary sinus tenderness and frontal sinus tenderness. Left sinus exhibits maxillary sinus tenderness and frontal sinus tenderness.   Mouth/Throat: Posterior oropharyngeal erythema present. No oropharyngeal exudate.   Cardiovascular: Normal rate, regular rhythm, S1 normal, S2 normal and normal heart sounds.   Pulmonary/Chest: Effort normal and breath sounds normal.   Lymphadenopathy:     She has cervical adenopathy.   Vitals reviewed.          Diagnoses and all orders for this visit:    Strep pharyngitis  -     POC Rapid Strep A    Other orders  -     amoxicillin (AMOXIL) 875 MG tablet; Take 1 tablet by mouth 2 (Two) Times a Day for 10 days.

## 2020-03-02 NOTE — PATIENT INSTRUCTIONS
Strep Throat    Strep throat is a bacterial infection of the throat. Your health care provider may call the infection tonsillitis or pharyngitis, depending on whether there is swelling in the tonsils or at the back of the throat. Strep throat is most common during the cold months of the year in children who are 5-15 years of age, but it can happen during any season in people of any age. This infection is spread from person to person (contagious) through coughing, sneezing, or close contact.  What are the causes?  Strep throat is caused by the bacteria called Streptococcus pyogenes.  What increases the risk?  This condition is more likely to develop in:  · People who spend time in crowded places where the infection can spread easily.  · People who have close contact with someone who has strep throat.  What are the signs or symptoms?  Symptoms of this condition include:  · Fever or chills.  · Redness, swelling, or pain in the tonsils or throat.  · Pain or difficulty when swallowing.  · White or yellow spots on the tonsils or throat.  · Swollen, tender glands in the neck or under the jaw.  · Red rash all over the body (rare).  How is this diagnosed?  This condition is diagnosed by performing a rapid strep test or by taking a swab of your throat (throat culture test). Results from a rapid strep test are usually ready in a few minutes, but throat culture test results are available after one or two days.  How is this treated?  This condition is treated with antibiotic medicine.  Follow these instructions at home:  Medicines  · Take over-the-counter and prescription medicines only as told by your health care provider.  · Take your antibiotic as told by your health care provider. Do not stop taking the antibiotic even if you start to feel better.  · Have family members who also have a sore throat or fever tested for strep throat. They may need antibiotics if they have the strep infection.  Eating and drinking  · Do not  share food, drinking cups, or personal items that could cause the infection to spread to other people.  · If swallowing is difficult, try eating soft foods until your sore throat feels better.  · Drink enough fluid to keep your urine clear or pale yellow.  General instructions  · Gargle with a salt-water mixture 3-4 times per day or as needed. To make a salt-water mixture, completely dissolve ½-1 tsp of salt in 1 cup of warm water.  · Make sure that all household members wash their hands well.  · Get plenty of rest.  · Stay home from school or work until you have been taking antibiotics for 24 hours.  · Keep all follow-up visits as told by your health care provider. This is important.  Contact a health care provider if:  · The glands in your neck continue to get bigger.  · You develop a rash, cough, or earache.  · You cough up a thick liquid that is green, yellow-brown, or bloody.  · You have pain or discomfort that does not get better with medicine.  · Your problems seem to be getting worse rather than better.  · You have a fever.  Get help right away if:  · You have new symptoms, such as vomiting, severe headache, stiff or painful neck, chest pain, or shortness of breath.  · You have severe throat pain, drooling, or changes in your voice.  · You have swelling of the neck, or the skin on the neck becomes red and tender.  · You have signs of dehydration, such as fatigue, dry mouth, and decreased urination.  · You become increasingly sleepy, or you cannot wake up completely.  · Your joints become red or painful.  This information is not intended to replace advice given to you by your health care provider. Make sure you discuss any questions you have with your health care provider.  Document Released: 12/15/2001 Document Revised: 08/16/2017 Document Reviewed: 04/11/2016  [employer] Interactive Patient Education © 2020 [employer]

## 2020-04-23 ENCOUNTER — RESULTS ENCOUNTER (OUTPATIENT)
Dept: FAMILY MEDICINE CLINIC | Facility: CLINIC | Age: 30
End: 2020-04-23

## 2020-04-23 DIAGNOSIS — D50.0 IRON DEFICIENCY ANEMIA DUE TO CHRONIC BLOOD LOSS: ICD-10-CM

## 2020-04-23 DIAGNOSIS — E55.9 VITAMIN D DEFICIENCY: ICD-10-CM

## 2020-06-08 DIAGNOSIS — E04.2 MULTINODULAR NON-TOXIC GOITER: ICD-10-CM

## 2020-06-08 DIAGNOSIS — D50.0 IRON DEFICIENCY ANEMIA DUE TO CHRONIC BLOOD LOSS: ICD-10-CM

## 2020-06-08 DIAGNOSIS — E55.9 VITAMIN D DEFICIENCY: Primary | ICD-10-CM

## 2020-06-08 DIAGNOSIS — E53.8 LOW SERUM VITAMIN B12: ICD-10-CM

## 2020-06-08 DIAGNOSIS — E53.8 FOLIC ACID DEFICIENCY: ICD-10-CM

## 2020-06-15 ENCOUNTER — APPOINTMENT (OUTPATIENT)
Dept: GENERAL RADIOLOGY | Facility: HOSPITAL | Age: 30
End: 2020-06-15

## 2020-06-15 PROCEDURE — 73610 X-RAY EXAM OF ANKLE: CPT | Performed by: EMERGENCY MEDICINE

## 2020-06-15 PROCEDURE — 73630 X-RAY EXAM OF FOOT: CPT | Performed by: EMERGENCY MEDICINE

## 2020-06-16 LAB
25(OH)D3+25(OH)D2 SERPL-MCNC: 35.3 NG/ML (ref 30–100)
BASOPHILS # BLD AUTO: 0.03 10*3/MM3 (ref 0–0.2)
BASOPHILS NFR BLD AUTO: 0.5 % (ref 0–1.5)
EOSINOPHIL # BLD AUTO: 0.09 10*3/MM3 (ref 0–0.4)
EOSINOPHIL NFR BLD AUTO: 1.4 % (ref 0.3–6.2)
ERYTHROCYTE [DISTWIDTH] IN BLOOD BY AUTOMATED COUNT: 12 % (ref 12.3–15.4)
FERRITIN SERPL-MCNC: 35 NG/ML (ref 13–150)
FOLATE SERPL-MCNC: 7.3 NG/ML (ref 4.78–24.2)
HCT VFR BLD AUTO: 41.4 % (ref 34–46.6)
HGB BLD-MCNC: 14 G/DL (ref 12–15.9)
IMM GRANULOCYTES # BLD AUTO: 0.01 10*3/MM3 (ref 0–0.05)
IMM GRANULOCYTES NFR BLD AUTO: 0.2 % (ref 0–0.5)
IRON SATN MFR SERPL: 27 % (ref 20–50)
IRON SERPL-MCNC: 120 MCG/DL (ref 37–145)
LYMPHOCYTES # BLD AUTO: 1.8 10*3/MM3 (ref 0.7–3.1)
LYMPHOCYTES NFR BLD AUTO: 28.6 % (ref 19.6–45.3)
MCH RBC QN AUTO: 30.6 PG (ref 26.6–33)
MCHC RBC AUTO-ENTMCNC: 33.8 G/DL (ref 31.5–35.7)
MCV RBC AUTO: 90.4 FL (ref 79–97)
MONOCYTES # BLD AUTO: 0.31 10*3/MM3 (ref 0.1–0.9)
MONOCYTES NFR BLD AUTO: 4.9 % (ref 5–12)
NEUTROPHILS # BLD AUTO: 4.06 10*3/MM3 (ref 1.7–7)
NEUTROPHILS NFR BLD AUTO: 64.4 % (ref 42.7–76)
NRBC BLD AUTO-RTO: 0 /100 WBC (ref 0–0.2)
PLATELET # BLD AUTO: 172 10*3/MM3 (ref 140–450)
RBC # BLD AUTO: 4.58 10*6/MM3 (ref 3.77–5.28)
T3FREE SERPL-MCNC: 3.2 PG/ML (ref 2–4.4)
T4 FREE SERPL-MCNC: 1.2 NG/DL (ref 0.93–1.7)
TIBC SERPL-MCNC: 443 MCG/DL
TSH SERPL DL<=0.005 MIU/L-ACNC: 1.7 UIU/ML (ref 0.27–4.2)
UIBC SERPL-MCNC: 323 MCG/DL (ref 112–346)
VIT B12 SERPL-MCNC: 444 PG/ML (ref 211–946)
WBC # BLD AUTO: 6.3 10*3/MM3 (ref 3.4–10.8)

## 2020-06-17 ENCOUNTER — TELEPHONE (OUTPATIENT)
Dept: FAMILY MEDICINE CLINIC | Facility: CLINIC | Age: 30
End: 2020-06-17

## 2020-06-17 NOTE — TELEPHONE ENCOUNTER
----- Message from [place] Hogan PA-C sent at 6/16/2020  6:20 PM EDT -----  Thyroid is fine and iron stores have improved.  I like for your stores to be a little bit higher and have you take iron for another month and then you can stop it

## 2020-09-04 ENCOUNTER — OFFICE VISIT (OUTPATIENT)
Dept: MAMMOGRAPHY | Facility: CLINIC | Age: 30
End: 2020-09-04

## 2020-09-04 VITALS
DIASTOLIC BLOOD PRESSURE: 68 MMHG | OXYGEN SATURATION: 99 % | HEART RATE: 85 BPM | TEMPERATURE: 98.2 F | BODY MASS INDEX: 31.01 KG/M2 | WEIGHT: 175 LBS | HEIGHT: 63 IN | SYSTOLIC BLOOD PRESSURE: 124 MMHG

## 2020-09-04 DIAGNOSIS — R23.4 BREAST SKIN CHANGES: Primary | ICD-10-CM

## 2020-09-04 PROCEDURE — 99204 OFFICE O/P NEW MOD 45 MIN: CPT | Performed by: SURGERY

## 2020-09-04 NOTE — PROGRESS NOTES
Chief Complaint: Sherry Quevedo is a 30 y.o.. female here today for Breast Problem (right breast dimpling)        History of Present Illness:  Patient presents with breast skin changes.  She is a nice 30-year-old white female who walked by a full-length mirror at home approximately 3 to 4 weeks ago and noted a vertical indentation in the lateral aspect of the right breast.  At first she thought this was due to the bra she was wearing and it did seem to get better for a few days but then it returned.  It does seem to come and go according to the patient and today she really does not notice much.  She did show me a picture that she had taken on her phone and she indeed seem to have a curvilinear indentation of the lateral aspect of the right breast extending from the upper to the lower quadrant.  The patient has undergone mammograms and an ultrasound which failed to detect any abnormalities in the breast.  I have personally reviewed these imaging studies along with the reports.  I agree that there are no concerning features in the lateral aspect of the breast.    The patient's family history is negative for breast cancer aside from her maternal grandmother.      Review of Systems:  Review of Systems   Constitutional: Negative.    HENT:  Negative.    Eyes: Negative.    Respiratory: Negative.    Cardiovascular: Negative.    Gastrointestinal: Negative.    Endocrine: Negative.    Genitourinary: Negative.     Musculoskeletal: Negative.    Skin:        Right breast skin dimpling   Neurological: Negative.    Hematological: Negative.    Psychiatric/Behavioral: Positive for depression. The patient is nervous/anxious.         Currently being treated for Bi-polar disorder      I have reviewed the ROS as documented by the MA/LPN/RN Zhen Kim MD      Past Medical and Surgical History:  Breast Biopsy History:  Patient has not had a breast biopsy in the past.  Breast Cancer HIstory:  Patient does not have a past  medical history of breast cancer.  Breast Operations, and year:  0  Social History     Tobacco Use   Smoking Status Never Smoker   Smokeless Tobacco Never Used   Tobacco Comment    caffeine use 1 coke daily, occas sweet tea     Obstetric History:  Patient is postmenopausal due to removal of her uterus in the following year:2019   Number of pregnancies:6  Number of live births: 3  Number of abortions or miscarriages: 3  Age of delivery of first child: 22  Patient breast fed, for the following lenth of time:1 year  Length of time taking birth control pills:unsure how long  Patient has never taken hormone replacement    Past Surgical History:   Procedure Laterality Date   • ADENOIDECTOMY  2007   • CHOLECYSTECTOMY  11/2018    Was 27 weeks pregnant   • CHOLECYSTECTOMY WITH INTRAOPERATIVE CHOLANGIOGRAM N/A 11/9/2018    Procedure: Laparoscopic cholecystectomy;  Surgeon: Khanh Lassiter MD;  Location: [facility];  Service: General   • COLONOSCOPY  8/2019   • LAPAROSCOPIC ASSISTED VAGINAL HYSTERECTOMY Bilateral 12/4/2019    Procedure: LAPAROSCOPIC ASSISTED VAGINAL HYSTERECTOMY, BILATERAL SALPINGECTOMY;  Surgeon: Hillary Nunn MD;  Location: [facility];  Service: Obstetrics/Gynecology   • OVARIAN CYST SURGERY  2010   • TONSILLECTOMY  2007   • WISDOM TOOTH EXTRACTION         Past Medical History:   Diagnosis Date   • Abnormal uterine bleeding (AUB)    • Allergic 01/2013   • Anemia    • Anxiety    • Cholelithiasis 11/2018    Gallbladder removed   • Cholestasis during pregnancy in third trimester 2019   • Depression     History of PPD   • Endometriosis    • Fissure, anal    • Gestational diabetes     with first baby   • History of kidney stones 2006/2009   • Irritable bowel syndrome 1994   • Migraines    • PVC's (premature ventricular contractions)    • Rectal bleeding    • Scoliosis 2003   • Thyroid nodule        Prior Hospitalizations, other than for surgery or childbirth, and year:  0    Social History:  Patient  is .  Patient has 2 daughters and 1 son.     Family History:  Family History   Problem Relation Age of Onset   • Asthma Father    • COPD Father    • Bleeding Disorder Paternal Grandmother         Factor V   • COPD Paternal Grandmother    • Cancer Maternal Grandmother         Breast   • Diabetes Maternal Grandmother    • COPD Mother    • Depression Mother    • Hypertension Mother    • Malig Hyperthermia Neg Hx        Vital Signs:  Vitals:    09/04/20 1404   BP: 124/68   Pulse: 85   Temp: 98.2 °F (36.8 °C)   SpO2: 99%       Medications:    Current Outpatient Prescriptions:     Current Outpatient Medications:   •  ibuprofen (ADVIL,MOTRIN) 800 MG tablet, Take 1 tablet by mouth Every 6 (Six) Hours As Needed for Mild Pain ., Disp: 50 tablet, Rfl: 3  •  lamoTRIgine (LaMICtal) 25 MG tablet, Take 25 mg by mouth Every Night., Disp: , Rfl:   •  naproxen sodium (ANAPROX) 550 MG tablet, Take 1 tablet by mouth 2 (Two) Times a Day With Meals., Disp: 15 tablet, Rfl: 0  •  traZODone (DESYREL) 100 MG tablet, Take 100 mg by mouth Every Night., Disp: , Rfl:     Physical Examination:  General Appearance:   Patient is in no distress.  She is well kept and has an obese build.   Psychiatric:  Patient with appropriate mood and affect. Alert and oriented to self, time, and place.    Breast, RIGHT:  medium sized, symmetric with the contralateral side.  Breast skin is without erythema, edema, rashes.  There are no visible abnormalities upon inspection during the arm-raising maneuver or with hands on hips in the sitting position.  The lateral aspect of the breast was carefully examined and I just do not see any abnormalities on today's examination.  There is no nipple retraction, discharge or nipple/areolar skin changes.There are no masses palpable in the sitting or supine positions.    Breast, LEFT:  medium sized, symmetric with the contralateral side.  Breast skin is without erythema, edema, rashes.  There are no visible abnormalities  upon inspection during the arm-raising maneuver or with hands on hips in the sitting position. There is no nipple retraction, discharge or nipple/areolar skin changes.There are no masses palpable in the sitting or supine positions.    Lymphatic:  There is no axillary, cervical, infraclavicular, or supraclavicular adenopathy bilaterally.  Eyes:  Pupils are round and reactive to light.  Cardiovascular:  Heart rate and rhythm are regular.  Respiratory:  Lungs are clear bilaterally with no crackles or wheezes in any lung field.  Gastrointestinal:  Abdomen is soft, nondistended, and nontender.  There was no obvious hepatosplenomegaly or abdominal mass.  There are  scars from previous surgery.    Musculoskeletal:  Good strength in all 4 extremities.   There is good range of motion in both shoulders.    Skin:  No new skin lesions or rashes on the skin excluding the breast (see breast exam above).    Assessment:  1. Breast skin changes          Plan:  I explained to her that skin indentation is usually due to pulling in on the Anthony's ligaments of the breast.  In a malignant situation it typically takes a pretty good sized tumor to do this and imaging is often very accurate.  Certainly the indentation could be due to some swelling of the breast and perhaps an ill fitting bra could result in this appearance.  Something like thrombophlebitis of the breast veins would tend to give more of a ropelike structure rather than an indentation.  Her physical examination was unremarkable today as were her imaging studies.  I do not see anything that we could approach surgically at this point in time.  I have asked her to continue to monitor this area.  I will be happy to see her at any point in time.      CPT coding:    Next Appointment:  No follow-ups on file.            EMR Dragon/transcription disclaimer:    Much of this encounter note is an electronic transcription/translocation of spoken language to printed text.  The electronic  translation of spoken language may permit erroneous, or at times, nonsensical words or phrases to be inadvertently transcribed.  Although I have reviewed the note from such areas, some may still exist.

## 2020-09-21 ENCOUNTER — TELEMEDICINE (OUTPATIENT)
Dept: FAMILY MEDICINE CLINIC | Facility: CLINIC | Age: 30
End: 2020-09-21

## 2020-09-21 VITALS — WEIGHT: 175 LBS | BODY MASS INDEX: 31.01 KG/M2 | HEIGHT: 63 IN

## 2020-09-21 DIAGNOSIS — G43.109 MIGRAINE WITH AURA AND WITHOUT STATUS MIGRAINOSUS, NOT INTRACTABLE: ICD-10-CM

## 2020-09-21 DIAGNOSIS — E53.8 LOW SERUM VITAMIN B12: ICD-10-CM

## 2020-09-21 DIAGNOSIS — F41.9 ANXIETY: ICD-10-CM

## 2020-09-21 DIAGNOSIS — E53.8 FOLIC ACID DEFICIENCY: Primary | ICD-10-CM

## 2020-09-21 PROBLEM — J30.89 ENVIRONMENTAL AND SEASONAL ALLERGIES: Status: ACTIVE | Noted: 2020-09-21

## 2020-09-21 PROBLEM — F32.A DEPRESSION: Status: ACTIVE | Noted: 2020-09-21

## 2020-09-21 PROBLEM — K58.9 IRRITABLE BOWEL SYNDROME (IBS): Status: ACTIVE | Noted: 2020-09-21

## 2020-09-21 PROCEDURE — 99213 OFFICE O/P EST LOW 20 MIN: CPT | Performed by: PHYSICIAN ASSISTANT

## 2020-09-21 RX ORDER — SUMATRIPTAN 100 MG/1
TABLET, FILM COATED ORAL
Qty: 9 TABLET | Refills: 11 | Status: SHIPPED | OUTPATIENT
Start: 2020-09-21 | End: 2021-08-06

## 2020-09-21 RX ORDER — OXCARBAZEPINE 300 MG/1
300 TABLET, FILM COATED ORAL 3 TIMES DAILY
COMMUNITY
Start: 2020-09-14 | End: 2021-03-08

## 2020-09-21 NOTE — PROGRESS NOTES
"Lilly Quevedo is a 30 y.o. female.   You have chosen to receive care through a telehealth visit.  Do you consent to use a video/audio connection for your medical care today? Yes    History of Present Illness    Since the last visit, she has overall felt depressed and anxious.  She has Vitamin D deficiency and will update labs for continued management and Migraine headaches and responding well to PRN triptan Rx.  she has been compliant with current medications have reviewed them.  The patient denies medication side effects.  Will refill medications. Ht 160 cm (63\")   Wt 79.4 kg (175 lb)   LMP  (LMP Unknown)   BMI 31.00 kg/m²   Has about one migraine a week;  Imitrex works.   Results for orders placed or performed in visit on 06/08/20   Vitamin D 25 Hydroxy    Specimen: Blood   Result Value Ref Range    25 Hydroxy, Vitamin D 35.3 30.0 - 100.0 ng/ml   TSH    Specimen: Blood   Result Value Ref Range    TSH 1.700 0.270 - 4.200 uIU/mL   T4, Free    Specimen: Blood   Result Value Ref Range    Free T4 1.20 0.93 - 1.70 ng/dL   T3, Free    Specimen: Blood   Result Value Ref Range    T3, Free 3.2 2.0 - 4.4 pg/mL   Vitamin B12    Specimen: Blood   Result Value Ref Range    Vitamin B-12 444 211 - 946 pg/mL   Folate    Specimen: Blood   Result Value Ref Range    Folate 7.30 4.78 - 24.20 ng/mL   Ferritin    Specimen: Blood   Result Value Ref Range    Ferritin 35.00 13.00 - 150.00 ng/mL   Iron Profile    Specimen: Blood   Result Value Ref Range    TIBC 443 mcg/dL    UIBC 323 112 - 346 mcg/dL    Iron 120 37 - 145 mcg/dL    Iron Saturation 27 20 - 50 %   CBC & Differential    Specimen: Blood   Result Value Ref Range    WBC 6.30 3.40 - 10.80 10*3/mm3    RBC 4.58 3.77 - 5.28 10*6/mm3    Hemoglobin 14.0 12.0 - 15.9 g/dL    Hematocrit 41.4 34.0 - 46.6 %    MCV 90.4 79.0 - 97.0 fL    MCH 30.6 26.6 - 33.0 pg    MCHC 33.8 31.5 - 35.7 g/dL    RDW 12.0 (L) 12.3 - 15.4 %    Platelets 172 140 - 450 10*3/mm3    Neutrophil " Rel % 64.4 42.7 - 76.0 %    Lymphocyte Rel % 28.6 19.6 - 45.3 %    Monocyte Rel % 4.9 (L) 5.0 - 12.0 %    Eosinophil Rel % 1.4 0.3 - 6.2 %    Basophil Rel % 0.5 0.0 - 1.5 %    Neutrophils Absolute 4.06 1.70 - 7.00 10*3/mm3    Lymphocytes Absolute 1.80 0.70 - 3.10 10*3/mm3    Monocytes Absolute 0.31 0.10 - 0.90 10*3/mm3    Eosinophils Absolute 0.09 0.00 - 0.40 10*3/mm3    Basophils Absolute 0.03 0.00 - 0.20 10*3/mm3    Immature Granulocyte Rel % 0.2 0.0 - 0.5 %    Immature Grans Absolute 0.01 0.00 - 0.05 10*3/mm3    nRBC 0.0 0.0 - 0.2 /100 WBC       She is seeing [facility] 10-24-20 and tapering off Trileptal---it made her feel worse;  H/a's less since tapering off Trileptal    She is on Trazodone  Need also check on her iron def    Had hyst  I did labs in June; folic acid fine; iron stores almost normal---hyst and now off iron    The following portions of the patient's history were reviewed and updated as appropriate: allergies, current medications, past family history, past medical history, past social history, past surgical history and problem list.    Review of Systems   Constitutional: Positive for activity change and fatigue. Negative for fever.   HENT: Negative for nosebleeds and trouble swallowing.    Eyes: Negative for visual disturbance.   Respiratory: Negative for choking and stridor.    Cardiovascular: Negative for chest pain.   Gastrointestinal: Negative for blood in stool.   Endocrine: Negative for polydipsia.   Genitourinary: Negative for genital sores and hematuria.   Musculoskeletal: Negative for joint swelling.   Skin: Negative for color change and rash.   Allergic/Immunologic: Negative for immunocompromised state.   Neurological: Positive for headaches. Negative for seizures, facial asymmetry and speech difficulty.   Hematological: Negative for adenopathy.   Psychiatric/Behavioral: Positive for decreased concentration and dysphoric mood. Negative for behavioral problems, self-injury and suicidal  ideas. The patient is nervous/anxious.        Objective   Physical Exam  Constitutional:       General: She is not in acute distress.     Appearance: She is well-developed. She is not diaphoretic.   HENT:      Head: Normocephalic and atraumatic.   Eyes:      Conjunctiva/sclera: Conjunctivae normal.   Neck:      Musculoskeletal: Normal range of motion.   Pulmonary:      Effort: Pulmonary effort is normal. No respiratory distress.   Musculoskeletal: Normal range of motion.   Skin:     General: Skin is dry.   Neurological:      Mental Status: She is alert and oriented to person, place, and time.      Coordination: Coordination normal.   Psychiatric:         Behavior: Behavior normal.         Thought Content: Thought content normal.         Judgment: Judgment normal.         Assessment/Plan   Sherry was seen today for anxiety.    Diagnoses and all orders for this visit:    Folic acid deficiency  -     Comprehensive Metabolic Panel  -     CBC & Differential  -     Vitamin B12  -     Folate    Low serum vitamin B12  -     Comprehensive Metabolic Panel  -     CBC & Differential  -     Vitamin B12  -     Folate    Anxiety  -     Comprehensive Metabolic Panel  -     CBC & Differential  -     Vitamin B12  -     Folate    Migraine with aura and without status migrainosus, not intractable    Other orders  -     SUMAtriptan (IMITREX) 100 MG tablet; One PO at onset of migraine and may repeat dose X 1 after 2 hours if headache continues        Check Na d/t Trileptal--- she is tapering off and has new psychiatric appointment October 14 we will continue trazodone for now and tapering off Trileptal--labs to check sodium --we will make sure hemoglobin remains in range off the iron  Plan, Sherry Quevedo, was seen today.  she was seen for Vitamin D deficiency and will update labs  and Migraine headaches and will continue with their PRN triptan.  labs  Follow-up labs for B12 and folic acid deficiency she is taking supplementation  for this and will prove it with labs  Time spent 15 minutes

## 2020-09-21 NOTE — PATIENT INSTRUCTIONS
Vitamin D Deficiency  Vitamin D deficiency is when your body does not have enough vitamin D. Vitamin D is important to your body because:  · It helps your body use other minerals.  · It helps to keep your bones strong and healthy.  · It may help to prevent some diseases.  · It helps your heart and other muscles work well.  Not getting enough vitamin D can make your bones soft. It can also cause other health problems.  What are the causes?  This condition may be caused by:  · Not eating enough foods that contain vitamin D.  · Not getting enough sun.  · Having diseases that make it hard for your body to absorb vitamin D.  · Having a surgery in which a part of the stomach or a part of the small intestine is removed.  · Having kidney disease or liver disease.  What increases the risk?  You are more likely to get this condition if:  · You are older.  · You do not spend much time outdoors.  · You live in a nursing home.  · You have had broken bones.  · You have weak or thin bones (osteoporosis).  · You have a disease or condition that changes how your body absorbs vitamin D.  · You have dark skin.  · You take certain medicines.  · You are overweight or obese.  What are the signs or symptoms?  · In mild cases, there may not be any symptoms. If the condition is very bad, symptoms may include:  ? Bone pain.  ? Muscle pain.  ? Falling often.  ? Broken bones caused by a minor injury.  How is this treated?  Treatment may include taking supplements as told by your doctor. Your doctor will tell you what dose is best for you. Supplements may include:  · Vitamin D.  · Calcium.  Follow these instructions at home:  Eating and drinking    · Eat foods that contain vitamin D, such as:  ? Dairy products, cereals, or juices with added vitamin D. Check the label.  ? Fish, such as salmon or trout.  ? Eggs.  ? Oysters.  ? Mushrooms.  The items listed above may not be a complete list of what you can eat and drink. Contact a dietitian for more  options.  General instructions  · Take medicines and supplements only as told by your doctor.  · Get regular, safe exposure to natural sunlight.  · Do not use a tanning bed.  · Maintain a healthy weight. Lose weight if needed.  · Keep all follow-up visits as told by your doctor. This is important.  How is this prevented?  · You can get vitamin D by:  ? Eating foods that naturally contain vitamin D.  ? Eating or drinking products that have vitamin D added to them, such as cereals, juices, and milk.  ? Taking vitamin D or a multivitamin that contains vitamin D.  ? Being in the sun. Your body makes vitamin D when your skin is exposed to sunlight. Your body changes the sunlight into a form of the vitamin that it can use.  Contact a doctor if:  · Your symptoms do not go away.  · You feel sick to your stomach (nauseous).  · You throw up (vomit).  · You poop less often than normal, or you have trouble pooping (constipation).  Summary  · Vitamin D deficiency is when your body does not have enough vitamin D.  · Vitamin D helps to keep your bones strong and healthy.  · This condition is often treated by taking a supplement.  · Your doctor will tell you what dose is best for you.  This information is not intended to replace advice given to you by your health care provider. Make sure you discuss any questions you have with your health care provider.  Document Released: 12/06/2012 Document Revised: 08/26/2019 Document Reviewed: 08/26/2019  [employer] Patient Education © 2020 [employer]

## 2020-10-01 LAB
ALBUMIN SERPL-MCNC: 4.9 G/DL (ref 3.5–5.2)
ALBUMIN/GLOB SERPL: 2.7 G/DL
ALP SERPL-CCNC: 61 U/L (ref 39–117)
ALT SERPL-CCNC: 8 U/L (ref 1–33)
AST SERPL-CCNC: 13 U/L (ref 1–32)
BASOPHILS # BLD AUTO: 0.04 10*3/MM3 (ref 0–0.2)
BASOPHILS NFR BLD AUTO: 0.7 % (ref 0–1.5)
BILIRUB SERPL-MCNC: 0.5 MG/DL (ref 0–1.2)
BUN SERPL-MCNC: 11 MG/DL (ref 6–20)
BUN/CREAT SERPL: 12.4 (ref 7–25)
CALCIUM SERPL-MCNC: 9.1 MG/DL (ref 8.6–10.5)
CHLORIDE SERPL-SCNC: 104 MMOL/L (ref 98–107)
CO2 SERPL-SCNC: 23.7 MMOL/L (ref 22–29)
CREAT SERPL-MCNC: 0.89 MG/DL (ref 0.57–1)
EOSINOPHIL # BLD AUTO: 0.07 10*3/MM3 (ref 0–0.4)
EOSINOPHIL NFR BLD AUTO: 1.3 % (ref 0.3–6.2)
ERYTHROCYTE [DISTWIDTH] IN BLOOD BY AUTOMATED COUNT: 12.5 % (ref 12.3–15.4)
FOLATE SERPL-MCNC: 7.24 NG/ML (ref 4.78–24.2)
GLOBULIN SER CALC-MCNC: 1.8 GM/DL
GLUCOSE SERPL-MCNC: 90 MG/DL (ref 65–99)
HCT VFR BLD AUTO: 40.7 % (ref 34–46.6)
HGB BLD-MCNC: 13.6 G/DL (ref 12–15.9)
IMM GRANULOCYTES # BLD AUTO: 0.01 10*3/MM3 (ref 0–0.05)
IMM GRANULOCYTES NFR BLD AUTO: 0.2 % (ref 0–0.5)
LYMPHOCYTES # BLD AUTO: 1.86 10*3/MM3 (ref 0.7–3.1)
LYMPHOCYTES NFR BLD AUTO: 34.2 % (ref 19.6–45.3)
MCH RBC QN AUTO: 30.4 PG (ref 26.6–33)
MCHC RBC AUTO-ENTMCNC: 33.4 G/DL (ref 31.5–35.7)
MCV RBC AUTO: 90.8 FL (ref 79–97)
MONOCYTES # BLD AUTO: 0.34 10*3/MM3 (ref 0.1–0.9)
MONOCYTES NFR BLD AUTO: 6.3 % (ref 5–12)
NEUTROPHILS # BLD AUTO: 3.12 10*3/MM3 (ref 1.7–7)
NEUTROPHILS NFR BLD AUTO: 57.3 % (ref 42.7–76)
NRBC BLD AUTO-RTO: 0 /100 WBC (ref 0–0.2)
PLATELET # BLD AUTO: 166 10*3/MM3 (ref 140–450)
POTASSIUM SERPL-SCNC: 4 MMOL/L (ref 3.5–5.2)
PROT SERPL-MCNC: 6.7 G/DL (ref 6–8.5)
RBC # BLD AUTO: 4.48 10*6/MM3 (ref 3.77–5.28)
SODIUM SERPL-SCNC: 138 MMOL/L (ref 136–145)
VIT B12 SERPL-MCNC: 459 PG/ML (ref 211–946)
WBC # BLD AUTO: 5.44 10*3/MM3 (ref 3.4–10.8)

## 2020-10-05 ENCOUNTER — TELEPHONE (OUTPATIENT)
Dept: FAMILY MEDICINE CLINIC | Facility: CLINIC | Age: 30
End: 2020-10-05

## 2020-10-05 NOTE — TELEPHONE ENCOUNTER
----- Message from [place] Hogan PA-C sent at 10/2/2020  7:30 AM EDT -----  Labs are okay.  Are you taking over-the-counter folic acid?  If you are not taking it, can start over-the-counter 400 to 800 mcg daily just boost this

## 2021-03-08 ENCOUNTER — OFFICE VISIT (OUTPATIENT)
Dept: FAMILY MEDICINE CLINIC | Facility: CLINIC | Age: 31
End: 2021-03-08

## 2021-03-08 VITALS
HEIGHT: 63 IN | TEMPERATURE: 97.5 F | BODY MASS INDEX: 24.45 KG/M2 | RESPIRATION RATE: 16 BRPM | DIASTOLIC BLOOD PRESSURE: 75 MMHG | OXYGEN SATURATION: 100 % | HEART RATE: 85 BPM | WEIGHT: 138 LBS | SYSTOLIC BLOOD PRESSURE: 105 MMHG

## 2021-03-08 DIAGNOSIS — D50.0 IRON DEFICIENCY ANEMIA DUE TO CHRONIC BLOOD LOSS: ICD-10-CM

## 2021-03-08 DIAGNOSIS — O24.419 GESTATIONAL DIABETES MELLITUS (GDM), ANTEPARTUM, GESTATIONAL DIABETES METHOD OF CONTROL UNSPECIFIED: ICD-10-CM

## 2021-03-08 DIAGNOSIS — M79.2 RADICULAR PAIN IN LEFT ARM: ICD-10-CM

## 2021-03-08 DIAGNOSIS — M79.602 LEFT ARM PAIN: Primary | ICD-10-CM

## 2021-03-08 PROCEDURE — 73060 X-RAY EXAM OF HUMERUS: CPT | Performed by: FAMILY MEDICINE

## 2021-03-08 PROCEDURE — 72050 X-RAY EXAM NECK SPINE 4/5VWS: CPT | Performed by: FAMILY MEDICINE

## 2021-03-08 PROCEDURE — 99213 OFFICE O/P EST LOW 20 MIN: CPT | Performed by: FAMILY MEDICINE

## 2021-03-08 RX ORDER — METHYLPREDNISOLONE 4 MG/1
TABLET ORAL
COMMUNITY
Start: 2021-03-05 | End: 2021-04-14

## 2021-03-08 RX ORDER — DEXTROAMPHETAMINE SACCHARATE, AMPHETAMINE ASPARTATE, DEXTROAMPHETAMINE SULFATE AND AMPHETAMINE SULFATE 5; 5; 5; 5 MG/1; MG/1; MG/1; MG/1
30 TABLET ORAL 2 TIMES DAILY
COMMUNITY
Start: 2020-11-30 | End: 2021-11-29 | Stop reason: DRUGHIGH

## 2021-03-08 RX ORDER — FLUOXETINE 10 MG/1
CAPSULE ORAL
COMMUNITY
Start: 2020-12-07 | End: 2021-11-29

## 2021-03-08 RX ORDER — DEXTROAMPHETAMINE SACCHARATE, AMPHETAMINE ASPARTATE, DEXTROAMPHETAMINE SULFATE AND AMPHETAMINE SULFATE 5; 5; 5; 5 MG/1; MG/1; MG/1; MG/1
1 TABLET ORAL 3 TIMES DAILY
COMMUNITY
Start: 2021-02-11 | End: 2021-08-06

## 2021-03-08 NOTE — PROGRESS NOTES
"Subjective   Sherry Quevedo is a 30 y.o. female.     History of Present Illness   Sherry Quevedo 30 y.o. female /75 (BP Location: Left arm, Patient Position: Sitting, Cuff Size: Adult)   Pulse 85   Temp 97.5 °F (36.4 °C)   Resp 16   Ht 160 cm (62.99\")   Wt 62.6 kg (138 lb)   LMP  (LMP Unknown)   SpO2 100%   BMI 24.45 kg/m²  who presents today for left arm pain. Sore in biceps; with movement an when touch it---makes it tingle. ROM pain, N/T, no burn, more a throb. The meds from  are helping.  she has a history of   Patient Active Problem List   Diagnosis   • Anemia   • Chronic allergic rhinitis   • History of renal stone   • Left cervical lymphadenopathy   • Migraine with aura and without status migrainosus, not intractable   • Iron deficiency anemia due to chronic blood loss   • Low serum vitamin B12   • Folic acid deficiency   • Generalized anxiety disorder   • Moderate episode of recurrent major depressive disorder (CMS/HCC)   • Multinodular non-toxic goiter   • Anxiety   • Depression   • Environmental and seasonal allergies   • Irritable bowel syndrome (IBS)   • Migraines   .onset was Thurs; pain---4 days ago; N/T down left arm for few weeks. no injury.    Past hx low iron --had hyst  Hx low D and update  Went to  for arm pain----was seen for this and on Medrol noel and Robaxin;  Pain left arm and N/T fingers;   Weight down with Adderall--Dr Warren  Also has bug bite left forearm last 2 days and itches  X-Ray  Interpretation report in house X-rays that I personally viewed    Relevant Clinical Issues/Diagnoses/Indications:  Left arm pain; ? Radicular?        Clinical Findings: Left humerus down to elbow without fracture and no mass no area of calcification.  Cervical spine with great alignment no compression fracture, no mass, no degenerative changes          Comparative Data:  none          Date of Previous X-ray:    Change on current X-ray:      The following portions of the patient's " history were reviewed and updated as appropriate: allergies, current medications, past family history, past medical history, past social history, past surgical history and problem list.    Review of Systems   Constitutional: Negative for activity change, appetite change and unexpected weight change.   HENT: Negative for nosebleeds and trouble swallowing.    Eyes: Negative for pain and visual disturbance.   Respiratory: Negative for chest tightness, shortness of breath and wheezing.    Cardiovascular: Negative for chest pain and palpitations.   Gastrointestinal: Negative for abdominal pain and blood in stool.   Endocrine: Negative.    Genitourinary: Negative for difficulty urinating and hematuria.   Musculoskeletal: Positive for arthralgias. Negative for joint swelling.   Skin: Negative for color change and rash.   Allergic/Immunologic: Negative.    Neurological: Positive for numbness. Negative for syncope and speech difficulty.   Hematological: Negative for adenopathy.   Psychiatric/Behavioral: Negative for agitation and confusion.   All other systems reviewed and are negative.      Objective   Physical Exam  Constitutional:       General: She is not in acute distress.     Appearance: She is well-developed. She is not diaphoretic.   HENT:      Head: Normocephalic and atraumatic.   Eyes:      General: No scleral icterus.     Conjunctiva/sclera: Conjunctivae normal.   Pulmonary:      Effort: Pulmonary effort is normal. No respiratory distress.   Musculoskeletal:         General: Tenderness (left biceps sore with palp and ROM) present. Normal range of motion.      Cervical back: Normal range of motion.   Skin:     General: Skin is dry.      Coloration: Skin is not jaundiced.      Findings: Lesion (left distal forearm pink papule 4mm; local pink aournd this) and rash present.   Neurological:      General: No focal deficit present.      Mental Status: She is alert and oriented to person, place, and time.      Sensory: No  sensory deficit.      Motor: No weakness.      Coordination: Coordination normal.      Deep Tendon Reflexes: Reflexes normal.   Psychiatric:         Mood and Affect: Mood normal.         Behavior: Behavior normal.         Thought Content: Thought content normal.         Judgment: Judgment normal.         Assessment/Plan   Diagnoses and all orders for this visit:    1. Left arm pain (Primary)  -     Comprehensive metabolic panel  -     Lipid panel  -     CBC and Differential  -     TSH  -     T3, Free  -     T4, Free  -     Vitamin B12  -     Folate  -     Vitamin D 25 Hydroxy  -     Hemoglobin A1c  -     Ferritin  -     Iron Profile    2. Radicular pain in left arm  -     Comprehensive metabolic panel  -     Lipid panel  -     CBC and Differential  -     TSH  -     T3, Free  -     T4, Free  -     Vitamin B12  -     Folate  -     Vitamin D 25 Hydroxy  -     Hemoglobin A1c  -     Ferritin  -     Iron Profile    3. Iron deficiency anemia due to chronic blood loss  -     Comprehensive metabolic panel  -     Lipid panel  -     CBC and Differential  -     TSH  -     T3, Free  -     T4, Free  -     Vitamin B12  -     Folate  -     Vitamin D 25 Hydroxy  -     Hemoglobin A1c  -     Ferritin  -     Iron Profile    4. Gestational diabetes mellitus (GDM), antepartum, gestational diabetes method of control unspecified  -     Comprehensive metabolic panel  -     Lipid panel  -     CBC and Differential  -     TSH  -     T3, Free  -     T4, Free  -     Vitamin B12  -     Folate  -     Vitamin D 25 Hydroxy  -     Hemoglobin A1c  -     Ferritin  -     Iron Profile      Xray neck ? Radicular pain left  Has 2 days left Medrol and finish--helping  Robaxin PRN if helps  Refer ortho if worse or cont C/o  Topical Hydrocortisone cream bug bite left forearm             Answers for HPI/ROS submitted by the patient on 3/5/2021  Please describe your symptoms.: Left arm pain; throbbing, tingling, throbbed, like it is asleep, lots of weird  sensation.  I was seen by [facility] on Thursday, and the DrPro thought it was a swollen or irrated nerve. He prescribed a muscle relaxer and steroid pack, and said to follow up with doctor if it doesn't improve. My arm has been bothering me for 2-3 weeks but Thursday it started getting worse, and affecting my daily activities.  Have you had these symptoms before?: No  How long have you been having these symptoms?: Greater than 2 weeks  Please list any medications you are currently taking for this condition.: Methocarbinol- muscle relaxer, Solymederial- Steriod Pack, Tylonal and Ibprophen  Please describe any probable cause for these symptoms. : I am not sure. I thought maybe I had slept on it wrong but it is getting worse, and tylonal and Ibprophen was not relieving it. , , I had my second dose of the Covid vaccine around the onset, but I cannot remember if it started before or after. The only symptom I had was a sore arm the next day or two and went away (not the same feeling as now).  What is the primary reason for your visit?: Other

## 2021-03-18 ENCOUNTER — TELEPHONE (OUTPATIENT)
Dept: FAMILY MEDICINE CLINIC | Facility: CLINIC | Age: 31
End: 2021-03-18

## 2021-03-18 DIAGNOSIS — R21 RASH AND NONSPECIFIC SKIN ERUPTION: Primary | ICD-10-CM

## 2021-03-18 NOTE — TELEPHONE ENCOUNTER
----- Message from Sherry Quevedo sent at 3/17/2021 10:13 PM EDT -----  Regarding: RE: Visit Follow-Up Question  Contact: [phone]  [place],     I am continuing to get bites on a daily basis, and the antihistamines are not helping the itch. Ozzy has gotten a few of the bites as well. We fogged the house on Saturday, but it does not seem to help. I am going to attach a couple photos of my arms (previous bite) and an area of new bites. I am unable to miss work this week for an appointment, but Ozzy could come in if needed since they look pretty much the same. Would it be better to try to get in with a dermatologist?  I am not sure what else to do, this itching is the worst thing I have experienced and the first spot appeared 2 weeks ago. Thanks,  Sherry    ----- Message -----  From: [place] Hogan PA-C  Sent: 3/11/21 12:51 PM  To: Sherry Quevedo  Subject: RE: Visit Follow-Up Question    Add Zyrtec 10mg twice a day; can take Benadryl at night; also Pepcid 20mg twice daily----blocks histamine; see if this helps      ----- Message -----       From:Sherry Quevedo       Sent:3/11/2021 10:38 AM EST         To:[place] Hogan PA-C    Subject:Visit Follow-Up Question    [place],      I finished my steroid Tuesday and the pain is better but spot on my arm is not getting better. It has gotten a little worse and I have one on the other arm as well. It is very strange because it will get red and then all the sudden welp up for a little bit and itching will be impossible to stop  I have tried taking benadryl, using hydrocortisone 2.5, calamine type lotion, after bite stick, etc. I've tried Band aid/non and nothing is helping. My arm is swollen in the area surrounding the bite on my left arm.  The bite on my right arm is not as bad but does the same thing with turning white, welping up, and itching, but it's not nearly as inflamed.

## 2021-04-14 ENCOUNTER — TELEMEDICINE (OUTPATIENT)
Dept: FAMILY MEDICINE CLINIC | Facility: CLINIC | Age: 31
End: 2021-04-14

## 2021-04-14 DIAGNOSIS — M25.60 JOINT STIFFNESS: ICD-10-CM

## 2021-04-14 DIAGNOSIS — E04.2 MULTINODULAR NON-TOXIC GOITER: ICD-10-CM

## 2021-04-14 DIAGNOSIS — E53.8 LOW FOLIC ACID: ICD-10-CM

## 2021-04-14 DIAGNOSIS — E53.8 LOW SERUM VITAMIN B12: ICD-10-CM

## 2021-04-14 DIAGNOSIS — M25.50 MULTIPLE JOINT PAIN: Primary | ICD-10-CM

## 2021-04-14 DIAGNOSIS — E53.8 FOLIC ACID DEFICIENCY: ICD-10-CM

## 2021-04-14 DIAGNOSIS — D50.0 IRON DEFICIENCY ANEMIA DUE TO CHRONIC BLOOD LOSS: ICD-10-CM

## 2021-04-14 DIAGNOSIS — R68.82 DECREASED SEX DRIVE: ICD-10-CM

## 2021-04-14 DIAGNOSIS — Z86.32 HISTORY OF GESTATIONAL DIABETES: ICD-10-CM

## 2021-04-14 DIAGNOSIS — E55.9 VITAMIN D DEFICIENCY: ICD-10-CM

## 2021-04-14 PROCEDURE — 99213 OFFICE O/P EST LOW 20 MIN: CPT | Performed by: PHYSICIAN ASSISTANT

## 2021-04-14 RX ORDER — IVERMECTIN 3 MG/1
TABLET ORAL
COMMUNITY
Start: 2021-03-23 | End: 2021-04-14

## 2021-04-14 RX ORDER — PERMETHRIN 50 MG/G
CREAM TOPICAL
COMMUNITY
Start: 2021-03-24 | End: 2021-04-14

## 2021-04-14 NOTE — PROGRESS NOTES
Subjective   Sherry Quevedo is a 30 y.o. female.   You have chosen to receive care through a telehealth visit.  Do you consent to use a video/audio connection for your medical care today? Yes    History of Present Illness   Sherry Quevedo 30 y.o. female LMP  (LMP Unknown)  who presents today for joint pain.  Still is tired.   she has a history of   Patient Active Problem List   Diagnosis   • Anemia   • Chronic allergic rhinitis   • History of renal stone   • Left cervical lymphadenopathy   • Migraine with aura and without status migrainosus, not intractable   • Iron deficiency anemia due to chronic blood loss   • Low serum vitamin B12   • Folic acid deficiency   • Generalized anxiety disorder   • Moderate episode of recurrent major depressive disorder (CMS/HCC)   • Multinodular non-toxic goiter   • Anxiety   • Depression   • Environmental and seasonal allergies   • Irritable bowel syndrome (IBS)   • Migraines   .    Still having pain in her wrists, hands, ---also feet, legs, back;   Having muscle and joint pain; stiff and achy  Also edema in fingers and hands---can get stuck  BMI Readings from Last 1 Encounters:   03/08/21 24.45 kg/m²       Memory fog, salcido; low sex drive; mom had hormone issues.  Had hyst ; has ovaries  Hx thyroid nodules and saw PA for Dr Hoffman last year---had us and told no change in nodules  I want her to get thyroid labs and get u/s thyroid      She sees psych; weight is still down since med change. Weight stable    Need f/u on prior iron def labs also    Camps every weekend; no ticks seen; check lyme titer    No fever    The following portions of the patient's history were reviewed and updated as appropriate: allergies, current medications, past family history, past medical history, past social history, past surgical history and problem list.    Review of Systems   Constitutional: Positive for activity change, appetite change and fatigue. Negative for unexpected weight change.    HENT: Negative for nosebleeds and trouble swallowing.    Eyes: Negative for pain and visual disturbance.   Respiratory: Negative for chest tightness, shortness of breath and wheezing.    Cardiovascular: Negative for chest pain and palpitations.   Gastrointestinal: Negative for abdominal pain and blood in stool.   Endocrine: Negative.    Genitourinary: Negative for difficulty urinating and hematuria.   Musculoskeletal: Positive for arthralgias and joint swelling.   Skin: Negative for color change and rash.   Allergic/Immunologic: Negative.    Neurological: Negative for syncope and speech difficulty.   Hematological: Negative for adenopathy.   Psychiatric/Behavioral: Positive for decreased concentration. Negative for agitation and confusion.   All other systems reviewed and are negative.      Objective   Physical Exam  Constitutional:       General: She is not in acute distress.     Appearance: Normal appearance. She is well-developed. She is not ill-appearing, toxic-appearing or diaphoretic.   HENT:      Head: Normocephalic and atraumatic.   Eyes:      General: No scleral icterus.     Conjunctiva/sclera: Conjunctivae normal.   Neck:      Vascular: No carotid bruit.   Pulmonary:      Effort: Pulmonary effort is normal. No respiratory distress.   Musculoskeletal:         General: Normal range of motion.      Cervical back: Normal range of motion.   Skin:     General: Skin is dry.      Coloration: Skin is not jaundiced.   Neurological:      General: No focal deficit present.      Mental Status: She is alert and oriented to person, place, and time.      Coordination: Coordination normal.   Psychiatric:         Mood and Affect: Mood normal.         Behavior: Behavior normal.         Thought Content: Thought content normal.         Judgment: Judgment normal.         Assessment/Plan   Diagnoses and all orders for this visit:    1. Multiple joint pain (Primary)  -     Comprehensive metabolic panel  -     Lipid panel  -      CBC and Differential  -     TSH  -     Hemoglobin A1c  -     Prolactin  -     FSH & LH  -     Ferritin  -     Iron Profile  -     Vitamin B12  -     Folate  -     Vitamin D 25 Hydroxy  -     T3, Free  -     T4, Free  -     C-reactive Protein  -     TARA  -     Rheumatoid Factor  -     Uric Acid  -     DHEA  -     B. Burgdorferi Antibodies, WB Reflex    2. Folic acid deficiency  -     Comprehensive metabolic panel  -     Lipid panel  -     CBC and Differential  -     TSH  -     Hemoglobin A1c  -     Prolactin  -     FSH & LH  -     Ferritin  -     Iron Profile  -     Vitamin B12  -     Folate  -     Vitamin D 25 Hydroxy  -     T3, Free  -     T4, Free  -     C-reactive Protein  -     TARA  -     Rheumatoid Factor  -     Uric Acid  -     DHEA  -     B. Burgdorferi Antibodies, WB Reflex    3. Low serum vitamin B12  -     Comprehensive metabolic panel  -     Lipid panel  -     CBC and Differential  -     TSH  -     Hemoglobin A1c  -     Prolactin  -     FSH & LH  -     Ferritin  -     Iron Profile  -     Vitamin B12  -     Folate  -     Vitamin D 25 Hydroxy  -     T3, Free  -     T4, Free  -     C-reactive Protein  -     TARA  -     Rheumatoid Factor  -     Uric Acid  -     DHEA  -     B. Burgdorferi Antibodies, WB Reflex    4. Decreased sex drive  -     Comprehensive metabolic panel  -     Lipid panel  -     CBC and Differential  -     TSH  -     Hemoglobin A1c  -     Prolactin  -     FSH & LH  -     Ferritin  -     Iron Profile  -     Vitamin B12  -     Folate  -     Vitamin D 25 Hydroxy  -     T3, Free  -     T4, Free  -     C-reactive Protein  -     TARA  -     Rheumatoid Factor  -     Uric Acid  -     DHEA  -     B. Burgdorferi Antibodies, WB Reflex    5. Joint stiffness  -     Comprehensive metabolic panel  -     Lipid panel  -     CBC and Differential  -     TSH  -     Hemoglobin A1c  -     Prolactin  -     FSH & LH  -     Ferritin  -     Iron Profile  -     Vitamin B12  -     Folate  -     Vitamin D 25 Hydroxy  -      T3, Free  -     T4, Free  -     C-reactive Protein  -     TARA  -     Rheumatoid Factor  -     Uric Acid  -     DHEA  -     B. Burgdorferi Antibodies, WB Reflex    6. Vitamin D deficiency  -     Comprehensive metabolic panel  -     Lipid panel  -     CBC and Differential  -     TSH  -     Hemoglobin A1c  -     Prolactin  -     FSH & LH  -     Ferritin  -     Iron Profile  -     Vitamin B12  -     Folate  -     Vitamin D 25 Hydroxy  -     T3, Free  -     T4, Free  -     C-reactive Protein  -     TARA  -     Rheumatoid Factor  -     Uric Acid  -     DHEA  -     B. Burgdorferi Antibodies, WB Reflex    7. Multinodular non-toxic goiter  -     Comprehensive metabolic panel  -     Lipid panel  -     CBC and Differential  -     TSH  -     Hemoglobin A1c  -     Prolactin  -     FSH & LH  -     Ferritin  -     Iron Profile  -     Vitamin B12  -     Folate  -     Vitamin D 25 Hydroxy  -     T3, Free  -     T4, Free  -     C-reactive Protein  -     TARA  -     Rheumatoid Factor  -     Uric Acid  -     DHEA  -     B. Burgdorferi Antibodies, WB Reflex    8. Iron deficiency anemia due to chronic blood loss  -     Comprehensive metabolic panel  -     Lipid panel  -     CBC and Differential  -     TSH  -     Hemoglobin A1c  -     Prolactin  -     FSH & LH  -     Ferritin  -     Iron Profile  -     Vitamin B12  -     Folate  -     Vitamin D 25 Hydroxy  -     T3, Free  -     T4, Free  -     C-reactive Protein  -     TARA  -     Rheumatoid Factor  -     Uric Acid  -     DHEA  -     B. Burgdorferi Antibodies, WB Reflex    9. History of gestational diabetes  -     Comprehensive metabolic panel  -     Lipid panel  -     CBC and Differential  -     TSH  -     Hemoglobin A1c  -     Prolactin  -     FSH & LH  -     Ferritin  -     Iron Profile  -     Vitamin B12  -     Folate  -     Vitamin D 25 Hydroxy  -     T3, Free  -     T4, Free  -     C-reactive Protein  -     TARA  -     Rheumatoid Factor  -     Uric Acid  -     DHEA  -     B.  Burgdorferi Antibodies, WB Reflex      Thyroid u/s  Labs  F/u iron  Do check RA, TARA, CRP---d/t joint pain and stiff  Low sex drive and labs for this also--prolactin, testosterone, FSH, LH  Still see ortho if neg labs  Sees psych

## 2021-04-21 ENCOUNTER — TELEPHONE (OUTPATIENT)
Dept: FAMILY MEDICINE CLINIC | Facility: CLINIC | Age: 31
End: 2021-04-21

## 2021-04-24 LAB
25(OH)D3+25(OH)D2 SERPL-MCNC: 26.7 NG/ML (ref 30–100)
ALBUMIN SERPL-MCNC: 4.8 G/DL (ref 3.5–5.2)
ALBUMIN/GLOB SERPL: 2.4 G/DL
ALP SERPL-CCNC: 56 U/L (ref 39–117)
ALT SERPL-CCNC: 7 U/L (ref 1–33)
ANA SER QL: NEGATIVE
AST SERPL-CCNC: 17 U/L (ref 1–32)
B BURGDOR IGG+IGM SER-ACNC: <0.91 ISR (ref 0–0.9)
B BURGDOR IGM SER IA-ACNC: <0.8 INDEX (ref 0–0.79)
BASOPHILS # BLD AUTO: 0.03 10*3/MM3 (ref 0–0.2)
BASOPHILS NFR BLD AUTO: 0.4 % (ref 0–1.5)
BILIRUB SERPL-MCNC: 0.6 MG/DL (ref 0–1.2)
BUN SERPL-MCNC: 13 MG/DL (ref 6–20)
BUN/CREAT SERPL: 17.3 (ref 7–25)
CALCIUM SERPL-MCNC: 9.5 MG/DL (ref 8.6–10.5)
CHLORIDE SERPL-SCNC: 103 MMOL/L (ref 98–107)
CHOLEST SERPL-MCNC: 122 MG/DL (ref 0–200)
CO2 SERPL-SCNC: 27.9 MMOL/L (ref 22–29)
CREAT SERPL-MCNC: 0.75 MG/DL (ref 0.57–1)
CRP SERPL-MCNC: <0.3 MG/DL (ref 0–0.5)
DHEA SERPL-MCNC: 206 NG/DL (ref 31–701)
EOSINOPHIL # BLD AUTO: 0.04 10*3/MM3 (ref 0–0.4)
EOSINOPHIL NFR BLD AUTO: 0.6 % (ref 0.3–6.2)
ERYTHROCYTE [DISTWIDTH] IN BLOOD BY AUTOMATED COUNT: 12.5 % (ref 12.3–15.4)
FERRITIN SERPL-MCNC: 69.6 NG/ML (ref 13–150)
FOLATE SERPL-MCNC: 2.76 NG/ML (ref 4.78–24.2)
FSH SERPL-ACNC: 9.6 MIU/ML
GLOBULIN SER CALC-MCNC: 2 GM/DL
GLUCOSE SERPL-MCNC: 78 MG/DL (ref 65–99)
HBA1C MFR BLD: 4.9 % (ref 4.8–5.6)
HCT VFR BLD AUTO: 41.7 % (ref 34–46.6)
HDLC SERPL-MCNC: 51 MG/DL (ref 40–60)
HGB BLD-MCNC: 14.2 G/DL (ref 12–15.9)
IMM GRANULOCYTES # BLD AUTO: 0.02 10*3/MM3 (ref 0–0.05)
IMM GRANULOCYTES NFR BLD AUTO: 0.3 % (ref 0–0.5)
IRON SATN MFR SERPL: 25 % (ref 20–50)
IRON SERPL-MCNC: 93 MCG/DL (ref 37–145)
LDLC SERPL CALC-MCNC: 60 MG/DL (ref 0–100)
LH SERPL-ACNC: 15.3 MIU/ML
LYMPHOCYTES # BLD AUTO: 1.96 10*3/MM3 (ref 0.7–3.1)
LYMPHOCYTES NFR BLD AUTO: 27.8 % (ref 19.6–45.3)
MCH RBC QN AUTO: 30.9 PG (ref 26.6–33)
MCHC RBC AUTO-ENTMCNC: 34.1 G/DL (ref 31.5–35.7)
MCV RBC AUTO: 90.8 FL (ref 79–97)
MONOCYTES # BLD AUTO: 0.34 10*3/MM3 (ref 0.1–0.9)
MONOCYTES NFR BLD AUTO: 4.8 % (ref 5–12)
NEUTROPHILS # BLD AUTO: 4.65 10*3/MM3 (ref 1.7–7)
NEUTROPHILS NFR BLD AUTO: 66.1 % (ref 42.7–76)
NRBC BLD AUTO-RTO: 0 /100 WBC (ref 0–0.2)
PLATELET # BLD AUTO: 159 10*3/MM3 (ref 140–450)
POTASSIUM SERPL-SCNC: 3.8 MMOL/L (ref 3.5–5.2)
PROLACTIN SERPL-MCNC: 10.6 NG/ML (ref 4.8–23.3)
PROT SERPL-MCNC: 6.8 G/DL (ref 6–8.5)
RBC # BLD AUTO: 4.59 10*6/MM3 (ref 3.77–5.28)
RHEUMATOID FACT SERPL-ACNC: <10 IU/ML (ref 0–13.9)
SODIUM SERPL-SCNC: 140 MMOL/L (ref 136–145)
T3FREE SERPL-MCNC: 2.9 PG/ML (ref 2–4.4)
T4 FREE SERPL-MCNC: 1.25 NG/DL (ref 0.93–1.7)
TIBC SERPL-MCNC: 371 MCG/DL
TRIGL SERPL-MCNC: 49 MG/DL (ref 0–150)
TSH SERPL DL<=0.005 MIU/L-ACNC: 1.24 UIU/ML (ref 0.27–4.2)
UIBC SERPL-MCNC: 278 MCG/DL (ref 112–346)
URATE SERPL-MCNC: 4.2 MG/DL (ref 2.4–5.7)
VIT B12 SERPL-MCNC: 417 PG/ML (ref 211–946)
VLDLC SERPL CALC-MCNC: 11 MG/DL (ref 5–40)
WBC # BLD AUTO: 7.04 10*3/MM3 (ref 3.4–10.8)

## 2021-04-24 RX ORDER — FOLIC ACID 1 MG/1
1 TABLET ORAL DAILY
Qty: 90 TABLET | Refills: 1 | Status: SHIPPED | OUTPATIENT
Start: 2021-04-24 | End: 2021-08-06

## 2021-04-25 ENCOUNTER — HOSPITAL ENCOUNTER (OUTPATIENT)
Dept: ULTRASOUND IMAGING | Facility: HOSPITAL | Age: 31
Discharge: HOME | End: 2021-04-25
Admitting: PHYSICIAN ASSISTANT

## 2021-04-25 DIAGNOSIS — E04.2 MULTINODULAR NON-TOXIC GOITER: ICD-10-CM

## 2021-04-25 PROCEDURE — 76536 US EXAM OF HEAD AND NECK: CPT

## 2021-06-02 ENCOUNTER — OFFICE (AMBULATORY)
Dept: URBAN - METROPOLITAN AREA CLINIC 75 | Facility: CLINIC | Age: 31
End: 2021-06-02

## 2021-06-02 VITALS
HEIGHT: 64 IN | TEMPERATURE: 97.2 F | HEART RATE: 77 BPM | SYSTOLIC BLOOD PRESSURE: 110 MMHG | WEIGHT: 132 LBS | OXYGEN SATURATION: 99 % | DIASTOLIC BLOOD PRESSURE: 78 MMHG

## 2021-06-02 DIAGNOSIS — K62.5 HEMORRHAGE OF ANUS AND RECTUM: ICD-10-CM

## 2021-06-02 DIAGNOSIS — K64.8 OTHER HEMORRHOIDS: ICD-10-CM

## 2021-06-02 DIAGNOSIS — K64.4 RESIDUAL HEMORRHOIDAL SKIN TAGS: ICD-10-CM

## 2021-06-02 DIAGNOSIS — K58.2 MIXED IRRITABLE BOWEL SYNDROME: ICD-10-CM

## 2021-06-02 PROCEDURE — 99214 OFFICE O/P EST MOD 30 MIN: CPT

## 2021-06-02 RX ORDER — HYDROCORTISONE ACETATE AND PRAMOXINE HYDROCHLORIDE 25; 10 MG/G; MG/G
CREAM TOPICAL
Qty: 1 | Refills: 2 | Status: COMPLETED
Start: 2021-06-02 | End: 2023-08-31

## 2021-06-02 RX ORDER — RIFAXIMIN 550 MG/1
1650 TABLET ORAL
Qty: 42 | Refills: 2 | Status: COMPLETED
Start: 2021-06-02 | End: 2023-08-31

## 2021-06-10 ENCOUNTER — TELEPHONE (OUTPATIENT)
Dept: FAMILY MEDICINE CLINIC | Facility: CLINIC | Age: 31
End: 2021-06-10

## 2021-06-10 RX ORDER — SCOLOPAMINE TRANSDERMAL SYSTEM 1 MG/1
1 PATCH, EXTENDED RELEASE TRANSDERMAL
Qty: 4 PATCH | Refills: 1 | Status: SHIPPED | OUTPATIENT
Start: 2021-06-10 | End: 2021-08-06

## 2021-06-10 NOTE — TELEPHONE ENCOUNTER
----- Message from Sherry Quevedo sent at 6/9/2021  8:53 PM EDT -----  Regarding: Prescription Question  Contact: [phone]  [place],      We are going to [place] at the end of this month for 10 days and going to [place] at the end of October for our anniversary.  Would it be possible to get a refill of the scopolimine patches?  Ozzy will also need some, but he will send you an email so it is on record as well.    Thanks,  Sherry

## 2021-07-15 ENCOUNTER — OFFICE VISIT (OUTPATIENT)
Dept: FAMILY MEDICINE CLINIC | Facility: CLINIC | Age: 31
End: 2021-07-15

## 2021-07-15 VITALS
RESPIRATION RATE: 16 BRPM | TEMPERATURE: 97.5 F | DIASTOLIC BLOOD PRESSURE: 69 MMHG | HEIGHT: 63 IN | WEIGHT: 127 LBS | BODY MASS INDEX: 22.5 KG/M2 | HEART RATE: 86 BPM | OXYGEN SATURATION: 100 % | SYSTOLIC BLOOD PRESSURE: 110 MMHG

## 2021-07-15 DIAGNOSIS — R59.0 LEFT CERVICAL LYMPHADENOPATHY: Primary | ICD-10-CM

## 2021-07-15 DIAGNOSIS — L98.8 ABNORMAL GLUTEAL CREASE: ICD-10-CM

## 2021-07-15 DIAGNOSIS — E53.8 LOW SERUM VITAMIN B12: ICD-10-CM

## 2021-07-15 DIAGNOSIS — E53.8 LOW FOLIC ACID: ICD-10-CM

## 2021-07-15 DIAGNOSIS — G43.109 MIGRAINE WITH AURA AND WITHOUT STATUS MIGRAINOSUS, NOT INTRACTABLE: ICD-10-CM

## 2021-07-15 DIAGNOSIS — Z87.442 HISTORY OF RENAL STONE: ICD-10-CM

## 2021-07-15 DIAGNOSIS — E16.2 HYPOGLYCEMIA: ICD-10-CM

## 2021-07-15 PROCEDURE — 99214 OFFICE O/P EST MOD 30 MIN: CPT | Performed by: PHYSICIAN ASSISTANT

## 2021-07-15 RX ORDER — HYDROCORTISONE ACETATE PRAMOXINE HCL 2.5; 1 G/100G; G/100G
CREAM TOPICAL
COMMUNITY
Start: 2021-06-02 | End: 2021-08-06

## 2021-07-15 RX ORDER — PRAMOXINE HYDROCHLORIDE HYDROCORTISONE ACETATE 100; 100 MG/10G; MG/10G
AEROSOL, FOAM TOPICAL
COMMUNITY
Start: 2021-06-08 | End: 2021-08-06

## 2021-07-15 RX ORDER — BUPROPION HYDROCHLORIDE 150 MG/1
150 TABLET ORAL EVERY MORNING
COMMUNITY
Start: 2021-07-06 | End: 2021-08-06

## 2021-07-15 RX ORDER — AMOXICILLIN AND CLAVULANATE POTASSIUM 875; 125 MG/1; MG/1
1 TABLET, FILM COATED ORAL EVERY 12 HOURS SCHEDULED
Qty: 20 TABLET | Refills: 0 | Status: SHIPPED | OUTPATIENT
Start: 2021-07-15 | End: 2021-08-06

## 2021-07-15 RX ORDER — FLUCONAZOLE 150 MG/1
150 TABLET ORAL ONCE
Qty: 1 TABLET | Refills: 2 | Status: SHIPPED | OUTPATIENT
Start: 2021-07-15 | End: 2021-07-15

## 2021-07-15 RX ORDER — RIFAXIMIN 550 MG/1
TABLET ORAL
COMMUNITY
Start: 2021-06-15 | End: 2021-11-29

## 2021-07-15 NOTE — PROGRESS NOTES
"Lilly Quevedo is a 31 y.o. female.     History of Present Illness      Since the last visit, she has overall felt tired.  She has Vitamin D deficiency and labs are at goal >30 ng/mL and Migraine headaches and responding well to PRN triptan Rx.  she has been compliant with current medications have reviewed them.  The patient denies medication side effects.  Will refill medications. /69 (BP Location: Left arm, Patient Position: Sitting, Cuff Size: Adult)   Pulse 86   Temp 97.5 °F (36.4 °C)   Resp 16   Ht 160 cm (62.99\")   Wt 57.6 kg (127 lb)   LMP  (LMP Unknown)   SpO2 100%   BMI 22.50 kg/m²   Sees DR Warren  Had issue word finding-----check psych meds first--talk to psych  Systolic bp average 120  She is still tired all the time and episodes of shaky, h/a, lack of focus, dizzy---goes away when eats.    Having episodes hypoglycemia often; food helps; also orthostatic to stand  Results for orders placed or performed in visit on 04/14/21   Comprehensive metabolic panel    Specimen: Blood   Result Value Ref Range    Glucose 78 65 - 99 mg/dL    BUN 13 6 - 20 mg/dL    Creatinine 0.75 0.57 - 1.00 mg/dL    eGFR Non African Am 90 >60 mL/min/1.73    eGFR African Am 109 >60 mL/min/1.73    BUN/Creatinine Ratio 17.3 7.0 - 25.0    Sodium 140 136 - 145 mmol/L    Potassium 3.8 3.5 - 5.2 mmol/L    Chloride 103 98 - 107 mmol/L    Total CO2 27.9 22.0 - 29.0 mmol/L    Calcium 9.5 8.6 - 10.5 mg/dL    Total Protein 6.8 6.0 - 8.5 g/dL    Albumin 4.80 3.50 - 5.20 g/dL    Globulin 2.0 gm/dL    A/G Ratio 2.4 g/dL    Total Bilirubin 0.6 0.0 - 1.2 mg/dL    Alkaline Phosphatase 56 39 - 117 U/L    AST (SGOT) 17 1 - 32 U/L    ALT (SGPT) 7 1 - 33 U/L   Lipid panel    Specimen: Blood   Result Value Ref Range    Total Cholesterol 122 0 - 200 mg/dL    Triglycerides 49 0 - 150 mg/dL    HDL Cholesterol 51 40 - 60 mg/dL    VLDL Cholesterol Esteban 11 5 - 40 mg/dL    LDL Chol Calc ([facility]) 60 0 - 100 mg/dL   TSH    Specimen: " Blood   Result Value Ref Range    TSH 1.240 0.270 - 4.200 uIU/mL   Hemoglobin A1c    Specimen: Blood   Result Value Ref Range    Hemoglobin A1C 4.90 4.80 - 5.60 %   Prolactin    Specimen: Blood   Result Value Ref Range    Prolactin 10.6 4.8 - 23.3 ng/mL   FSH & LH    Specimen: Blood   Result Value Ref Range    LH 15.3 mIU/mL    FSH 9.6 mIU/mL   Ferritin    Specimen: Blood   Result Value Ref Range    Ferritin 69.60 13.00 - 150.00 ng/mL   Iron Profile    Specimen: Blood   Result Value Ref Range    TIBC 371 mcg/dL    UIBC 278 112 - 346 mcg/dL    Iron 93 37 - 145 mcg/dL    Iron Saturation 25 20 - 50 %   Vitamin B12    Specimen: Blood   Result Value Ref Range    Vitamin B-12 417 211 - 946 pg/mL   Folate    Specimen: Blood   Result Value Ref Range    Folate 2.76 (L) 4.78 - 24.20 ng/mL   Vitamin D 25 Hydroxy    Specimen: Blood   Result Value Ref Range    25 Hydroxy, Vitamin D 26.7 (L) 30.0 - 100.0 ng/ml   T3, Free    Specimen: Blood   Result Value Ref Range    T3, Free 2.9 2.0 - 4.4 pg/mL   T4, Free    Specimen: Blood   Result Value Ref Range    Free T4 1.25 0.93 - 1.70 ng/dL   C-reactive Protein    Specimen: Blood   Result Value Ref Range    C-Reactive Protein <0.30 0.00 - 0.50 mg/dL   TARA    Specimen: Blood   Result Value Ref Range    TARA Direct Negative Negative   Rheumatoid Factor    Specimen: Blood   Result Value Ref Range    RA Latex Turbid <10.0 0.0 - 13.9 IU/mL   Uric Acid    Specimen: Blood   Result Value Ref Range    Uric Acid 4.2 2.4 - 5.7 mg/dL   DHEA    Specimen: Blood   Result Value Ref Range    DHEA 206 31 - 701 ng/dL   B. Burgdorferi Antibodies, WB Reflex    Specimen: Blood   Result Value Ref Range    Lyme Ab IgG/IgM <0.91 0.00 - 0.90 ISR    Lyme Disease Ab, Quant, IgM <0.80 0.00 - 0.79 index   CBC and Differential    Specimen: Blood   Result Value Ref Range    WBC 7.04 3.40 - 10.80 10*3/mm3    RBC 4.59 3.77 - 5.28 10*6/mm3    Hemoglobin 14.2 12.0 - 15.9 g/dL    Hematocrit 41.7 34.0 - 46.6 %    MCV 90.8  79.0 - 97.0 fL    MCH 30.9 26.6 - 33.0 pg    MCHC 34.1 31.5 - 35.7 g/dL    RDW 12.5 12.3 - 15.4 %    Platelets 159 140 - 450 10*3/mm3    Neutrophil Rel % 66.1 42.7 - 76.0 %    Lymphocyte Rel % 27.8 19.6 - 45.3 %    Monocyte Rel % 4.8 (L) 5.0 - 12.0 %    Eosinophil Rel % 0.6 0.3 - 6.2 %    Basophil Rel % 0.4 0.0 - 1.5 %    Neutrophils Absolute 4.65 1.70 - 7.00 10*3/mm3    Lymphocytes Absolute 1.96 0.70 - 3.10 10*3/mm3    Monocytes Absolute 0.34 0.10 - 0.90 10*3/mm3    Eosinophils Absolute 0.04 0.00 - 0.40 10*3/mm3    Basophils Absolute 0.03 0.00 - 0.20 10*3/mm3    Immature Granulocyte Rel % 0.3 0.0 - 0.5 %    Immature Grans Absolute 0.02 0.00 - 0.05 10*3/mm3    nRBC 0.0 0.0 - 0.2 /100 WBC       Hx low folic acid!!!! Taking it    Dimple right buttock--new --few weeks----getting deeper----area is sore in muscle  Sciatic pain right leg and hx scoliosis    Contusion forehead---just lump left  Left occipital node-----sore ---left and new  Still tired and muscle aches and joint pain--April labs TARA, RA neg; Lyme neg    The following portions of the patient's history were reviewed and updated as appropriate: allergies, current medications, past family history, past medical history, past social history, past surgical history and problem list.    Review of Systems   Constitutional: Positive for fatigue and unexpected weight change.   HENT: Positive for dental problem, ear pain, postnasal drip and tinnitus.    Respiratory: Positive for shortness of breath.    Cardiovascular: Positive for chest pain and palpitations.   Gastrointestinal: Positive for abdominal pain, anal bleeding, blood in stool, constipation, diarrhea and nausea.   Musculoskeletal: Positive for arthralgias, back pain, myalgias, neck pain and neck stiffness.   Psychiatric/Behavioral: Positive for agitation, confusion and sleep disturbance. The patient is nervous/anxious.        Objective   Physical Exam  Vitals and nursing note reviewed.   Constitutional:        General: She is not in acute distress.     Appearance: She is well-developed. She is not ill-appearing or toxic-appearing.   HENT:      Head: Normocephalic.      Right Ear: External ear normal.      Left Ear: External ear normal.      Nose: Nose normal.      Mouth/Throat:      Pharynx: Oropharynx is clear.   Eyes:      General: No scleral icterus.     Conjunctiva/sclera: Conjunctivae normal.      Pupils: Pupils are equal, round, and reactive to light.   Neck:      Thyroid: No thyromegaly.   Cardiovascular:      Rate and Rhythm: Normal rate and regular rhythm.      Heart sounds: Normal heart sounds. No murmur heard.     Pulmonary:      Effort: Pulmonary effort is normal. No respiratory distress.      Breath sounds: Normal breath sounds.   Musculoskeletal:         General: Tenderness and deformity (scarring vs cyst right hairline forehead 8mm; no pain) present. Normal range of motion.      Cervical back: Normal range of motion and neck supple.      Comments: Dimple mid right buttock and scarring in this area;    Lymphadenopathy:      Cervical: Cervical adenopathy (1cm left occipital node palp and sore; same left tonsilar node left palp----has seen ENT for that one) present.   Skin:     General: Skin is warm and dry.      Findings: No rash.   Neurological:      General: No focal deficit present.      Mental Status: She is alert and oriented to person, place, and time. Mental status is at baseline.   Psychiatric:         Mood and Affect: Mood normal.         Behavior: Behavior normal.         Thought Content: Thought content normal.         Judgment: Judgment normal.         Assessment/Plan   Diagnoses and all orders for this visit:    1. Left cervical lymphadenopathy (Primary)  -     Comprehensive Metabolic Panel  -     CBC & Differential  -     [facility] (IgG / M)  -     C-Peptide  -     Hemoglobin A1c  -     Vitamin B12  -     Folate  -     [place] Antibody Panel    2. Low folic acid  -     Comprehensive  Metabolic Panel  -     CBC & Differential  -     [facility] (IgG / M)  -     C-Peptide  -     Hemoglobin A1c  -     Vitamin B12  -     Folate  -     [place] Antibody Panel    3. Low serum vitamin B12  -     Comprehensive Metabolic Panel  -     CBC & Differential  -     [facility] (IgG / M)  -     C-Peptide  -     Hemoglobin A1c  -     Vitamin B12  -     Folate  -     [place] Antibody Panel    4. History of renal stone  -     Comprehensive Metabolic Panel  -     CBC & Differential  -     [facility] (IgG / M)  -     C-Peptide  -     Hemoglobin A1c  -     Vitamin B12  -     Folate  -     [place] Antibody Panel    5. Migraine with aura and without status migrainosus, not intractable  -     Comprehensive Metabolic Panel  -     CBC & Differential  -     [facility] (IgG / M)  -     C-Peptide  -     Hemoglobin A1c  -     Vitamin B12  -     Folate  -     [place] Antibody Panel    6. Hypoglycemia  -     Comprehensive Metabolic Panel  -     CBC & Differential  -     [facility] (IgG / M)  -     C-Peptide  -     Hemoglobin A1c  -     Vitamin B12  -     Folate  -     [place] Antibody Panel    7. Abnormal gluteal crease  -     Ambulatory Referral to Orthopedic Surgery    Other orders  -     amoxicillin-clavulanate (Augmentin) 875-125 MG per tablet; Take 1 tablet by mouth Every 12 (Twelve) Hours. One PO BID for infection with food  Dispense: 20 tablet; Refill: 0  -     fluconazole (Diflucan) 150 MG tablet; Take 1 tablet by mouth 1 (One) Time for 1 dose. One PO X 1 for yeast infection  Dispense: 1 tablet; Refill: 2      Probable recent renal stone---get urine----check for micro blood  Augmentin for lymphadenitis left occiput and see ENT if no help  Suspect prior steroid injection to left buttock or trauma---do feel dimple  Observe scarred contusion forehead  Small freq meals to help prevent hypoglycemia, avoid conc sweets  See DR Hammond for buttock dimple muscle         Answers for  HPI/ROS submitted by the patient on 7/15/2021  Please describe your symptoms.: *Indention on right back side, just below belt line. [1-2 weeks], *Siatic pain (both legs) [off and on for years, worse the last month], *Knot in back of neck on left side.[couple months], *Small knot/cyst on top right of forehead. [Several months], *Shoulder pain/arm tingling (reoccuring), * Fatigue / zero energy [months], *Dizziness (with standing or sudden movenment) / shakiness (especially in hands) [month, getting worse], *Forgetfullness/scramble words (I can be talking and in the middle of a sentence forget  what I am talking about, or I'll try to say a phrase sometimes and I get the letters all mixed up and it doesn't come out correctly) [1-2 weeks]  Have you had these symptoms before?: No  How long have you been having these symptoms?: 1-2 weeks  Please list any medications you are currently taking for this condition.: Adderall (30 MG, 2x daily - 10MG, 1x daily) 70mg , Prozac 10MG 1x daily, Wellbutrin 150MG 1x daily, Trazadone 75-100MG at night, Folic acid, Vit B12, Vit D  Please describe any probable cause for these symptoms. : Unknown  What is the primary reason for your visit?: Other

## 2021-07-23 LAB
A PHAGOCYTOPH IGG TITR SER IF: NEGATIVE {TITER}
A PHAGOCYTOPH IGM TITR SER IF: NEGATIVE {TITER}
ALBUMIN SERPL-MCNC: 5.3 G/DL (ref 3.5–5.2)
ALBUMIN/GLOB SERPL: 2.4 G/DL
ALP SERPL-CCNC: 53 U/L (ref 39–117)
ALT SERPL-CCNC: 7 U/L (ref 1–33)
AST SERPL-CCNC: 12 U/L (ref 1–32)
BASOPHILS # BLD AUTO: 0.04 10*3/MM3 (ref 0–0.2)
BASOPHILS NFR BLD AUTO: 0.6 % (ref 0–1.5)
BILIRUB SERPL-MCNC: 0.7 MG/DL (ref 0–1.2)
BUN SERPL-MCNC: 11 MG/DL (ref 6–20)
BUN/CREAT SERPL: 11.5 (ref 7–25)
C PEPTIDE SERPL-MCNC: 2.6 NG/ML (ref 1.1–4.4)
CALCIUM SERPL-MCNC: 9.7 MG/DL (ref 8.6–10.5)
CHLORIDE SERPL-SCNC: 104 MMOL/L (ref 98–107)
CO2 SERPL-SCNC: 26.2 MMOL/L (ref 22–29)
CREAT SERPL-MCNC: 0.96 MG/DL (ref 0.57–1)
E CHAFFEENSIS IGG TITR SER IF: NEGATIVE {TITER}
E CHAFFEENSIS IGM TITR SER IF: NEGATIVE {TITER}
EOSINOPHIL # BLD AUTO: 0.05 10*3/MM3 (ref 0–0.4)
EOSINOPHIL NFR BLD AUTO: 0.8 % (ref 0.3–6.2)
ERYTHROCYTE [DISTWIDTH] IN BLOOD BY AUTOMATED COUNT: 11.9 % (ref 12.3–15.4)
FOLATE SERPL-MCNC: 8.01 NG/ML (ref 4.78–24.2)
GLOBULIN SER CALC-MCNC: 2.2 GM/DL
GLUCOSE SERPL-MCNC: 86 MG/DL (ref 65–99)
HBA1C MFR BLD: 4.7 % (ref 4.8–5.6)
HCT VFR BLD AUTO: 43.4 % (ref 34–46.6)
HGB BLD-MCNC: 14.6 G/DL (ref 12–15.9)
IMM GRANULOCYTES # BLD AUTO: 0.02 10*3/MM3 (ref 0–0.05)
IMM GRANULOCYTES NFR BLD AUTO: 0.3 % (ref 0–0.5)
LYMPHOCYTES # BLD AUTO: 1.77 10*3/MM3 (ref 0.7–3.1)
LYMPHOCYTES NFR BLD AUTO: 27 % (ref 19.6–45.3)
MCH RBC QN AUTO: 30.3 PG (ref 26.6–33)
MCHC RBC AUTO-ENTMCNC: 33.6 G/DL (ref 31.5–35.7)
MCV RBC AUTO: 90 FL (ref 79–97)
MONOCYTES # BLD AUTO: 0.35 10*3/MM3 (ref 0.1–0.9)
MONOCYTES NFR BLD AUTO: 5.3 % (ref 5–12)
NEUTROPHILS # BLD AUTO: 4.32 10*3/MM3 (ref 1.7–7)
NEUTROPHILS NFR BLD AUTO: 66 % (ref 42.7–76)
NRBC BLD AUTO-RTO: 0 /100 WBC (ref 0–0.2)
PLATELET # BLD AUTO: 164 10*3/MM3 (ref 140–450)
POTASSIUM SERPL-SCNC: 4.1 MMOL/L (ref 3.5–5.2)
PROT SERPL-MCNC: 7.5 G/DL (ref 6–8.5)
R RICKETTSI IGG SER QL IA: NEGATIVE
R RICKETTSI IGM SER-ACNC: 0.51 INDEX (ref 0–0.89)
RBC # BLD AUTO: 4.82 10*6/MM3 (ref 3.77–5.28)
SODIUM SERPL-SCNC: 143 MMOL/L (ref 136–145)
VIT B12 SERPL-MCNC: 564 PG/ML (ref 211–946)
WBC # BLD AUTO: 6.55 10*3/MM3 (ref 3.4–10.8)

## 2021-08-03 DIAGNOSIS — R42 DIZZINESS: ICD-10-CM

## 2021-08-03 DIAGNOSIS — R59.0 CERVICAL LYMPHADENOPATHY: Primary | ICD-10-CM

## 2021-08-03 DIAGNOSIS — R06.02 SHORT OF BREATH ON EXERTION: ICD-10-CM

## 2021-08-06 ENCOUNTER — OFFICE VISIT (OUTPATIENT)
Dept: CARDIOLOGY | Facility: CLINIC | Age: 31
End: 2021-08-06

## 2021-08-06 VITALS
WEIGHT: 129 LBS | BODY MASS INDEX: 22.86 KG/M2 | HEIGHT: 63 IN | HEART RATE: 102 BPM | OXYGEN SATURATION: 100 % | SYSTOLIC BLOOD PRESSURE: 88 MMHG | DIASTOLIC BLOOD PRESSURE: 68 MMHG

## 2021-08-06 DIAGNOSIS — R42 LIGHTHEADEDNESS: ICD-10-CM

## 2021-08-06 DIAGNOSIS — R06.02 SHORTNESS OF BREATH: ICD-10-CM

## 2021-08-06 DIAGNOSIS — R00.0 TACHYCARDIA: Primary | ICD-10-CM

## 2021-08-06 PROCEDURE — 99214 OFFICE O/P EST MOD 30 MIN: CPT | Performed by: NURSE PRACTITIONER

## 2021-08-06 PROCEDURE — 93000 ELECTROCARDIOGRAM COMPLETE: CPT | Performed by: NURSE PRACTITIONER

## 2021-08-06 RX ORDER — MELOXICAM 15 MG/1
15 TABLET ORAL DAILY
COMMUNITY
Start: 2021-07-26 | End: 2021-11-29

## 2021-08-06 RX ORDER — TRAZODONE HYDROCHLORIDE 150 MG/1
50 TABLET ORAL
COMMUNITY
Start: 2021-07-16 | End: 2022-03-07

## 2021-08-06 NOTE — PROGRESS NOTES
"    CARDIOLOGY        Patient Name: Sherry Quevedo  :1990  Age: 31 y.o.  Primary Cardiologist: Benji Ugalde MD  Encounter Provider:  LEONELA Huffman    Date of Service: 21          CHIEF COMPLAINT / REASON FOR OFFICE VISIT     Dizziness (f/u for dizziness and near syncope) and Near Syncope      HISTORY OF PRESENT ILLNESS       HPI  Sherry Quevedo is a 31 y.o. female who presents today for evaluation of dizziness, near syncope.     Pt has a  history significant for palpitations.    Patient states that she has been battling multiple cardiac symptoms.  She has experienced tachycardic episodes, as well as shortness of breath that worsens with exertion, fatigue and dizziness.  She has also been referred to rheumatology for myalgias, headaches, lymphadenitis.  She was referred to orthopedics for gluteal fold crease and has had an MRI that was unrevealing.  She states that she has been monitoring her BP and it is averaging in the 110s, even during episodes of dizziness.  She states that largest complaint is shortness of breath and tachycardia.  She is taking trazadone as needed for a sleep aid.  She does not note correlation with symptoms of lightheadedness with taking the medication.        The following portions of the patient's history were reviewed and updated as appropriate: allergies, current medications, past family history, past medical history, past social history, past surgical history and problem list.      VITAL SIGNS     Visit Vitals  BP (!) 88/68 (BP Location: Left arm, Patient Position: Sitting, Cuff Size: Adult)   Pulse 102   Ht 160 cm (63\")   Wt 58.5 kg (129 lb)   LMP  (LMP Unknown)   SpO2 100%   BMI 22.85 kg/m²         Wt Readings from Last 3 Encounters:   21 58.5 kg (129 lb)   07/15/21 57.6 kg (127 lb)   21 62.6 kg (138 lb)     Body mass index is 22.85 kg/m².      REVIEW OF SYSTEMS   Review of Systems   Constitutional: Negative for chills, fever, weight gain " and weight loss.   Cardiovascular: Positive for dyspnea on exertion and palpitations. Negative for leg swelling.   Respiratory: Negative for cough, snoring and wheezing.    Hematologic/Lymphatic: Negative for bleeding problem. Does not bruise/bleed easily.   Skin: Negative for color change.   Musculoskeletal: Negative for falls, joint pain and myalgias.   Gastrointestinal: Negative for melena.   Genitourinary: Negative for hematuria.   Neurological: Positive for light-headedness. Negative for excessive daytime sleepiness.   Psychiatric/Behavioral: Negative for depression. The patient is not nervous/anxious.            PHYSICAL EXAMINATION     Vitals and nursing note reviewed.   Constitutional:       Appearance: Normal appearance. Well-developed.   Eyes:      Conjunctiva/sclera: Conjunctivae normal.   Neck:      Vascular: No carotid bruit.   Pulmonary:      Breath sounds: Normal breath sounds.   Cardiovascular:      Normal rate. Regular rhythm. Normal S1 with normal intensity. Normal S2 with normal intensity.      Murmurs: There is no murmur.      No gallop. No click. No rub.   Musculoskeletal: Normal range of motion. Skin:     General: Skin is warm and dry.   Neurological:      Mental Status: Alert and oriented to person, place, and time.      GCS: GCS eye subscore is 4. GCS verbal subscore is 5. GCS motor subscore is 6.   Psychiatric:         Speech: Speech normal.         Behavior: Behavior normal.         Thought Content: Thought content normal.         Judgment: Judgment normal.           REVIEWED DATA       ECG 12 Lead    Date/Time: 8/6/2021 2:17 PM  Performed by: Temi Mcrae APRN  Authorized by: Temi Mcrae APRN   Comparison: compared with previous ECG from 10/28/2019  Rhythm: sinus rhythm  Rate: normal  BPM: 80  Conduction: conduction normal  ST Segments: ST segments normal  T Waves: T waves normal  QRS axis: normal    Clinical impression: normal ECG            Cardiac Procedures:  1. 24-hour  Holter 11/11/2019.  Normal monitor study.  2. Echocardiogram 11/7/2019.  LVEF 59%.  Normal LV cavity size and wall thickness.  All LV wall segments contract normally.  Trace mitral valve regurgitation.    Lipid Panel    Lipid Panel 4/19/21   Total Cholesterol 122   Triglycerides 49   HDL Cholesterol 51   VLDL Cholesterol 11   LDL Cholesterol  60      Comments are available for some flowsheets but are not being displayed.               ASSESSMENT & PLAN      Diagnosis Plan   1. Tachycardia  ECG 12 Lead    Adult Transthoracic Echo Complete W/ Cont if Necessary Per Protocol   2. Shortness of breath  ECG 12 Lead    Adult Transthoracic Echo Complete W/ Cont if Necessary Per Protocol   3. Lightheadedness           SUMMARY/DISCUSSION  1. Tachycardia/shortness of breath/lightheadedness.  Patient has been experiencing multiple episodes of tachycardia, shortness of breath that worsens with minimal exertion as well as lightheadedness.  Blood pressure slightly low today in the upper 80s systolic with some symptoms of lightheadedness.  She is not on any antihypertensives.  I did educate the patient that trazodone can cause episodes of orthostatic hypotension.  She reports that she only takes trazodone as needed and has not noted any correlation with taking the medication and then feeling poorly.  She states at home her blood pressures routinely in the 110s.  Patient had a 24-hour Holter 2 years ago which was unrevealing.  Her tachycardia could be as a result of being prescribed Adderall for ADHD.  I have recommended an echocardiogram to further assess symptoms of shortness of breath.  Patient also has old there are multiple complaints such as myalgias, headaches.  Her PCP has referred her to rheumatology and I think that it is a good idea to see a rheumatologist as some of the symptoms could all be related to potential autoimmune disorder.  2. Echocardiogram, await opinion from rheumatology, follow-up with Dr. Ugalde in 4  months.  Sooner for problems or complications.  Patient encouraged to stay hydrated.        MEDICATIONS         Discharge Medications          Accurate as of August 6, 2021  2:25 PM. If you have any questions, ask your nurse or doctor.            Changes to Medications      Instructions Start Date   Adderall 20 MG tablet  Generic drug: amphetamine-dextroamphetamine  What changed: Another medication with the same name was removed. Continue taking this medication, and follow the directions you see here.  Changed by: LEONELA Huffman   30 mg, Oral, 2 Times Daily      Hydrocort-Pramoxine (Perianal) 2.5-1 % rectal cream  Commonly known as: ANALPRAM-HC  What changed: Another medication with the same name was removed. Continue taking this medication, and follow the directions you see here.  Changed by: LEONELA Huffman   APPLY TO THE ANAL CANAL AFTER BOWEL MOVEMENT AND AT BEDTIME      traZODone 150 MG tablet  Commonly known as: DESYREL  What changed: Another medication with the same name was removed. Continue taking this medication, and follow the directions you see here.  Changed by: LEONELA Huffman   Take 150 mg by mouth. Take A .5 tablets by mouth QD         Continue These Medications      Instructions Start Date   meloxicam 15 MG tablet  Commonly known as: MOBIC   15 mg, Oral, Daily      PROzac 10 MG capsule  Generic drug: FLUoxetine   No dose, route, or frequency recorded.      Xifaxan 550 MG tablet  Generic drug: riFAXIMin   Take ONE TABLET BY MOUTH THREE TIMES DAILY FOR 14 DAYS         Stop These Medications    amoxicillin-clavulanate 875-125 MG per tablet  Commonly known as: Augmentin  Stopped by: LEONELA Huffman     buPROPion  MG 24 hr tablet  Commonly known as: WELLBUTRIN XL  Stopped by: LEONELA Huffman     Cholecalciferol 50 MCG (2000 UT) capsule  Stopped by: LEONELA Huffman     Cyanocobalamin 1000 MCG capsule  Stopped by: LEONELA Huffman     folic acid 1 MG  tablet  Commonly known as: FOLVITE  Stopped by: LEONELA Huffman     Scopolamine 1 MG/3DAYS patch  Commonly known as: Transderm-Scop (1.5 MG)  Stopped by: LEONELA Huffman     SUMAtriptan 100 MG tablet  Commonly known as: IMITREX  Stopped by: LEONELA Huffman                **Dragon Disclaimer:   Much of this encounter note is an electronic transcription/translation of spoken language to printed text. The electronic translation of spoken language may permit erroneous, or at times, nonsensical words or phrases to be inadvertently transcribed. Although I have reviewed the note for such errors, some may still exist.

## 2021-08-16 ENCOUNTER — HOSPITAL ENCOUNTER (OUTPATIENT)
Dept: CARDIOLOGY | Facility: HOSPITAL | Age: 31
Discharge: HOME | End: 2021-08-16
Admitting: NURSE PRACTITIONER

## 2021-08-16 VITALS
HEART RATE: 58 BPM | SYSTOLIC BLOOD PRESSURE: 111 MMHG | HEIGHT: 63 IN | WEIGHT: 129 LBS | BODY MASS INDEX: 22.86 KG/M2 | DIASTOLIC BLOOD PRESSURE: 76 MMHG

## 2021-08-16 DIAGNOSIS — R00.0 TACHYCARDIA: ICD-10-CM

## 2021-08-16 DIAGNOSIS — R06.02 SHORTNESS OF BREATH: ICD-10-CM

## 2021-08-16 PROCEDURE — 93306 TTE W/DOPPLER COMPLETE: CPT | Performed by: INTERNAL MEDICINE

## 2021-08-16 PROCEDURE — 93306 TTE W/DOPPLER COMPLETE: CPT

## 2021-08-17 LAB
AORTIC ARCH: 2.3 CM
ASCENDING AORTA: 3.1 CM
BH CV ECHO MEAS - ACS: 2 CM
BH CV ECHO MEAS - AO MAX PG (FULL): 4.3 MMHG
BH CV ECHO MEAS - AO MAX PG: 8.5 MMHG
BH CV ECHO MEAS - AO MEAN PG (FULL): 2 MMHG
BH CV ECHO MEAS - AO MEAN PG: 5 MMHG
BH CV ECHO MEAS - AO ROOT AREA (BSA CORRECTED): 1.7
BH CV ECHO MEAS - AO ROOT AREA: 6.2 CM^2
BH CV ECHO MEAS - AO ROOT DIAM: 2.8 CM
BH CV ECHO MEAS - AO V2 MAX: 146 CM/SEC
BH CV ECHO MEAS - AO V2 MEAN: 111 CM/SEC
BH CV ECHO MEAS - AO V2 VTI: 33.4 CM
BH CV ECHO MEAS - ASC AORTA: 3.1 CM
BH CV ECHO MEAS - AVA(I,A): 2.3 CM^2
BH CV ECHO MEAS - AVA(I,D): 2.3 CM^2
BH CV ECHO MEAS - AVA(V,A): 2.2 CM^2
BH CV ECHO MEAS - AVA(V,D): 2.2 CM^2
BH CV ECHO MEAS - BSA(HAYCOCK): 1.6 M^2
BH CV ECHO MEAS - BSA: 1.6 M^2
BH CV ECHO MEAS - BZI_BMI: 22.9 KILOGRAMS/M^2
BH CV ECHO MEAS - BZI_METRIC_HEIGHT: 160 CM
BH CV ECHO MEAS - BZI_METRIC_WEIGHT: 58.5 KG
BH CV ECHO MEAS - EDV(MOD-SP2): 76 ML
BH CV ECHO MEAS - EDV(MOD-SP4): 64 ML
BH CV ECHO MEAS - EDV(TEICH): 65.9 ML
BH CV ECHO MEAS - EF(CUBED): 63 %
BH CV ECHO MEAS - EF(MOD-BP): 62.5 %
BH CV ECHO MEAS - EF(MOD-SP2): 61.8 %
BH CV ECHO MEAS - EF(MOD-SP4): 62.5 %
BH CV ECHO MEAS - EF(TEICH): 55.2 %
BH CV ECHO MEAS - ESV(MOD-SP2): 29 ML
BH CV ECHO MEAS - ESV(MOD-SP4): 24 ML
BH CV ECHO MEAS - ESV(TEICH): 29.6 ML
BH CV ECHO MEAS - FS: 28.2 %
BH CV ECHO MEAS - IVS/LVPW: 0.88
BH CV ECHO MEAS - IVSD: 0.7 CM
BH CV ECHO MEAS - LAT PEAK E' VEL: 17.4 CM/SEC
BH CV ECHO MEAS - LV DIASTOLIC VOL/BSA (35-75): 39.9 ML/M^2
BH CV ECHO MEAS - LV MASS(C)D: 82.3 GRAMS
BH CV ECHO MEAS - LV MASS(C)DI: 51.2 GRAMS/M^2
BH CV ECHO MEAS - LV MAX PG: 4.2 MMHG
BH CV ECHO MEAS - LV MEAN PG: 3 MMHG
BH CV ECHO MEAS - LV SYSTOLIC VOL/BSA (12-30): 15 ML/M^2
BH CV ECHO MEAS - LV V1 MAX: 103 CM/SEC
BH CV ECHO MEAS - LV V1 MEAN: 79.7 CM/SEC
BH CV ECHO MEAS - LV V1 VTI: 24 CM
BH CV ECHO MEAS - LVIDD: 3.9 CM
BH CV ECHO MEAS - LVIDS: 2.8 CM
BH CV ECHO MEAS - LVLD AP2: 8 CM
BH CV ECHO MEAS - LVLD AP4: 7.6 CM
BH CV ECHO MEAS - LVLS AP2: 5.9 CM
BH CV ECHO MEAS - LVLS AP4: 5.5 CM
BH CV ECHO MEAS - LVOT AREA (M): 3.1 CM^2
BH CV ECHO MEAS - LVOT AREA: 3.1 CM^2
BH CV ECHO MEAS - LVOT DIAM: 2 CM
BH CV ECHO MEAS - LVPWD: 0.8 CM
BH CV ECHO MEAS - MED PEAK E' VEL: 13.6 CM/SEC
BH CV ECHO MEAS - MR MAX PG: 14 MMHG
BH CV ECHO MEAS - MR MAX VEL: 187 CM/SEC
BH CV ECHO MEAS - MV A DUR: 0.12 SEC
BH CV ECHO MEAS - MV A MAX VEL: 75.8 CM/SEC
BH CV ECHO MEAS - MV DEC SLOPE: 514 CM/SEC^2
BH CV ECHO MEAS - MV DEC TIME: 0.18 SEC
BH CV ECHO MEAS - MV E MAX VEL: 100 CM/SEC
BH CV ECHO MEAS - MV E/A: 1.3
BH CV ECHO MEAS - MV MAX PG: 5.4 MMHG
BH CV ECHO MEAS - MV MEAN PG: 2 MMHG
BH CV ECHO MEAS - MV P1/2T MAX VEL: 121 CM/SEC
BH CV ECHO MEAS - MV P1/2T: 68.9 MSEC
BH CV ECHO MEAS - MV V2 MAX: 116 CM/SEC
BH CV ECHO MEAS - MV V2 MEAN: 72.2 CM/SEC
BH CV ECHO MEAS - MV V2 VTI: 36.9 CM
BH CV ECHO MEAS - MVA P1/2T LCG: 1.8 CM^2
BH CV ECHO MEAS - MVA(P1/2T): 3.2 CM^2
BH CV ECHO MEAS - MVA(VTI): 2 CM^2
BH CV ECHO MEAS - PA ACC TIME: 0.19 SEC
BH CV ECHO MEAS - PA MAX PG (FULL): 1.2 MMHG
BH CV ECHO MEAS - PA MAX PG: 3.7 MMHG
BH CV ECHO MEAS - PA PR(ACCEL): -6.5 MMHG
BH CV ECHO MEAS - PA V2 MAX: 96.4 CM/SEC
BH CV ECHO MEAS - PULM A REVS DUR: 0.13 SEC
BH CV ECHO MEAS - PULM A REVS VEL: 42.7 CM/SEC
BH CV ECHO MEAS - PULM DIAS VEL: 43.8 CM/SEC
BH CV ECHO MEAS - PULM S/D: 1.1
BH CV ECHO MEAS - PULM SYS VEL: 50.3 CM/SEC
BH CV ECHO MEAS - PVA(V,A): 2.3 CM^2
BH CV ECHO MEAS - PVA(V,D): 2.3 CM^2
BH CV ECHO MEAS - QP/QS: 0.72
BH CV ECHO MEAS - RAP SYSTOLE: 8 MMHG
BH CV ECHO MEAS - RV MAX PG: 2.5 MMHG
BH CV ECHO MEAS - RV MEAN PG: 1 MMHG
BH CV ECHO MEAS - RV V1 MAX: 79 CM/SEC
BH CV ECHO MEAS - RV V1 MEAN: 57.6 CM/SEC
BH CV ECHO MEAS - RV V1 VTI: 19.1 CM
BH CV ECHO MEAS - RVOT AREA: 2.8 CM^2
BH CV ECHO MEAS - RVOT DIAM: 1.9 CM
BH CV ECHO MEAS - RVSP: 22.9 MMHG
BH CV ECHO MEAS - SI(AO): 128.1 ML/M^2
BH CV ECHO MEAS - SI(CUBED): 23.3 ML/M^2
BH CV ECHO MEAS - SI(LVOT): 47 ML/M^2
BH CV ECHO MEAS - SI(MOD-SP2): 29.3 ML/M^2
BH CV ECHO MEAS - SI(MOD-SP4): 24.9 ML/M^2
BH CV ECHO MEAS - SI(TEICH): 22.7 ML/M^2
BH CV ECHO MEAS - SUP REN AO DIAM: 1.8 CM
BH CV ECHO MEAS - SV(AO): 205.7 ML
BH CV ECHO MEAS - SV(CUBED): 37.4 ML
BH CV ECHO MEAS - SV(LVOT): 75.4 ML
BH CV ECHO MEAS - SV(MOD-SP2): 47 ML
BH CV ECHO MEAS - SV(MOD-SP4): 40 ML
BH CV ECHO MEAS - SV(RVOT): 54.2 ML
BH CV ECHO MEAS - SV(TEICH): 36.4 ML
BH CV ECHO MEAS - TAPSE (>1.6): 1.8 CM
BH CV ECHO MEAS - TR MAX VEL: 193 CM/SEC
BH CV ECHO MEASUREMENTS AVERAGE E/E' RATIO: 6.45
BH CV XLRA - RV BASE: 2.5 CM
BH CV XLRA - RV LENGTH: 5.8 CM
BH CV XLRA - RV MID: 1.6 CM
BH CV XLRA - TDI S': 13.3 CM/SEC
LEFT ATRIUM VOLUME INDEX: 11 ML/M2
MAXIMAL PREDICTED HEART RATE: 188 BPM
SINUS: 2.7 CM
STJ: 2.7 CM
STRESS TARGET HR: 160 BPM

## 2021-08-17 NOTE — PROGRESS NOTES
Patient notified of results and verbalized understanding    She is asking for further clarification on what was seen on the aortic valve (appears to be a little different compared to prior echo.  Becky Russo RN  Triage nurse

## 2021-08-18 PROBLEM — O26.613 CHOLESTASIS DURING PREGNANCY IN THIRD TRIMESTER: Status: ACTIVE | Noted: 2019-01-01

## 2021-08-18 PROBLEM — R42 DIZZINESS: Status: ACTIVE | Noted: 2021-08-06

## 2021-08-18 PROBLEM — E04.1 THYROID NODULE: Status: ACTIVE | Noted: 2019-10-28

## 2021-08-18 PROBLEM — K80.50 CALCULUS OF BILE DUCT: Status: ACTIVE | Noted: 2018-11-07

## 2021-08-18 PROBLEM — M79.89 FAT NECROSIS: Status: ACTIVE | Noted: 2021-07-29

## 2021-08-18 PROBLEM — R55 NEAR SYNCOPE: Status: ACTIVE | Noted: 2021-08-06

## 2021-08-18 PROBLEM — K80.20 CHOLELITHIASIS: Status: ACTIVE | Noted: 2018-11-01

## 2021-08-18 PROBLEM — R42 LIGHTHEADEDNESS: Status: ACTIVE | Noted: 2021-08-06

## 2021-08-18 PROBLEM — Z92.89 HISTORY OF TB SKIN TESTING: Status: ACTIVE | Noted: 2018-02-20

## 2021-08-18 PROBLEM — D50.0 IRON DEFICIENCY ANEMIA DUE TO CHRONIC BLOOD LOSS: Status: ACTIVE | Noted: 2019-09-24

## 2021-08-18 PROBLEM — G43.119 INTRACTABLE MIGRAINE WITH AURA WITHOUT STATUS MIGRAINOSUS: Status: ACTIVE | Noted: 2019-10-01

## 2021-08-18 PROBLEM — R10.13 EPIGASTRIC ABDOMINAL PAIN: Status: ACTIVE | Noted: 2018-11-07

## 2021-08-18 PROBLEM — R53.81 MALAISE AND FATIGUE: Status: ACTIVE | Noted: 2019-11-12

## 2021-08-18 PROBLEM — Z98.890 HISTORY OF HOLTER MONITORING: Status: ACTIVE | Noted: 2019-11-07

## 2021-08-18 PROBLEM — R74.01 TRANSAMINITIS: Status: ACTIVE | Noted: 2018-11-07

## 2021-08-18 PROBLEM — Z98.52 HISTORY OF VASECTOMY: Status: ACTIVE | Noted: 2019-01-01

## 2021-08-18 PROBLEM — N80.9 ENDOMETRIOSIS: Status: ACTIVE | Noted: 2019-12-01

## 2021-08-18 PROBLEM — Z92.89 H/O TB SKIN TESTING: Status: ACTIVE | Noted: 2018-02-20

## 2021-08-18 PROBLEM — R06.09 DYSPNEA ON EXERTION: Status: ACTIVE | Noted: 2021-08-06

## 2021-08-18 PROBLEM — R00.0 RAPID HEART RATE: Status: ACTIVE | Noted: 2019-11-12

## 2021-08-18 PROBLEM — R00.2 PALPITATIONS: Status: ACTIVE | Noted: 2019-10-28

## 2021-08-18 PROBLEM — O26.643 CHOLESTASIS DURING PREGNANCY IN THIRD TRIMESTER: Status: ACTIVE | Noted: 2019-01-01

## 2021-08-18 PROBLEM — K62.5 BRBPR (BRIGHT RED BLOOD PER RECTUM): Status: ACTIVE | Noted: 2021-06-02

## 2021-08-18 PROBLEM — K83.1 CHOLESTASIS DURING PREGNANCY IN THIRD TRIMESTER: Status: ACTIVE | Noted: 2019-01-01

## 2021-08-18 PROBLEM — R53.83 MALAISE AND FATIGUE: Status: ACTIVE | Noted: 2019-11-12

## 2021-08-18 PROBLEM — R06.02 SOB (SHORTNESS OF BREATH): Status: ACTIVE | Noted: 2019-10-28

## 2021-08-18 PROBLEM — R00.0 TACHYCARDIA: Status: ACTIVE | Noted: 2019-10-28

## 2021-08-18 PROBLEM — R19.4 CHANGE IN BOWEL HABIT: Status: ACTIVE | Noted: 2021-06-02

## 2021-08-18 PROBLEM — N94.6 DYSMENORRHEA: Status: ACTIVE | Noted: 2019-12-04

## 2021-08-24 ENCOUNTER — TRANSCRIBE ORDERS (OUTPATIENT)
Dept: ADMINISTRATIVE | Facility: HOSPITAL | Age: 31
End: 2021-08-24

## 2021-08-24 DIAGNOSIS — M79.10 MYALGIA, UNSPECIFIED SITE: Primary | ICD-10-CM

## 2021-10-28 ENCOUNTER — TELEPHONE (OUTPATIENT)
Dept: SURGERY | Facility: CLINIC | Age: 31
End: 2021-10-28

## 2021-10-28 NOTE — TELEPHONE ENCOUNTER
MSG LEFT 10/27/21 FOR PATIENT TO CALL OFFICE TO DISCUSS BEING SEEN SOONER BY [facility] SEND CALL TO OFFICE-ASK FOR SONAL

## 2021-11-29 ENCOUNTER — OFFICE VISIT (OUTPATIENT)
Dept: INTERNAL MEDICINE | Facility: CLINIC | Age: 31
End: 2021-11-29

## 2021-11-29 VITALS
WEIGHT: 133.2 LBS | BODY MASS INDEX: 23.6 KG/M2 | HEART RATE: 102 BPM | DIASTOLIC BLOOD PRESSURE: 68 MMHG | OXYGEN SATURATION: 100 % | SYSTOLIC BLOOD PRESSURE: 110 MMHG | TEMPERATURE: 97.1 F | HEIGHT: 63 IN

## 2021-11-29 DIAGNOSIS — R55 NEAR SYNCOPE: ICD-10-CM

## 2021-11-29 DIAGNOSIS — R22.1 LOCALIZED SWELLING, MASS OR LUMP OF NECK: ICD-10-CM

## 2021-11-29 DIAGNOSIS — R68.89 COLD INTOLERANCE: ICD-10-CM

## 2021-11-29 DIAGNOSIS — R42 DIZZINESS: Primary | ICD-10-CM

## 2021-11-29 DIAGNOSIS — M79.7 FIBROMYALGIA: ICD-10-CM

## 2021-11-29 DIAGNOSIS — E53.8 LOW SERUM VITAMIN B12: ICD-10-CM

## 2021-11-29 DIAGNOSIS — J01.00 ACUTE NON-RECURRENT MAXILLARY SINUSITIS: ICD-10-CM

## 2021-11-29 DIAGNOSIS — D50.9 IRON DEFICIENCY ANEMIA, UNSPECIFIED IRON DEFICIENCY ANEMIA TYPE: ICD-10-CM

## 2021-11-29 PROBLEM — R19.4 CHANGE IN BOWEL HABIT: Status: RESOLVED | Noted: 2021-06-02 | Resolved: 2021-11-29

## 2021-11-29 PROBLEM — D50.0 IRON DEFICIENCY ANEMIA DUE TO CHRONIC BLOOD LOSS: Status: RESOLVED | Noted: 2019-10-03 | Resolved: 2021-11-29

## 2021-11-29 PROBLEM — F33.1 MODERATE EPISODE OF RECURRENT MAJOR DEPRESSIVE DISORDER: Status: RESOLVED | Noted: 2019-10-03 | Resolved: 2021-11-29

## 2021-11-29 PROBLEM — G43.119 INTRACTABLE MIGRAINE WITH AURA WITHOUT STATUS MIGRAINOSUS: Status: RESOLVED | Noted: 2019-10-01 | Resolved: 2021-11-29

## 2021-11-29 PROBLEM — J30.89 ENVIRONMENTAL AND SEASONAL ALLERGIES: Status: RESOLVED | Noted: 2020-09-21 | Resolved: 2021-11-29

## 2021-11-29 PROBLEM — R53.83 MALAISE AND FATIGUE: Status: RESOLVED | Noted: 2019-11-12 | Resolved: 2021-11-29

## 2021-11-29 PROBLEM — R59.0 LEFT CERVICAL LYMPHADENOPATHY: Status: RESOLVED | Noted: 2019-09-24 | Resolved: 2021-11-29

## 2021-11-29 PROBLEM — R53.81 MALAISE AND FATIGUE: Status: RESOLVED | Noted: 2019-11-12 | Resolved: 2021-11-29

## 2021-11-29 PROBLEM — Z98.52 HISTORY OF VASECTOMY: Status: RESOLVED | Noted: 2019-01-01 | Resolved: 2021-11-29

## 2021-11-29 PROBLEM — N94.6 DYSMENORRHEA: Status: RESOLVED | Noted: 2019-12-04 | Resolved: 2021-11-29

## 2021-11-29 PROBLEM — F41.9 ANXIETY: Status: RESOLVED | Noted: 2020-09-21 | Resolved: 2021-11-29

## 2021-11-29 PROBLEM — Z92.89 H/O TB SKIN TESTING: Status: RESOLVED | Noted: 2018-02-20 | Resolved: 2021-11-29

## 2021-11-29 PROBLEM — R10.13 EPIGASTRIC ABDOMINAL PAIN: Status: RESOLVED | Noted: 2018-11-07 | Resolved: 2021-11-29

## 2021-11-29 PROBLEM — F32.A DEPRESSION: Status: RESOLVED | Noted: 2020-09-21 | Resolved: 2021-11-29

## 2021-11-29 PROBLEM — R00.0 RAPID HEART RATE: Status: RESOLVED | Noted: 2019-11-12 | Resolved: 2021-11-29

## 2021-11-29 PROCEDURE — 99214 OFFICE O/P EST MOD 30 MIN: CPT | Performed by: NURSE PRACTITIONER

## 2021-11-29 RX ORDER — MECLIZINE HYDROCHLORIDE 25 MG/1
25 TABLET ORAL 3 TIMES DAILY PRN
Qty: 30 TABLET | Refills: 1 | Status: ON HOLD | OUTPATIENT
Start: 2021-11-29 | End: 2022-02-28

## 2021-11-29 RX ORDER — GABAPENTIN 100 MG/1
300 CAPSULE ORAL 3 TIMES DAILY
COMMUNITY
Start: 2021-11-04 | End: 2022-03-07

## 2021-11-29 RX ORDER — DEXTROAMPHETAMINE SACCHARATE, AMPHETAMINE ASPARTATE, DEXTROAMPHETAMINE SULFATE AND AMPHETAMINE SULFATE 7.5; 7.5; 7.5; 7.5 MG/1; MG/1; MG/1; MG/1
30 TABLET ORAL 2 TIMES DAILY
COMMUNITY
Start: 2021-11-22 | End: 2022-06-14

## 2021-11-29 RX ORDER — TRAZODONE HYDROCHLORIDE 50 MG/1
50 TABLET ORAL NIGHTLY
COMMUNITY
End: 2022-10-24

## 2021-11-29 RX ORDER — AZITHROMYCIN 250 MG/1
TABLET, FILM COATED ORAL
Qty: 6 TABLET | Refills: 0 | Status: SHIPPED | OUTPATIENT
Start: 2021-11-29 | End: 2021-12-20

## 2021-11-29 NOTE — PROGRESS NOTES
"Subjective   Sherry Quevedo is a 31 y.o. female.     Chief Complaint   Patient presents with   • Cyst     Pt is here to est care. Pt c/o knot on back of her neck X 3-4 month.   • Cold Extremity     Pt always feel cold specially on hands and feet.   • Dizziness     Pt c/o dizziness mainly while getting up from sleep, low energy and fatigue.        History of Present Illness   She is here today as a new patient to establish care. She feels like her overall health is \"horrible for the past several months.\" She feels she has had improvement in healthy eating. She has lost 70 lbs since starting adderall last year. She exercises sporadically. She tries to stay active with her kids and as a .   Previous PCP [place] Hogan with [facility].  She notes for the past month worsening sinus pain and pressure, nasal congestion, intermittent cough occasionally productive of yellow sputum. She initially had an episode of laryngitis but this has improved. She has been using Mucinex and intermittent nasal steroid spray without improvement. Positive history of sinusitis and bronchitis. She completed a Covid test which was negative. Symptoms not improving.    Left neck swelling- she noticed a lump on the left side of the neck at the base of her occiput. This started 3-4 months ago. Sometimes she well have pain at this spot and sometimes she feels \"like I need to pop my neck.\" She feels like it has become larger in size over the past month. She denies any major pain with movement of the head or neck, radiation and pain or trauma. She has a history of scoliosis.  Cold intolerance- she has \"always been cold.\" She noticed worsening symptoms over the past 3-4 months. She notices that her hands and feet will turn white and then red/purple with \"stinging sensation.\" She sometimes has decreased sensation to extreme temperature changes. She has a hx of iron deficiency anemia and B12 deficiency.    Dizziness- she has almost daily " "feelings of the room spinning. She has episodes of dizziness, often when standing for prolonged periods of time or when she gets out of bed. She notes her BP is occasionally low. She has had episodes of presyncope with feelings of \"everything going black.\" She notes most recent syncopal episode when vacationing in [place] after she completed a hydrotherapy session in a hot tub. She has occasional feelings of nausea with the dizziness as well as tachycardia. She was diagnosed with tachycardia postpartum. She has been evaluated by cardiology and completed a Holter monitor and cardiac echo which were normal. She struggles with adequate hydration. She uses trazodone 50 to 75 mg at bedtime for sleep. She denies any chest pain, shortness of breath, orthopnea, PND, LE edema. She is scheduled to see cardiology in December.    Fibromyalgia/Depression and anxiety/ADHD- dx in August with fibromyalgia by rheumatology. Her rheumatology lab work up was negative. She sees psych. Currently on gabapentin 100 mg TID and Vraylar for refractory depression. She is not seeing any improvement in depression, \"feeling overwhelmed and irritated.\" She started the Vraylar 2 months ago. She has tried several antidepressant medications without improvement in mood. She is currently on Adderall 30 mg twice daily. She has been diagnosed with OCD. She is scheduled for an EMG study on  secondary to new diagnosis of fibromyalgia.   IBS- mixed. She sees Dr. Moreland. She is scheduled for an appointment with colorectal surgery for evaluation of her hemorrhoids.  Enlarged thyroid -last thyroid ultrasound completed in 2021. There was evidence of a benign colloid cyst, no evidence of multinodular goiter or thyromegaly. She has been having a thyroid ultrasound annually.    GYN- . S/p total hysterectomy 2020 secondary to abnormal uterine bleeding. Hx of gestational diabetes with her first regnancy and cholestasis with her 3rd pregnancy " requiring cholecystectomy. She has had a mammogram in 2020, benign. Sexually active with her .     She is  with 3 children. She is a .    The following portions of the patient's history were reviewed and updated as appropriate: allergies, current medications, past family history, past medical history, past social history, past surgical history and problem list.    Review of Systems   Constitutional: Positive for fatigue. Negative for chills and fever.   HENT: Positive for congestion, rhinorrhea and sinus pressure. Negative for sore throat, swollen glands and trouble swallowing.    Respiratory: Positive for cough (intermittent, yellow thick) and chest tightness. Negative for shortness of breath and wheezing.    Cardiovascular: Positive for palpitations. Negative for chest pain and leg swelling.   Endocrine: Positive for cold intolerance.   Musculoskeletal: Positive for arthralgias and neck pain. Negative for back pain, joint swelling, myalgias and neck stiffness.   Neurological: Positive for dizziness, syncope and memory problem (foggy thinking). Negative for tremors, seizures, facial asymmetry, speech difficulty, weakness, light-headedness, numbness, headache and confusion.   Psychiatric/Behavioral: Positive for depressed mood. Negative for sleep disturbance and suicidal ideas. The patient is nervous/anxious.        Objective   Physical Exam  Constitutional:       Appearance: She is well-developed.   HENT:      Head: Normocephalic and atraumatic.      Right Ear: Hearing, ear canal and external ear normal. Tympanic membrane is scarred.      Left Ear: Hearing, tympanic membrane, ear canal and external ear normal.      Nose: Rhinorrhea present. Rhinorrhea is purulent.      Right Sinus: Maxillary sinus tenderness present. No frontal sinus tenderness.      Left Sinus: Maxillary sinus tenderness present. No frontal sinus tenderness.      Mouth/Throat:      Lips: Pink.      Mouth: Mucous  membranes are moist. No injury or oral lesions.      Dentition: Normal dentition.      Tongue: No lesions. Tongue does not deviate from midline.      Palate: No mass and lesions.      Pharynx: Oropharynx is clear. Uvula midline.   Neck:      Thyroid: No thyroid mass, thyromegaly or thyroid tenderness.      Vascular: No carotid bruit.      Trachea: Trachea normal.   Cardiovascular:      Rate and Rhythm: Normal rate and regular rhythm.      Chest Wall: PMI is not displaced.      Pulses:           Radial pulses are 2+ on the right side and 2+ on the left side.        Dorsalis pedis pulses are 2+ on the right side and 2+ on the left side.        Posterior tibial pulses are 2+ on the right side and 2+ on the left side.      Heart sounds: S1 normal and S2 normal.   Pulmonary:      Effort: Pulmonary effort is normal.      Breath sounds: Normal breath sounds.   Musculoskeletal:      Cervical back: Swelling, spasms ( left trapezius) and tenderness (at the base of the occiput on the left side) present. No edema, deformity, erythema, signs of trauma, lacerations, rigidity, torticollis, bony tenderness or crepitus. Pain with movement present. Normal range of motion.      Thoracic back: Scoliosis present.      Lumbar back: Normal.      Right lower leg: No edema.      Left lower leg: No edema.   Lymphadenopathy:      Head:      Right side of head: No submental, submandibular, tonsillar or occipital adenopathy.      Left side of head: No submental, submandibular, tonsillar or occipital adenopathy.      Cervical: No cervical adenopathy.   Skin:     General: Skin is warm and dry.      Capillary Refill: Capillary refill takes less than 2 seconds.      Nails: There is no clubbing.   Neurological:      Mental Status: She is alert and oriented to person, place, and time. Mental status is at baseline.      Cranial Nerves: Cranial nerves are intact.      Sensory: Sensation is intact.      Motor: Motor function is intact.       Coordination: Coordination is intact.      Gait: Gait is intact.      Deep Tendon Reflexes:      Reflex Scores:       Patellar reflexes are 2+ on the right side and 2+ on the left side.     Comments: Positive Rohan-Hallpike bilaterally.   Psychiatric:         Attention and Perception: Attention normal.         Mood and Affect: Affect normal. Mood is anxious.         Speech: Speech normal.         Behavior: Behavior normal.         Thought Content: Thought content normal.         Cognition and Memory: Cognition normal.         Vitals:    11/29/21 0831   BP: 110/68   Pulse: 102   Temp: 97.1 °F (36.2 °C)   SpO2: 100%      Body mass index is 23.6 kg/m².    Assessment/Plan   Diagnoses and all orders for this visit:    1. Dizziness (Primary)  -     CBC & Differential  -     Comprehensive Metabolic Panel  -     TSH Rfx On Abnormal To Free T4  -     Vitamin B12  -     Ferritin  -     Iron Profile  -     Folate  -     Magnesium  -     Ambulatory Referral to Physical Therapy Vestibular    2. Near syncope  -     CBC & Differential  -     Comprehensive Metabolic Panel  -     TSH Rfx On Abnormal To Free T4  -     Vitamin B12  -     Ferritin  -     Iron Profile  -     Folate  -     Magnesium    3. Cold intolerance  -     CBC & Differential  -     Comprehensive Metabolic Panel  -     TSH Rfx On Abnormal To Free T4  -     Vitamin B12  -     Ferritin  -     Iron Profile  -     Folate  -     Magnesium    4. Localized swelling, mass or lump of neck  -     CBC & Differential  -     Comprehensive Metabolic Panel  -     TSH Rfx On Abnormal To Free T4  -     Vitamin B12  -     Ferritin  -     Iron Profile  -     Folate  -     Magnesium  -     US Head Neck Soft Tissue; Future    5. Iron deficiency anemia, unspecified iron deficiency anemia type  -     CBC & Differential  -     Comprehensive Metabolic Panel  -     TSH Rfx On Abnormal To Free T4  -     Vitamin B12  -     Ferritin  -     Iron Profile  -     Folate  -     Magnesium    6. Low  serum vitamin B12  -     CBC & Differential  -     Comprehensive Metabolic Panel  -     TSH Rfx On Abnormal To Free T4  -     Vitamin B12  -     Ferritin  -     Iron Profile  -     Folate  -     Magnesium    7. Acute non-recurrent maxillary sinusitis  -     CBC & Differential  -     Comprehensive Metabolic Panel  -     TSH Rfx On Abnormal To Free T4  -     Vitamin B12  -     Ferritin  -     Iron Profile  -     Folate  -     Magnesium    8. Fibromyalgia  -     CBC & Differential  -     Comprehensive Metabolic Panel  -     TSH Rfx On Abnormal To Free T4  -     Vitamin B12  -     Ferritin  -     Iron Profile  -     Folate  -     Magnesium    Other orders  -     azithromycin (Zithromax Z-Chai) 250 MG tablet; Take 2 tablets by mouth on day 1, then 1 tablet daily on days 2-5  Dispense: 6 tablet; Refill: 0  -     meclizine (ANTIVERT) 25 MG tablet; Take 1 tablet by mouth 3 (Three) Times a Day As Needed for Dizziness.  Dispense: 30 tablet; Refill: 1        1. Dizziness/near syncope-I suspect she is having benign paroxysmal positional vertigo based on her neuro exam today in the office. She does also have low blood pressure at baseline. Will give meclizine 25 mg 3 times daily as needed. Referral placed to physical therapy for vestibular rehab. Recommend her restarting nasal steroid spray daily. Encouraged her to work on increasing hydration to at least 8-10 8 ounce glasses of water daily. Make position changes slowly, especially when first getting up in the mornings. Recommend that she continue to monitor her BP at home. Recommend that she follow-up with cardiology as scheduled next month. Check labs today as she does have a history of iron deficiency and B12 deficiency.  2. Cold intolerance-?Raynaud's. Check lab work today. Encouraged her to modify her environment and avoid extreme temperatures. Make sure to wear gloves in cold environments and avoid extremely hot showers or baths.  3. Fibromyalgia-encouraged her to discuss  trial of Cymbalta with her psychiatrist. She will let me know if psychiatry is on board with this.  4. Localized swelling mass or lump of neck-check ultrasound of the neck. I suspect symptoms may be related to muscular spasms. If imaging is negative would recommend possible PT. Encouraged her to work on trapezius muscle stretching, good posture, and stress management as well as adequate hydration as this can exacerbate symptoms.  5. Acute maxillary sinusitis-as her symptoms have persisted for 4 weeks we will send in a Z-Chai. Encouraged her to return to nasal steroid spray daily and plain Mucinex. Notify if symptoms persist.    Fasting labs today. Follow-up in 2 weeks for CPE and lab follow-up.

## 2021-11-30 LAB
ALBUMIN SERPL-MCNC: 4.7 G/DL (ref 3.8–4.8)
ALBUMIN/GLOB SERPL: 2.2 {RATIO} (ref 1.2–2.2)
ALP SERPL-CCNC: 48 IU/L (ref 44–121)
ALT SERPL-CCNC: 7 IU/L (ref 0–32)
AST SERPL-CCNC: 13 IU/L (ref 0–40)
BASOPHILS # BLD AUTO: 0.1 X10E3/UL (ref 0–0.2)
BASOPHILS NFR BLD AUTO: 1 %
BILIRUB SERPL-MCNC: 0.3 MG/DL (ref 0–1.2)
BUN SERPL-MCNC: 14 MG/DL (ref 6–20)
BUN/CREAT SERPL: 16 (ref 9–23)
CALCIUM SERPL-MCNC: 9.3 MG/DL (ref 8.7–10.2)
CHLORIDE SERPL-SCNC: 105 MMOL/L (ref 96–106)
CO2 SERPL-SCNC: 23 MMOL/L (ref 20–29)
CREAT SERPL-MCNC: 0.89 MG/DL (ref 0.57–1)
EOSINOPHIL # BLD AUTO: 0.1 X10E3/UL (ref 0–0.4)
EOSINOPHIL NFR BLD AUTO: 1 %
ERYTHROCYTE [DISTWIDTH] IN BLOOD BY AUTOMATED COUNT: 12 % (ref 11.7–15.4)
FERRITIN SERPL-MCNC: 38 NG/ML (ref 15–150)
FOLATE SERPL-MCNC: 2.2 NG/ML
GLOBULIN SER CALC-MCNC: 2.1 G/DL (ref 1.5–4.5)
GLUCOSE SERPL-MCNC: 81 MG/DL (ref 65–99)
HCT VFR BLD AUTO: 43.6 % (ref 34–46.6)
HGB BLD-MCNC: 14.8 G/DL (ref 11.1–15.9)
IMM GRANULOCYTES # BLD AUTO: 0 X10E3/UL (ref 0–0.1)
IMM GRANULOCYTES NFR BLD AUTO: 0 %
IRON SATN MFR SERPL: 25 % (ref 15–55)
IRON SERPL-MCNC: 82 UG/DL (ref 27–159)
LYMPHOCYTES # BLD AUTO: 1.8 X10E3/UL (ref 0.7–3.1)
LYMPHOCYTES NFR BLD AUTO: 26 %
MAGNESIUM SERPL-MCNC: 2.2 MG/DL (ref 1.6–2.3)
MCH RBC QN AUTO: 30.6 PG (ref 26.6–33)
MCHC RBC AUTO-ENTMCNC: 33.9 G/DL (ref 31.5–35.7)
MCV RBC AUTO: 90 FL (ref 79–97)
MONOCYTES # BLD AUTO: 0.3 X10E3/UL (ref 0.1–0.9)
MONOCYTES NFR BLD AUTO: 4 %
NEUTROPHILS # BLD AUTO: 4.7 X10E3/UL (ref 1.4–7)
NEUTROPHILS NFR BLD AUTO: 68 %
PLATELET # BLD AUTO: 166 X10E3/UL (ref 150–450)
POTASSIUM SERPL-SCNC: 4.1 MMOL/L (ref 3.5–5.2)
PROT SERPL-MCNC: 6.8 G/DL (ref 6–8.5)
RBC # BLD AUTO: 4.83 X10E6/UL (ref 3.77–5.28)
SODIUM SERPL-SCNC: 141 MMOL/L (ref 134–144)
TIBC SERPL-MCNC: 334 UG/DL (ref 250–450)
TSH SERPL DL<=0.005 MIU/L-ACNC: 1.94 UIU/ML (ref 0.45–4.5)
UIBC SERPL-MCNC: 252 UG/DL (ref 131–425)
VIT B12 SERPL-MCNC: 470 PG/ML (ref 232–1245)
WBC # BLD AUTO: 6.9 X10E3/UL (ref 3.4–10.8)

## 2021-12-01 ENCOUNTER — TREATMENT (OUTPATIENT)
Dept: PHYSICAL THERAPY | Facility: CLINIC | Age: 31
End: 2021-12-01

## 2021-12-01 DIAGNOSIS — H81.11 BPPV (BENIGN PAROXYSMAL POSITIONAL VERTIGO), RIGHT: Primary | ICD-10-CM

## 2021-12-01 DIAGNOSIS — R42 VERTIGO: ICD-10-CM

## 2021-12-01 DIAGNOSIS — M79.7 FIBROMYALGIA: Primary | ICD-10-CM

## 2021-12-01 PROCEDURE — 95992 CANALITH REPOSITIONING PROC: CPT | Performed by: PHYSICAL THERAPIST

## 2021-12-01 PROCEDURE — 97530 THERAPEUTIC ACTIVITIES: CPT | Performed by: PHYSICAL THERAPIST

## 2021-12-01 PROCEDURE — 97161 PT EVAL LOW COMPLEX 20 MIN: CPT | Performed by: PHYSICAL THERAPIST

## 2021-12-01 RX ORDER — DULOXETIN HYDROCHLORIDE 30 MG/1
30 CAPSULE, DELAYED RELEASE ORAL DAILY
Qty: 30 CAPSULE | Refills: 5 | Status: SHIPPED | OUTPATIENT
Start: 2021-12-01 | End: 2021-12-20

## 2021-12-01 NOTE — PROGRESS NOTES
Physical Therapy Initial Evaluation and Plan of Care    Patient: Sherry Quevedo   : 1990  Diagnosis/ICD-10 Code:  BPPV (benign paroxysmal positional vertigo), right [H81.11]  Referring practitioner: LEONELA Loera  PMx Reviewed : 2021    Subjective Evaluation    History of Present Illness  Mechanism of injury: Pt presents to PT with a Hx of vertigo symptoms for about 3 to 6 months.  The symptoms with start with laying down, getting up in the night, mornings and teaching. Feel kind of foggy and fuzzy all the time that gets worse with certain positions or movements.      Hx of Allergies - Occasionally take Zyrtec and Nasacort, but not consistent.     Pt denies any prior Hx of BPPV.    Occupation:   - 5th Grade [employer]   Activities:  3 Kids 8, 5, 3 yo, camping when warm  PLOF: Independent  Medical Hx Reviewed.          Quality of life: excellent    Social Support  Lives in: multiple-level home  Lives with: spouse and young children (Kitten)             Objective       Assessment & Plan     Assessment  Impairments: activity intolerance, impaired balance and safety issue  Functional Limitations: moving in bed, standing, stooping and reaching overhead  Assessment details: Pt presents to PT with symptoms consistent with BPPV.  Pt would benefit from skilled PT intervention to address the deficits noted.     S/P Rx provided pt was escorted to a chair with CGA to a full seated position.  Pt sat for 20 mins with head stationary.      Pt not to lay down or do activities that change head level beyond 45 degrees from upright until laying down for bed for the day.      Educated the pt to BPPV symptoms, anatomy, pathology and plan to treat.  Reviewed and answered questions at the end of the session.    Prognosis: good    Goals  Plan Goals: SHORT TERM GOALS: Time for Goal: 3 weeks    1.  Pt reports that understand the information about BPPV and the treatment.  2.  Pt to understand, not  to lay down the rest of the day secondary to BPPV Tx.    LONG TERM GOALS: Time for Goal: 6 months  1.  Pt to report absence of vertigo symptoms with all ADL's, IADL's, household, recreational and community activities, including all motions and positions.       Plan  Therapy options: will be seen for skilled therapy services  Planned therapy interventions: neuromuscular re-education and therapeutic activities  Other planned therapy interventions: Canalith repositioning, Vestibular Strengthening  Frequency: 2x week  Duration in weeks: 6  Treatment plan discussed with: patient  Plan details: If no improvement is seen with BPPV is seen, then an appropriate vestibular exercise program will be started.          Manual Therapy:          mins  47900;  Therapeutic Exercise:          mins  90343;     Neuromuscular Jose Rafael:         mins  62994;    Therapeutic Activity:      10     mins  86464;     Gait Training:            mins  95980;     Ultrasound:           mins  10577;    Electrical Stimulation:          mins  93745 ( );  Dry Needling           mins self-pay  Traction           mins [id number]  Canalith Repositioning    15     mins 74923      Timed Treatment:   25   mins   Total Treatment:     60   mins    PT SIGNATURE: Rafael Theodore PT   [place] Lic #[id number]    DATE TREATMENT INITIATED: 12/1/2021    Initial Certification     Certification Period: 3/1/2022  I certify that the therapy services are furnished while this patient is under my care.  The services outlined above are required by this patient, and will be reviewed every 90 days.     PHYSICIAN: Temi Jones APRN      DATE:     Please sign and return via fax to [phone]. Thank you, [facility].

## 2021-12-07 ENCOUNTER — HOSPITAL ENCOUNTER (OUTPATIENT)
Dept: ULTRASOUND IMAGING | Facility: HOSPITAL | Age: 31
Discharge: HOME | End: 2021-12-07
Admitting: NURSE PRACTITIONER

## 2021-12-07 DIAGNOSIS — R22.1 LOCALIZED SWELLING, MASS OR LUMP OF NECK: ICD-10-CM

## 2021-12-07 PROCEDURE — 76536 US EXAM OF HEAD AND NECK: CPT

## 2021-12-08 ENCOUNTER — TREATMENT (OUTPATIENT)
Dept: PHYSICAL THERAPY | Facility: CLINIC | Age: 31
End: 2021-12-08

## 2021-12-08 DIAGNOSIS — H81.11 BPPV (BENIGN PAROXYSMAL POSITIONAL VERTIGO), RIGHT: Primary | ICD-10-CM

## 2021-12-08 DIAGNOSIS — R42 VERTIGO: ICD-10-CM

## 2021-12-08 PROCEDURE — 97530 THERAPEUTIC ACTIVITIES: CPT | Performed by: PHYSICAL THERAPIST

## 2021-12-08 PROCEDURE — 95992 CANALITH REPOSITIONING PROC: CPT | Performed by: PHYSICAL THERAPIST

## 2021-12-08 NOTE — PROGRESS NOTES
Physical Therapy Daily Progress Note  Visit: 2    Sherry Quevdeo reports: I might feel a little better but not gone.      Subjective     Objective   See Exercise, Manual, and Modality Logs for complete treatment.     See Vestibular Section of Logs for testing and Rx.    S/P Rx provided pt was escorted to a chair with CGA to a full seated position.  Pt sat for 20 mins with head stationary.      Pt not to lay down or do activities that change head level beyond 45 degrees from upright until laying down for bed for the day.        Assessment & Plan     Assessment    Assessment details: The pt's symptoms seemed to switch sides on the most symptomatic. And seem to less intense.      Plan  Plan details: Re-assess upon next visit for continued BPPV symptoms and treat appropriately.             Manual Therapy:          mins  08365;  Therapeutic Exercise:          mins  14736;     Neuromuscular Jose Rafael:         mins  02847;    Therapeutic Activity:      10     mins  15054;     Gait Training:            mins  14865;     Ultrasound:           mins  07683;    Electrical Stimulation:          mins  34378 ( );  Dry Needling           mins self-pay  Traction           mins 43811  Canalith Repositioning    15     mins 77284      Timed Treatment:   25   mins   Total Treatment:     45   mins    Rafael Theodore PT  [place] License #: [id number]    Physical Therapist

## 2021-12-09 DIAGNOSIS — M54.2 NECK PAIN ON LEFT SIDE: ICD-10-CM

## 2021-12-09 DIAGNOSIS — R22.1 LOCALIZED SWELLING, MASS OR LUMP OF NECK: Primary | ICD-10-CM

## 2021-12-17 ENCOUNTER — HOSPITAL ENCOUNTER (OUTPATIENT)
Dept: INFUSION THERAPY | Facility: HOSPITAL | Age: 31
Discharge: HOME | End: 2021-12-17
Admitting: PSYCHIATRY & NEUROLOGY

## 2021-12-17 DIAGNOSIS — M79.10 MYALGIA, UNSPECIFIED SITE: ICD-10-CM

## 2021-12-17 PROCEDURE — 95886 MUSC TEST DONE W/N TEST COMP: CPT

## 2021-12-17 PROCEDURE — 95909 NRV CNDJ TST 5-6 STUDIES: CPT

## 2021-12-17 PROCEDURE — 95886 MUSC TEST DONE W/N TEST COMP: CPT | Performed by: PSYCHIATRY & NEUROLOGY

## 2021-12-17 PROCEDURE — 95909 NRV CNDJ TST 5-6 STUDIES: CPT | Performed by: PSYCHIATRY & NEUROLOGY

## 2021-12-17 NOTE — PROCEDURES
EMG and Nerve Conduction Studies    I.      Instrument used: Neuromax 1002  II.     Please see data sheets for tabular summary of NCS and details on methods, temperatures and lab standards.   III.    EMG muscles tested for upper extremity studies include the deltoid, biceps, triceps, pronator teres, extensor digitorum communis, first dorsal interosseous and abductor pollicis brevis.    IV.   EMG muscles tested for lower extremity studies include the vastus lateralis, tibialis anterior, peroneus longus, medial gastrocnemius and extensor digitorum brevis.    V.    Additional muscles tested as needed.  Paraspinal muscles tested as needed.   VI.   Please see data sheets for tabular summary of EMG findings.   VII. The complete report includes the data sheets.      Indication: Numbness tingling and pain in the arms and legs  History: 31-year-old white female with numbness tingling and pain in the arms and legs.  There is more pain in the calves and in the upper arms than other places and she does cramp frequently she states.  Symptoms started 4 to 6 months ago.  She also has scoliosis and bilateral sciatica.  No history of diabetes or thyroid disease      Ht: 160 cm  Wt: 60.4 kg; BMI 23.6  HbA1C:   Lab Results   Component Value Date    HGBA1C 4.70 (L) 07/15/2021     TSH:   Lab Results   Component Value Date    TSH 1.940 11/29/2021       Technical summary:  Nerve conduction studies were obtained in the right leg with 1 comparison on the left.  Skin temperatures were a bit cold but temperature correction was not needed.  Needle examination was obtained on selected muscles in both legs.    Results:  1.  Normal right sural sensory distal latency and amplitude.  2.  Normal superficial peroneal sensory distal latencies and amplitudes bilaterally.  3.  Normal right peroneal motor conduction velocities, distal latency and amplitudes.  (The amplitude from the popliteal fossa was a bit higher than the other amplitudes but  stimulation posterior to the lateral malleolus did not produce evidence of an accessory peroneal nerve).  4.  Normal right tibial motor conduction velocity, distal latency and amplitudes.  5.  Needle examination of selected muscles in both legs showed normal insertional activities throughout.  There were normal motor units and recruitment patterns throughout.    Impression:  Normal study.  No evidence of peripheral neuropathy, a myopathy or a lumbosacral radiculopathy on either side by this study.  Study results were discussed with the patient.    Faustino Ferguson M.D.              Dictated utilizing Dragon dictation.

## 2021-12-20 ENCOUNTER — OFFICE VISIT (OUTPATIENT)
Dept: INTERNAL MEDICINE | Facility: CLINIC | Age: 31
End: 2021-12-20

## 2021-12-20 ENCOUNTER — OFFICE VISIT (OUTPATIENT)
Dept: SURGERY | Facility: CLINIC | Age: 31
End: 2021-12-20

## 2021-12-20 VITALS
SYSTOLIC BLOOD PRESSURE: 104 MMHG | TEMPERATURE: 98.2 F | BODY MASS INDEX: 23.55 KG/M2 | HEART RATE: 74 BPM | OXYGEN SATURATION: 98 % | DIASTOLIC BLOOD PRESSURE: 60 MMHG | HEIGHT: 63 IN | WEIGHT: 132.9 LBS

## 2021-12-20 VITALS
HEART RATE: 96 BPM | BODY MASS INDEX: 24.19 KG/M2 | HEIGHT: 63 IN | TEMPERATURE: 98.9 F | OXYGEN SATURATION: 99 % | DIASTOLIC BLOOD PRESSURE: 68 MMHG | WEIGHT: 136.5 LBS | SYSTOLIC BLOOD PRESSURE: 90 MMHG

## 2021-12-20 DIAGNOSIS — L91.8 CUTANEOUS SKIN TAGS: ICD-10-CM

## 2021-12-20 DIAGNOSIS — F41.1 GENERALIZED ANXIETY DISORDER: ICD-10-CM

## 2021-12-20 DIAGNOSIS — E53.8 FOLIC ACID DEFICIENCY: ICD-10-CM

## 2021-12-20 DIAGNOSIS — H81.13 BENIGN PAROXYSMAL POSITIONAL VERTIGO DUE TO BILATERAL VESTIBULAR DISORDER: ICD-10-CM

## 2021-12-20 DIAGNOSIS — M79.7 FIBROMYALGIA: ICD-10-CM

## 2021-12-20 DIAGNOSIS — M54.2 NECK PAIN ON LEFT SIDE: ICD-10-CM

## 2021-12-20 DIAGNOSIS — H53.9 CHANGE IN VISION: ICD-10-CM

## 2021-12-20 DIAGNOSIS — R22.1 NECK SWELLING: ICD-10-CM

## 2021-12-20 DIAGNOSIS — K64.8 INTERNAL HEMORRHOIDS WITH COMPLICATION: ICD-10-CM

## 2021-12-20 DIAGNOSIS — K64.4 EXTERNAL HEMORRHOIDS: Primary | ICD-10-CM

## 2021-12-20 DIAGNOSIS — Z00.00 HEALTHCARE MAINTENANCE: Primary | ICD-10-CM

## 2021-12-20 DIAGNOSIS — F33.9 CHRONIC RECURRENT MAJOR DEPRESSIVE DISORDER (HCC): ICD-10-CM

## 2021-12-20 PROCEDURE — 99395 PREV VISIT EST AGE 18-39: CPT | Performed by: NURSE PRACTITIONER

## 2021-12-20 PROCEDURE — 90686 IIV4 VACC NO PRSV 0.5 ML IM: CPT | Performed by: NURSE PRACTITIONER

## 2021-12-20 PROCEDURE — 99204 OFFICE O/P NEW MOD 45 MIN: CPT | Performed by: COLON & RECTAL SURGERY

## 2021-12-20 PROCEDURE — 90471 IMMUNIZATION ADMIN: CPT | Performed by: NURSE PRACTITIONER

## 2021-12-20 PROCEDURE — 46600 DIAGNOSTIC ANOSCOPY SPX: CPT | Performed by: COLON & RECTAL SURGERY

## 2021-12-20 RX ORDER — SODIUM CHLORIDE 0.9 % (FLUSH) 0.9 %
3 SYRINGE (ML) INJECTION EVERY 12 HOURS SCHEDULED
Status: CANCELLED | OUTPATIENT
Start: 2022-06-16

## 2021-12-20 RX ORDER — FOLIC ACID 1 MG/1
1 TABLET ORAL DAILY
Qty: 90 TABLET | Refills: 0 | Status: ON HOLD | OUTPATIENT
Start: 2021-12-20 | End: 2022-02-28

## 2021-12-20 RX ORDER — DULOXETIN HYDROCHLORIDE 60 MG/1
60 CAPSULE, DELAYED RELEASE ORAL DAILY
Qty: 30 CAPSULE | Refills: 5 | Status: ON HOLD | OUTPATIENT
Start: 2021-12-20 | End: 2022-02-28

## 2021-12-20 RX ORDER — SODIUM CHLORIDE 0.9 % (FLUSH) 0.9 %
3-10 SYRINGE (ML) INJECTION AS NEEDED
Status: CANCELLED | OUTPATIENT
Start: 2022-06-16

## 2021-12-20 NOTE — PROGRESS NOTES
Subjective   Sherry Quevedo is a 31 y.o. female.     Chief Complaint   Patient presents with   • Annual Exam        History of Present Illness   She is here today for CPE and lab f/u. She is feeling better since last visit. She is currently stretching daily. She is trying to eat a healthy diet.   She is still with left side neck pain and swelling. No increase in swelling since last office visit. She has HA associated with the pain. Neck U/S completed, normal. She is scheduled for CT scan of the neck tomorrow.    BPPV- she has completed 2 PT sessions for vestibular rehab. She has had improvement in dizziness, but has noticed intermittent sharp pain in bilateral ears. She notes some postnasal drainage. She is using a NSS once daily. She was recently treated for acute sinusitis with a Z-noel.    Fibromyalgia/Depression/Anxiety/ADHD- UTD with psych. Next visit scheduled on 12/30. EMG completed 12/17 with geno Sarkar. At last office visit started cymbalta 30 mg daily for fibromyalgia. She has not noticed any major change in pain. Denies any side effects. She has noticed some improvement in anxiety and depression.  Thyroid nodules- U/S completed 04/2021 showing stable colloid cyst. Repeat imaging in 1 year.     GYN- due to see GYN back. Mammogram completed last year. S/p hysterectomy 12/2019. She has not had a pap since this.     She is planning to go to [place] for the holidays with her family.     The following portions of the patient's history were reviewed and updated as appropriate: allergies, current medications, past family history, past medical history, past social history, past surgical history and problem list.    Review of Systems   Constitutional: Positive for fatigue. Negative for appetite change, chills, diaphoresis, fever, unexpected weight gain and unexpected weight loss.   HENT: Positive for ear pain, postnasal drip and sinus pressure. Negative for congestion, dental problem, hearing loss, mouth  sores, nosebleeds, rhinorrhea, sore throat, swollen glands, tinnitus and trouble swallowing.    Eyes: Positive for blurred vision (intermittent). Negative for pain, discharge, redness, itching and visual disturbance.   Respiratory: Negative for cough, chest tightness, shortness of breath and wheezing.    Cardiovascular: Negative for chest pain, palpitations and leg swelling.   Gastrointestinal: Positive for constipation and diarrhea. Negative for abdominal distention, abdominal pain, blood in stool, nausea, vomiting and GERD.   Endocrine: Positive for cold intolerance. Negative for heat intolerance.   Genitourinary: Negative for breast discharge, breast lump, breast pain, decreased libido, difficulty urinating, dyspareunia, dysuria, flank pain, frequency, hematuria, pelvic pain, pelvic pressure, urgency, urinary incontinence, vaginal bleeding and vaginal discharge.   Musculoskeletal: Positive for arthralgias (with fibromyalgia) and neck pain. Negative for back pain, gait problem, joint swelling and myalgias.   Skin: Positive for skin lesions (right breast with skin tag). Negative for color change and rash.   Allergic/Immunologic: Negative for environmental allergies and food allergies.   Neurological: Positive for headache (with neck pain) and memory problem (foggy thinking). Negative for dizziness, tremors, seizures, syncope, speech difficulty, weakness, light-headedness, numbness and confusion.   Hematological: Positive for adenopathy (left chronic cervical anedonpathy). Bruises/bleeds easily (patient baseline).   Psychiatric/Behavioral: Positive for depressed mood (improving). Negative for sleep disturbance, suicidal ideas and stress. The patient is nervous/anxious (improvement).        Objective   Physical Exam  Constitutional:       Appearance: Normal appearance. She is well-developed.   HENT:      Head: Normocephalic and atraumatic.      Right Ear: Hearing, ear canal and external ear normal. Tympanic membrane  is scarred.      Left Ear: Hearing, ear canal and external ear normal.      Ears:      Comments: Fluid present bilateral TM.      Nose: Nose normal.      Right Sinus: No maxillary sinus tenderness or frontal sinus tenderness.      Left Sinus: No maxillary sinus tenderness or frontal sinus tenderness.      Mouth/Throat:      Lips: Pink.      Mouth: Mucous membranes are moist.      Dentition: Normal dentition.      Tongue: No lesions.      Pharynx: Oropharynx is clear. Uvula midline.      Tonsils: No tonsillar exudate.   Eyes:      General: Lids are normal.      Extraocular Movements: Extraocular movements intact.      Conjunctiva/sclera: Conjunctivae normal.      Pupils: Pupils are equal, round, and reactive to light.   Neck:      Thyroid: No thyroid mass, thyromegaly or thyroid tenderness.      Vascular: No carotid bruit.      Trachea: Trachea normal.   Cardiovascular:      Rate and Rhythm: Normal rate and regular rhythm.      Pulses: Normal pulses.           Radial pulses are 2+ on the right side and 2+ on the left side.        Popliteal pulses are 2+ on the right side and 2+ on the left side.        Dorsalis pedis pulses are 2+ on the right side and 2+ on the left side.        Posterior tibial pulses are 2+ on the right side and 2+ on the left side.      Heart sounds: S1 normal and S2 normal.   Pulmonary:      Effort: Pulmonary effort is normal.      Breath sounds: Normal breath sounds.   Chest:      Chest wall: No mass, lacerations, deformity, swelling, tenderness, crepitus or edema.   Breasts: Breasts are symmetrical.      Right: Skin change present. No swelling, bleeding, inverted nipple, mass, nipple discharge, tenderness or supraclavicular adenopathy.      Left: Normal. No supraclavicular adenopathy.        Comments: Skin tag present at 3 O'clock on right nipple.   Abdominal:      General: Bowel sounds are normal. There is no distension or abdominal bruit.      Palpations: Abdomen is soft. There is no  hepatomegaly or splenomegaly.      Tenderness: There is no abdominal tenderness.      Hernia: No hernia is present.   Musculoskeletal:      Cervical back: Normal range of motion and neck supple.      Right lower leg: No edema.      Left lower leg: No edema.   Lymphadenopathy:      Head:      Right side of head: No submental, submandibular, tonsillar or occipital adenopathy.      Left side of head: No submental, submandibular, tonsillar or occipital adenopathy.      Cervical: No cervical adenopathy.      Upper Body:      Right upper body: No supraclavicular adenopathy.      Left upper body: No supraclavicular adenopathy.      Lower Body: No right inguinal adenopathy. No left inguinal adenopathy.   Skin:     General: Skin is warm and dry.      Findings: No rash.      Nails: There is no clubbing.   Neurological:      Mental Status: She is alert and oriented to person, place, and time.      Cranial Nerves: Cranial nerves are intact.      Motor: Motor function is intact.      Coordination: Coordination is intact.      Gait: Gait is intact.      Deep Tendon Reflexes:      Reflex Scores:       Patellar reflexes are 2+ on the right side and 2+ on the left side.  Psychiatric:         Attention and Perception: Attention normal.         Mood and Affect: Mood and affect normal.         Speech: Speech normal.         Behavior: Behavior normal.         Thought Content: Thought content normal.         Cognition and Memory: Cognition normal.         Vitals:    12/20/21 1004   BP: 104/60   Pulse: 74   Temp: 98.2 °F (36.8 °C)   SpO2: 98%      Body mass index is 23.54 kg/m².    Assessment/Plan   Diagnoses and all orders for this visit:    1. Healthcare maintenance (Primary)    2. Folic acid deficiency  -     folic acid (FOLVITE) 1 MG tablet; Take 1 tablet by mouth Daily.  Dispense: 90 tablet; Refill: 0  -     Folate; Future    3. Fibromyalgia  -     DULoxetine (CYMBALTA) 60 MG capsule; Take 1 capsule by mouth Daily.  Dispense: 30  capsule; Refill: 5    4. Generalized anxiety disorder    5. Chronic recurrent major depressive disorder (HCC)    6. Benign paroxysmal positional vertigo due to bilateral vestibular disorder    7. Cutaneous skin tags    8. Change in vision    9. Neck pain on left side    10. Neck swelling    Other orders  -     FluLaval/Fluarix/Fluzone >6 Months (4585-4037)    Lab results discussed with patient in office today.    1. Preventative counseling- encouraged healthy, balanced diet and return to regular low-impact exercise. Ensure adequate dietary intake of calcium and vit D.  2. Fibromyalgia/Generalized anxiety/Major depression- joint pain not controlled. Will try increase in cymbalta to 60 mg daily. Discussed appropriate use and a/e. She will notify me in 4 weeks after starting the higher dose.  3. Left neck pain and swelling- check CT scan of neck tomorrow.   4. BPPV- recommend increasing NSS to BID. Notify PT of ear pain at next visit tomorrow. Hydrate well with fluids, make position changes slowly.  5. Cutaneous skin tag- encouraged patient to see dermatology for further evaluation. Breast exam completed in office today, normal.  6. Change in vision- encouraged patient to follow up with ophthalmology.   7. Folic acid def- continue folic acid 1 mg daily. Re-check labs in 3 months.  “Discussed risks/benefits to vaccination, reviewed components of the vaccine, discussed VIS, discussed informed consent, informed consent obtained. Patient/Parent was allowed to accept or refuse vaccine. Questions answered to satisfactory state of patient/Parent. We reviewed typical age appropriate and seasonally appropriate vaccinations. Reviewed immunization history and updated state vaccination form as needed. Patient was counseled on COVID19 booster      Dentist- UTD  Wears sunscreen outside  GYN- due, she will schedule.  Flu vaccine- completed today    F/u in 1 year for CPE or sooner PRN.       Answers for HPI/ROS submitted by the  patient on 12/20/2021  What is the primary reason for your visit?: Physical

## 2021-12-21 ENCOUNTER — HOSPITAL ENCOUNTER (OUTPATIENT)
Dept: CT IMAGING | Facility: HOSPITAL | Age: 31
Discharge: HOME | End: 2021-12-21
Admitting: NURSE PRACTITIONER

## 2021-12-21 DIAGNOSIS — M54.2 NECK PAIN ON LEFT SIDE: ICD-10-CM

## 2021-12-21 DIAGNOSIS — R22.1 LOCALIZED SWELLING, MASS OR LUMP OF NECK: ICD-10-CM

## 2021-12-21 PROCEDURE — 70491 CT SOFT TISSUE NECK W/DYE: CPT

## 2021-12-21 PROCEDURE — 25010000002 IOPAMIDOL 61 % SOLUTION: Performed by: NURSE PRACTITIONER

## 2021-12-21 RX ADMIN — IOPAMIDOL 75 ML: 612 INJECTION, SOLUTION INTRAVENOUS at 11:25

## 2021-12-22 ENCOUNTER — APPOINTMENT (OUTPATIENT)
Dept: ULTRASOUND IMAGING | Facility: HOSPITAL | Age: 31
End: 2021-12-22

## 2021-12-23 ENCOUNTER — PATIENT ROUNDING (BHMG ONLY) (OUTPATIENT)
Dept: SURGERY | Facility: CLINIC | Age: 31
End: 2021-12-23

## 2021-12-23 NOTE — PROGRESS NOTES
December 23, 2021    Hello, may I speak with Sherry Quevedo?    My name is Ahsan     I am  with [facility]  [address]  [facility] [address] [phone].    Before we get started may I verify your date of birth? 1990    I am calling to officially welcome you to our practice and ask about your recent visit. Is this a good time to talk? yes    Tell me about your visit with us. What things went well?  Everything was fine.       We're always looking for ways to make our patients' experiences even better. Do you have recommendations on ways we may improve?  no    Overall were you satisfied with your first visit to our practice? yes       I appreciate you taking the time to speak with me today. Is there anything else I can do for you? no      Thank you, and have a great day.

## 2021-12-27 DIAGNOSIS — K11.8 MASS OF LEFT PAROTID GLAND: Primary | ICD-10-CM

## 2021-12-27 DIAGNOSIS — R93.89 ABNORMAL CT SCAN, NECK: ICD-10-CM

## 2021-12-28 ENCOUNTER — TREATMENT (OUTPATIENT)
Dept: PHYSICAL THERAPY | Facility: CLINIC | Age: 31
End: 2021-12-28

## 2021-12-28 DIAGNOSIS — H81.11 BPPV (BENIGN PAROXYSMAL POSITIONAL VERTIGO), RIGHT: Primary | ICD-10-CM

## 2021-12-28 DIAGNOSIS — R42 VERTIGO: ICD-10-CM

## 2021-12-28 PROCEDURE — 97530 THERAPEUTIC ACTIVITIES: CPT | Performed by: PHYSICAL THERAPIST

## 2021-12-28 PROCEDURE — 97112 NEUROMUSCULAR REEDUCATION: CPT | Performed by: PHYSICAL THERAPIST

## 2022-01-25 ENCOUNTER — APPOINTMENT (OUTPATIENT)
Dept: MRI IMAGING | Facility: HOSPITAL | Age: 32
End: 2022-01-25

## 2022-02-28 ENCOUNTER — HOSPITAL ENCOUNTER (OUTPATIENT)
Facility: HOSPITAL | Age: 32
Setting detail: OBSERVATION
Discharge: HOME | End: 2022-03-01
Attending: EMERGENCY MEDICINE | Admitting: EMERGENCY MEDICINE

## 2022-02-28 ENCOUNTER — APPOINTMENT (OUTPATIENT)
Dept: MRI IMAGING | Facility: HOSPITAL | Age: 32
End: 2022-02-28

## 2022-02-28 DIAGNOSIS — Z86.59 HISTORY OF ADHD: ICD-10-CM

## 2022-02-28 DIAGNOSIS — G25.3 LOWER EXTREMITY MYOCLONUS: Primary | ICD-10-CM

## 2022-02-28 PROBLEM — M62.838 MUSCLE SPASM: Status: ACTIVE | Noted: 2022-02-28

## 2022-02-28 LAB
ALBUMIN SERPL-MCNC: 4.4 G/DL (ref 3.5–5.2)
ALBUMIN/GLOB SERPL: 2 G/DL
ALP SERPL-CCNC: 46 U/L (ref 39–117)
ALT SERPL W P-5'-P-CCNC: 7 U/L (ref 1–33)
ANION GAP SERPL CALCULATED.3IONS-SCNC: 7 MMOL/L (ref 5–15)
AST SERPL-CCNC: 12 U/L (ref 1–32)
BASOPHILS # BLD AUTO: 0.03 10*3/MM3 (ref 0–0.2)
BASOPHILS NFR BLD AUTO: 0.5 % (ref 0–1.5)
BILIRUB SERPL-MCNC: 0.4 MG/DL (ref 0–1.2)
BUN SERPL-MCNC: 16 MG/DL (ref 6–20)
BUN/CREAT SERPL: 19 (ref 7–25)
CALCIUM SPEC-SCNC: 8.6 MG/DL (ref 8.6–10.5)
CHLORIDE SERPL-SCNC: 106 MMOL/L (ref 98–107)
CK SERPL-CCNC: 123 U/L (ref 20–180)
CO2 SERPL-SCNC: 25 MMOL/L (ref 22–29)
CREAT SERPL-MCNC: 0.84 MG/DL (ref 0.57–1)
DEPRECATED RDW RBC AUTO: 42.6 FL (ref 37–54)
EGFRCR SERPLBLD CKD-EPI 2021: 94.8 ML/MIN/1.73
EOSINOPHIL # BLD AUTO: 0.19 10*3/MM3 (ref 0–0.4)
EOSINOPHIL NFR BLD AUTO: 3.1 % (ref 0.3–6.2)
ERYTHROCYTE [DISTWIDTH] IN BLOOD BY AUTOMATED COUNT: 12.8 % (ref 12.3–15.4)
ERYTHROCYTE [SEDIMENTATION RATE] IN BLOOD: 2 MM/HR (ref 0–20)
GLOBULIN UR ELPH-MCNC: 2.2 GM/DL
GLUCOSE SERPL-MCNC: 84 MG/DL (ref 65–99)
HCG SERPL QL: NEGATIVE
HCT VFR BLD AUTO: 38.6 % (ref 34–46.6)
HGB BLD-MCNC: 13.1 G/DL (ref 12–15.9)
IMM GRANULOCYTES # BLD AUTO: 0.01 10*3/MM3 (ref 0–0.05)
IMM GRANULOCYTES NFR BLD AUTO: 0.2 % (ref 0–0.5)
LYMPHOCYTES # BLD AUTO: 1.84 10*3/MM3 (ref 0.7–3.1)
LYMPHOCYTES NFR BLD AUTO: 29.9 % (ref 19.6–45.3)
MAGNESIUM SERPL-MCNC: 2.1 MG/DL (ref 1.6–2.6)
MCH RBC QN AUTO: 31.3 PG (ref 26.6–33)
MCHC RBC AUTO-ENTMCNC: 33.9 G/DL (ref 31.5–35.7)
MCV RBC AUTO: 92.3 FL (ref 79–97)
MONOCYTES # BLD AUTO: 0.44 10*3/MM3 (ref 0.1–0.9)
MONOCYTES NFR BLD AUTO: 7.2 % (ref 5–12)
NEUTROPHILS NFR BLD AUTO: 3.64 10*3/MM3 (ref 1.7–7)
NEUTROPHILS NFR BLD AUTO: 59.1 % (ref 42.7–76)
NRBC BLD AUTO-RTO: 0 /100 WBC (ref 0–0.2)
PLATELET # BLD AUTO: 166 10*3/MM3 (ref 140–450)
PMV BLD AUTO: 9.3 FL (ref 6–12)
POTASSIUM SERPL-SCNC: 3.9 MMOL/L (ref 3.5–5.2)
PROT SERPL-MCNC: 6.6 G/DL (ref 6–8.5)
RBC # BLD AUTO: 4.18 10*6/MM3 (ref 3.77–5.28)
SARS-COV-2 RNA RESP QL NAA+PROBE: NOT DETECTED
SODIUM SERPL-SCNC: 138 MMOL/L (ref 136–145)
T4 FREE SERPL-MCNC: 1.21 NG/DL (ref 0.93–1.7)
TSH SERPL DL<=0.05 MIU/L-ACNC: 2.44 UIU/ML (ref 0.27–4.2)
WBC NRBC COR # BLD: 6.15 10*3/MM3 (ref 3.4–10.8)

## 2022-02-28 PROCEDURE — 99283 EMERGENCY DEPT VISIT LOW MDM: CPT

## 2022-02-28 PROCEDURE — 99214 OFFICE O/P EST MOD 30 MIN: CPT | Performed by: PSYCHIATRY & NEUROLOGY

## 2022-02-28 PROCEDURE — G0378 HOSPITAL OBSERVATION PER HR: HCPCS

## 2022-02-28 PROCEDURE — 84439 ASSAY OF FREE THYROXINE: CPT | Performed by: EMERGENCY MEDICINE

## 2022-02-28 PROCEDURE — 83735 ASSAY OF MAGNESIUM: CPT | Performed by: EMERGENCY MEDICINE

## 2022-02-28 PROCEDURE — 80053 COMPREHEN METABOLIC PANEL: CPT | Performed by: EMERGENCY MEDICINE

## 2022-02-28 PROCEDURE — 84443 ASSAY THYROID STIM HORMONE: CPT | Performed by: EMERGENCY MEDICINE

## 2022-02-28 PROCEDURE — 84703 CHORIONIC GONADOTROPIN ASSAY: CPT | Performed by: EMERGENCY MEDICINE

## 2022-02-28 PROCEDURE — 85025 COMPLETE CBC W/AUTO DIFF WBC: CPT | Performed by: EMERGENCY MEDICINE

## 2022-02-28 PROCEDURE — C9803 HOPD COVID-19 SPEC COLLECT: HCPCS

## 2022-02-28 PROCEDURE — U0003 INFECTIOUS AGENT DETECTION BY NUCLEIC ACID (DNA OR RNA); SEVERE ACUTE RESPIRATORY SYNDROME CORONAVIRUS 2 (SARS-COV-2) (CORONAVIRUS DISEASE [COVID-19]), AMPLIFIED PROBE TECHNIQUE, MAKING USE OF HIGH THROUGHPUT TECHNOLOGIES AS DESCRIBED BY CMS-2020-01-R: HCPCS | Performed by: EMERGENCY MEDICINE

## 2022-02-28 PROCEDURE — 85652 RBC SED RATE AUTOMATED: CPT | Performed by: EMERGENCY MEDICINE

## 2022-02-28 PROCEDURE — 82550 ASSAY OF CK (CPK): CPT | Performed by: EMERGENCY MEDICINE

## 2022-02-28 PROCEDURE — 72148 MRI LUMBAR SPINE W/O DYE: CPT

## 2022-02-28 RX ORDER — FOLIC ACID 1 MG/1
1 TABLET ORAL DAILY
Status: DISCONTINUED | OUTPATIENT
Start: 2022-03-01 | End: 2022-03-01 | Stop reason: HOSPADM

## 2022-02-28 RX ORDER — DULOXETIN HYDROCHLORIDE 60 MG/1
60 CAPSULE, DELAYED RELEASE ORAL DAILY
Status: DISCONTINUED | OUTPATIENT
Start: 2022-03-01 | End: 2022-03-01 | Stop reason: HOSPADM

## 2022-02-28 RX ORDER — SODIUM CHLORIDE 0.9 % (FLUSH) 0.9 %
10 SYRINGE (ML) INJECTION AS NEEDED
Status: DISCONTINUED | OUTPATIENT
Start: 2022-02-28 | End: 2022-03-01 | Stop reason: HOSPADM

## 2022-02-28 RX ORDER — ACETAMINOPHEN 325 MG/1
650 TABLET ORAL EVERY 4 HOURS PRN
Status: DISCONTINUED | OUTPATIENT
Start: 2022-02-28 | End: 2022-03-01 | Stop reason: HOSPADM

## 2022-02-28 RX ORDER — SODIUM CHLORIDE 0.9 % (FLUSH) 0.9 %
10 SYRINGE (ML) INJECTION EVERY 12 HOURS SCHEDULED
Status: DISCONTINUED | OUTPATIENT
Start: 2022-02-28 | End: 2022-03-01 | Stop reason: HOSPADM

## 2022-02-28 RX ORDER — NITROGLYCERIN 0.4 MG/1
0.4 TABLET SUBLINGUAL
Status: DISCONTINUED | OUTPATIENT
Start: 2022-02-28 | End: 2022-03-01 | Stop reason: HOSPADM

## 2022-02-28 RX ORDER — GABAPENTIN 300 MG/1
300 CAPSULE ORAL EVERY 8 HOURS SCHEDULED
Status: DISCONTINUED | OUTPATIENT
Start: 2022-02-28 | End: 2022-03-01 | Stop reason: HOSPADM

## 2022-02-28 RX ORDER — TRAZODONE HYDROCHLORIDE 50 MG/1
75 TABLET ORAL NIGHTLY
Status: DISCONTINUED | OUTPATIENT
Start: 2022-02-28 | End: 2022-03-01 | Stop reason: HOSPADM

## 2022-02-28 RX ORDER — MECLIZINE HYDROCHLORIDE 25 MG/1
25 TABLET ORAL 3 TIMES DAILY PRN
Status: DISCONTINUED | OUTPATIENT
Start: 2022-02-28 | End: 2022-03-01 | Stop reason: HOSPADM

## 2022-02-28 RX ADMIN — GABAPENTIN 300 MG: 300 CAPSULE ORAL at 22:21

## 2022-02-28 RX ADMIN — TRAZODONE HYDROCHLORIDE 75 MG: 50 TABLET ORAL at 22:21

## 2022-02-28 RX ADMIN — Medication 10 ML: at 22:00

## 2022-03-01 ENCOUNTER — READMISSION MANAGEMENT (OUTPATIENT)
Dept: CALL CENTER | Facility: HOSPITAL | Age: 32
End: 2022-03-01

## 2022-03-01 VITALS
HEART RATE: 95 BPM | HEIGHT: 63 IN | OXYGEN SATURATION: 100 % | RESPIRATION RATE: 18 BRPM | DIASTOLIC BLOOD PRESSURE: 79 MMHG | BODY MASS INDEX: 26.4 KG/M2 | SYSTOLIC BLOOD PRESSURE: 114 MMHG | WEIGHT: 149 LBS | TEMPERATURE: 98.2 F

## 2022-03-01 LAB
ANION GAP SERPL CALCULATED.3IONS-SCNC: 10 MMOL/L (ref 5–15)
BUN SERPL-MCNC: 13 MG/DL (ref 6–20)
BUN/CREAT SERPL: 19.7 (ref 7–25)
CALCIUM SPEC-SCNC: 8 MG/DL (ref 8.6–10.5)
CHLORIDE SERPL-SCNC: 107 MMOL/L (ref 98–107)
CO2 SERPL-SCNC: 21 MMOL/L (ref 22–29)
CREAT SERPL-MCNC: 0.66 MG/DL (ref 0.57–1)
DEPRECATED RDW RBC AUTO: 46.6 FL (ref 37–54)
EGFRCR SERPLBLD CKD-EPI 2021: 119.7 ML/MIN/1.73
ERYTHROCYTE [DISTWIDTH] IN BLOOD BY AUTOMATED COUNT: 13.1 % (ref 12.3–15.4)
GLUCOSE SERPL-MCNC: 96 MG/DL (ref 65–99)
HCT VFR BLD AUTO: 39.6 % (ref 34–46.6)
HGB BLD-MCNC: 12.9 G/DL (ref 12–15.9)
MCH RBC QN AUTO: 31.2 PG (ref 26.6–33)
MCHC RBC AUTO-ENTMCNC: 32.6 G/DL (ref 31.5–35.7)
MCV RBC AUTO: 95.9 FL (ref 79–97)
PLATELET # BLD AUTO: 138 10*3/MM3 (ref 140–450)
PMV BLD AUTO: 9.4 FL (ref 6–12)
POTASSIUM SERPL-SCNC: 3.9 MMOL/L (ref 3.5–5.2)
RBC # BLD AUTO: 4.13 10*6/MM3 (ref 3.77–5.28)
SODIUM SERPL-SCNC: 138 MMOL/L (ref 136–145)
WBC NRBC COR # BLD: 6.53 10*3/MM3 (ref 3.4–10.8)

## 2022-03-01 PROCEDURE — G0378 HOSPITAL OBSERVATION PER HR: HCPCS

## 2022-03-01 PROCEDURE — 85027 COMPLETE CBC AUTOMATED: CPT | Performed by: NURSE PRACTITIONER

## 2022-03-01 PROCEDURE — 80048 BASIC METABOLIC PNL TOTAL CA: CPT | Performed by: NURSE PRACTITIONER

## 2022-03-01 PROCEDURE — 99213 OFFICE O/P EST LOW 20 MIN: CPT | Performed by: NURSE PRACTITIONER

## 2022-03-01 PROCEDURE — 97162 PT EVAL MOD COMPLEX 30 MIN: CPT

## 2022-03-01 RX ADMIN — CARIPRAZINE 1.5 MG: 1.5 CAPSULE, GELATIN COATED ORAL at 00:02

## 2022-03-01 RX ADMIN — GABAPENTIN 300 MG: 300 CAPSULE ORAL at 06:18

## 2022-03-01 RX ADMIN — Medication 10 ML: at 08:15

## 2022-03-01 NOTE — PROGRESS NOTES
"DOS: 3/1/2022  NAME: Sherry Quevedo   : 1990  PCP: Temi Jones APRN  Chief Complaint   Patient presents with   • Leg Twitching     Stroke    Subjective: No recurrence of right lower extremity symptoms since admission.  She denies any withdrawal effects coming off of her Adderall but does states she feels more fatigued today.  Her significant other is at the bedside.Pt seen in follow up today, however the problem is new to the examiner.      Objective:  Vital signs: /79 (BP Location: Right arm, Patient Position: Lying)   Pulse 95   Temp 98.2 °F (36.8 °C) (Oral)   Resp 18   Ht 160 cm (63\")   Wt 67.6 kg (149 lb)   LMP  (LMP Unknown)   SpO2 100%   BMI 26.39 kg/m²       General appearance: Well developed, well nourished, well groomed, alert and cooperative.   HEENT: Normocephalic.   Neck and spine: Normal range of motion. Normal alignment. No mass or tenderness.    Cardiac: Regular rate and rhythm. No murmurs.   Peripheral Vasculature: Radial pulses are equal and symmetric.  Chest Exam: Clear to auscultation bilaterally, no wheezes, no rhonchi.  Extremities: Normal, no edema.   Skin: No rashes or birthmarks.     Higher integrative function: Oriented to time, place, person, intact recent and remote memory, attention span, concentration and language. Spontaneous speech, fund of vocabulary are normal.   CN II: Normal  visual fields.   CN III IV VI: Extraocular movements are full without nystagmus. Pupils are equal, round, and reactive to light.   CN V: Normal facial sensation.  CN VII: Facial movements are symmetric, no weakness.   CN VIII: Auditory acuity is normal.   CN IX & X: Symmetric palatal movement.   CN XI: Sternocleidomastoid and trapezius are normal. No weakness.   CN XII: The tongue is midline.   Motor: Normal muscle strength, bulk, and tone in upper and lower extremities. No fasciculations, rigidity, spasticity or abnormal movements.   Sensation: Normal light touch.  Station and " gait: Deferred.  Muscle stretch reflexes: Reflexes are normal and symmetric in the upper and lower extremities. Plantar reflexes are flexor bilaterally.   Coordination: Finger to nose test showed no dysmetria. Rapid alternating movements were normal. Heel to shin normal.     Scheduled Meds:Cariprazine HCl, 1.5 mg, Oral, Daily  DULoxetine, 60 mg, Oral, Daily  folic acid, 1 mg, Oral, Daily  gabapentin, 300 mg, Oral, Q8H  sodium chloride, 10 mL, Intravenous, Q12H  traZODone, 75 mg, Oral, Nightly      Continuous Infusions:   PRN Meds:.•  acetaminophen  •  meclizine  •  nitroglycerin  •  sodium chloride    Laboratory results:  Lab Results   Component Value Date    GLUCOSE 96 03/01/2022    CALCIUM 8.0 (L) 03/01/2022     03/01/2022    K 3.9 03/01/2022    CO2 21.0 (L) 03/01/2022     03/01/2022    BUN 13 03/01/2022    CREATININE 0.66 03/01/2022    EGFRIFAFRI 99 11/29/2021    EGFRIFNONA 86 11/29/2021    BCR 19.7 03/01/2022    ANIONGAP 10.0 03/01/2022     Lab Results   Component Value Date    WBC 6.53 03/01/2022    HGB 12.9 03/01/2022    HCT 39.6 03/01/2022    MCV 95.9 03/01/2022     (L) 03/01/2022     No results found for: CHOL  Lab Results   Component Value Date    HDL 51 04/19/2021    HDL 50 09/27/2019     Lab Results   Component Value Date    LDL 60 04/19/2021     (H) 09/27/2019     Lab Results   Component Value Date    TRIG 49 04/19/2021    TRIG 183 (H) 09/27/2019          Review and interpretation of imaging:  MRI Lumbar Spine Without Contrast    Result Date: 2/28/2022  MRI LUMBAR SPINE WO CONTRAST-  CLINICAL HISTORY: Back pain. Leg twitching.  TECHNIQUE: Sagittal T1, T2 and proton-density-weighted images were obtained. Axial T1 and T2-weighted images were also acquired.  COMPARISON: None  FINDINGS: All of the lumbar disks appear normal. There is no disc bulging or focal disc herniation or spinal canal or foraminal stenosis at any level within the lumbar spine. The vertebral bodies are normal in  height. There is normal alignment. There is no abnormal signal intensity within the bone marrow. The conus is normal in position and configuration.      Normal MRI of the lumbar spine without contrast.  This report was finalized on 2/28/2022 7:40 PM by Dr. Phillip Rahman M.D.      Impression:  This patient is a 31-year-old right-handed female with past medical history of ADHD, OCD, underlying bipolar disorder, and fibromyalgia who presented 2/20 with abnormal movements of the right lower extremity.  Yesterday morning when she woke up she was having an involuntary tremor affecting the right lower extremity which she could occasionally get to stop by repositioning her leg.  She has been on Vraylar for her OCD for several months and her Adderall was recently increased.  Her gabapentin was recently increased as well, from 200 mg 3 times daily to 300 mg 3 times daily.  She denied any associated back pain.  The twitching was noted to be worse when trying to ambulate.  MRI L-spine was completed and was unremarkable.  It was felt that her medications may be contributing to this so Adderall was held yesterday and symptoms have resolved without recurrence.    Work-up:  MRI lumbar spine without contrast: Normal/unremarkable.  Labs: TSH 2.4, free T4 1.2, hCG negative, magnesium 2.1, , CMP/CBC unremarkable, sedimentation rate 2.  Covid testing negative this admission.    Diagnosis: Involuntary movements of the right lower extremity, felt most likely related to medication combination of Adderall and Vraylar    Plan:  Recommend going back on previous dose of Adderall.  If symptoms recur patient should try going off her Adderall again.  She should follow-up with her PCP and psychiatrist and if symptoms are ongoing she should can follow-up with neurology, however I would recommend she see one of the physicians, if this does not recur she does not need neurology follow-up.  She can be discharged from neurology standpoint.  D/W  Dr Pop and Courtney GIPSON.

## 2022-03-01 NOTE — DISCHARGE INSTRUCTIONS
Hold your afternoon dose of adderall to ensure your symptoms do not return as this is the likely cause of the spasms you experienced yesterday.

## 2022-03-01 NOTE — OUTREACH NOTE
Prep Survey      Responses   [facility] facility patient discharged from? [facility]   Is LACE score < 7 ? Yes   Emergency Room discharge w/ pulse ox? No   Eligibility [facility]   Date of Admission 02/28/22   Date of Discharge 03/01/22   Discharge Disposition Home or Self Care   Discharge diagnosis muscle spasm   Does the patient have one of the following disease processes/diagnoses(primary or secondary)? Other   Does the patient have Home health ordered? No   Is there a DME ordered? No   Prep survey completed? Yes          Anna Sanchez RN

## 2022-03-01 NOTE — PROGRESS NOTES
[facility]     Progress Note    Patient Name: Sherry Quevedo  : 1990  MRN: [id number]  Primary Care Physician:  Temi Jones APRN  Date of admission: 2022    Subjective   Subjective     Chief Complaint: Right lower extremity spasms    HPI:    Patient is a pleasant afebrile 31 year old  female, admitted to  The observation unit for further evaluation of right leg spasms.  Patient advised to discontinue second dose of Adderall.  No recurrence of symptoms.  MRI lumbar spine negative.  Discussed with neurology last evening and they recommended overnight observation.  No acute events overnight.    Patient was seen and evaluated by Grace GIPSON with neurology and recommends patient being on her previous dose of Adderall without the afternoon dose which is the likely cause of her muscle spasms that occurred yesterday.  Patient is agreeable to plan and discharge home today.    Review of Systems  Review of Systems      Objective   Objective     Vitals:   Temp:  [98 °F (36.7 °C)-98.3 °F (36.8 °C)] 98.3 °F (36.8 °C)  Heart Rate:  [] 87  Resp:  [16-18] 16  BP: ()/(62-82) 95/62  Physical Exam  Physical Exam     GENERAL: 31-year-old female, alert and oriented x3, not distressed  HENT: nares patent, moist mucous membranes  EYES: no scleral icterus  CV: regular rhythm, regular rate  RESPIRATORY: normal effort, clear to auscultation bilaterally, no wheezes, rhonchi, or rales  ABDOMEN: soft, no rebound or guarding, normal bowel sounds  MUSCULOSKELETAL: no deformity  NEURO: alert, moves all extremities, follows commands  SKIN: warm, dry    Result Review    Result Review:  I have personally reviewed the results from the time of this admission to 3/1/2022 05:59 EST and agree with these findings:  [x]  Laboratory  []  Microbiology  [x]  Radiology  []  EKG/Telemetry   []  Cardiology/Vascular   []  Pathology  []  Old records  []  Other:  Most notable findings include:     MRI Lumbar  Spine Without Contrast    Result Date: 2/28/2022  Normal MRI of the lumbar spine without contrast.  This report was finalized on 2/28/2022 7:40 PM by Dr. Phillip Rahman M.D.        Assessment/Plan   Assessment / Plan     Brief Patient Summary:  Sherry Quevedo is a 31 y.o. female who was admitted to observation unit for further evaluation of lower extremity spasm    Active Hospital Problems:  Active Hospital Problems    Diagnosis    • Muscle spasm      Plan:        1. Right lower extremity muscle spasm  -Consult to neurology  -MRI lumbar spine without contrast negative acute  -Likely secondary to home medications, hold Adderall per neurology recommendation  -Muscle spasm PRN if symptoms recur     2. Anxiety  -continue home medications     3. ADHD  -hold adderall/amphetamine drugs as likely is etiology of 1       DVT prophylaxis:  Mechanical DVT prophylaxis orders are present.    CODE STATUS:   Level Of Support Discussed With: Patient  Code Status (Patient has no pulse and is not breathing): CPR (Attempt to Resuscitate)  Medical Interventions (Patient has pulse or is breathing): Full Support    Disposition:  I expect patient to be discharged today.    This note also serves as discharge summary.    I wore an N95 mask, face shield, and gloves during this patient encounter. Patient also wearing a surgical mask. Hand hygeine performed before and after seeing the patient.      Electronically signed by AMALIA Kaufman, 03/01/22, 5:59 AM EST.

## 2022-03-01 NOTE — THERAPY EVALUATION
Patient Name: Sherry Quevedo  : 1990    MRN: [id number]                              Today's Date: 3/1/2022       Admit Date: 2022    Visit Dx:     ICD-10-CM ICD-9-CM   1. Lower extremity myoclonus  G25.3 333.2   2. History of ADHD  Z86.59 V11.8     Patient Active Problem List   Diagnosis   • Anemia   • Chronic allergic rhinitis   • History of renal stone   • Migraine with aura and without status migrainosus, not intractable   • Low serum vitamin B12   • Generalized anxiety disorder   • Multinodular non-toxic goiter   • Irritable bowel syndrome (IBS)   • Migraines   • Thyroid nodule   • Scoliosis   • Rectal bleeding   • BRBPR (bright red blood per rectum)   • PVC's (premature ventricular contractions)   • Thyromegaly   • Benign paroxysmal positional vertigo due to bilateral vestibular disorder   • Near syncope   • Dyspnea on exertion   • Lightheadedness   • Fat necrosis   • Palpitations   • Tachycardia   • SOB (shortness of breath)   • History of Holter monitoring   • Low serum potassium level   • Folic acid deficiency   • Cervical adenopathy   • Endometriosis   • Abnormal gluteal crease   • Decreased sex drive   • Fibromyalgia   • Hypoglycemia   • Breast anomaly   • Ovarian cyst   • Fatigue   • Bruises easily   • Cold intolerance   • Sleep disturbance   • Renal stones   • Chronic recurrent major depressive disorder (HCC)   • Calculus of bile duct   • Transaminitis   • RUQ abdominal mass   • History of miscarriage   • Fissure, anal   • History of gestational diabetes   • Cholestasis during pregnancy in third trimester   • Cholelithiasis   • External hemorrhoids   • Muscle spasm     Past Medical History:   Diagnosis Date   • Abnormal gluteal crease    • Abnormal uterine bleeding (AUB)    • Allergic 2013   • Anemia    • Anxiety    • BRBPR (bright red blood per rectum) 2021    Dr. Nancy Santana at [facility]   • Breast anomaly    • Bruises easily    • Calculus of bile duct 2018    [facility] Admitted  11/07/2018 D/C 11/10/2018, at 27 weeks, calculus of bile duct with cholecystitis without obstruction, Dr. Mehnaz Maravilla, OBGYN Dr. Marcelino Nunn.   • Cervical adenopathy    • Cervical lymphadenopathy     Left side.   • Change in bowel habit 06/02/2021    [facility]. [facility] at [facility]   • Cholelithiasis 11/2018    Gallbladder removed   • Cholestasis during pregnancy in third trimester 2019   • Chronic allergic rhinitis    • Chronic recurrent major depressive disorder (HCC)     In partial remission.   • Cold intolerance    • Constipation    • Decreased sex drive    • Depression     History of PPD   • Diarrhea    • Dizziness 08/06/2021    LEONELA, Temi Mcrae, Cardiologist office.   • Dysmenorrhea 12/04/2019    Surgery: Laparoscopic assisted vaginal hysterectomy, Surgeon Dr. Hillary Nunn.   • Dysphoric mood    • Dyspnea on exertion 08/06/2021    LEONELA, Temi Mcrae, Cardiologist office.   • Endometriosis 12/2019    Laparoscopic assisted vaginal hysterectomy, & bilateral Salpingectomy, Surgeon Dr. Hillary Nunn on 12/04/2019.   • Environmental and seasonal allergies 9/21/2020   • Epigastric abdominal pain 11/07/2018    [facility].   • Fat necrosis 07/29/2021    [facility].   • Fatigue    • Fissure, anal    • Folic acid deficiency    • Generalized anxiety disorder    • Gestational diabetes     with first baby   • H/O TB skin testing 02/20/2018    Negative result at [facility].   • HA (headache)    • History of Holter monitoring 11/07/2019    24 hour.   • History of kidney stones 2006/2009   • History of miscarriage    • History of positive PPD    • History of TB skin testing 02/20/2018    Result: Negative, tested at [facility].   • History of vasectomy 2019    Spouse Vasectomy.   • Hypoglycemia    • Intractable migraine with aura without status migrainosus 10/2019   • Iron deficiency anemia due to chronic blood loss 09/24/2019   • Irritable bowel syndrome 1994    IBS with both  constipation/diarrhea, followed by GI Dr. Moreland.   • Joint stiffness    • Lightheadedness 08/06/2021    LEONELA, Temi Mcrae, Cardiologist office.   • Low serum potassium level    • Low serum vitamin B12    • Malaise and fatigue 11/12/2019    Cardiologist Dr. Benji Ugalde.   • Migraines 10/2019   • Multinodular non-toxic goiter    • Near syncope 08/06/2021    LEONELA, Temi Mcrae, Cardiologist office.   • Neck pain    • Nervously anxious    • Ovarian cyst 2010    Surgery removed.   • Palpitations 10/28/2019   • PVC's (premature ventricular contractions)    • Rapid heart rate 11/12/2019    Cardiologist Dr. Benji Ugalde.   • Rectal bleeding 06/02/2021    Dr. Nancy Santana at ..   • Renal stones    • RUQ abdominal mass [id number]    [facility].   • Scoliosis 2003   • Sleep disturbance    • SOB (shortness of breath) 10/28/2019   • Tachycardia 10/28/2019   • Thyroid nodule 10/28/2019    1 cm Left Submandibular node, [facility], Dr. Kevan Hoffman.   • Thyroid nodule 10/28/2019    0.3 cm Right side of neck, [facility], Dr. Kevan Hoffman.   • Thyromegaly 03/04/2016    [place] Thyromegaly, Findings via X-ray at [facility], ordering provider Dr. Nancy Santana.   • Transaminitis 11/07/2018    [facility].   • Vaginal delivery 01/2019    Baby girl.     Past Surgical History:   Procedure Laterality Date   • ADENOIDECTOMY  2007   • CHOLECYSTECTOMY  11/2018    Was 27 weeks pregnant   • CHOLECYSTECTOMY WITH INTRAOPERATIVE CHOLANGIOGRAM N/A 11/9/2018    Procedure: Laparoscopic cholecystectomy;  Surgeon: Khanh Lassiter MD;  Location: [facility] OR;  Service: General   • COLONOSCOPY  8/2019   • DILATATION AND CURETTAGE N/A 10/2015    MISCARRIAGE.   • LAPAROSCOPIC ASSISTED VAGINAL HYSTERECTOMY Bilateral 12/4/2019    Procedure: LAPAROSCOPIC ASSISTED VAGINAL HYSTERECTOMY, BILATERAL SALPINGECTOMY;  Surgeon: Hillary Nunn MD;  Location: [facility];  Service:  Obstetrics/Gynecology   • OVARIAN CYST SURGERY  2010    Laparoscopic ovarian cyst resection, no other abdominal surgery.   • TONSILLECTOMY  2007   • WISDOM TOOTH EXTRACTION        General Information     Row Name 03/01/22 0834          Physical Therapy Time and Intention    Document Type discharge evaluation/summary  -MS     Mode of Treatment physical therapy  -MS     Row Name 03/01/22 0834          General Information    Patient Profile Reviewed yes  -MS     Prior Level of Function independent:; all household mobility  no prior use of AD  -MS     Existing Precautions/Restrictions fall  -MS     Barriers to Rehab none identified  -MS     Row Name 03/01/22 0834          Living Environment    Lives With child(rigo), dependent; spouse  -MS     Row Name 03/01/22 0834          Cognition    Orientation Status (Cognition) oriented x 4  -MS     Row Name 03/01/22 0834          Safety Issues, Functional Mobility    Comment, Safety Issues/Impairments (Mobility) Nonskid socks and gait belt donned.  -MS           User Key  (r) = Recorded By, (t) = Taken By, (c) = Cosigned By    Initials Name Provider Type    MS Margaret Potter, PT Physical Therapist               Mobility     Row Name 03/01/22 0836          Bed Mobility    Bed Mobility supine-sit; sit-supine  -MS     Supine-Sit [place] (Bed Mobility) independent  -MS     Sit-Supine [place] (Bed Mobility) independent  -MS     Comment (Bed Mobility) SV-SBA for sitting balance.  -MS     Row Name 03/01/22 0836          Sit-Stand Transfer    Sit-Stand [place] (Transfers) supervision  -MS     Row Name 03/01/22 0836          Gait/Stairs (Locomotion)    [place] Level (Gait) supervision  -MS     Distance in Feet (Gait) 150'  -MS     Deviations/Abnormal Patterns (Gait) helder decreased; gait speed decreased  -MS     Bilateral Gait Deviations forward flexed posture  -MS     Comment (Gait/Stairs) No overt LOB or veering noted. No safety concerns demo'd.  -MS            User Key  (r) = Recorded By, (t) = Taken By, (c) = Cosigned By    Initials Name Provider Type    MS LeesMargaret, PT Physical Therapist               Obj/Interventions     Row Name 03/01/22 0836          Range of Motion Comprehensive    General Range of Motion no range of motion deficits identified  -MS     Row Name 03/01/22 0836          Strength Comprehensive (MMT)    General Manual Muscle Testing (MMT) Assessment no strength deficits identified  -MS     Comment, General Manual Muscle Testing (MMT) Assessment B LE MMT 5/5  -MS     Row Name 03/01/22 0836          Balance    Balance Assessment sitting static balance; standing static balance; sitting dynamic balance; standing dynamic balance  -MS     Static Sitting Balance sitting, edge of bed; WFL  -MS     Dynamic Sitting Balance WFL; sitting, edge of bed  -MS     Static Standing Balance standing; WFL  -MS     Dynamic Standing Balance standing; WFL  -MS     Row Name 03/01/22 0836          Sensory Assessment (Somatosensory)    Sensory Assessment (Somatosensory) sensation intact  -MS           User Key  (r) = Recorded By, (t) = Taken By, (c) = Cosigned By    Initials Name Provider Type    Margaret Zimmer, PT Physical Therapist               Goals/Plan    No documentation.                Clinical Impression     Row Name 03/01/22 0838          Pain    Additional Documentation Pain Scale: Numbers Pre/Post-Treatment (Group)  -MS     Row Name 03/01/22 0838          Pain Scale: Numbers Pre/Post-Treatment    Pretreatment Pain Rating 0/10 - no pain  -MS     Row Name 03/01/22 0838          Vital Signs    O2 Delivery Pre Treatment room air  -MS     Row Name 03/01/22 0838          Positioning and Restraints    Pre-Treatment Position in bed  -MS     Post Treatment Position bed  -MS     In Bed fowlers; call light within reach; encouraged to call for assist  -MS           User Key  (r) = Recorded By, (t) = Taken By, (c) = Cosigned By    Initials Name Provider Type    MS  Margaret Potter, PT Physical Therapist               Outcome Measures     Row Name 03/01/22 0839 03/01/22 0816       How much help from another person do you currently need...    Turning from your back to your side while in flat bed without using bedrails? 4  -MS 4  -DM    Moving from lying on back to sitting on the side of a flat bed without bedrails? 4  -MS 4  -DM    Moving to and from a bed to a chair (including a wheelchair)? 4  -MS 4  -DM    Standing up from a chair using your arms (e.g., wheelchair, bedside chair)? 4  -MS 4  -DM    Climbing 3-5 steps with a railing? 4  -MS 4  -DM    To walk in hospital room? 4  -MS 4  -DM    AM-PAC 6 Clicks Score (PT) 24  -MS 24  -DM          User Key  (r) = Recorded By, (t) = Taken By, (c) = Cosigned By    Initials Name Provider Type    Richmond Christianson, RN Registered Nurse    Margaret Zimmer, PT Physical Therapist                               PT Recommendation and Plan           Time Calculation:    PT Charges     Row Name 03/01/22 0834             Time Calculation    Start Time 0800  -MS      Stop Time 0812  -MS      Time Calculation (min) 12 min  -MS      PT Received On 03/01/22  -MS            User Key  (r) = Recorded By, (t) = Taken By, (c) = Cosigned By    Initials Name Provider Type    Margaret Zimmer, PT Physical Therapist              Therapy Charges for Today     Code Description Service Date Service Provider Modifiers Qty    27076892036 HC PT EVAL MOD COMPLEXITY 1 3/1/2022 Margaret Potter, PT GP 1          PT G-Codes  AM-PAC 6 Clicks Score (PT): 24    Margaret Potter, PT  3/1/2022

## 2022-03-01 NOTE — PLAN OF CARE
Goal Outcome Evaluation:      Patient alert and oriented. Patient being discharged home. Discharge paperwork given.

## 2022-03-01 NOTE — PLAN OF CARE
Goal Outcome Evaluation:  Plan of Care Reviewed With: patient        Progress: no change  Outcome Summary: Pt restful and pleasant overnight.  MRI neg.  Neuro to consult this AM.  VSS.  Will continue to monitor.

## 2022-03-01 NOTE — PLAN OF CARE
Goal Outcome Evaluation:    Patient is a 31 y.o. female admitted to [facility] from home for R leg twitching on 2/28/2022. Patient is currently functioning at or near their reported baseline, ind for bed mobility, SV for STS transfers, and SV for ambulation with no AD for 150' ft. No safety concerns demo'd. Patient denies any questions or concerns for PT at this time. Patient has no further acute skilled PT needs at this time and is appropriate for DC home with assist once medically appropriate.    Patient was wearing a face mask during this therapy encounter. Therapist used appropriate personal protective equipment including mask and gloves.  Mask used was standard procedure mask. Appropriate PPE was worn during the entire therapy session. Hand hygiene was completed before and after therapy session. Patient is not in enhanced droplet precautions.

## 2022-03-02 ENCOUNTER — TRANSITIONAL CARE MANAGEMENT TELEPHONE ENCOUNTER (OUTPATIENT)
Dept: CALL CENTER | Facility: HOSPITAL | Age: 32
End: 2022-03-02

## 2022-03-02 NOTE — OUTREACH NOTE
Call Center TCM Note      Responses   [facility] patient discharged from? [place]   Does the patient have one of the following disease processes/diagnoses(primary or secondary)? Other   TCM attempt successful? Yes   Call end time 1217   Discharge diagnosis muscle spasm   Meds reviewed with patient/caregiver? Yes   Is the patient having any side effects they believe may be caused by any medication additions or changes? No   Does the patient have all medications ordered at discharge? Yes   Is the patient taking all medications as directed (includes completed medication regime)? Yes   Does the patient have a primary care provider?  Yes   Does the patient have an appointment with their PCP within 7 days of discharge? No   What is preventing the patient from scheduling follow up appointments within 7 days of discharge? Unsure of when or with whom   Nursing Interventions Educated patient on importance of making appointment,  Advised patient to make appointment   Has the patient kept scheduled appointments due by today? N/A   Psychosocial issues? No   Did the patient receive a copy of their discharge instructions? Yes   Nursing interventions Reviewed instructions with patient   What is the patient's perception of their health status since discharge? Improving   Is the patient/caregiver able to teach back signs and symptoms related to disease process for when to call PCP? Yes   Is the patient/caregiver able to teach back signs and symptoms related to disease process for when to call 911? Yes   Is the patient/caregiver able to teach back the hierarchy of who to call/visit for symptoms/problems? PCP, Specialist, Home health nurse, Urgent Care, ED, 911 Yes   If the patient is a current smoker, are they able to teach back resources for cessation? Not a smoker   TCM call completed? Yes   Wrap up additional comments Spouse states pt is doing better, and back to work. Spouse shares pt will need a PCP fu appt around 4:00  so RN will message office to call pt with fu appt. No questions/concerns.          Yamileth Patel RN    3/2/2022, 12:20 EST

## 2022-03-07 ENCOUNTER — OFFICE VISIT (OUTPATIENT)
Dept: INTERNAL MEDICINE | Facility: CLINIC | Age: 32
End: 2022-03-07

## 2022-03-07 VITALS
SYSTOLIC BLOOD PRESSURE: 108 MMHG | HEIGHT: 63 IN | TEMPERATURE: 97.3 F | OXYGEN SATURATION: 97 % | DIASTOLIC BLOOD PRESSURE: 70 MMHG | HEART RATE: 103 BPM | BODY MASS INDEX: 26.91 KG/M2 | WEIGHT: 151.9 LBS

## 2022-03-07 DIAGNOSIS — E83.51 HYPOCALCEMIA: ICD-10-CM

## 2022-03-07 DIAGNOSIS — M62.838 MUSCLE SPASM OF RIGHT LEG: ICD-10-CM

## 2022-03-07 DIAGNOSIS — D69.6 THROMBOCYTOPENIA: ICD-10-CM

## 2022-03-07 DIAGNOSIS — G25.81 RESTLESS LEG SYNDROME: ICD-10-CM

## 2022-03-07 DIAGNOSIS — Z78.9 DRUG INTERACTION: ICD-10-CM

## 2022-03-07 DIAGNOSIS — Z09 HOSPITAL DISCHARGE FOLLOW-UP: Primary | ICD-10-CM

## 2022-03-07 PROCEDURE — 99495 TRANSJ CARE MGMT MOD F2F 14D: CPT | Performed by: NURSE PRACTITIONER

## 2022-03-07 RX ORDER — BUPROPION HYDROCHLORIDE 150 MG/1
150 TABLET ORAL EVERY MORNING
COMMUNITY
Start: 2022-02-24 | End: 2022-06-14

## 2022-03-07 RX ORDER — GABAPENTIN 300 MG/1
300 CAPSULE ORAL 3 TIMES DAILY
COMMUNITY
Start: 2022-02-16 | End: 2022-05-09 | Stop reason: DRUGHIGH

## 2022-03-07 NOTE — PROGRESS NOTES
Subjective   Sherry Quevedo is a 31 y.o. female.     Chief Complaint   Patient presents with   • leg shaking     Pt is here as a hospital follow up for rt leg shaky last week.        History of Present Illness   She is here today for hospital follow-up for right lower extremity myoclonus.    She presented to [facility] ER on 2/28 with c/o acute right lower extremity shaking. She was painting when symptoms began in the right leg, started as a muscle cramp in the right foot and progressed up the leg. Symptoms were worse with walking. This lasted 3-3 1/2 hours. Her psychiatrist had recently increased her Adderall to 45 mg qAm and 30 mg at night, as well as increased her gabapentin to 300 mg TID.  She was admitted for further evaluation.  Neurology was consulted, who felt this was a drug interaction with Vraylar and Adderall at higher doses as both medications are dopamine and norepinephrine receptor agonists.  Adderall was held.  MRI of the lumbar spine was completed, normal.  Lab results were normal overall, she was found to have hypocalcemia of 8 and mild thrombocytopenia with platelets of 138 prior to discharge.  She was discharged on 3/1 to f/u with PCP. She was instructed to f/u with neurology if symptoms return.   She is feeling well today. She has had improvement in RLE twitching. She is currently down to Adderall 30 mg BID and Vraylar 1.5 mg daily. She had been prescribed Wellbutrin 150 mg by psychiatry, but has not started this yet. She is waiting to speak with her psychiatrists regarding recent events requiring hospitalization.  She does note that she has intermittent restless leg sensation during the day, worse at night. She struggles with hydrating well and has episodes of leg muscle cramps. She has tried a weighted blanket without improvement.     The following portions of the patient's history were reviewed and updated as appropriate: allergies, current medications, past family history, past medical  history, past social history, past surgical history and problem list.    Review of Systems   Constitutional: Negative for chills, fatigue and fever.   Respiratory: Negative for cough, chest tightness, shortness of breath and wheezing.    Cardiovascular: Negative for chest pain, palpitations and leg swelling.   Musculoskeletal: Negative for arthralgias, gait problem and myalgias.        Restless legs     Neurological: Positive for light-headedness (occasional with position changes). Negative for dizziness, tremors, seizures, syncope, weakness, numbness, headache, memory problem and confusion.   Psychiatric/Behavioral: Negative for suicidal ideas and depressed mood. The patient is not nervous/anxious.        Objective   Physical Exam  Constitutional:       Appearance: She is well-developed.   Neck:      Thyroid: No thyroid mass, thyromegaly or thyroid tenderness.      Vascular: No carotid bruit.      Trachea: Trachea normal.   Cardiovascular:      Rate and Rhythm: Normal rate and regular rhythm.      Chest Wall: PMI is not displaced.      Pulses:           Radial pulses are 2+ on the right side and 2+ on the left side.        Dorsalis pedis pulses are 2+ on the right side and 2+ on the left side.        Posterior tibial pulses are 2+ on the right side and 2+ on the left side.      Heart sounds: S1 normal and S2 normal.   Pulmonary:      Effort: Pulmonary effort is normal.      Breath sounds: Normal breath sounds.   Musculoskeletal:      Right lower leg: No edema.      Left lower leg: No edema.   Lymphadenopathy:      Head:      Right side of head: No submental, submandibular, tonsillar or occipital adenopathy.      Left side of head: No submental, submandibular, tonsillar or occipital adenopathy.      Cervical: No cervical adenopathy.   Skin:     General: Skin is warm and dry.      Capillary Refill: Capillary refill takes less than 2 seconds.      Nails: There is no clubbing.   Neurological:      General: No focal  deficit present.      Mental Status: She is alert and oriented to person, place, and time. Mental status is at baseline.      Cranial Nerves: Cranial nerves are intact.      Sensory: Sensation is intact.      Motor: Motor function is intact.      Coordination: Coordination is intact.      Gait: Gait is intact.      Deep Tendon Reflexes:      Reflex Scores:       Patellar reflexes are 2+ on the right side and 2+ on the left side.     Comments: Bilateral LE strength 5/5 symmetric and equal.    Psychiatric:         Attention and Perception: Attention normal.         Mood and Affect: Mood and affect normal.         Speech: Speech normal.         Behavior: Behavior normal.         Thought Content: Thought content normal.         Cognition and Memory: Cognition normal.         Vitals:    03/07/22 1548   BP: 108/70   Pulse: 103   Temp: 97.3 °F (36.3 °C)   SpO2: 97%      Body mass index is 26.91 kg/m².    Assessment/Plan   Diagnoses and all orders for this visit:    1. Hospital discharge follow-up (Primary)  -     CBC & Differential  -     Basic Metabolic Panel    2. Muscle spasm of right leg  -     CBC & Differential  -     Basic Metabolic Panel    3. Drug interaction  -     CBC & Differential  -     Basic Metabolic Panel    4. Restless leg syndrome  -     CBC & Differential  -     Basic Metabolic Panel    5. Hypocalcemia  -     Basic Metabolic Panel    6. Thrombocytopenia (HCC)  -     CBC & Differential      1. Hospital discharge follow up- symptoms have improved overall. Discussed with patient to hold Wellbutrin until speaking with psychiatry as this medication works as a dopamine and norepinephrine agonists and could potentially cause return of symptoms. Will re-check BMP and CBC tomorrow.  2. Restless leg syndrome- recommend starting OTC folic acid supplement as her folate level was low. Encouraged her to hydrate well with water. Can try magnesium 200 mg at bedtime. If no improvement could consider prescription  medication.

## 2022-03-22 LAB
BASOPHILS # BLD AUTO: 0.1 X10E3/UL (ref 0–0.2)
BASOPHILS NFR BLD AUTO: 1 %
BUN SERPL-MCNC: 13 MG/DL (ref 6–20)
BUN/CREAT SERPL: 15 (ref 9–23)
CALCIUM SERPL-MCNC: 9.3 MG/DL (ref 8.7–10.2)
CHLORIDE SERPL-SCNC: 104 MMOL/L (ref 96–106)
CO2 SERPL-SCNC: 22 MMOL/L (ref 20–29)
CREAT SERPL-MCNC: 0.87 MG/DL (ref 0.57–1)
EGFRCR SERPLBLD CKD-EPI 2021: 91 ML/MIN/1.73
EOSINOPHIL # BLD AUTO: 0.2 X10E3/UL (ref 0–0.4)
EOSINOPHIL NFR BLD AUTO: 3 %
ERYTHROCYTE [DISTWIDTH] IN BLOOD BY AUTOMATED COUNT: 12.4 % (ref 11.7–15.4)
FOLATE SERPL-MCNC: 4.8 NG/ML
GLUCOSE SERPL-MCNC: 80 MG/DL (ref 65–99)
HCT VFR BLD AUTO: 41.9 % (ref 34–46.6)
HGB BLD-MCNC: 14.2 G/DL (ref 11.1–15.9)
IMM GRANULOCYTES # BLD AUTO: 0 X10E3/UL (ref 0–0.1)
IMM GRANULOCYTES NFR BLD AUTO: 0 %
LYMPHOCYTES # BLD AUTO: 2.4 X10E3/UL (ref 0.7–3.1)
LYMPHOCYTES NFR BLD AUTO: 35 %
MCH RBC QN AUTO: 30.9 PG (ref 26.6–33)
MCHC RBC AUTO-ENTMCNC: 33.9 G/DL (ref 31.5–35.7)
MCV RBC AUTO: 91 FL (ref 79–97)
MONOCYTES # BLD AUTO: 0.4 X10E3/UL (ref 0.1–0.9)
MONOCYTES NFR BLD AUTO: 6 %
NEUTROPHILS # BLD AUTO: 3.9 X10E3/UL (ref 1.4–7)
NEUTROPHILS NFR BLD AUTO: 55 %
PLATELET # BLD AUTO: 175 X10E3/UL (ref 150–450)
POTASSIUM SERPL-SCNC: 4.1 MMOL/L (ref 3.5–5.2)
RBC # BLD AUTO: 4.59 X10E6/UL (ref 3.77–5.28)
SODIUM SERPL-SCNC: 142 MMOL/L (ref 134–144)
WBC # BLD AUTO: 6.9 X10E3/UL (ref 3.4–10.8)

## 2022-05-09 ENCOUNTER — OFFICE VISIT (OUTPATIENT)
Dept: SURGERY | Facility: CLINIC | Age: 32
End: 2022-05-09

## 2022-05-09 VITALS
TEMPERATURE: 97 F | BODY MASS INDEX: 26.19 KG/M2 | HEART RATE: 108 BPM | DIASTOLIC BLOOD PRESSURE: 86 MMHG | HEIGHT: 63 IN | SYSTOLIC BLOOD PRESSURE: 104 MMHG | OXYGEN SATURATION: 100 % | WEIGHT: 147.8 LBS

## 2022-05-09 DIAGNOSIS — K64.8 INTERNAL HEMORRHOIDS WITH COMPLICATION: ICD-10-CM

## 2022-05-09 DIAGNOSIS — K64.4 EXTERNAL HEMORRHOIDS: Primary | ICD-10-CM

## 2022-05-09 PROCEDURE — 99213 OFFICE O/P EST LOW 20 MIN: CPT | Performed by: COLON & RECTAL SURGERY

## 2022-05-09 RX ORDER — CYCLOBENZAPRINE HCL 5 MG
5 TABLET ORAL DAILY PRN
COMMUNITY
Start: 2022-04-15 | End: 2022-06-30

## 2022-05-09 RX ORDER — METHYLPREDNISOLONE 4 MG/1
TABLET ORAL
COMMUNITY
Start: 2022-03-30 | End: 2022-05-09

## 2022-05-09 RX ORDER — GABAPENTIN 400 MG/1
400 CAPSULE ORAL 3 TIMES DAILY
COMMUNITY
Start: 2022-04-15 | End: 2022-10-24

## 2022-05-09 NOTE — PROGRESS NOTES
"Chief Complaint  Hemorrhoids    Subjective     {Problem List  Visit Diagnosis   Encounters  Notes  Medications  Labs  Result Review Imaging  Media :23}     Sherry Quevedo presents to [facility] COLORECTAL SURGERY  History of Present Illness     The patient was seen last in 12/2021 with complaints of enlarged hemorrhoids with bleeding and large clots. She has done banding and infrared in addition to hydrocortisone-based treatments with very little long-term improvement. The patient states that the bleeding is not any better. She reports that she can have a hard bowel movement and not have any bleeding. She states that she will urinate and wipe its blood there. She reports that it is like she is on her period. The patient reports that she will use the cream when it gets really bad. She states that she has been using the Proctofoam, she only uses it when it is really hurting. She states that the medication is very expensive.     Objective   Vital Signs:  /86 (BP Location: Left arm, Patient Position: Sitting, Cuff Size: Large Adult)   Pulse 108   Temp 97 °F (36.1 °C)   Ht 160 cm (63\")   Wt 67 kg (147 lb 12.8 oz)   SpO2 100%   BMI 26.18 kg/m²     {BMI is >= 25 and < 30. (Overweight) The following options were offered after discussion (Optional):[id number]}      Physical Exam  Genitourinary:     Comments: Perianal exam grade 4 right anterior and enlarged right posterior.       Result Review :{Labs  Result Review  Imaging  Med Tab  Media  Procedures :23}   {The following data was reviewed by (Optional):14302}  {Ambulatory Labs (Optional):74646}  {Data reviewed (Optional):53097:::1}          Assessment and Plan {CC Problem List  Visit Diagnosis   ROS  Review (Popup)  Health Maintenance  Quality  BestPractice  Medications  SmartSets  SnapShot Encounters  Media :23}   1. Hemorrhoids         - I described with patient typical surgical time, postop recovery including " pain management, and restrictions. I discussed with  patient  risks, benefits, and alternatives.  The patient had opportunity to ask questions.  I answered all questions.  Patient  understands and  wishes to proceed with procedure.        - I encouraged the patient to begin taking a stool softener. I recommended MiraLax.         - Patient will schedule procedure       {Time Spent (Optional):89490}  Follow Up {Instructions Charge Capture  Follow-up Communications :23}  No follow-ups on file.  Patient was given instructions and counseling regarding her condition or for health maintenance advice. Please see specific information pulled into the AVS if appropriate.     Transcribed from ambient dictation for Linda Sanchez MD by Codie Nieto.  05/09/22   17:49 EDT    Patient verbalized consent to the visit recording.

## 2022-05-09 NOTE — PROGRESS NOTES
Sherry Quevedo is a 31 y.o. female in for follow up of External hemorrhoids    Internal hemorrhoids with complication       The patient was seen last in 12/2021 with complaints of enlarged hemorrhoids with bleeding and large clots. She has done banding and infrared in addition to hydrocortisone-based treatments with very little long-term improvement. The patient states that the bleeding is not any better. She reports that she can have a hard bowel movement and not have any bleeding. She states that she will urinate and wipe its blood there. She reports that it is like she is on her period. The patient reports that she will use the cream when it gets really bad. She states that she has been using the Proctofoam, she only uses it when it is really hurting. She states that the medication is very expensive.   Past Medical History:   Diagnosis Date   • Abnormal gluteal crease    • Abnormal uterine bleeding (AUB)    • ADHD (attention deficit hyperactivity disorder) 2020   • Allergic 01/2013   • Anemia    • Anxiety    • BRBPR (bright red blood per rectum) 06/02/2021    Dr. Nancy Santana at [facility]   • Breast anomaly    • Bruises easily    • Calculus of bile duct 11/07/2018    [facility] Admitted 11/07/2018 D/C 11/10/2018, at 27 weeks, calculus of bile duct with cholecystitis without obstruction, Dr. Mehnaz Maravilla, OBGYN Dr. Marcelino Nunn.   • Cervical adenopathy    • Cervical lymphadenopathy     Left side.   • Change in bowel habit 06/02/2021    Dr. Nancy Santana at [facility]   • Cholelithiasis 11/2018    Gallbladder removed   • Cholestasis during pregnancy in third trimester 2019   • Chronic allergic rhinitis    • Chronic recurrent major depressive disorder (HCC)     In partial remission.   • Cold intolerance    • Constipation    • Decreased sex drive    • Depression     History of PPD   • Diarrhea    • Dizziness 08/06/2021    LEONELA, Temi Mcrae, Cardiologist office.   • Dysmenorrhea 12/04/2019    Surgery: Laparoscopic  assisted vaginal hysterectomy, Surgeon Dr. Hillary Nunn.   • Dysphoric mood    • Dyspnea on exertion 08/06/2021    Temi GIPSON, Cardiologist office.   • Endometriosis 12/2019    Laparoscopic assisted vaginal hysterectomy, & bilateral Salpingectomy, Surgeon Dr. Hillary Nunn on 12/04/2019.   • Environmental and seasonal allergies 09/21/2020   • Epigastric abdominal pain 11/07/2018    [facility].   • Fat necrosis 07/29/2021    [facility].   • Fatigue    • Fibromyalgia, primary 2021   • Fissure, anal    • Folic acid deficiency    • Generalized anxiety disorder    • Gestational diabetes     with first baby   • H/O TB skin testing 02/20/2018    Negative result at [facility].   • HA (headache)    • History of Holter monitoring 11/07/2019    24 hour.   • History of kidney stones 2006/2009   • History of miscarriage    • History of positive PPD    • History of TB skin testing 02/20/2018    Result: Negative, tested at [facility].   • History of vasectomy 2019    Spouse Vasectomy.   • Hypoglycemia    • Intractable migraine with aura without status migrainosus 10/2019   • Iron deficiency anemia due to chronic blood loss 09/24/2019   • Irritable bowel syndrome 1994    IBS with both constipation/diarrhea, followed by GI Dr. Moreland.   • Joint stiffness    • Lightheadedness 08/06/2021    Temi GIPSON, Cardiologist office.   • Low serum potassium level    • Low serum vitamin B12    • Malaise and fatigue 11/12/2019    Cardiologist Dr. Benji Ugalde.   • Migraines 10/2019   • Multinodular non-toxic goiter    • Near syncope 08/06/2021    Temi GIPSON, Cardiologist office.   • Neck pain    • Nervously anxious    • Ovarian cyst 2010    Surgery removed.   • Palpitations 10/28/2019   • PVC's (premature ventricular contractions)    • Rapid heart rate 11/12/2019    Cardiologist Dr. Benji Ugalde.   • Rectal bleeding 06/02/2021    Dr. Nancy Santana at [facility]   •  "Renal stones    • RUQ abdominal mass [id number]    [facility].   • Scoliosis 2003   • Sleep disturbance    • SOB (shortness of breath) 10/28/2019   • Tachycardia 10/28/2019   • Thyroid nodule 10/28/2019    1 cm Left Submandibular node, [facility], Dr. Kevan HUTCHINSON [place].   • Thyroid nodule 10/28/2019    0.3 cm Right side of neck, [facility], Dr. Kevan HUTCHINSON [place].   • Thyromegaly 03/04/2016    [place] Thyromegaly, Findings via X-ray at [facility], ordering provider Dr. Nancy Santana.   • Transaminitis 11/07/2018    [facility].   • Vaginal delivery 01/2019    Baby girl.     Past Surgical History:   Procedure Laterality Date   • ADENOIDECTOMY  2007   • CHOLECYSTECTOMY  11/2018    Was 27 weeks pregnant   • CHOLECYSTECTOMY WITH INTRAOPERATIVE CHOLANGIOGRAM N/A 11/9/2018    Procedure: Laparoscopic cholecystectomy;  Surgeon: Khanh Lassiter MD;  Location: [facility] OR;  Service: General   • COLONOSCOPY  8/2019   • DILATATION AND CURETTAGE N/A 10/2015    MISCARRIAGE.   • LAPAROSCOPIC ASSISTED VAGINAL HYSTERECTOMY Bilateral 12/4/2019    Procedure: LAPAROSCOPIC ASSISTED VAGINAL HYSTERECTOMY, BILATERAL SALPINGECTOMY;  Surgeon: Hillary Nunn MD;  [place] [facility] [place] [facility]  Service: Obstetrics/Gynecology   • OVARIAN CYST SURGERY  2010    Laparoscopic ovarian cyst resection, no other abdominal surgery.   • TONSILLECTOMY  2007   • WISDOM TOOTH EXTRACTION         /86 (BP Location: Left arm, Patient Position: Sitting, Cuff Size: Large Adult)   Pulse 108   Temp 97 °F (36.1 °C)   Ht 160 cm (63\")   Wt 67 kg (147 lb 12.8 oz)   LMP  (LMP Unknown)   SpO2 100%   Breastfeeding No   BMI 26.18 kg/m²   Body mass index is 26.18 kg/m².      PE:  Physical Exam  Constitutional:       General: She is not in acute distress.     Appearance: She is well-developed.   HENT:      Head: Normocephalic and atraumatic.   Abdominal:      General: There is no distension.      Palpations: Abdomen is soft. "   Genitourinary:     Comments: Comments: Perianal exam grade 4 right anterior and enlarged right posterior.  Musculoskeletal:         General: Normal range of motion.   Neurological:      Mental Status: She is alert.   Psychiatric:         Thought Content: Thought content normal.           Assessment:   1. External hemorrhoids    2. Internal hemorrhoids with complication         Plan:        -     I described with patient typical surgical time, postop recovery including pain management, and restrictions. I discussed with       patient             risks, benefits, and alternatives.  The patient had opportunity to ask questions.  I answered all questions.  Patient     understands and     wishes to proceed with procedure.        -     I encouraged the patient to begin taking a stool softener. I recommended MiraLax.         -     Patient will schedule procedure     Transcribed from ambient dictation for Linda Sanchez MD by Codie Nieto.  05/09/22   17:49 EDT     Patient verbalized consent to the visit recording.  This patient was evaluated by me, recommendations made, documentation reviewed, edited, and revised by me, Linda Sanchez MD

## 2022-06-14 ENCOUNTER — PRE-ADMISSION TESTING (OUTPATIENT)
Dept: PREADMISSION TESTING | Facility: HOSPITAL | Age: 32
End: 2022-06-14

## 2022-06-14 VITALS
RESPIRATION RATE: 20 BRPM | BODY MASS INDEX: 28 KG/M2 | WEIGHT: 158 LBS | OXYGEN SATURATION: 100 % | HEART RATE: 83 BPM | TEMPERATURE: 99.8 F | HEIGHT: 63 IN | DIASTOLIC BLOOD PRESSURE: 68 MMHG | SYSTOLIC BLOOD PRESSURE: 107 MMHG

## 2022-06-14 DIAGNOSIS — K64.4 EXTERNAL HEMORRHOIDS: ICD-10-CM

## 2022-06-14 LAB
ANION GAP SERPL CALCULATED.3IONS-SCNC: 8.4 MMOL/L (ref 5–15)
BUN SERPL-MCNC: 14 MG/DL (ref 6–20)
BUN/CREAT SERPL: 16.5 (ref 7–25)
CALCIUM SPEC-SCNC: 8.9 MG/DL (ref 8.6–10.5)
CHLORIDE SERPL-SCNC: 108 MMOL/L (ref 98–107)
CO2 SERPL-SCNC: 24.6 MMOL/L (ref 22–29)
CREAT SERPL-MCNC: 0.85 MG/DL (ref 0.57–1)
DEPRECATED RDW RBC AUTO: 39.6 FL (ref 37–54)
EGFRCR SERPLBLD CKD-EPI 2021: 92.9 ML/MIN/1.73
ERYTHROCYTE [DISTWIDTH] IN BLOOD BY AUTOMATED COUNT: 12.5 % (ref 12.3–15.4)
GLUCOSE SERPL-MCNC: 85 MG/DL (ref 65–99)
HCT VFR BLD AUTO: 36.6 % (ref 34–46.6)
HGB BLD-MCNC: 12.6 G/DL (ref 12–15.9)
MCH RBC QN AUTO: 30.3 PG (ref 26.6–33)
MCHC RBC AUTO-ENTMCNC: 34.4 G/DL (ref 31.5–35.7)
MCV RBC AUTO: 88 FL (ref 79–97)
PLATELET # BLD AUTO: 157 10*3/MM3 (ref 140–450)
PMV BLD AUTO: 9 FL (ref 6–12)
POTASSIUM SERPL-SCNC: 4 MMOL/L (ref 3.5–5.2)
RBC # BLD AUTO: 4.16 10*6/MM3 (ref 3.77–5.28)
SARS-COV-2 ORF1AB RESP QL NAA+PROBE: NOT DETECTED
SODIUM SERPL-SCNC: 141 MMOL/L (ref 136–145)
WBC NRBC COR # BLD: 6.08 10*3/MM3 (ref 3.4–10.8)

## 2022-06-14 PROCEDURE — 85027 COMPLETE CBC AUTOMATED: CPT

## 2022-06-14 PROCEDURE — 80048 BASIC METABOLIC PNL TOTAL CA: CPT

## 2022-06-14 PROCEDURE — C9803 HOPD COVID-19 SPEC COLLECT: HCPCS

## 2022-06-14 PROCEDURE — U0004 COV-19 TEST NON-CDC HGH THRU: HCPCS

## 2022-06-14 PROCEDURE — 36415 COLL VENOUS BLD VENIPUNCTURE: CPT

## 2022-06-14 RX ORDER — CHLORHEXIDINE GLUCONATE 500 MG/1
1 CLOTH TOPICAL
COMMUNITY
End: 2022-06-16 | Stop reason: HOSPADM

## 2022-06-14 RX ORDER — ACETAMINOPHEN 325 MG/1
650 TABLET ORAL EVERY 6 HOURS PRN
COMMUNITY
End: 2022-07-22

## 2022-06-14 RX ORDER — IBUPROFEN 200 MG
400 TABLET ORAL EVERY 6 HOURS PRN
COMMUNITY
End: 2022-10-24

## 2022-06-14 RX ORDER — BUPROPION HYDROCHLORIDE 300 MG/1
300 TABLET ORAL EVERY MORNING
COMMUNITY
End: 2022-07-27 | Stop reason: SDUPTHER

## 2022-06-14 RX ORDER — ACETAMINOPHEN, ASPIRIN AND CAFFEINE 250; 250; 65 MG/1; MG/1; MG/1
2 TABLET, FILM COATED ORAL EVERY 6 HOURS PRN
COMMUNITY
End: 2022-06-30

## 2022-06-14 NOTE — DISCHARGE INSTRUCTIONS
Take the following medications the morning of surgery:    Wellbutrin   gabapentin    If you are on prescription narcotic pain medication to control your pain you may also take that medication the morning of surgery.    General Instructions:  Do not eat solid food after midnight the night before surgery.  You may drink clear liquids day of surgery but must stop at least one hour before your hospital arrival time.  It is beneficial for you to have a clear drink that contains carbohydrates the day of surgery.  We suggest a 12 to 20 ounce bottle of Gatorade or Powerade for non-diabetic patients or a 12 to 20 ounce bottle of G2 or Powerade Zero for diabetic patients. (Pediatric patients, are not advised to drink a 12 to 20 ounce carbohydrate drink)    Clear liquids are liquids you can see through.  Nothing red in color.     Plain water                               Sports drinks  Sodas                                   Gelatin (Jell-O)  Fruit juices without pulp such as white grape juice and apple juice  Popsicles that contain no fruit or yogurt  Tea or coffee (no cream or milk added)  Gatorade / Powerade  G2 / Powerade Zero    Infants may have breast milk up to four hours before surgery.  Infants drinking formula may drink formula up to six hours before surgery.   Patients who avoid smoking, chewing tobacco and alcohol for 4 weeks prior to surgery have a reduced risk of post-operative complications.  Quit smoking as many days before surgery as you can.  Do not smoke, use chewing tobacco or drink alcohol the day of surgery.   If applicable bring your C-PAP/ BI-PAP machine.  Bring any papers given to you in the doctor’s office.  Wear clean comfortable clothes.  Do not wear contact lenses, false eyelashes or make-up.  Bring a case for your glasses.   Bring crutches or walker if applicable.  Remove all piercings.  Leave jewelry and any other valuables at home.  Hair extensions with metal clips must be removed prior to  surgery.  The Pre-Admission Testing nurse will instruct you to bring medications if unable to obtain an accurate list in Pre-Admission Testing.        If you were given a blood bank ID arm band remember to bring it with you the day of surgery.    Preventing a Surgical Site Infection:  For 2 to 3 days before surgery, avoid shaving with a razor because the razor can irritate skin and make it easier to develop an infection.    Any areas of open skin can increase the risk of a post-operative wound infection by allowing bacteria to enter and travel throughout the body.  Notify your surgeon if you have any skin wounds / rashes even if it is not near the expected surgical site.  The area will need assessed to determine if surgery should be delayed until it is healed.  The night prior to surgery shower using a fresh bar of anti-bacterial soap (such as Dial) and clean washcloth.  Sleep in a clean bed with clean clothing.  Do not allow pets to sleep with you.  Shower on the morning of surgery using a fresh bar of anti-bacterial soap (such as Dial) and clean washcloth.  Dry with a clean towel and dress in clean clothing.  Ask your surgeon if you will be receiving antibiotics prior to surgery.  Make sure you, your family, and all healthcare providers clean their hands with soap and water or an alcohol based hand  before caring for you or your wound.    Day of surgery:6/16/2022   0530  Your arrival time is approximately two hours before your scheduled surgery time.  Upon arrival, a Pre-op nurse and Anesthesiologist will review your health history, obtain vital signs, and answer questions you may have.  The only belongings needed at this time will be a list of your home medications and if applicable your C-PAP/BI-PAP machine.  A Pre-op nurse will start an IV and you may receive medication in preparation for surgery, including something to help you relax.     Please be aware that surgery does come with discomfort.  We  want to make every effort to control your discomfort so please discuss any uncontrolled symptoms with your nurse.   Your doctor will most likely have prescribed pain medications.      If you are going home after surgery you will receive individualized written care instructions before being discharged.  A responsible adult must drive you to and from the hospital on the day of your surgery and stay with you for 24 hours.  Discharge prescriptions can be filled by the hospital pharmacy during regular pharmacy hours.  If you are having surgery late in the day/evening your prescription may be e-prescribed to your pharmacy.  Please verify your pharmacy hours or chose a 24 hour pharmacy to avoid not having access to your prescription because your pharmacy has closed for the day.    If you are staying overnight following surgery, you will be transported to your hospital room following the recovery period.  [facility] has all private rooms.    If you have any questions please call Pre-Admission Testing at [phone].  Deductibles and co-payments are collected on the day of service. Please be prepared to pay the required co-pay, deductible or deposit on the day of service as defined by your plan.    Patient Education for Self-Quarantine Process    Following your COVID testing, we strongly recommend that you wear a mask when you are with other people and practice social distancing.   Limit your activities to only required outings.  Wash your hands with soap and water frequently for at least 20 seconds.   Avoid touching your eyes, nose and mouth with unwashed hands.  Do not share anything - utensils, drinking glasses, food from the same bowl.   Sanitize household surfaces daily. Include all high touch areas (door handles, light switches, phones, countertops, etc.)    Call your surgeon immediately if you experience any of the following symptoms:  Sore Throat  Shortness of Breath or difficulty  breathing  Cough  Chills  Body soreness or muscle pain  Headache  Fever  New loss of taste or smell  Do not arrive for your surgery ill.  Your procedure will need to be rescheduled to another time.  You will need to call your physician before the day of surgery to avoid any unnecessary exposure to hospital staff as well as other patients.   CHLORHEXIDINE CLOTH INSTRUCTIONS  The morning of surgery follow these instructions using the Chlorhexidine cloths you've been given.  These steps reduce bacteria on the body.  Do not use the cloths near your eyes, ears mouth, genitalia or on open wounds.  Throw the cloths away after use but do not try to flush them down a toilet.      Open and remove one cloth at a time from the package.    Leave the cloth unfolded and begin the bathing.  Massage the skin with the cloths using gentle pressure to remove bacteria.  Do not scrub harshly.   Follow the steps below with one 2% CHG cloth per area (6 total cloths).  One cloth for neck, shoulders and chest.  One cloth for both arms, hands, fingers and underarms (do underarms last).  One cloth for the abdomen followed by groin.  One cloth for right leg and foot including between the toes.  One cloth for left leg and foot including between the toes.  The last cloth is to be used for the back of the neck, back and buttocks.    Allow the CHG to air dry 3 minutes on the skin which will give it time to work and decrease the chance of irritation.  The skin may feel sticky until it is dry.  Do not rinse with water or any other liquid or you will lose the beneficial effects of the CHG.  If mild skin irritation occurs, do rinse the skin to remove the CHG.  Report this to the nurse at time of admission.  Do not apply lotions, creams, ointments, deodorants or perfumes after using the clothes. Dress in clean clothes before coming to the hospital.

## 2022-06-16 ENCOUNTER — ANESTHESIA EVENT (OUTPATIENT)
Dept: PERIOP | Facility: HOSPITAL | Age: 32
End: 2022-06-16

## 2022-06-16 ENCOUNTER — TELEPHONE (OUTPATIENT)
Dept: SURGERY | Facility: CLINIC | Age: 32
End: 2022-06-16

## 2022-06-16 ENCOUNTER — HOSPITAL ENCOUNTER (OUTPATIENT)
Facility: HOSPITAL | Age: 32
Setting detail: HOSPITAL OUTPATIENT SURGERY
Discharge: HOME | End: 2022-06-16
Attending: COLON & RECTAL SURGERY | Admitting: COLON & RECTAL SURGERY

## 2022-06-16 ENCOUNTER — ANESTHESIA (OUTPATIENT)
Dept: PERIOP | Facility: HOSPITAL | Age: 32
End: 2022-06-16

## 2022-06-16 VITALS
WEIGHT: 158 LBS | SYSTOLIC BLOOD PRESSURE: 115 MMHG | HEART RATE: 71 BPM | RESPIRATION RATE: 16 BRPM | OXYGEN SATURATION: 100 % | TEMPERATURE: 97.6 F | BODY MASS INDEX: 28 KG/M2 | DIASTOLIC BLOOD PRESSURE: 80 MMHG | HEIGHT: 63 IN

## 2022-06-16 DIAGNOSIS — K64.4 EXTERNAL HEMORRHOIDS: ICD-10-CM

## 2022-06-16 PROCEDURE — 25010000002 CEFOXITIN PER 1 G: Performed by: PHYSICIAN ASSISTANT

## 2022-06-16 PROCEDURE — 88304 TISSUE EXAM BY PATHOLOGIST: CPT | Performed by: COLON & RECTAL SURGERY

## 2022-06-16 PROCEDURE — 46260 REMOVE IN/EX HEM GROUPS 2+: CPT | Performed by: COLON & RECTAL SURGERY

## 2022-06-16 PROCEDURE — 25010000002 ONDANSETRON PER 1 MG: Performed by: NURSE ANESTHETIST, CERTIFIED REGISTERED

## 2022-06-16 PROCEDURE — 25010000002 KETOROLAC TROMETHAMINE PER 15 MG: Performed by: NURSE ANESTHETIST, CERTIFIED REGISTERED

## 2022-06-16 PROCEDURE — 25010000002 PROPOFOL 10 MG/ML EMULSION: Performed by: NURSE ANESTHETIST, CERTIFIED REGISTERED

## 2022-06-16 PROCEDURE — 25010000002 DEXAMETHASONE PER 1 MG: Performed by: NURSE ANESTHETIST, CERTIFIED REGISTERED

## 2022-06-16 PROCEDURE — 25010000002 FENTANYL CITRATE (PF) 50 MCG/ML SOLUTION: Performed by: NURSE ANESTHETIST, CERTIFIED REGISTERED

## 2022-06-16 PROCEDURE — 25010000002 MIDAZOLAM PER 1 MG: Performed by: ANESTHESIOLOGY

## 2022-06-16 RX ORDER — PROMETHAZINE HYDROCHLORIDE 25 MG/1
25 TABLET ORAL ONCE AS NEEDED
Status: DISCONTINUED | OUTPATIENT
Start: 2022-06-16 | End: 2022-06-16 | Stop reason: HOSPADM

## 2022-06-16 RX ORDER — LIDOCAINE HYDROCHLORIDE 20 MG/ML
INJECTION, SOLUTION INFILTRATION; PERINEURAL AS NEEDED
Status: DISCONTINUED | OUTPATIENT
Start: 2022-06-16 | End: 2022-06-16 | Stop reason: SURG

## 2022-06-16 RX ORDER — LABETALOL HYDROCHLORIDE 5 MG/ML
5 INJECTION, SOLUTION INTRAVENOUS
Status: DISCONTINUED | OUTPATIENT
Start: 2022-06-16 | End: 2022-06-16 | Stop reason: HOSPADM

## 2022-06-16 RX ORDER — DIPHENHYDRAMINE HYDROCHLORIDE 50 MG/ML
12.5 INJECTION INTRAMUSCULAR; INTRAVENOUS
Status: DISCONTINUED | OUTPATIENT
Start: 2022-06-16 | End: 2022-06-16 | Stop reason: HOSPADM

## 2022-06-16 RX ORDER — PROMETHAZINE HYDROCHLORIDE 25 MG/1
25 SUPPOSITORY RECTAL ONCE AS NEEDED
Status: DISCONTINUED | OUTPATIENT
Start: 2022-06-16 | End: 2022-06-16 | Stop reason: HOSPADM

## 2022-06-16 RX ORDER — HYDRALAZINE HYDROCHLORIDE 20 MG/ML
5 INJECTION INTRAMUSCULAR; INTRAVENOUS
Status: DISCONTINUED | OUTPATIENT
Start: 2022-06-16 | End: 2022-06-16 | Stop reason: HOSPADM

## 2022-06-16 RX ORDER — DEXAMETHASONE SODIUM PHOSPHATE 4 MG/ML
INJECTION, SOLUTION INTRA-ARTICULAR; INTRALESIONAL; INTRAMUSCULAR; INTRAVENOUS; SOFT TISSUE AS NEEDED
Status: DISCONTINUED | OUTPATIENT
Start: 2022-06-16 | End: 2022-06-16 | Stop reason: SURG

## 2022-06-16 RX ORDER — LIDOCAINE HYDROCHLORIDE 10 MG/ML
0.5 INJECTION, SOLUTION EPIDURAL; INFILTRATION; INTRACAUDAL; PERINEURAL ONCE AS NEEDED
Status: DISCONTINUED | OUTPATIENT
Start: 2022-06-16 | End: 2022-06-16 | Stop reason: HOSPADM

## 2022-06-16 RX ORDER — OXYCODONE HYDROCHLORIDE AND ACETAMINOPHEN 5; 325 MG/1; MG/1
TABLET ORAL
Qty: 36 TABLET | Refills: 0 | Status: SHIPPED | OUTPATIENT
Start: 2022-06-16 | End: 2022-06-30

## 2022-06-16 RX ORDER — ONDANSETRON 2 MG/ML
INJECTION INTRAMUSCULAR; INTRAVENOUS AS NEEDED
Status: DISCONTINUED | OUTPATIENT
Start: 2022-06-16 | End: 2022-06-16 | Stop reason: SURG

## 2022-06-16 RX ORDER — SODIUM CHLORIDE 0.9 % (FLUSH) 0.9 %
3 SYRINGE (ML) INJECTION EVERY 12 HOURS SCHEDULED
Status: DISCONTINUED | OUTPATIENT
Start: 2022-06-16 | End: 2022-06-16 | Stop reason: HOSPADM

## 2022-06-16 RX ORDER — FENTANYL CITRATE 50 UG/ML
50 INJECTION, SOLUTION INTRAMUSCULAR; INTRAVENOUS
Status: DISCONTINUED | OUTPATIENT
Start: 2022-06-16 | End: 2022-06-16 | Stop reason: HOSPADM

## 2022-06-16 RX ORDER — SODIUM CHLORIDE 0.9 % (FLUSH) 0.9 %
3-10 SYRINGE (ML) INJECTION AS NEEDED
Status: DISCONTINUED | OUTPATIENT
Start: 2022-06-16 | End: 2022-06-16 | Stop reason: HOSPADM

## 2022-06-16 RX ORDER — NALOXONE HCL 0.4 MG/ML
0.2 VIAL (ML) INJECTION AS NEEDED
Status: DISCONTINUED | OUTPATIENT
Start: 2022-06-16 | End: 2022-06-16 | Stop reason: HOSPADM

## 2022-06-16 RX ORDER — HYDROMORPHONE HYDROCHLORIDE 1 MG/ML
0.5 INJECTION, SOLUTION INTRAMUSCULAR; INTRAVENOUS; SUBCUTANEOUS
Status: DISCONTINUED | OUTPATIENT
Start: 2022-06-16 | End: 2022-06-16 | Stop reason: HOSPADM

## 2022-06-16 RX ORDER — KETOROLAC TROMETHAMINE 30 MG/ML
INJECTION, SOLUTION INTRAMUSCULAR; INTRAVENOUS AS NEEDED
Status: DISCONTINUED | OUTPATIENT
Start: 2022-06-16 | End: 2022-06-16 | Stop reason: SURG

## 2022-06-16 RX ORDER — SODIUM CHLORIDE, SODIUM LACTATE, POTASSIUM CHLORIDE, CALCIUM CHLORIDE 600; 310; 30; 20 MG/100ML; MG/100ML; MG/100ML; MG/100ML
9 INJECTION, SOLUTION INTRAVENOUS CONTINUOUS
Status: DISCONTINUED | OUTPATIENT
Start: 2022-06-16 | End: 2022-06-16 | Stop reason: HOSPADM

## 2022-06-16 RX ORDER — FLUMAZENIL 0.1 MG/ML
0.2 INJECTION INTRAVENOUS AS NEEDED
Status: DISCONTINUED | OUTPATIENT
Start: 2022-06-16 | End: 2022-06-16 | Stop reason: HOSPADM

## 2022-06-16 RX ORDER — ONDANSETRON 2 MG/ML
4 INJECTION INTRAMUSCULAR; INTRAVENOUS ONCE AS NEEDED
Status: DISCONTINUED | OUTPATIENT
Start: 2022-06-16 | End: 2022-06-16 | Stop reason: HOSPADM

## 2022-06-16 RX ORDER — ACETAMINOPHEN 650 MG
TABLET, EXTENDED RELEASE ORAL AS NEEDED
Status: DISCONTINUED | OUTPATIENT
Start: 2022-06-16 | End: 2022-06-16 | Stop reason: HOSPADM

## 2022-06-16 RX ORDER — DIPHENHYDRAMINE HCL 25 MG
25 CAPSULE ORAL
Status: DISCONTINUED | OUTPATIENT
Start: 2022-06-16 | End: 2022-06-16 | Stop reason: HOSPADM

## 2022-06-16 RX ORDER — PROMETHAZINE HYDROCHLORIDE 12.5 MG/1
TABLET ORAL
Qty: 34 TABLET | Refills: 0 | Status: SHIPPED | OUTPATIENT
Start: 2022-06-16 | End: 2022-06-30

## 2022-06-16 RX ORDER — FAMOTIDINE 10 MG/ML
20 INJECTION, SOLUTION INTRAVENOUS ONCE
Status: COMPLETED | OUTPATIENT
Start: 2022-06-16 | End: 2022-06-16

## 2022-06-16 RX ORDER — MAGNESIUM HYDROXIDE 1200 MG/15ML
LIQUID ORAL AS NEEDED
Status: DISCONTINUED | OUTPATIENT
Start: 2022-06-16 | End: 2022-06-16 | Stop reason: HOSPADM

## 2022-06-16 RX ORDER — OXYCODONE AND ACETAMINOPHEN 7.5; 325 MG/1; MG/1
1 TABLET ORAL EVERY 4 HOURS PRN
Status: DISCONTINUED | OUTPATIENT
Start: 2022-06-16 | End: 2022-06-16 | Stop reason: HOSPADM

## 2022-06-16 RX ORDER — PROPOFOL 10 MG/ML
VIAL (ML) INTRAVENOUS AS NEEDED
Status: DISCONTINUED | OUTPATIENT
Start: 2022-06-16 | End: 2022-06-16 | Stop reason: SURG

## 2022-06-16 RX ORDER — FENTANYL CITRATE 50 UG/ML
INJECTION, SOLUTION INTRAMUSCULAR; INTRAVENOUS AS NEEDED
Status: DISCONTINUED | OUTPATIENT
Start: 2022-06-16 | End: 2022-06-16 | Stop reason: SURG

## 2022-06-16 RX ORDER — MIDAZOLAM HYDROCHLORIDE 1 MG/ML
1 INJECTION INTRAMUSCULAR; INTRAVENOUS
Status: COMPLETED | OUTPATIENT
Start: 2022-06-16 | End: 2022-06-16

## 2022-06-16 RX ORDER — ONDANSETRON 4 MG/1
4 TABLET, FILM COATED ORAL ONCE AS NEEDED
Status: DISCONTINUED | OUTPATIENT
Start: 2022-06-16 | End: 2022-06-16 | Stop reason: HOSPADM

## 2022-06-16 RX ORDER — LIDOCAINE 50 MG/G
1 OINTMENT TOPICAL EVERY 4 HOURS PRN
Qty: 30 G | Refills: 4 | Status: SHIPPED | OUTPATIENT
Start: 2022-06-16 | End: 2022-07-16

## 2022-06-16 RX ORDER — ROCURONIUM BROMIDE 10 MG/ML
INJECTION, SOLUTION INTRAVENOUS AS NEEDED
Status: DISCONTINUED | OUTPATIENT
Start: 2022-06-16 | End: 2022-06-16 | Stop reason: SURG

## 2022-06-16 RX ORDER — HYDROCODONE BITARTRATE AND ACETAMINOPHEN 7.5; 325 MG/1; MG/1
1 TABLET ORAL ONCE AS NEEDED
Status: DISCONTINUED | OUTPATIENT
Start: 2022-06-16 | End: 2022-06-16 | Stop reason: HOSPADM

## 2022-06-16 RX ORDER — POLYETHYLENE GLYCOL 3350 17 G/17G
17 POWDER, FOR SOLUTION ORAL 2 TIMES DAILY
Start: 2022-06-16 | End: 2022-10-24

## 2022-06-16 RX ORDER — EPHEDRINE SULFATE 50 MG/ML
5 INJECTION, SOLUTION INTRAVENOUS ONCE AS NEEDED
Status: DISCONTINUED | OUTPATIENT
Start: 2022-06-16 | End: 2022-06-16 | Stop reason: HOSPADM

## 2022-06-16 RX ORDER — IBUPROFEN 600 MG/1
600 TABLET ORAL ONCE AS NEEDED
Status: DISCONTINUED | OUTPATIENT
Start: 2022-06-16 | End: 2022-06-16 | Stop reason: HOSPADM

## 2022-06-16 RX ADMIN — ROCURONIUM BROMIDE 40 MG: 50 INJECTION INTRAVENOUS at 07:37

## 2022-06-16 RX ADMIN — FENTANYL CITRATE 50 MCG: 50 INJECTION INTRAMUSCULAR; INTRAVENOUS at 07:33

## 2022-06-16 RX ADMIN — MIDAZOLAM 1 MG: 1 INJECTION INTRAMUSCULAR; INTRAVENOUS at 06:45

## 2022-06-16 RX ADMIN — OXYCODONE HYDROCHLORIDE AND ACETAMINOPHEN 1 TABLET: 7.5; 325 TABLET ORAL at 09:03

## 2022-06-16 RX ADMIN — FENTANYL CITRATE 50 MCG: 50 INJECTION INTRAMUSCULAR; INTRAVENOUS at 07:56

## 2022-06-16 RX ADMIN — LIDOCAINE HYDROCHLORIDE 100 MG: 20 INJECTION, SOLUTION INFILTRATION; PERINEURAL at 07:36

## 2022-06-16 RX ADMIN — MIDAZOLAM 1 MG: 1 INJECTION INTRAMUSCULAR; INTRAVENOUS at 06:58

## 2022-06-16 RX ADMIN — SUGAMMADEX 200 MG: 100 INJECTION, SOLUTION INTRAVENOUS at 08:21

## 2022-06-16 RX ADMIN — PROPOFOL 170 MG: 10 INJECTION, EMULSION INTRAVENOUS at 07:36

## 2022-06-16 RX ADMIN — SODIUM CHLORIDE, POTASSIUM CHLORIDE, SODIUM LACTATE AND CALCIUM CHLORIDE 9 ML/HR: 600; 310; 30; 20 INJECTION, SOLUTION INTRAVENOUS at 06:36

## 2022-06-16 RX ADMIN — ONDANSETRON 4 MG: 2 INJECTION INTRAMUSCULAR; INTRAVENOUS at 07:58

## 2022-06-16 RX ADMIN — DEXAMETHASONE SODIUM PHOSPHATE 8 MG: 4 INJECTION, SOLUTION INTRA-ARTICULAR; INTRALESIONAL; INTRAMUSCULAR; INTRAVENOUS; SOFT TISSUE at 07:41

## 2022-06-16 RX ADMIN — CEFOXITIN SODIUM 2 G: 2 POWDER, FOR SOLUTION INTRAVENOUS at 06:35

## 2022-06-16 RX ADMIN — KETOROLAC TROMETHAMINE 30 MG: 30 INJECTION, SOLUTION INTRAMUSCULAR at 07:50

## 2022-06-16 RX ADMIN — FAMOTIDINE 20 MG: 10 INJECTION, SOLUTION INTRAVENOUS at 06:40

## 2022-06-16 NOTE — TELEPHONE ENCOUNTER
Patrick pharmacy technician called to say that the insurance company will only allow 6 of the Percocet per day, but still a quantity of #36 will be allow. So he was going to change the sig, so it can be approved by insurance.    Will that be ok?    [facility] pharmacy.

## 2022-06-16 NOTE — ANESTHESIA PREPROCEDURE EVALUATION
Anesthesia Evaluation     NPO Solid Status: > 8 hours             Airway   Mallampati: I  Dental      Pulmonary    (-) asthma, sleep apnea, not a smoker    ROS comment: Negative patient screen for AMANUEL    Cardiovascular     (-) hypertension, angina, TERRAZAS      Neuro/Psych  (+) headaches, psychiatric history Anxiety, Depression and ADHD,    GI/Hepatic/Renal/Endo    (+)   thyroid problem     Musculoskeletal     (+) myalgias, neck pain,   Abdominal    Substance History      OB/GYN          Other                        Anesthesia Plan    ASA 2     general       Anesthetic plan, risks, benefits, and alternatives have been provided, discussed and informed consent has been obtained with: patient.        CODE STATUS:

## 2022-06-16 NOTE — ANESTHESIA POSTPROCEDURE EVALUATION
Patient: Sherry Quevedo    Procedure Summary     Date: 06/16/22 Room / Location: [facility] OR [address] [place] [address] MAIN OR    Anesthesia Start: 0729 Anesthesia Stop: 0835    Procedure: HEMORRHOIDECTOMY (N/A Anus) Diagnosis:       External hemorrhoids      (External hemorrhoids [K64.4])    Surgeons: Linda Sanchez MD Provider: Oliver Hutchinson MD    Anesthesia Type: general ASA Status: 2          Anesthesia Type: general    Vitals  Vitals Value Taken Time   /79 06/16/22 0901   Temp 36.4 °C (97.6 °F) 06/16/22 0905   Pulse 72 06/16/22 0911   Resp 16 06/16/22 0900   SpO2 100 % 06/16/22 0911   Vitals shown include unvalidated device data.        Post Anesthesia Care and Evaluation    Patient location during evaluation: PACU  Patient participation: complete - patient participated  Level of consciousness: awake and alert  Pain management: adequate    Airway patency: patent  Anesthetic complications: No anesthetic complications    Cardiovascular status: acceptable  Respiratory status: acceptable  Hydration status: acceptable    Comments: --------------------            06/16/22 0930     --------------------   BP:       112/77     Pulse:      75       Resp:       16       Temp:                SpO2:      100%     --------------------

## 2022-06-16 NOTE — DISCHARGE INSTRUCTIONS
Dr. Linda Sanchez  [address]  [place] [id number] [phone]      Discharge Instructions for Hemorrhoidectomy/Anal Fissures/Fistulas      Go home, rest and take it easy today; however, you should get up and move about several times today to reduce the risk of developing a clot in your legs.      You may experience some dizziness or memory loss from the anesthesia.  This may last for the next 24 hours.  Someone should plan on staying with you for the first 24 hours for your safety.    Do not make any important legal decisions or sign any legal papers for the next 24 hours.      Eat and drink lightly today.  Start off with liquids, jello, soup, crackers or other bland foods at first. Please drink plenty of fluids. You may advance your diet tomorrow as tolerated as long as you do not experience any nausea or vomiting.     Some patients will have packing in their rectum.  It should come out will your first bowel movement.  You may remove it sooner yourself if it bothers you.  If it comes out on its own before your first bowel movement, do not worry about it.  It does not need to be replaced.      Begin your sitz baths tomorrow.    The best method of pain relief is a sitz bath (sitting in a tub or warm water) at least 3 times daily for 10 minutes.  This helps to reduce pain and aids with hygiene/drainage.  The drainage may have an unpleasant odor.  This is not unexpected and should be controlled with baths and showers.  If the skin around the anal area becomes irritated, you may apply Vaseline, A&D ointment or a similar barrier cream to the area.       Bleeding and drainage are to be expected and may persist for as long as 2-4 weeks.  Bleeding may occur with your bowel movements as well.  Wear a cotton liner such as a Kotex pad or a panty liner inside your underwear to protect your clothing.       You have received a prescription for a narcotic pain medicine, as you will have pain following surgery.    You will not be totally pain free, but your pain medicine should make the pain tolerable.  Please take your pain medicine as prescribed and always take your pills with food to prevent nausea. Your pain may persist for 1-3 weeks. If you are having severe pain that cannot be controlled by the pain medicine, please contact me.  Typically, patients with anal fissures will have less pain than those with hemorrhoids.      The goal is for your bowel movements to be soft which will help to minimize pain.  The pain medicine used to keep your comfortable may also cause some constipation so I recommend the following:    Miralax (17 grams)--1 capfull every day starting the day after surgery.    Keep taking fiber everyday (Citrucel, Metamucil, or Fiber-con) as directed.    If you are unable to have a bowel movement by 2 days after surgery, try Milk of Magnesia, Magnesium Citrate, or Colace.  If still unable to have a bowel movement, call the office at [phone]      No driving for 24 hours and for as long as you are taking your prescription pain medicine.  You may resume your activities gradually.       You will need to call the office at [phone] to schedule a follow-up appointment in 10-21 days.    Remember to contact me for any of the following:    Fever > 100.5 degrees  Severe pain that cannot be controlled by taking your pain pills  Severe nausea or vomiting   Significant bleeding > 1/4 cup  Any other questions or concerns

## 2022-06-16 NOTE — ANESTHESIA PROCEDURE NOTES
Airway  Urgency: elective    Date/Time: 6/16/2022 7:39 AM  Airway not difficult    General Information and Staff    Patient location during procedure: OR  Anesthesiologist: Oliver Hutchinson MD  CRNA/CAA: Ela Box CRNA    Indications and Patient Condition  Indications for airway management: airway protection    Preoxygenated: yes  MILS not maintained throughout  Mask difficulty assessment: 1 - vent by mask    Final Airway Details  Final airway type: endotracheal airway      Successful airway: ETT  Cuffed: yes   Successful intubation technique: direct laryngoscopy  Facilitating devices/methods: intubating stylet  Endotracheal tube insertion site: oral  Blade: Tobar  Blade size: 2  ETT size (mm): 7.0  Cormack-Lehane Classification: grade I - full view of glottis  Placement verified by: chest auscultation and capnometry   Cuff volume (mL): 6  Measured from: teeth  ETT/EBT  to teeth (cm): 20  Number of attempts at approach: 1  Assessment: lips, teeth, and gum same as pre-op and atraumatic intubation    Additional Comments  Airway exam prior to DL, teeth/lips inspected. Preoxygenated with 100% O2; sniffing position, easy mask ventilation. Eyes taped. Atraumatic intubation. Lips and teeth intact, no damage. ETT connected to vent. Confirmed EBBS, +EtCO2.

## 2022-06-16 NOTE — H&P
Sherry Quevedo is a 31 y.o. female in for follow up of External hemorrhoids     Internal hemorrhoids with complication        The patient was seen last in 12/2021 with complaints of enlarged hemorrhoids with bleeding and large clots. She has done banding and infrared in addition to hydrocortisone-based treatments with very little long-term improvement. The patient states that the bleeding is not any better. She reports that she can have a hard bowel movement and not have any bleeding. She states that she will urinate and wipe its blood there. She reports that it is like she is on her period. The patient reports that she will use the cream when it gets really bad. She states that she has been using the Proctofoam, she only uses it when it is really hurting. She states that the medication is very expensive.   Medical History        Past Medical History:   Diagnosis Date   • Abnormal gluteal crease     • Abnormal uterine bleeding (AUB)     • ADHD (attention deficit hyperactivity disorder) 2020   • Allergic 01/2013   • Anemia     • Anxiety     • BRBPR (bright red blood per rectum) 06/02/2021     [facility]   • Breast anomaly     • Bruises easily     • Calculus of bile duct 11/07/2018     [facility] Admitted 11/07/2018 D/C 11/10/2018, at 27 weeks, calculus of bile duct with cholecystitis without obstruction, Dr. Mehnaz Maravilla, OBGYN Dr. Marcelino Nunn.   • Cervical adenopathy     • Cervical lymphadenopathy       Left side.   • Change in bowel habit 06/02/2021     Dr. Nancy Santana at [facility]   • Cholelithiasis 11/2018     Gallbladder removed   • Cholestasis during pregnancy in third trimester 2019   • Chronic allergic rhinitis     • Chronic recurrent major depressive disorder (HCC)       In partial remission.   • Cold intolerance     • Constipation     • Decreased sex drive     • Depression       History of PPD   • Diarrhea     • Dizziness 08/06/2021     LEONELA, Temi Mcrae, Cardiologist office.   •  Dysmenorrhea 12/04/2019     Surgery: Laparoscopic assisted vaginal hysterectomy, Surgeon Dr. Hillary Nunn.   • Dysphoric mood     • Dyspnea on exertion 08/06/2021     Temi GIPSON, Cardiologist office.   • Endometriosis 12/2019     Laparoscopic assisted vaginal hysterectomy, & bilateral Salpingectomy, Surgeon Dr. Hillary Nunn on 12/04/2019.   • Environmental and seasonal allergies 09/21/2020   • Epigastric abdominal pain 11/07/2018     [facility].   • Fat necrosis 07/29/2021     [facility].   • Fatigue     • Fibromyalgia, primary 2021   • Fissure, anal     • Folic acid deficiency     • Generalized anxiety disorder     • Gestational diabetes       with first baby   • H/O TB skin testing 02/20/2018     Negative result at [facility].   • HA (headache)     • History of Holter monitoring 11/07/2019     24 hour.   • History of kidney stones 2006/2009   • History of miscarriage     • History of positive PPD     • History of TB skin testing 02/20/2018     Result: Negative, tested at [facility].   • History of vasectomy 2019     Spouse Vasectomy.   • Hypoglycemia     • Intractable migraine with aura without status migrainosus 10/2019   • Iron deficiency anemia due to chronic blood loss 09/24/2019   • Irritable bowel syndrome 1994     IBS with both constipation/diarrhea, followed by GI Dr. Moreland.   • Joint stiffness     • Lightheadedness 08/06/2021     LEOENLA, Temi Mcrae, Cardiologist office.   • Low serum potassium level     • Low serum vitamin B12     • Malaise and fatigue 11/12/2019     Cardiologist Dr. Benji Ugalde.   • Migraines 10/2019   • Multinodular non-toxic goiter     • Near syncope 08/06/2021     Temi GIPSON, Cardiologist office.   • Neck pain     • Nervously anxious     • Ovarian cyst 2010     Surgery removed.   • Palpitations 10/28/2019   • PVC's (premature ventricular contractions)     • Rapid heart rate 11/12/2019     Cardiologist   "Benji Ugalde.   • Rectal bleeding 06/02/2021     Dr. Nancy Santana at [facility]   • Renal stones     • RUQ abdominal mass [id number]     [facility].   • Scoliosis 2003   • Sleep disturbance     • SOB (shortness of breath) 10/28/2019   • Tachycardia 10/28/2019   • Thyroid nodule 10/28/2019     1 cm Left Submandibular node, [facility], Dr. Kevan Hoffman.   • Thyroid nodule 10/28/2019     0.3 cm Right side of neck, [facility], Dr. Kevan Hoffman.   • Thyromegaly 03/04/2016     [place]line Thyromegaly, Findings via X-ray at [facility], ordering provider Dr. Nancy Santana.   • Transaminitis 11/07/2018     [facility].   • Vaginal delivery 01/2019     Baby girl.         Surgical History         Past Surgical History:   Procedure Laterality Date   • ADENOIDECTOMY   2007   • CHOLECYSTECTOMY   11/2018     Was 27 weeks pregnant   • CHOLECYSTECTOMY WITH INTRAOPERATIVE CHOLANGIOGRAM N/A 11/9/2018     Procedure: Laparoscopic cholecystectomy;  Surgeon: Khanh Lassiter MD;  Location: [facility] OR;  Service: General   • COLONOSCOPY   8/2019   • DILATATION AND CURETTAGE N/A 10/2015     MISCARRIAGE.   • LAPAROSCOPIC ASSISTED VAGINAL HYSTERECTOMY Bilateral 12/4/2019     Procedure: LAPAROSCOPIC ASSISTED VAGINAL HYSTERECTOMY, BILATERAL SALPINGECTOMY;  Surgeon: Hillary Nunn MD;  Location: [facility];  Service: Obstetrics/Gynecology   • OVARIAN CYST SURGERY   2010     Laparoscopic ovarian cyst resection, no other abdominal surgery.   • TONSILLECTOMY   2007   • WISDOM TOOTH EXTRACTION                /77 (BP Location: Right arm, Patient Position: Lying)   Pulse 78   Temp 98.6 °F (37 °C) (Oral)   Resp 16   Ht 160 cm (63\")   Wt 71.7 kg (158 lb)   LMP  (LMP Unknown)   SpO2 100%   BMI 27.99 kg/m²        PE:  Physical Exam  Constitutional:       General: She is not in acute distress.     Appearance: She is well-developed.   HENT:      Head: Normocephalic and atraumatic.   Abdominal:      General: " There is no distension.      Palpations: Abdomen is soft.   Genitourinary:     Comments: Comments: Perianal exam grade 4 right anterior and enlarged right posterior.  Musculoskeletal:         General: Normal range of motion.   Neurological:      Mental Status: She is alert.   Psychiatric:         Thought Content: Thought content normal.               Assessment:   1. External hemorrhoids    2. Internal hemorrhoids with complication          Plan:        -     I described with patient typical surgical time, postop recovery including pain management, and restrictions. I discussed with       patient             risks, benefits, and alternatives.  The patient had opportunity to ask questions.  I answered all questions.  Patient     understands and     wishes to proceed with procedure.        -     I encouraged the patient to begin taking a stool softener. I recommended MiraLax.         -     Patient will schedule procedure

## 2022-06-16 NOTE — OP NOTE
DATE OF PROCEDURE: 06/16/22     PREOPERATIVE DIAGNOSES: Internal and external hemorrhoids.      POSTOPERATIVE DIAGNOSES: Internal and external hemorrhoids.      PROCEDURES PERFORMED: Internal and external hemorrhoidectomy x 4      SURGEON: Linda Sanchez MD     Surgical Assistant: Ximena Cabrera PA-C     Estimated Blood Loss: 100ml    Specimens:   Order Name Source Comment Collection Info Order Time   TISSUE PATHOLOGY EXAM Anus  Collected By: Linda Sanchez MD 6/16/2022  7:56 AM     Release to patient   Immediate             Specimens     ID Source Type Tests Collected By Collected At Frozen?    A Anus Tissue · TISSUE PATHOLOGY EXAM   Linda Sanchez MD 6/16/22 0756     Description: RIGHT ANTERIOR HEMORRHOID    This specimen was not marked as sent.    B Anus Tissue · TISSUE PATHOLOGY EXAM   Linda Sanchez MD 6/16/22 0801     Description: LEFT LATERAL HEMORRHOID    This specimen was not marked as sent.    C Anus Tissue · TISSUE PATHOLOGY EXAM   Linda Sanchez MD 6/16/22 0804     Description: POSTERIOR HEMORRHOID    This specimen was not marked as sent.    D Anus Tissue · TISSUE PATHOLOGY EXAM   Linda Sanchez MD 6/16/22 0807     Description: RIGHT POSTERIOR HEMORRHOID    This specimen was not marked as sent.           INDICATIONS: This is a 32 y.o. female  who comes in with enlarged hemorrhoids. she has failed conservative therapy and wishes to have them removed. she understands the risks, benefits, and alternatives, and wishes to proceed.      DESCRIPTION OF PROCEDURE: The patient was brought into the operating room, SCDs were placed, antibiotics were infused. After adequate general anesthesia was achieved, the patient was placed prone on the operating room table. Buttocks were effaced with tape, and she was prepped and draped in sterile fashion. Then 1% lidocaine with epinephrine and 0.25% Marcaine without was used as local infiltration and a perineum block. I did a circumferential exam of the anal canal  using a lighted [place]-En[place] and found enlarged internal and external hemorrhoids.     I did the left lateral,midline posterior right posterior, and then the right anterior. I did them in the same fashion. I made an elliptical incision around the internal and external hemorrhoid. Sweeping the sphincter muscle out of the way, I then used the Enseal to take the hemorrhoid at its base. I used a 2-0 Vicryl in a running fashion to closed the incision.  I then did the others in the same fashion.  I ensured good hemostasis.      All instrument, lap counts, and needle counts were correct. The patient was stable throughout the entire case and was taken to recovery.

## 2022-06-16 NOTE — ANESTHESIA POSTPROCEDURE EVALUATION
"Patient: Sherry Quevedo    Procedure Summary     Date: 06/16/22 Room / Location: [facility] OR [address] [place] [address] MAIN OR    Anesthesia Start: 0729 Anesthesia Stop: 0835    Procedure: HEMORRHOIDECTOMY (N/A Anus) Diagnosis:       External hemorrhoids      (External hemorrhoids [K64.4])    Surgeons: Linda Sanchez MD Provider: Oliver Hutchinson MD    Anesthesia Type: general ASA Status: 2          Anesthesia Type: general    Vitals  Vitals Value Taken Time   /79 06/16/22 0901   Temp 36.4 °C (97.6 °F) 06/16/22 0905   Pulse 72 06/16/22 0911   Resp 16 06/16/22 0900   SpO2 100 % 06/16/22 0911   Vitals shown include unvalidated device data.        Post Anesthesia Care and Evaluation    Patient location during evaluation: bedside  Pain management: adequate    Airway patency: patent  Anesthetic complications: No anesthetic complications    Cardiovascular status: acceptable  Respiratory status: acceptable  Hydration status: acceptable    Comments: /80   Pulse 71   Temp 36.4 °C (97.6 °F) (Oral)   Resp 16   Ht 160 cm (63\")   Wt 71.7 kg (158 lb)   LMP  (LMP Unknown)   SpO2 100%   BMI 27.99 kg/m²         "

## 2022-06-17 LAB
LAB AP CASE REPORT: NORMAL
PATH REPORT.FINAL DX SPEC: NORMAL
PATH REPORT.GROSS SPEC: NORMAL

## 2022-06-21 ENCOUNTER — PATIENT MESSAGE (OUTPATIENT)
Dept: SURGERY | Facility: CLINIC | Age: 32
End: 2022-06-21

## 2022-06-22 ENCOUNTER — LAB (OUTPATIENT)
Dept: LAB | Facility: HOSPITAL | Age: 32
End: 2022-06-22

## 2022-06-22 ENCOUNTER — TRANSCRIBE ORDERS (OUTPATIENT)
Dept: ADMINISTRATIVE | Facility: HOSPITAL | Age: 32
End: 2022-06-22

## 2022-06-22 ENCOUNTER — TELEPHONE (OUTPATIENT)
Dept: SURGERY | Facility: CLINIC | Age: 32
End: 2022-06-22

## 2022-06-22 DIAGNOSIS — R30.0 DYSURIA: Primary | ICD-10-CM

## 2022-06-22 DIAGNOSIS — R30.0 DYSURIA: ICD-10-CM

## 2022-06-22 LAB
BILIRUB UR QL STRIP: NEGATIVE
CLARITY UR: CLEAR
COLOR UR: YELLOW
GLUCOSE UR STRIP-MCNC: NEGATIVE MG/DL
HGB UR QL STRIP.AUTO: NEGATIVE
KETONES UR QL STRIP: NEGATIVE
LEUKOCYTE ESTERASE UR QL STRIP.AUTO: NEGATIVE
NITRITE UR QL STRIP: NEGATIVE
PH UR STRIP.AUTO: 7.5 [PH] (ref 5–8)
PROT UR QL STRIP: NEGATIVE
SP GR UR STRIP: 1.01 (ref 1–1.03)
UROBILINOGEN UR QL STRIP: NORMAL

## 2022-06-22 PROCEDURE — 81003 URINALYSIS AUTO W/O SCOPE: CPT

## 2022-06-22 NOTE — TELEPHONE ENCOUNTER
Lab called and pt is there for U/A, the order was to be put in yesterday.    Please see yesterdays notes and enter.    Thank you.

## 2022-06-22 NOTE — TELEPHONE ENCOUNTER
[facility] called office requesting an order to be enter since patient is there now. I explained to him regarding the notes back & forth with our ELTON Yeboah & Dr. Sanchez yesterday 06/21/2022 she was advised to go to the ER. Lab person put me on hold.    Spoke with Patient and I told her reading notes from Dr. Sanchez & ELTON Yeboah she was advised to go to ER yesterday and not to lab, due to fever she had yesterday, and I told her that is why Dr. Sanchez did not enter the order. She said I had a fever yesterday, that was yesterday and I don't have one today. I asked if the fever went down yesterday on it's own or did she take any medication she said it went away on it's own & I'm here now in the lab I gave them a urine sample and that order needs to be in now so she can go home and rest, she stated.    Spoke with ELTON Yeboah and she gave me a U/A order to fax over to lab, fax #[phone].    Faxed over, confirmation came through.

## 2022-06-30 ENCOUNTER — OFFICE VISIT (OUTPATIENT)
Dept: SURGERY | Facility: CLINIC | Age: 32
End: 2022-06-30

## 2022-06-30 VITALS
SYSTOLIC BLOOD PRESSURE: 100 MMHG | OXYGEN SATURATION: 98 % | BODY MASS INDEX: 28.51 KG/M2 | DIASTOLIC BLOOD PRESSURE: 60 MMHG | HEIGHT: 63 IN | WEIGHT: 160.9 LBS | HEART RATE: 86 BPM

## 2022-06-30 DIAGNOSIS — K64.4 EXTERNAL HEMORRHOIDS WITH COMPLICATION: Primary | ICD-10-CM

## 2022-06-30 DIAGNOSIS — K64.8 INTERNAL HEMORRHOIDS WITH COMPLICATION: ICD-10-CM

## 2022-06-30 PROCEDURE — 99024 POSTOP FOLLOW-UP VISIT: CPT | Performed by: PHYSICIAN ASSISTANT

## 2022-06-30 NOTE — PROGRESS NOTES
"Sherry Quevedo is a 32 y.o. female in for follow up of hemorrhoids status post internal and external hemorrhoidectomy x4 on 6/16/22.    Pain under control, but itching is terrible. Still some spotting of bleeding and one clot within last day or 2. Drainage decreasing over time. BMs soft, 2 times per day.    /60 (BP Location: Left arm, Patient Position: Sitting, Cuff Size: Adult)   Pulse 86   Ht 160 cm (63\")   Wt 73 kg (160 lb 14.4 oz)   LMP  (LMP Unknown)   SpO2 98%   Breastfeeding No   BMI 28.50 kg/m²   Body mass index is 28.5 kg/m².  Vitals reviewed    PE:  Physical Exam  Vitals reviewed. Exam conducted with a chaperone present.   Constitutional:       General: She is not in acute distress.     Appearance: Normal appearance.   HENT:      Head: Normocephalic and atraumatic.   Eyes:      Extraocular Movements: Extraocular movements intact.   Cardiovascular:      Rate and Rhythm: Normal rate.   Pulmonary:      Effort: Pulmonary effort is normal.   Genitourinary:     Comments: Perianal exam: mildly swollen small skin tags circumferentially without erythema, drainage, bleeding, or induration.  Musculoskeletal:         General: Normal range of motion.      Cervical back: Normal range of motion.   Skin:     General: Skin is warm and dry.   Neurological:      General: No focal deficit present.      Mental Status: She is alert.   Psychiatric:         Mood and Affect: Mood normal.         Behavior: Behavior normal.       Pathology reviewed:  1. Anterior Hemorrhoid, Right, Hemorrhoidectomy:               A. Benign internal and external hemorrhoid, negative for dysplasia.   2. Lateral Hemorrhoid, Left, Hemorrhoidectomy:                 A. Benign internal and external hemorrhoid, negative for dysplasia.   3. Posterior Hemorrhoid, Left, Hemorrhoidectomy:                 A. Benign external hemorrhoid, negative for dysplasia.   4. Posterior Hemorrhoid, Left, Hemorrhoidectomy:                 A. Benign external " hemorrhoid, negative for dysplasia    Assessment:   1. External hemorrhoids with complication    2. Internal hemorrhoids with complication    status post internal and external hemorrhoidectomy x4 on 6/16/22    Plan:  No swimming, especially lake or hot tub, until healed.   Barrier cream for itching, as well as taking a break from wet wipes.  Continue current bowel regimen.  Follow up in 3-4 weeks with Dr. Sanchez.

## 2022-07-15 ENCOUNTER — OFFICE VISIT (OUTPATIENT)
Dept: SURGERY | Facility: CLINIC | Age: 32
End: 2022-07-15

## 2022-07-15 VITALS
BODY MASS INDEX: 29.93 KG/M2 | OXYGEN SATURATION: 100 % | WEIGHT: 168.9 LBS | HEIGHT: 63 IN | DIASTOLIC BLOOD PRESSURE: 72 MMHG | TEMPERATURE: 98 F | SYSTOLIC BLOOD PRESSURE: 112 MMHG | HEART RATE: 88 BPM

## 2022-07-15 DIAGNOSIS — K64.8 INTERNAL HEMORRHOIDS WITH COMPLICATION: ICD-10-CM

## 2022-07-15 DIAGNOSIS — K64.4 EXTERNAL HEMORRHOIDS WITH COMPLICATION: Primary | ICD-10-CM

## 2022-07-15 PROCEDURE — 99024 POSTOP FOLLOW-UP VISIT: CPT | Performed by: COLON & RECTAL SURGERY

## 2022-07-15 RX ORDER — HYDROCORTISONE ACETATE PRAMOXINE HCL 2.5; 1 G/100G; G/100G
CREAM TOPICAL 3 TIMES DAILY
COMMUNITY
End: 2022-10-24

## 2022-07-15 NOTE — PROGRESS NOTES
"      Sherry Quevedo is a 32 y.o. female in for follow up of External hemorrhoids with complication    Internal hemorrhoids with complication    The patient reports she continues to experience itching, but it is not as bad as it was at her last visit. She states she has been using some Anopram cream, but it still felt swollen and very itchy. She did not know what else to do because the lidocaine was not cutting it. The patient reports for the last week she has been experiencing a lot of pain again. She states she has been taking ibuprofen 2 to 3 times a day, which she has been doing, but it is a higher dose this time. The patient reports the pain is consistent and worse at night. The patient reports towards the beginning whenever she had a bowel movement, she can feel it \"relaxed and everything feels like it does not go back in\". The patient reports she never had the \"going back in type feeling before\", but now it is. She states she still feels like she has hemorrhoids back there. The patient reports she is still using the lidocaine, but she has not noticed any splitting while using it. She states she takes MiraLAX in the morning and a thing of fiber in the morning.    /72 (BP Location: Left arm, Patient Position: Sitting, Cuff Size: Small Adult)   Pulse 88   Temp 98 °F (36.7 °C)   Ht 160 cm (63\")   Wt 76.6 kg (168 lb 14.4 oz)   LMP  (LMP Unknown)   SpO2 100%   Breastfeeding No   BMI 29.92 kg/m²   Body mass index is 29.92 kg/m².      PE:    Physical Exam  Constitutional:       General: She is not in acute distress.     Appearance: She is well-developed.   HENT:      Head: Normocephalic and atraumatic.   Abdominal:      General: There is no distension.      Palpations: Abdomen is soft.   Genitourinary:     Comments: Perianal exam posteriorly, patient has granulation tissue and it is healing nicely. Left lateral she has some enlarged tag with swelling.  Musculoskeletal:         General: Normal range " of motion.   Neurological:      Mental Status: She is alert.   Psychiatric:         Thought Content: Thought content normal.       Assessment:   1. External hemorrhoids with complication    2. Internal hemorrhoids with complication         Plan:  I advised the patient to use a diaper rash cream since things have improved. The patient was advised to continue doing what she is doing. She will follow-up in 3 to 4 weeks.     Transcribed from ambient dictation for Linda Sanchez MD by Lizeth Cordero.  07/15/22   16:48 EDT    Patient verbalized consent to the visit recording.  This patient was evaluated by me, recommendations made, documentation reviewed, edited, and revised by me, Linda Sanchez MD

## 2022-07-22 ENCOUNTER — OFFICE VISIT (OUTPATIENT)
Dept: INTERNAL MEDICINE | Facility: CLINIC | Age: 32
End: 2022-07-22

## 2022-07-22 VITALS
HEART RATE: 98 BPM | TEMPERATURE: 97.5 F | HEIGHT: 63 IN | SYSTOLIC BLOOD PRESSURE: 104 MMHG | BODY MASS INDEX: 29.7 KG/M2 | DIASTOLIC BLOOD PRESSURE: 62 MMHG | OXYGEN SATURATION: 100 % | WEIGHT: 167.6 LBS

## 2022-07-22 DIAGNOSIS — R53.82 CHRONIC FATIGUE: ICD-10-CM

## 2022-07-22 DIAGNOSIS — E53.8 LOW SERUM VITAMIN B12: ICD-10-CM

## 2022-07-22 DIAGNOSIS — E53.8 FOLIC ACID DEFICIENCY: ICD-10-CM

## 2022-07-22 DIAGNOSIS — E04.1 THYROID NODULE: ICD-10-CM

## 2022-07-22 DIAGNOSIS — K21.9 GASTROESOPHAGEAL REFLUX DISEASE, UNSPECIFIED WHETHER ESOPHAGITIS PRESENT: ICD-10-CM

## 2022-07-22 DIAGNOSIS — R63.5 WEIGHT GAIN: ICD-10-CM

## 2022-07-22 DIAGNOSIS — M79.7 FIBROMYALGIA: Primary | ICD-10-CM

## 2022-07-22 DIAGNOSIS — H00.012 HORDEOLUM EXTERNUM OF RIGHT LOWER EYELID: ICD-10-CM

## 2022-07-22 PROCEDURE — 99214 OFFICE O/P EST MOD 30 MIN: CPT | Performed by: NURSE PRACTITIONER

## 2022-07-22 RX ORDER — LURASIDONE HYDROCHLORIDE 60 MG/1
60 TABLET, FILM COATED ORAL DAILY
COMMUNITY
End: 2022-10-24

## 2022-07-22 RX ORDER — CALCIUM CARBONATE 200(500)MG
1 TABLET,CHEWABLE ORAL DAILY
COMMUNITY
End: 2022-10-24

## 2022-07-22 RX ORDER — ERYTHROMYCIN 5 MG/G
OINTMENT OPHTHALMIC NIGHTLY
Qty: 1 EACH | Refills: 0 | Status: SHIPPED | OUTPATIENT
Start: 2022-07-22 | End: 2022-08-29

## 2022-07-22 NOTE — PROGRESS NOTES
Subjective   Sherry Quevedo is a 32 y.o. female.     Chief Complaint   Patient presents with   • Weight Gain   • Heartburn   • Fibromyalgia     Pt Is here to discuss abt weight gain, fibromyalgia, heartburn and a bump on rt eyelid.        History of Present Illness   She is here today to discuss weight gain.  She noticed that she started gaining weight in December, and has gained 40 pounds.  She has noticed over the past few months worsening reflux symptoms.  She will have intermittent episodes of burning sensation in the throat.  She has been using Tums with minimal improvement.  Denies any dysphagia or odynophagia.  She tries to stay active at home and at work, she is not doing any formal exercise secondary to chronic fatigue.  She tries to eat a healthy, balanced diet.  She was switched from Adderall to Latuda secondary to neurologic side effects.  She is frustrated that she is continuing to gain weight.  She denies snoring at night or apneic episodes.    She is also with a bump on the right lower eyelid.  She noticed this today when she was putting her make-up on.  She is concerned that she may have a stye.    Fibromyalgia- she is wanting to try Savella for fibromyalgia. She is currently taking gabepentin 400 mg BID.  She cannot tolerate the full 3 doses of gabapentin secondary to dizziness.  She is with chronic fatigue that is becoming worse.  She does not have the energy to exercise.    The following portions of the patient's history were reviewed and updated as appropriate: allergies, current medications, past family history, past medical history, past social history, past surgical history and problem list.    Review of Systems   Constitutional: Positive for activity change, fatigue and unexpected weight gain. Negative for chills and fever.   HENT: Negative for trouble swallowing.    Eyes: Negative for blurred vision, pain, discharge, redness and itching.        Right lower eyelid bump.    Respiratory:  Negative for cough, chest tightness, shortness of breath and wheezing.    Cardiovascular: Negative for chest pain, palpitations and leg swelling.   Gastrointestinal: Positive for GERD. Negative for nausea and vomiting.   Musculoskeletal: Positive for arthralgias and myalgias.   Psychiatric/Behavioral: Negative for sleep disturbance, suicidal ideas and depressed mood. The patient is not nervous/anxious.        Objective   Physical Exam  Constitutional:       Appearance: She is well-developed.   Eyes:      General: Vision grossly intact.         Right eye: Hordeolum present. No foreign body or discharge.      Extraocular Movements: Extraocular movements intact.      Conjunctiva/sclera: Conjunctivae normal.      Comments: Hordeolum present right lower eyelid.   Neck:      Thyroid: No thyroid mass, thyromegaly or thyroid tenderness.      Vascular: No carotid bruit.      Trachea: Trachea normal.   Cardiovascular:      Rate and Rhythm: Normal rate and regular rhythm.      Chest Wall: PMI is not displaced.      Pulses:           Radial pulses are 2+ on the right side and 2+ on the left side.        Dorsalis pedis pulses are 2+ on the right side and 2+ on the left side.        Posterior tibial pulses are 2+ on the right side and 2+ on the left side.      Heart sounds: S1 normal and S2 normal.   Pulmonary:      Effort: Pulmonary effort is normal.      Breath sounds: Normal breath sounds.   Abdominal:      General: Bowel sounds are normal. There is no distension or abdominal bruit. There are no signs of injury.      Palpations: Abdomen is soft. There is no hepatomegaly or splenomegaly.      Tenderness: There is no abdominal tenderness. There is no guarding or rebound. Negative signs include Carmen's sign.      Hernia: No hernia is present.   Musculoskeletal:      Right lower leg: No edema.      Left lower leg: No edema.   Lymphadenopathy:      Head:      Right side of head: No submental, submandibular, tonsillar or occipital  adenopathy.      Left side of head: No submental, submandibular, tonsillar or occipital adenopathy.      Cervical: No cervical adenopathy.   Skin:     General: Skin is warm and dry.      Capillary Refill: Capillary refill takes less than 2 seconds.      Nails: There is no clubbing.   Neurological:      Mental Status: She is alert and oriented to person, place, and time.   Psychiatric:         Attention and Perception: Attention normal.         Mood and Affect: Mood and affect normal.         Speech: Speech normal.         Behavior: Behavior normal.         Thought Content: Thought content normal.         Cognition and Memory: Cognition normal.         Vitals:    07/22/22 1448   BP: 104/62   Pulse: 98   Temp: 97.5 °F (36.4 °C)   SpO2: 100%      Body mass index is 29.69 kg/m².    Assessment & Plan   Diagnoses and all orders for this visit:    1. Fibromyalgia (Primary)    2. Chronic fatigue  -     TSH  -     T4, Free  -     T3, Free  -     Vitamin B12  -     Folate  -     CBC No Differential    3. Low serum vitamin B12  -     TSH  -     T4, Free  -     T3, Free  -     Vitamin B12  -     Folate  -     CBC No Differential    4. Folic acid deficiency  -     TSH  -     T4, Free  -     T3, Free  -     Vitamin B12  -     Folate  -     CBC No Differential    5. Thyroid nodule  -     TSH  -     T4, Free  -     T3, Free  -     Vitamin B12  -     Folate  -     CBC No Differential    6. Hordeolum externum of right lower eyelid  -     erythromycin (ROMYCIN) 5 MG/GM ophthalmic ointment; Administer  to the right eye Every Night.  Dispense: 1 each; Refill: 0    7. Weight gain    8. Gastroesophageal reflux disease, unspecified whether esophagitis present      1.  Fibromyalgia- I will further research Savella and perform a medication interaction check with her current medication list.  If no significant interactions, okay to start Savella for fibromyalgia.  Encouraged her to begin a regular exercise program.  Discussed trying water  aerobics, yoga.  We will follow-up in 4 weeks.  2.  Chronic fatigue/weight gain- this may be related to fibromyalgia.  She does have a history of low B12 and folic acid deficiency along with thyroid nodules.  With her weight gain we will recheck labs today.  3.  Right lower eyelid hordeolum- start erythromycin ointment nightly x7 to 10 days.  Avoid touching the eye and wearing eye make-up.  4.  GERD- this may be related to recent weight gain.  Start omeprazole 20 mg daily.  Take this 30 minutes before breakfast.  Continue this for 3 to 4 weeks.  Avoid trigger foods and continue to work on weight loss.    Follow-up in 4 weeks.       Answers for HPI/ROS submitted by the patient on 7/18/2022  Please describe your symptoms.: Significant weight gain over the last several months.,  , Heart burn, , Discuss fibro-Savella Medication  Have you had these symptoms before?: No  How long have you been having these symptoms?: Greater than 2 weeks  What is the primary reason for your visit?: Other

## 2022-07-23 LAB
ERYTHROCYTE [DISTWIDTH] IN BLOOD BY AUTOMATED COUNT: 12.6 % (ref 11.7–15.4)
FOLATE SERPL-MCNC: 3.9 NG/ML
HCT VFR BLD AUTO: 38.5 % (ref 34–46.6)
HGB BLD-MCNC: 12.8 G/DL (ref 11.1–15.9)
MCH RBC QN AUTO: 30.2 PG (ref 26.6–33)
MCHC RBC AUTO-ENTMCNC: 33.2 G/DL (ref 31.5–35.7)
MCV RBC AUTO: 91 FL (ref 79–97)
PLATELET # BLD AUTO: 166 X10E3/UL (ref 150–450)
RBC # BLD AUTO: 4.24 X10E6/UL (ref 3.77–5.28)
T3FREE SERPL-MCNC: 3.1 PG/ML (ref 2–4.4)
T4 FREE SERPL-MCNC: 1.11 NG/DL (ref 0.82–1.77)
TSH SERPL DL<=0.005 MIU/L-ACNC: 2.04 UIU/ML (ref 0.45–4.5)
VIT B12 SERPL-MCNC: 683 PG/ML (ref 232–1245)
WBC # BLD AUTO: 6.6 X10E3/UL (ref 3.4–10.8)

## 2022-07-27 DIAGNOSIS — M79.7 FIBROMYALGIA: Primary | ICD-10-CM

## 2022-07-27 RX ORDER — BUPROPION HYDROCHLORIDE 150 MG/1
150 TABLET ORAL EVERY MORNING
Qty: 10 TABLET | Refills: 0 | Status: SHIPPED | OUTPATIENT
Start: 2022-07-27 | End: 2022-08-29

## 2022-07-27 RX ORDER — MILNACIPRAN HYDROCHLORIDE 12.5-25-5
1 KIT ORAL 2 TIMES DAILY
Qty: 55 EACH | Refills: 0 | Status: SHIPPED | OUTPATIENT
Start: 2022-07-27 | End: 2022-08-22

## 2022-08-22 DIAGNOSIS — M79.7 FIBROMYALGIA: Primary | ICD-10-CM

## 2022-08-29 ENCOUNTER — TELEMEDICINE (OUTPATIENT)
Dept: INTERNAL MEDICINE | Facility: CLINIC | Age: 32
End: 2022-08-29

## 2022-08-29 VITALS — WEIGHT: 172 LBS | BODY MASS INDEX: 30.48 KG/M2 | HEIGHT: 63 IN

## 2022-08-29 DIAGNOSIS — R63.5 ABNORMAL WEIGHT GAIN: ICD-10-CM

## 2022-08-29 DIAGNOSIS — E66.9 CLASS 1 OBESITY WITH SERIOUS COMORBIDITY AND BODY MASS INDEX (BMI) OF 30.0 TO 30.9 IN ADULT, UNSPECIFIED OBESITY TYPE: ICD-10-CM

## 2022-08-29 DIAGNOSIS — K58.1 IRRITABLE BOWEL SYNDROME WITH CONSTIPATION: ICD-10-CM

## 2022-08-29 DIAGNOSIS — M79.7 FIBROMYALGIA: Primary | ICD-10-CM

## 2022-08-29 DIAGNOSIS — Z86.32 HISTORY OF GESTATIONAL DIABETES: ICD-10-CM

## 2022-08-29 PROBLEM — E66.811 CLASS 1 OBESITY WITH SERIOUS COMORBIDITY AND BODY MASS INDEX (BMI) OF 30.0 TO 30.9 IN ADULT: Status: ACTIVE | Noted: 2022-08-29

## 2022-08-29 PROCEDURE — 99214 OFFICE O/P EST MOD 30 MIN: CPT | Performed by: NURSE PRACTITIONER

## 2022-08-29 NOTE — PROGRESS NOTES
Subjective   Sherry Quevedo is a 32 y.o. female.     Chief Complaint   Patient presents with   • Weight gain, follow up on new medication        History of Present Illness   You have chosen to receive care through a telehealth visit.  Do you consent to use a video/audio connection for your medical care today? Yes    She is here today for a telehealth using [employer] from her work. Provider is working form her office. She is following up on fibromyalgia and weight gain. She is currently on Savella 50 mg BID. She is unsure if this is helping with her fibro. She did not take her medication today and has noticed worsening joint pain. She is getting in the pool every night and then gets in the hot tub. She is also doing yoga a few days a week.  She notes continued weight gain, worse over the past 2-3 months. She is currently up to 174 lbs. She has gained 15 lbs since the end of June. She is trying to work on portion control and exercising in the pool and with yoga. She is frustrated as she continues to gain weight.  She did check her sugar yesterday fasting and it was elevated at 130.  Positive history of gestational diabetes with her first pregnancy and positive family history of DM 2.    She is also noticed that since her hemorrhoidectomy she has had chronic constipation.  She notes that occasionally when she strains with a bowel movement she will have abdominal cramping described as a charley horse.  She is trying to hydrate well with fluids and drink electrolyte water.    The following portions of the patient's history were reviewed and updated as appropriate: allergies, current medications, past family history, past medical history, past social history, past surgical history and problem list.    Review of Systems   Constitutional: Positive for unexpected weight gain. Negative for chills, fatigue and fever.   Respiratory: Negative for cough, chest tightness, shortness of breath and wheezing.    Cardiovascular:  Negative for chest pain, palpitations and leg swelling.   Gastrointestinal: Positive for abdominal pain and constipation.   Musculoskeletal: Positive for arthralgias and myalgias.   Psychiatric/Behavioral: Negative for suicidal ideas and depressed mood. The patient is not nervous/anxious.        Objective   Physical Exam  Constitutional:       General: She is awake.      Appearance: Normal appearance.   Pulmonary:      Effort: Pulmonary effort is normal.   Neurological:      Mental Status: She is alert and oriented to person, place, and time. Mental status is at baseline.   Psychiatric:         Attention and Perception: Attention and perception normal.         Mood and Affect: Mood and affect normal.         Speech: Speech normal.         Behavior: Behavior normal. Behavior is cooperative.         Thought Content: Thought content normal.         Cognition and Memory: Cognition and memory normal.         Judgment: Judgment normal.         There were no vitals filed for this visit.       Assessment & Plan   Diagnoses and all orders for this visit:    1. Fibromyalgia (Primary)    2. Class 1 obesity with serious comorbidity and body mass index (BMI) of 30.0 to 30.9 in adult, unspecified obesity type  -     Hemoglobin A1c    3. Abnormal weight gain  -     Hemoglobin A1c    4. History of gestational diabetes  -     Hemoglobin A1c    5. Irritable bowel syndrome with constipation      BMI is >= 30 and <35. (Class 1 Obesity). The following options were offered after discussion;: exercise counseling/recommendations, nutrition counseling/recommendations and pharmacological intervention options    1.  Fibromyalgia- continue Savella 50 mg twice daily.  She will notify me if she does not see improvement in symptoms in the next few weeks.  Encouraged her to continue to stay active with water aerobics and yoga.  2.  Obesity/weight gain-check A1c as she has a history of gestational diabetes and a recent elevated blood sugar.  She  would benefit from weight loss medication.  Pending A1c results would consider treatment with Plenity versus a GLP-1 medication.  Encouraged her to continue to stay active and focus on a healthy, balanced diet with portion control.  3.  IOX-C-sylwfsuziu her to continue to hydrate well with fluids, ensure adequate dietary fiber intake and notify GI of current symptoms.    Follow-up pending lab results.    [employer] visit lasting 15 minutes.

## 2022-09-08 DIAGNOSIS — Z86.32 HISTORY OF GESTATIONAL DIABETES: ICD-10-CM

## 2022-09-08 DIAGNOSIS — M79.7 FIBROMYALGIA: Primary | ICD-10-CM

## 2022-09-08 DIAGNOSIS — M25.50 PAIN IN JOINT INVOLVING MULTIPLE SITES: ICD-10-CM

## 2022-10-07 ENCOUNTER — TELEPHONE (OUTPATIENT)
Dept: INTERNAL MEDICINE | Facility: CLINIC | Age: 32
End: 2022-10-07

## 2022-10-07 DIAGNOSIS — M25.50 CHRONIC PAIN OF MULTIPLE JOINTS: Primary | ICD-10-CM

## 2022-10-07 DIAGNOSIS — M79.7 FIBROMYALGIA: ICD-10-CM

## 2022-10-07 DIAGNOSIS — G89.29 CHRONIC PAIN OF MULTIPLE JOINTS: Primary | ICD-10-CM

## 2022-10-07 NOTE — TELEPHONE ENCOUNTER
----- Message from LEONELA Loera sent at 10/7/2022  8:00 AM EDT -----  Lab results look good overall.  Inflammatory markers are within normal limits.  Rheumatoid factor and TARA negative.  Hemoglobin A1c is within normal limits at 5.1%.  Follow-up as scheduled on 10/24 and we can discuss labs in further detail.

## 2022-10-07 NOTE — TELEPHONE ENCOUNTER
Pt. Expressed understanding but asked if you recommend a Rheumatologist for her. I let her know usually what happens is her PCP will put In a referral but I would relay the message.  10/7/22 RCC

## 2022-10-24 ENCOUNTER — OFFICE VISIT (OUTPATIENT)
Dept: INTERNAL MEDICINE | Facility: CLINIC | Age: 32
End: 2022-10-24

## 2022-10-24 VITALS
HEART RATE: 107 BPM | DIASTOLIC BLOOD PRESSURE: 78 MMHG | HEIGHT: 63 IN | TEMPERATURE: 98.3 F | BODY MASS INDEX: 32.39 KG/M2 | RESPIRATION RATE: 16 BRPM | OXYGEN SATURATION: 98 % | SYSTOLIC BLOOD PRESSURE: 122 MMHG | WEIGHT: 182.8 LBS

## 2022-10-24 DIAGNOSIS — F33.9 CHRONIC RECURRENT MAJOR DEPRESSIVE DISORDER: Primary | ICD-10-CM

## 2022-10-24 DIAGNOSIS — E66.9 CLASS 1 OBESITY WITH SERIOUS COMORBIDITY AND BODY MASS INDEX (BMI) OF 30.0 TO 30.9 IN ADULT, UNSPECIFIED OBESITY TYPE: ICD-10-CM

## 2022-10-24 DIAGNOSIS — F41.1 GENERALIZED ANXIETY DISORDER: ICD-10-CM

## 2022-10-24 DIAGNOSIS — J30.9 CHRONIC ALLERGIC RHINITIS: ICD-10-CM

## 2022-10-24 DIAGNOSIS — G43.109 MIGRAINE WITH AURA AND WITHOUT STATUS MIGRAINOSUS, NOT INTRACTABLE: ICD-10-CM

## 2022-10-24 PROCEDURE — 99214 OFFICE O/P EST MOD 30 MIN: CPT | Performed by: NURSE PRACTITIONER

## 2022-10-24 PROCEDURE — 90686 IIV4 VACC NO PRSV 0.5 ML IM: CPT | Performed by: NURSE PRACTITIONER

## 2022-10-24 PROCEDURE — 90471 IMMUNIZATION ADMIN: CPT | Performed by: NURSE PRACTITIONER

## 2022-10-24 RX ORDER — BUPROPION HYDROCHLORIDE 150 MG/1
150 TABLET ORAL DAILY
Qty: 30 TABLET | Refills: 5 | Status: SHIPPED | OUTPATIENT
Start: 2022-10-24 | End: 2022-12-23

## 2022-10-24 RX ORDER — CARBOXYMETHYLCELLULOSE/CITRIC 0.75 G
3 CAPSULE ORAL
Qty: 180 CAPSULE | Refills: 0 | Status: SHIPPED | OUTPATIENT
Start: 2022-10-24 | End: 2022-12-22 | Stop reason: SINTOL

## 2022-10-24 RX ORDER — TOPIRAMATE 25 MG/1
25 TABLET ORAL NIGHTLY
Qty: 30 TABLET | Refills: 5 | Status: SHIPPED | OUTPATIENT
Start: 2022-10-24 | End: 2022-12-22 | Stop reason: SDUPTHER

## 2022-10-24 NOTE — PROGRESS NOTES
Subjective   Sherry Quevedo is a 32 y.o. female.     Chief Complaint   Patient presents with   • Anxiety     Med manage.         History of Present Illness   She is here today for f/u on depression and anxiety.  She has weaned off of all of her medications.  She does not notice any change in anxiety or depression being off her meds.  She has previously tried and failed several medications for anxiety and depression.  She is feeling very frustrated.  Her depression is worse.  She notes frequent agitation.  She is having a lot of fatigue and decreased motivation.  She notes anhedonia.  She was seeing a psychiatrist but has stopped this.  She denies any enriqueta or SI.  She has previously been on Wellbutrin with some improvement in depression.  She has a family history of bipolar disorder.    She has noticed over the past several months worsening migraine headaches.  She has an aura with her migraines.  She has previously tried Imitrex with minimal improvement in abortive therapy.  She has never been on a preventative migraine medication.  She is having on average 10-15 migraine headache days a month.  She is up-to-date with eye doctor.    The following portions of the patient's history were reviewed and updated as appropriate: allergies, current medications, past family history, past medical history, past social history, past surgical history and problem list.    Review of Systems   Constitutional: Positive for fatigue. Negative for chills and fever.   HENT: Positive for congestion, postnasal drip and sinus pressure.    Eyes: Negative.    Respiratory: Negative for cough, chest tightness, shortness of breath and wheezing.    Cardiovascular: Negative for chest pain, palpitations and leg swelling.   Neurological: Positive for headache.   Psychiatric/Behavioral: Positive for agitation, sleep disturbance and depressed mood. Negative for self-injury and suicidal ideas. The patient is nervous/anxious.        Objective    Physical Exam  Constitutional:       Appearance: She is well-developed.   HENT:      Head: Normocephalic and atraumatic.      Right Ear: Hearing, ear canal and external ear normal.      Left Ear: Hearing, ear canal and external ear normal.      Ears:      Comments: Fluid present bilateral TM.     Nose: Rhinorrhea present. Rhinorrhea is clear.      Right Sinus: No maxillary sinus tenderness or frontal sinus tenderness.      Left Sinus: No maxillary sinus tenderness or frontal sinus tenderness.      Mouth/Throat:      Lips: Pink.      Mouth: Mucous membranes are moist. No injury or oral lesions.      Dentition: Normal dentition.      Tongue: No lesions. Tongue does not deviate from midline.      Palate: No mass and lesions.      Pharynx: Uvula midline. No pharyngeal swelling, oropharyngeal exudate, posterior oropharyngeal erythema or uvula swelling.      Comments: Posterior pharynx with clear drainage and cobbling.  Neck:      Thyroid: No thyroid mass, thyromegaly or thyroid tenderness.      Vascular: No carotid bruit.      Trachea: Trachea normal.   Cardiovascular:      Rate and Rhythm: Normal rate and regular rhythm.      Chest Wall: PMI is not displaced.      Pulses:           Radial pulses are 2+ on the right side and 2+ on the left side.        Dorsalis pedis pulses are 2+ on the right side and 2+ on the left side.        Posterior tibial pulses are 2+ on the right side and 2+ on the left side.      Heart sounds: S1 normal and S2 normal.   Pulmonary:      Effort: Pulmonary effort is normal.      Breath sounds: Normal breath sounds.   Musculoskeletal:      Right lower leg: No edema.      Left lower leg: No edema.   Lymphadenopathy:      Head:      Right side of head: No submental, submandibular, tonsillar or occipital adenopathy.      Left side of head: No submental, submandibular, tonsillar or occipital adenopathy.      Cervical: No cervical adenopathy.   Skin:     General: Skin is warm and dry.      Capillary  Refill: Capillary refill takes less than 2 seconds.      Nails: There is no clubbing.   Neurological:      General: No focal deficit present.      Mental Status: She is alert and oriented to person, place, and time. Mental status is at baseline.      Cranial Nerves: Cranial nerves 2-12 are intact.      Sensory: Sensation is intact.      Motor: Motor function is intact.      Coordination: Coordination is intact.      Gait: Gait is intact.      Deep Tendon Reflexes:      Reflex Scores:       Patellar reflexes are 2+ on the right side and 2+ on the left side.  Psychiatric:         Attention and Perception: Attention and perception normal.         Mood and Affect: Affect normal. Mood is depressed.         Speech: Speech normal.         Behavior: Behavior normal. Behavior is cooperative.         Thought Content: Thought content normal.         Cognition and Memory: Cognition and memory normal.         Judgment: Judgment normal.         Vitals:    10/24/22 1317   BP: 122/78   Pulse: 107   Resp: 16   Temp: 98.3 °F (36.8 °C)   SpO2: 98%      Body mass index is 32.38 kg/m².    Assessment & Plan   Diagnoses and all orders for this visit:    1. Chronic recurrent major depressive disorder (HCC) (Primary)  -     buPROPion XL (Wellbutrin XL) 150 MG 24 hr tablet; Take 1 tablet by mouth Daily.  Dispense: 30 tablet; Refill: 5  -     Cancel: Ambulatory Referral to Behavioral Health  -     Ambulatory Referral to Behavioral Health    2. Generalized anxiety disorder  -     buPROPion XL (Wellbutrin XL) 150 MG 24 hr tablet; Take 1 tablet by mouth Daily.  Dispense: 30 tablet; Refill: 5  -     Cancel: Ambulatory Referral to Behavioral Health  -     Ambulatory Referral to Behavioral Health    3. Migraine with aura and without status migrainosus, not intractable  -     topiramate (Topamax) 25 MG tablet; Take 1 tablet by mouth Every Night.  Dispense: 30 tablet; Refill: 5    4. Chronic allergic rhinitis    5. Class 1 obesity with serious  comorbidity and body mass index (BMI) of 30.0 to 30.9 in adult, unspecified obesity type  -     Carboxymeth-Cellulose-CitricAc (Plenity Welcome Kit) capsule; Take 3 capsules by mouth 2 (Two) Times a Day Before Meals.  Dispense: 180 capsule; Refill: 0    Other orders  -     FluLaval/Fluzone >6 mos (3566-4129)    BMI is >= 30 and <35. (Class 1 Obesity). The following options were offered after discussion;: exercise counseling/recommendations, nutrition counseling/recommendations and pharmacological intervention options    1.  Major depression/generalized anxiety-not controlled.  She has previously tried and failed several medications.  We will go ahead and restart Wellbutrin  mg once daily as she has done well with this medication in the past.  Referral placed to behavioral health for further evaluation and management.  2.  Migraine with aura- not controlled with greater than 4 migraine headache days a month.  Start Topamax 25 mg nightly.  Discussed appropriate use and adverse effects.  She will start this a few days after restarting Wellbutrin to ensure no medication side effects.  Encouraged her to avoid migraine triggers.  We will also give her a prescription for Nurtec 75 mg to use once as needed for abortive therapy as there is significant vascular risk with triptan use with migraines with aura.  We will follow-up in 4 weeks for medication check.  3.  Chronic AR-recommend restarting nasal steroid spray 1 to 2 puffs in each nostril daily and continuing this until the first hard frost.  4. Obesity-we will try Plenity 3 capsules 20 minutes before lunch and dinner with a 16 ounce glass of water.  Encouraged regular exercise and healthy eating focusing on portion control.  Follow-up in 4 weeks.  Follow-up in 4 weeks for medication check.       Answers for HPI/ROS submitted by the patient on 10/24/2022  Please describe your symptoms.: Depression- constantly in an angry moode. Everyone thinks im mad at them, no  rashaad in anything, , All I wanna do is sleep (extreme fatigue syndrome), , Weight Gain, , Zero libido , , Migranes, , Fibro/RA pain  Have you had these symptoms before?: Yes  How long have you been having these symptoms?: Greater than 2 weeks  Please list any medications you are currently taking for this condition.: I have came off all of my medications  Please describe any probable cause for these symptoms. : Unknown  What is the primary reason for your visit?: Other

## 2022-11-16 DIAGNOSIS — G43.109 MIGRAINE WITH AURA AND WITHOUT STATUS MIGRAINOSUS, NOT INTRACTABLE: Primary | ICD-10-CM

## 2022-11-16 RX ORDER — RIMEGEPANT SULFATE 75 MG/75MG
1 TABLET, ORALLY DISINTEGRATING ORAL ONCE AS NEEDED
Qty: 16 TABLET | Refills: 1 | Status: SHIPPED | OUTPATIENT
Start: 2022-11-16 | End: 2023-03-20 | Stop reason: SDUPTHER

## 2022-12-22 ENCOUNTER — OFFICE VISIT (OUTPATIENT)
Dept: INTERNAL MEDICINE | Facility: CLINIC | Age: 32
End: 2022-12-22

## 2022-12-22 VITALS
HEART RATE: 96 BPM | SYSTOLIC BLOOD PRESSURE: 98 MMHG | BODY MASS INDEX: 32.6 KG/M2 | HEIGHT: 63 IN | TEMPERATURE: 97.3 F | DIASTOLIC BLOOD PRESSURE: 62 MMHG | OXYGEN SATURATION: 98 % | WEIGHT: 184 LBS

## 2022-12-22 DIAGNOSIS — J40 BRONCHITIS: Primary | ICD-10-CM

## 2022-12-22 DIAGNOSIS — J01.00 ACUTE NON-RECURRENT MAXILLARY SINUSITIS: Primary | ICD-10-CM

## 2022-12-22 DIAGNOSIS — J40 BRONCHITIS: ICD-10-CM

## 2022-12-22 DIAGNOSIS — G43.109 MIGRAINE WITH AURA AND WITHOUT STATUS MIGRAINOSUS, NOT INTRACTABLE: ICD-10-CM

## 2022-12-22 DIAGNOSIS — E66.9 CLASS 1 OBESITY WITH SERIOUS COMORBIDITY AND BODY MASS INDEX (BMI) OF 32.0 TO 32.9 IN ADULT, UNSPECIFIED OBESITY TYPE: ICD-10-CM

## 2022-12-22 PROBLEM — R00.0 TACHYCARDIA: Chronic | Status: ACTIVE | Noted: 2019-10-28

## 2022-12-22 PROCEDURE — 99214 OFFICE O/P EST MOD 30 MIN: CPT | Performed by: NURSE PRACTITIONER

## 2022-12-22 RX ORDER — ALBUTEROL SULFATE 90 UG/1
2 AEROSOL, METERED RESPIRATORY (INHALATION) EVERY 4 HOURS PRN
Qty: 8 G | Refills: 0 | Status: SHIPPED | OUTPATIENT
Start: 2022-12-22 | End: 2023-01-16

## 2022-12-22 RX ORDER — BROMPHENIRAMINE MALEATE, PSEUDOEPHEDRINE HYDROCHLORIDE, AND DEXTROMETHORPHAN HYDROBROMIDE 2; 30; 10 MG/5ML; MG/5ML; MG/5ML
5 SYRUP ORAL 3 TIMES DAILY PRN
Qty: 118 ML | Refills: 0 | Status: SHIPPED | OUTPATIENT
Start: 2022-12-22 | End: 2022-12-22 | Stop reason: RX

## 2022-12-22 RX ORDER — TOPIRAMATE 50 MG/1
25 TABLET, FILM COATED ORAL NIGHTLY
Qty: 90 TABLET | Refills: 1 | Status: SHIPPED | OUTPATIENT
Start: 2022-12-22 | End: 2022-12-22 | Stop reason: DRUGHIGH

## 2022-12-22 RX ORDER — DEXTROMETHORPHAN HYDROBROMIDE AND PROMETHAZINE HYDROCHLORIDE 15; 6.25 MG/5ML; MG/5ML
5 SYRUP ORAL 4 TIMES DAILY PRN
Qty: 180 ML | Refills: 0 | Status: SHIPPED | OUTPATIENT
Start: 2022-12-22 | End: 2023-01-16

## 2022-12-22 RX ORDER — TOPIRAMATE 50 MG/1
50 TABLET, FILM COATED ORAL NIGHTLY
Qty: 90 TABLET | Refills: 1 | Status: SHIPPED | OUTPATIENT
Start: 2022-12-22 | End: 2022-12-23 | Stop reason: SDUPTHER

## 2022-12-22 RX ORDER — AZITHROMYCIN 250 MG/1
TABLET, FILM COATED ORAL
Qty: 6 TABLET | Refills: 0 | Status: SHIPPED | OUTPATIENT
Start: 2022-12-22 | End: 2023-01-16

## 2022-12-22 NOTE — PROGRESS NOTES
Lilly Quevedo is a 32 y.o. female.     Chief Complaint   Patient presents with   • Follow-up     Patient is here for a follow up on migraines and weight gain, is complaing of sinus issues with chest congestion    • URI        History of Present Illness   She is here today for f/u on migraine HA and obesity.  She is experiencing URI symptoms. Symptoms started 2 weeks ago. She is now with worsening maxillary sinus pain and pressure and upper tooth pain. She has a dry cough. She feels tightness in the chest and intermittent wheezing with coughing. She has been using Dayquil, Nyquil, tylenol and OTC flu medicine with minimal improvement. She has completed 2 COVID tests, negative.    Her  and children have recently been sick.      Migraine HA- she initially saw improvement in migraine HA down to 1-2 a month starting Topamax 25 mg nightly. She has since had an increase in migraine HA to 1-2 migraines a week.  She is using Nurtec 75 mg as needed for abortive therapy with improvement.  She did have 1 headache in which she had to take an additional Excedrin Migraine.    Obesity- she started the plenity, but stopped this secondary to abdominal cramping after 3 weeks.     The following portions of the patient's history were reviewed and updated as appropriate: allergies, current medications, past family history, past medical history, past social history, past surgical history and problem list.    Review of Systems   Constitutional: Positive for fatigue. Negative for chills and fever.   HENT: Positive for congestion, ear pain, postnasal drip, rhinorrhea and sinus pressure. Negative for sore throat and trouble swallowing.    Respiratory: Positive for cough, chest tightness and wheezing. Negative for shortness of breath.    Cardiovascular: Negative for chest pain, palpitations and leg swelling.   Neurological: Positive for headache.   Psychiatric/Behavioral: Negative for suicidal ideas and depressed mood.  The patient is not nervous/anxious.        Objective   Physical Exam  Constitutional:       General: She is awake.      Appearance: She is well-developed. She is ill-appearing.   HENT:      Head: Normocephalic and atraumatic.      Right Ear: Hearing, ear canal and external ear normal.      Left Ear: Hearing, ear canal and external ear normal.      Ears:      Comments: Fluid present bilateral TM.     Nose: Rhinorrhea present. Rhinorrhea is purulent.      Right Turbinates: Enlarged.      Left Turbinates: Enlarged.      Right Sinus: Maxillary sinus tenderness present. No frontal sinus tenderness.      Left Sinus: Maxillary sinus tenderness present. No frontal sinus tenderness.      Mouth/Throat:      Lips: Pink.      Mouth: Mucous membranes are moist. No injury or oral lesions.      Dentition: Normal dentition.      Tongue: No lesions. Tongue does not deviate from midline.      Palate: No mass and lesions.      Pharynx: Uvula midline. Posterior oropharyngeal erythema present. No pharyngeal swelling, oropharyngeal exudate or uvula swelling.   Neck:      Thyroid: No thyroid mass, thyromegaly or thyroid tenderness.      Vascular: No carotid bruit.      Trachea: Trachea normal.   Cardiovascular:      Rate and Rhythm: Normal rate and regular rhythm.      Chest Wall: PMI is not displaced.      Pulses:           Radial pulses are 2+ on the right side and 2+ on the left side.        Dorsalis pedis pulses are 2+ on the right side and 2+ on the left side.        Posterior tibial pulses are 2+ on the right side and 2+ on the left side.      Heart sounds: S1 normal and S2 normal.   Pulmonary:      Effort: Pulmonary effort is normal.      Breath sounds: Examination of the right-upper field reveals wheezing. Examination of the left-upper field reveals wheezing. Wheezing present. No decreased breath sounds, rhonchi or rales.      Comments: Intermittent bronchitic cough.  Musculoskeletal:      Right lower leg: No edema.      Left  lower leg: No edema.   Lymphadenopathy:      Head:      Right side of head: No submental, submandibular, tonsillar or occipital adenopathy.      Left side of head: No submental, submandibular, tonsillar or occipital adenopathy.      Cervical: No cervical adenopathy.   Skin:     General: Skin is warm and dry.      Capillary Refill: Capillary refill takes less than 2 seconds.      Nails: There is no clubbing.   Neurological:      Mental Status: She is alert and oriented to person, place, and time.   Psychiatric:         Attention and Perception: Attention normal.         Mood and Affect: Mood and affect normal.         Speech: Speech normal.         Behavior: Behavior normal. Behavior is cooperative.         Thought Content: Thought content normal.         Cognition and Memory: Cognition normal.         Vitals:    12/22/22 0950   BP: 98/62   Pulse: 96   Temp: 97.3 °F (36.3 °C)   SpO2: 98%      Body mass index is 32.6 kg/m².    Assessment & Plan   Diagnoses and all orders for this visit:    1. Acute non-recurrent maxillary sinusitis (Primary)  -     azithromycin (Zithromax Z-Chai) 250 MG tablet; Take 2 tablets by mouth on day 1, then 1 tablet daily on days 2-5  Dispense: 6 tablet; Refill: 0    2. Migraine with aura and without status migrainosus, not intractable  -     Discontinue: topiramate (Topamax) 50 MG tablet; Take 0.5 tablets by mouth Every Night.  Dispense: 90 tablet; Refill: 1  -     topiramate (Topamax) 50 MG tablet; Take 1 tablet by mouth Every Night.  Dispense: 90 tablet; Refill: 1    3. Bronchitis  -     albuterol sulfate  (90 Base) MCG/ACT inhaler; Inhale 2 puffs Every 4 (Four) Hours As Needed for Wheezing or Shortness of Air.  Dispense: 8 g; Refill: 0  -     brompheniramine-pseudoephedrine-DM 30-2-10 MG/5ML syrup; Take 5 mL by mouth 3 (Three) Times a Day As Needed for Cough.  Dispense: 118 mL; Refill: 0    4. Class 1 obesity with serious comorbidity and body mass index (BMI) of 32.0 to 32.9 in  adult, unspecified obesity type      1.  Acute maxillary sinusitis- start Z-Chai.  Recommend nasal steroid spray, plain Mucinex, hydrate well with fluids, vitamin C, vitamin D.  2.  Bronchitis- prescription sent for Bromfed-DM cough medicine 3 times daily as needed for cough.  No other pseudoephedrine while taking this.  Prescription sent for albuterol inhaler 2 puffs every 4-6 hours as needed for any shortness of breath or wheezing.  3.  Migraine with aura- increase Topamax to 50 mg nightly.  Continue with as needed Nurtec 75 mg.  Follow-up in 4 weeks.  4.  Obesity- she has tried Plenity with side effects of GI upset.  Her insurance does not cover other weight loss medications.  Follow-up in 4 weeks we will discuss possible initiation of phentermine pending EKG in office.    F/u in 4 weeks for CPE with same day fasting labs.

## 2022-12-23 ENCOUNTER — TELEMEDICINE (OUTPATIENT)
Dept: BEHAVIORAL HEALTH | Facility: CLINIC | Age: 32
End: 2022-12-23

## 2022-12-23 DIAGNOSIS — F32.9 TREATMENT-RESISTANT DEPRESSION: Primary | ICD-10-CM

## 2022-12-23 DIAGNOSIS — G43.109 MIGRAINE WITH AURA AND WITHOUT STATUS MIGRAINOSUS, NOT INTRACTABLE: ICD-10-CM

## 2022-12-23 PROCEDURE — 90792 PSYCH DIAG EVAL W/MED SRVCS: CPT

## 2022-12-23 RX ORDER — TOPIRAMATE 50 MG/1
50 TABLET, FILM COATED ORAL NIGHTLY
Qty: 90 TABLET | Refills: 1 | Status: SHIPPED | OUTPATIENT
Start: 2022-12-23

## 2022-12-23 NOTE — PROGRESS NOTES
"Patient assessed today via MyChart through EPIC pt is at home in a secure environment and verbalizes privacy during interview. ARNP Afshin is at home in a secure environment using a secure laptop. The patient's condition being diagnosed/treated is appropriate for telemedicine. The provider identified himself as well as his credentials.   The patient, and/or patient's guardian, consent to be seen remotely, and when consent is given they understand that the consent allows for patient identifiable information to be sent to a third party as needed.   They may refuse to be seen remotely at any time. The electronic data is encrypted and password protected, and the patient and/or guardian has been advised of the potential risks to privacy not withstanding such measures.    [facility]  [facility]  [address] [facility] [id number] [phone]     Subjective   Sherry Quevedo is a 32 y.o. female who presents today for initial evaluation     Referring Provider:  No referring provider defined for this encounter. [facility]    Chief Complaint:  \"No rashaad in life, tired all the time\" per pt.    History of Present Illness:   Pt seeking treatment/evaluation for depression/anxiety, only prior treatments appear to be mostly medications. Kelvin was old psychiatrist, switched practices, couldn't get a hold of him, office randomly closed, MD at ER visit took pt off of adderall, pt was cut off cold turkey due to neuro S/S, reports being on a higher then recommended dose, tolerating ADHD w/out medication. Multiple meds tried, listed below, pt reports only thing that helped was Wellbutrin that over time was raised to max dose then higher then max dose, no records to review, genesight testing results reviewed, they contradict what pt reaction to Wellbutrin should be (states lower then normal dose for Wellbutrin is recommended).    No 100% concrete " manic/hypomanic episode reported, pt at one time had a bipolar diagnosis and has been put on mood stabilizing agents, but denies she is truly bipolar. Sleep is so-so, wakes up often, restless legs or fibromyalgia contribute to this, medically complex, multiple active problems listed in Epic. Therapy/counsellor used briefly in the past, not beneficial, last was 8-10 years ago, marriage counseling three years ago, went well.     Med History:   · Viibryd, sharmila remember  · Vylar, more angry, was not on 4-6 weeks  · Lamictal, more angry, was not on 4-6 weeks  · Latuda   · Wellbutrin  mg/day was on for several years, stopped working   · Adderall bid, stopped working  · Gabapentin  · Topamax  · Cymbaltasharmila remember it helping   · Lexapro, Zoloft, Prozac, not beneficial, took each medication for > 6 weeks with no benefit.   · Abilify     PHQ-9 Depression Screening  Little interest or pleasure in doing things? (P) 3-->nearly every day   Feeling down, depressed, or hopeless? (P) 2-->more than half the days   Trouble falling or staying asleep, or sleeping too much? (P) 2-->more than half the days   Feeling tired or having little energy? (P) 3-->nearly every day   Poor appetite or overeating? (P) 0-->not at all   Feeling bad about yourself - or that you are a failure or have let yourself or your family down? (P) 0-->not at all   Trouble concentrating on things, such as reading the newspaper or watching television? (P) 0-->not at all   Moving or speaking so slowly that other people could have noticed? Or the opposite - being so fidgety or restless that you have been moving around a lot more than usual? (P) 0-->not at all   Thoughts that you would be better off dead, or of hurting yourself in some way? (P) 0-->not at all   PHQ-9 Total Score (P) 10   If you checked off any problems, how difficult have these problems made it for you to do your work, take care of things at home, or get along with other people? (P) extremely  difficult     GAD7 DOCUMENTATION    Feeling nervous, anxious or on edge (P) 3   Not being able to stop or control worrying (P) 2   Worrying too much about different things (P) 2   Trouble relaxing (P) 3   Being so restless that it is hard to sit still (P) 2   Becoming easily annoyed or irritable (P) 3   Feeling Afraid as if something awful might happen (P) 0   BI Total Score (P) 15   If you checked any problems, how difficult have these problems made it for you to do your work, take care of things at home or get along with other people? (P) Extremely difficult      Past Psychiatric History:  Began Treatment:Ongoing on/ff for many years  Diagnoses:Depression and Anxiety OCD, Bipolar? 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    Social History     Socioeconomic History   • Marital status:    Tobacco Use   • Smoking status: Never   • Smokeless tobacco: Never   • Tobacco comments:     caffeine use 1 coke daily, occas sweet tea   Vaping Use   • Vaping Use: Never used   Substance and Sexual Activity   • Alcohol use: Yes     Alcohol/week: 2.0 standard drinks     Types: 1 Glasses of wine, 1 Shots of liquor per week   • Drug use: Never   • Sexual activity: Yes     Partners: Male     Birth control/protection: Surgical, Other     Comment: .       Family History:   Suicide Attempts: half sister in middle schoole  Suicide Completions:Denies   Sisier: bipolar shes a half sister    Family History    Problem Relation Age of Onset   • COPD Mother    • Depression Mother    • Hypertension Mother    • Heart disease Mother    • Hyperlipidemia Mother    • Asthma Father    • COPD Father    • Heart disease Father    • Alcohol abuse Father    • Hearing loss Father    • Cancer Sister    • Miscarriages / Stillbirths Sister         1 miscarriage   • Depression Sister    • Cancer Sister    • Miscarriages / Stillbirths Sister         Miscarriage & stillbirth   • Cancer Maternal Grandmother         Breast   • Diabetes Maternal Grandmother    • Depression Maternal Grandmother    • Breast cancer Maternal Grandmother    • Bleeding Disorder Paternal Grandmother         Factor V   • COPD Paternal Grandmother    • Asthma Paternal Grandmother    • Osteoarthritis Paternal Grandmother    • Arthritis Paternal Grandmother    • Alcohol abuse Paternal Grandfather    • Malig Hyperthermia Neg Hx    • Colon cancer Neg Hx      Developmental History:   Born: [facility], normal childhood  Childhood: Denies Abuse  High School:Completed   College:Masters    Past Surgical History:  Past Surgical History:   Procedure Laterality Date   • ADENOIDECTOMY  2006   • CHOLECYSTECTOMY  2019   • CHOLECYSTECTOMY WITH INTRAOPERATIVE CHOLANGIOGRAM N/A 11/09/2018    Procedure: Laparoscopic cholecystectomy;  Surgeon: Khanh Lassiter MD;  Location: [facility];  Service: General   • COLONOSCOPY N/A 08/2019   • DILATATION AND CURETTAGE N/A 10/2015    MISCARRIAGE.   • ENDOSCOPY N/A    • HEMORRHOIDECTOMY N/A 06/16/2022    Procedure: HEMORRHOIDECTOMY;  Surgeon: Linda Sanchez MD;  Location: [facility];  Service: General;  Laterality: N/A;   • LAPAROSCOPIC ASSISTED VAGINAL HYSTERECTOMY Bilateral 12/04/2019    Procedure: LAPAROSCOPIC ASSISTED VAGINAL HYSTERECTOMY, BILATERAL SALPINGECTOMY;  Surgeon: Hillary Nunn MD;  Location: [facility];  Service: Obstetrics/Gynecology   • OVARIAN CYST SURGERY  06/2009    Laparoscopic ovarian cyst resection, no  other abdominal surgery.   • TONSILLECTOMY AND ADENOIDECTOMY Bilateral 10/2006   • WISDOM TOOTH EXTRACTION Bilateral        Problem List:  Patient Active Problem List   Diagnosis   • Anemia   • Chronic allergic rhinitis   • History of renal stone   • Migraine with aura and without status migrainosus, not intractable   • Low serum vitamin B12   • Generalized anxiety disorder   • Multinodular non-toxic goiter   • Irritable bowel syndrome (IBS)   • Thyroid nodule   • Scoliosis   • Rectal bleeding   • BRBPR (bright red blood per rectum)   • PVC's (premature ventricular contractions)   • Thyromegaly   • Benign paroxysmal positional vertigo due to bilateral vestibular disorder   • Near syncope   • Dyspnea on exertion   • Lightheadedness   • Fat necrosis   • Palpitations   • Tachycardia   • SOB (shortness of breath)   • History of Holter monitoring   • Low serum potassium level   • Folic acid deficiency   • Cervical adenopathy   • Endometriosis   • Abnormal gluteal crease   • Decreased sex drive   • Fibromyalgia   • Hypoglycemia   • Breast anomaly   • Ovarian cyst   • Fatigue   • Bruises easily   • Cold intolerance   • Sleep disturbance   • Renal stones   • Chronic recurrent major depressive disorder (HCC)   • Calculus of bile duct   • Transaminitis   • RUQ abdominal mass   • History of miscarriage   • Fissure, anal   • History of gestational diabetes   • Cholestasis during pregnancy in third trimester   • Cholelithiasis   • External hemorrhoids   • Muscle spasm   • Class 1 obesity with serious comorbidity and body mass index (BMI) of 32.0 to 32.9 in adult   • Treatment-resistant depression     Allergy:   Allergies   Allergen Reactions   • Hydrocodone Itching and Rash   • Tramadol Nausea And Vomiting        Discontinued Medications:  Medications Discontinued During This Encounter   Medication Reason   • buPROPion XL (Wellbutrin XL) 150 MG 24 hr tablet        Current Medications:   Current Outpatient Medications    Medication Sig Dispense Refill   • albuterol sulfate  (90 Base) MCG/ACT inhaler Inhale 2 puffs Every 4 (Four) Hours As Needed for Wheezing or Shortness of Air. 8 g 0   • azithromycin (Zithromax Z-Chai) 250 MG tablet Take 2 tablets by mouth on day 1, then 1 tablet daily on days 2-5 6 tablet 0   • promethazine-dextromethorphan (PROMETHAZINE-DM) 6.25-15 MG/5ML syrup Take 5 mL by mouth 4 (Four) Times a Day As Needed for Cough. 180 mL 0   • Rimegepant Sulfate (Nurtec) 75 MG tablet dispersible tablet Take 1 tablet by mouth 1 (One) Time As Needed (migraine headaches.). May repeat dose once after 2 hours. (Patient taking differently: Take 1 tablet by mouth 1 (One) Time As Needed (migraine headaches.). TAKING ROUGHLY ONCE A MONTH) 16 tablet 1   • topiramate (Topamax) 50 MG tablet Take 1 tablet by mouth Every Night. 90 tablet 1   • buPROPion XL (Wellbutrin XL) 300 MG 24 hr tablet Take 1 tablet by mouth Every Morning for 90 days. 30 tablet 2   • Vortioxetine HBr (Trintellix) 10 MG tablet tablet Take 1 tablet by mouth Daily With Breakfast. 30 tablet 0     No current facility-administered medications for this visit.       Past Medical History:  Past Medical History:   Diagnosis Date   • Abnormal computed tomography angiography (CTA) of neck 12/21/2021   • Abnormal gluteal crease    • ADHD (attention deficit hyperactivity disorder) 2020   • Anemia 2019    iron defiency anemia   • Anxiety    • Benign paroxysmal positional vertigo of right ear 12/01/2021   • Bipolar disorder (HCC) 2012   • BRBPR (bright red blood per rectum) 06/02/2021    [facility]   • Breast anomaly    • Bruises easily    • Cervical adenopathy    • Cervical lymphadenopathy     Left side.   • Change in bowel habit 06/02/2021    Dr. Nancy Santana at [facility]   • Cholestasis during pregnancy in third trimester 2019   • Chronic allergic rhinitis    • Chronic pain disorder 2022    Fibro   • Chronic recurrent major depressive disorder (HCC)     In  partial remission.   • Cold intolerance    • Constipation    • Decreased sex drive    • Depression     History of PPD   • Diarrhea    • Dizziness 08/06/2021    LEONELA, Temi Mcrae, Cardiologist office.   • Dysmenorrhea 12/04/2019    Surgery: Laparoscopic assisted vaginal hysterectomy, Surgeon Dr. Hillary Nunn.   • Dysphoric mood    • Dyspnea on exertion 08/06/2021    LEONELA, Temi Mcrae, Cardiologist office.   • Endometriosis 12/2019    Laparoscopic assisted vaginal hysterectomy, & bilateral Salpingectomy, Surgeon Dr. Hillary Nunn on 12/04/2019.   • Environmental and seasonal allergies 09/21/2020   • Epigastric abdominal pain 11/07/2018    [facility].   • Fat necrosis 07/29/2021    [facility].   • Fatigue    • Fibromyalgia, primary 2021   • Fissure, anal     history   • Folic acid deficiency    • H/O TB skin testing 02/20/2018    Negative result at [facility].   • HA (headache)    • Hemorrhoid    • History of gestational diabetes     with first baby   • History of Holter monitoring 11/07/2019    24 hour.   • History of kidney stones 2006/2009   • History of miscarriage    • History of vasectomy 2019    Spouse Vasectomy.   • Hypocalcemia 03/2022   • Hypoglycemia    • Irritable bowel syndrome 1994    IBS with both constipation/diarrhea, followed by GI Dr. Moreland.   • Joint stiffness    • Lightheadedness 08/06/2021    LEONELA, Temi Mcrae, Cardiologist office.   • Low serum potassium level    • Low serum vitamin B12    • Lower extremity myoclonus 02/28/2022    ADMITTED TO [facility]   • Malaise and fatigue 11/12/2019    Cardiologist Dr. Benji Ugalde.   • Mass of left parotid gland 12/2021   • Migraines 10/2019   • Multinodular non-toxic goiter    • Near syncope 08/06/2021    LEONELA, Temi Mcrae, Cardiologist office.   • OCD (obsessive compulsive disorder)    • Ovarian cyst 2010    Surgery removed.   • Palpitations 10/28/2019   • PVC's (premature ventricular contractions)    •  Rapid heart rate 11/12/2019    Cardiologist Dr. Benji Ugalde.   • Rectal bleeding 06/02/2021    Dr. Nancy Santana at [facility]   • Renal stones    • RLS (restless legs syndrome)    • RUQ abdominal mass [id number]    [facility].   • Scoliosis 2003   • Sleep disturbance    • SOB (shortness of breath) 10/28/2019   • Tachycardia 10/28/2019   • Thrombocytopenia (HCC) 03/2022   • Thyroid nodule 10/28/2019    1 cm Left Submandibular node, [facility], Dr. Kevan Hoffman.   • Thyroid nodule 10/28/2019    0.3 cm Right side of neck, [facility], Dr. Kevan Hoffman.   • Thyromegaly 03/04/2016    Boarderline Thyromegaly, Findings via X-ray at [facility], ordering provider Dr. Nancy Santana.   • Transaminitis 11/07/2018    [facility].   • Trigger index finger of right hand 01/2022   • Vaginal delivery 01/2019    Baby girl.     Mental Status Exam:   Hygiene:   good  Cooperation:  Cooperative  Eye Contact:  Good  Psychomotor Behavior:  Appropriate  Affect:  Appropriate but slightly irritable towards end of interview  Mood: normal  Hopelessness: Denies  Speech:  Normal  Thought Process:  Goal directed  Thought Content:  Normal  Suicidal:  None  Homicidal:  None  Hallucinations:  None  Delusion:  None  Memory:  Intact  Orientation:  Person, Place, Time and Situation  Reliability:  fair  Insight:  Fair  Judgement:  Fair  Impulse Control:  Fair  Physical/Medical Issues:  Yes multiple, reviewed     Review of Systems:  Review of Systems   Constitutional: Positive for activity change, fatigue and unexpected weight change.   Respiratory: Negative.    Cardiovascular: Positive for palpitations.   Neurological:        Restless legs, fibro   Psychiatric/Behavioral: Positive for dysphoric mood and sleep disturbance.     Physical Exam:  Physical Exam  Constitutional:       Appearance: Normal appearance.   Pulmonary:      Effort: Pulmonary effort is normal.   Neurological:      Mental Status: She is alert and oriented to  person, place, and time.   Psychiatric:         Thought Content: Thought content normal.         Judgment: Judgment normal.         Vital Signs:   There were no vitals taken for this visit.     Lab Results:   Results Encounter on 09/09/2022   Component Date Value Ref Range Status   • C-Reactive Protein 10/05/2022 <0.30  0.00 - 0.50 mg/dL Final   • Sed Rate 10/05/2022 5  0 - 20 mm/hr Final   • RA Latex Turbid 10/05/2022 <10.0  <14.0 IU/mL Final   • TARA Direct 10/05/2022 Negative  Negative Final   • Hemoglobin A1C 10/05/2022 5.10  4.80 - 5.60 % Final    Comment: Hemoglobin A1C Ranges:  Increased Risk for Diabetes  5.7% to 6.4%  Diabetes                     >= 6.5%  Diabetic Goal                < 7.0%     Office Visit on 07/22/2022   Component Date Value Ref Range Status   • TSH 07/22/2022 2.040  0.450 - 4.500 uIU/mL Final   • Free T4 07/22/2022 1.11  0.82 - 1.77 ng/dL Final   • T3, Free 07/22/2022 3.1  2.0 - 4.4 pg/mL Final   • Vitamin B-12 07/22/2022 683  232 - 1,245 pg/mL Final   • Folate 07/22/2022 3.9  >3.0 ng/mL Final    Comment: A serum folate concentration of less than 3.1 ng/mL is  considered to represent clinical deficiency.     • WBC 07/22/2022 6.6  3.4 - 10.8 x10E3/uL Final   • RBC 07/22/2022 4.24  3.77 - 5.28 x10E6/uL Final   • Hemoglobin 07/22/2022 12.8  11.1 - 15.9 g/dL Final   • Hematocrit 07/22/2022 38.5  34.0 - 46.6 % Final   • MCV 07/22/2022 91  79 - 97 fL Final   • MCH 07/22/2022 30.2  26.6 - 33.0 pg Final   • MCHC 07/22/2022 33.2  31.5 - 35.7 g/dL Final   • RDW 07/22/2022 12.6  11.7 - 15.4 % Final   • Platelets 07/22/2022 166  150 - 450 x10E3/uL Final       EKG Results:  No orders to display     Imaging Results:  No Images in the past 120 days found..    Assessment & Plan   Diagnoses and all orders for this visit:    1. Treatment-resistant depression (Primary)  -     Ambulatory Referral to Behavioral Health    Other orders  -     buPROPion XL (Wellbutrin XL) 300 MG 24 hr tablet; Take 1 tablet by  mouth Every Morning for 90 days.  Dispense: 30 tablet; Refill: 2  -     Vortioxetine HBr (Trintellix) 10 MG tablet tablet; Take 1 tablet by mouth Daily With Breakfast.  Dispense: 30 tablet; Refill: 0      PLAN:   1. Condition being treated for is: Depression, newly identified to this provider, appears to be treatment resistant due to multiple failed meds, patientt educated how that could point to a  personality disorder, most likely borderline that would require therapy/counselling, particular dialectical behavioral therapy (DBT) or bipolar disorder. Genesight results showed patient would benefit from a lower dose of Wellbutrin that contradicts her past response to medication.  2. Counseling highly encouraged, transcranial magnetic stimulation (TMS) discussed, patient reports difficulty with work schedule that would not allow her to seek out treatment  3. Spravato/esketamin nasal spray treatment discussed, patient reports difficulty with work schedule that would not allow her to seek out treatment, agreed to have referral placed anyway, it usually takes a few months to get in.  4. A higher level of care may be needed like an intensive outpatient program (IOP) at [facility] if newer medications not effective.  5. Med Changes: OK to increase Wellbutrin XL to 300 mg/day, can take (2) 150 mg patient has at home till new script filled. OK to start trintellix/vortioextine 10 mg, take in morning with breakfast (a prior authorization likely that can take time to get approved).  6. Follow up: Around 4-6 weeks, appt scheduled.    Progress toward goal: Not at goal    Functional Status: Mild impairment     Prognosis: Fair with Ongoing Treatment     TREATMENT PLAN/GOALS: Continue supportive psychotherapy efforts and medications as indicated. Treatment and medication options discussed during today's visit. Patient acknowledged and verbally consented to continue with current treatment plan and was educated on the  importance of compliance with treatment and follow-up appointments.    MEDICATION ISSUES:  ZEINAB reviewed 12/23/2022  • A thorough discussion was had that included review of disease process, need for continued monitoring and additional treatment options including use of pharmacological and non-pharmacological approaches to care, decisions were made and agreed upon by patient and provider.   • Discussed the risks, benefits, and potential side effects of the medications; patient ackowledged and verbally consented.   • Patient is advised to avoid driving or operating heavy machinery if they feel drowsy or over sedated.   • Patient is agreeable to call the office with any worsening of symptoms or onset of intolerable side effects. Patient is agreeable to call 911 or go to the nearest ER should he/she begin having SI/HI.     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     Strengths: expressive of needs and self-reliant    Short-Term Goals: Patient will be compliant with medication management and note improvement in symptoms over the next 4 to 6 weeks or at next scheduled visit.  Long-Term Goals: Patient will continue psychotherapy as well as medication regimen without impairment in daily functioning over the next 6 months.      MEDS ORDERED DURING VISIT:  New Medications Ordered This Visit   Medications   • buPROPion XL (Wellbutrin XL) 300 MG 24 hr tablet     Sig: Take 1 tablet by mouth Every Morning for 90 days.     Dispense:  30 tablet     Refill:  2   • Vortioxetine HBr (Trintellix) 10 MG tablet tablet     Sig: Take 1 tablet by mouth Daily With Breakfast.     Dispense:  30 tablet     Refill:  0     Return in 4 weeks (on 1/20/2023) for Video visit.    This document has been electronically signed by Ranjeet  LEONELA Staley  December 24, 2022 15:02 EST    Part of this note may be an electronic transcription/translation of spoken language to printed text using the Dragon Dictation System.

## 2022-12-23 NOTE — PATIENT INSTRUCTIONS
PLAN:   Condition being treated for is: Depression, newly identified to this provider, appears to be treatment resistant due to multiple failed meds, patientt educated how that could point to a  personality disorder, most likely borderline that would require therapy/counselling, particular dialectical behavioral therapy (DBT) or bipolar disorder. Genesight results showed patient would benefit from a lower dose of Wellbutrin that contradicts her past response to medication.  Counseling highly encouraged, transcranial magnetic stimulation (TMS) discussed, patient reports difficulty with work schedule that would not allow her to seek out treatment  Spravato/esketamin nasal spray treatment discussed, patient reports difficulty with work schedule that would not allow her to seek out treatment, agreed to have referral placed anyway, it usually takes a few months to get in.  A higher level of care may be needed like an intensive outpatient program (IOP) at [facility] if newer medications not effective.  Med Changes: OK to increase Wellbutrin XL to 300 mg/day, can take (2) 150 mg patient has at home till new script filled. OK to start trintellix/vortioextine 10 mg, take in morning with breakfast (a prior authorization likely that can take time to get approved).  Follow up: Around 4-6 weeks, appt scheduled.  Please read/review attached documents on different treatments/diagnosis    General Instructions:  Patient to monitor for side effects, worsening symptoms, and/or improvement, report to PMHNP Afshin valdez.  If a sooner appointment is needed please call office at number listed below to schedule.  Please request refills through your pharmacy prior to reaching out to office or through [employer] [facility]  Please give office staff (1) week to schedule a referral, if you have not heard anything around that time call office and ask to speak to outgoing referral .    [facility]jeet [facility]  Nurse Practitioner (PMHNP)  [address] [place] [address]  P: [phone]  F: [phone]

## 2022-12-24 RX ORDER — BUPROPION HYDROCHLORIDE 300 MG/1
300 TABLET ORAL EVERY MORNING
Qty: 30 TABLET | Refills: 2 | Status: SHIPPED | OUTPATIENT
Start: 2022-12-24 | End: 2023-03-24

## 2022-12-27 ENCOUNTER — DOCUMENTATION (OUTPATIENT)
Dept: BEHAVIORAL HEALTH | Facility: CLINIC | Age: 32
End: 2022-12-27

## 2023-01-10 ENCOUNTER — OFFICE VISIT (OUTPATIENT)
Dept: INTERNAL MEDICINE | Facility: CLINIC | Age: 33
End: 2023-01-10
Payer: COMMERCIAL

## 2023-01-10 ENCOUNTER — HOSPITAL ENCOUNTER (EMERGENCY)
Facility: HOSPITAL | Age: 33
Discharge: HOME | End: 2023-01-11
Attending: EMERGENCY MEDICINE | Admitting: EMERGENCY MEDICINE
Payer: COMMERCIAL

## 2023-01-10 ENCOUNTER — APPOINTMENT (OUTPATIENT)
Dept: ULTRASOUND IMAGING | Facility: HOSPITAL | Age: 33
End: 2023-01-10
Payer: COMMERCIAL

## 2023-01-10 ENCOUNTER — APPOINTMENT (OUTPATIENT)
Dept: CT IMAGING | Facility: HOSPITAL | Age: 33
End: 2023-01-10
Payer: COMMERCIAL

## 2023-01-10 VITALS
BODY MASS INDEX: 32.6 KG/M2 | TEMPERATURE: 98 F | WEIGHT: 184 LBS | HEIGHT: 63 IN | DIASTOLIC BLOOD PRESSURE: 72 MMHG | HEART RATE: 121 BPM | OXYGEN SATURATION: 100 % | SYSTOLIC BLOOD PRESSURE: 110 MMHG

## 2023-01-10 DIAGNOSIS — R10.32 LEFT LOWER QUADRANT ABDOMINAL PAIN: Primary | ICD-10-CM

## 2023-01-10 DIAGNOSIS — K63.89 EPIPLOIC APPENDAGITIS: Primary | ICD-10-CM

## 2023-01-10 DIAGNOSIS — R31.9 HEMATURIA, UNSPECIFIED TYPE: ICD-10-CM

## 2023-01-10 LAB
ALBUMIN SERPL-MCNC: 4.5 G/DL (ref 3.5–5.2)
ALBUMIN/GLOB SERPL: 1.6 G/DL
ALP SERPL-CCNC: 85 U/L (ref 39–117)
ALT SERPL W P-5'-P-CCNC: 10 U/L (ref 1–33)
ANION GAP SERPL CALCULATED.3IONS-SCNC: 11 MMOL/L (ref 5–15)
AST SERPL-CCNC: 16 U/L (ref 1–32)
BASOPHILS # BLD AUTO: 0.07 10*3/MM3 (ref 0–0.2)
BASOPHILS NFR BLD AUTO: 0.8 % (ref 0–1.5)
BILIRUB BLD-MCNC: NEGATIVE MG/DL
BILIRUB SERPL-MCNC: 0.2 MG/DL (ref 0–1.2)
BILIRUB UR QL STRIP: NEGATIVE
BUN SERPL-MCNC: 12 MG/DL (ref 6–20)
BUN/CREAT SERPL: 12.8 (ref 7–25)
CALCIUM SPEC-SCNC: 8.7 MG/DL (ref 8.6–10.5)
CHLORIDE SERPL-SCNC: 109 MMOL/L (ref 98–107)
CLARITY UR: CLEAR
CLARITY, POC: CLEAR
CO2 SERPL-SCNC: 21 MMOL/L (ref 22–29)
COLOR UR: YELLOW
COLOR UR: YELLOW
CREAT SERPL-MCNC: 0.94 MG/DL (ref 0.57–1)
DEPRECATED RDW RBC AUTO: 41.6 FL (ref 37–54)
EGFRCR SERPLBLD CKD-EPI 2021: 82.3 ML/MIN/1.73
EOSINOPHIL # BLD AUTO: 0.19 10*3/MM3 (ref 0–0.4)
EOSINOPHIL NFR BLD AUTO: 2.1 % (ref 0.3–6.2)
ERYTHROCYTE [DISTWIDTH] IN BLOOD BY AUTOMATED COUNT: 12.7 % (ref 12.3–15.4)
EXPIRATION DATE: ABNORMAL
GLOBULIN UR ELPH-MCNC: 2.8 GM/DL
GLUCOSE SERPL-MCNC: 92 MG/DL (ref 65–99)
GLUCOSE UR STRIP-MCNC: NEGATIVE MG/DL
GLUCOSE UR STRIP-MCNC: NEGATIVE MG/DL
HCT VFR BLD AUTO: 42 % (ref 34–46.6)
HGB BLD-MCNC: 14.1 G/DL (ref 12–15.9)
HGB UR QL STRIP.AUTO: NEGATIVE
IMM GRANULOCYTES # BLD AUTO: 0.02 10*3/MM3 (ref 0–0.05)
IMM GRANULOCYTES NFR BLD AUTO: 0.2 % (ref 0–0.5)
KETONES UR QL STRIP: NEGATIVE
KETONES UR QL: NEGATIVE
LEUKOCYTE EST, POC: NEGATIVE
LEUKOCYTE ESTERASE UR QL STRIP.AUTO: NEGATIVE
LIPASE SERPL-CCNC: 35 U/L (ref 13–60)
LYMPHOCYTES # BLD AUTO: 3.47 10*3/MM3 (ref 0.7–3.1)
LYMPHOCYTES NFR BLD AUTO: 38.6 % (ref 19.6–45.3)
Lab: ABNORMAL
MCH RBC QN AUTO: 30.7 PG (ref 26.6–33)
MCHC RBC AUTO-ENTMCNC: 33.6 G/DL (ref 31.5–35.7)
MCV RBC AUTO: 91.3 FL (ref 79–97)
MONOCYTES # BLD AUTO: 0.58 10*3/MM3 (ref 0.1–0.9)
MONOCYTES NFR BLD AUTO: 6.5 % (ref 5–12)
NEUTROPHILS NFR BLD AUTO: 4.66 10*3/MM3 (ref 1.7–7)
NEUTROPHILS NFR BLD AUTO: 51.8 % (ref 42.7–76)
NITRITE UR QL STRIP: NEGATIVE
NITRITE UR-MCNC: NEGATIVE MG/ML
NRBC BLD AUTO-RTO: 0 /100 WBC (ref 0–0.2)
PH UR STRIP.AUTO: 5.5 [PH] (ref 5–8)
PH UR: 5.5 [PH] (ref 5–8)
PLATELET # BLD AUTO: 210 10*3/MM3 (ref 140–450)
PMV BLD AUTO: 8.9 FL (ref 6–12)
POTASSIUM SERPL-SCNC: 3.7 MMOL/L (ref 3.5–5.2)
PROT SERPL-MCNC: 7.3 G/DL (ref 6–8.5)
PROT UR QL STRIP: NEGATIVE
PROT UR STRIP-MCNC: NEGATIVE MG/DL
RBC # BLD AUTO: 4.6 10*6/MM3 (ref 3.77–5.28)
RBC # UR STRIP: ABNORMAL /UL
SODIUM SERPL-SCNC: 141 MMOL/L (ref 136–145)
SP GR UR STRIP: 1.02 (ref 1–1.03)
SP GR UR: 1.03 (ref 1–1.03)
UROBILINOGEN UR QL STRIP: NORMAL
UROBILINOGEN UR QL: NORMAL
WBC NRBC COR # BLD: 8.99 10*3/MM3 (ref 3.4–10.8)

## 2023-01-10 PROCEDURE — 96375 TX/PRO/DX INJ NEW DRUG ADDON: CPT

## 2023-01-10 PROCEDURE — 76830 TRANSVAGINAL US NON-OB: CPT

## 2023-01-10 PROCEDURE — 96376 TX/PRO/DX INJ SAME DRUG ADON: CPT

## 2023-01-10 PROCEDURE — 76856 US EXAM PELVIC COMPLETE: CPT

## 2023-01-10 PROCEDURE — 80053 COMPREHEN METABOLIC PANEL: CPT | Performed by: EMERGENCY MEDICINE

## 2023-01-10 PROCEDURE — 85025 COMPLETE CBC W/AUTO DIFF WBC: CPT | Performed by: EMERGENCY MEDICINE

## 2023-01-10 PROCEDURE — 99284 EMERGENCY DEPT VISIT MOD MDM: CPT

## 2023-01-10 PROCEDURE — 25010000002 HYDROMORPHONE PER 4 MG: Performed by: EMERGENCY MEDICINE

## 2023-01-10 PROCEDURE — 99213 OFFICE O/P EST LOW 20 MIN: CPT | Performed by: FAMILY MEDICINE

## 2023-01-10 PROCEDURE — 83690 ASSAY OF LIPASE: CPT | Performed by: EMERGENCY MEDICINE

## 2023-01-10 PROCEDURE — 74177 CT ABD & PELVIS W/CONTRAST: CPT

## 2023-01-10 PROCEDURE — 81003 URINALYSIS AUTO W/O SCOPE: CPT | Performed by: EMERGENCY MEDICINE

## 2023-01-10 PROCEDURE — 81003 URINALYSIS AUTO W/O SCOPE: CPT | Performed by: FAMILY MEDICINE

## 2023-01-10 PROCEDURE — 93976 VASCULAR STUDY: CPT

## 2023-01-10 PROCEDURE — 25010000002 IOPAMIDOL 61 % SOLUTION: Performed by: EMERGENCY MEDICINE

## 2023-01-10 PROCEDURE — 96374 THER/PROPH/DIAG INJ IV PUSH: CPT

## 2023-01-10 PROCEDURE — 25010000002 ONDANSETRON PER 1 MG: Performed by: EMERGENCY MEDICINE

## 2023-01-10 RX ORDER — HYDROMORPHONE HYDROCHLORIDE 1 MG/ML
0.5 INJECTION, SOLUTION INTRAMUSCULAR; INTRAVENOUS; SUBCUTANEOUS ONCE
Status: COMPLETED | OUTPATIENT
Start: 2023-01-10 | End: 2023-01-10

## 2023-01-10 RX ORDER — ONDANSETRON 2 MG/ML
4 INJECTION INTRAMUSCULAR; INTRAVENOUS ONCE
Status: COMPLETED | OUTPATIENT
Start: 2023-01-10 | End: 2023-01-10

## 2023-01-10 RX ADMIN — SODIUM CHLORIDE 1000 ML: 9 INJECTION, SOLUTION INTRAVENOUS at 22:00

## 2023-01-10 RX ADMIN — ONDANSETRON 4 MG: 2 INJECTION INTRAMUSCULAR; INTRAVENOUS at 22:00

## 2023-01-10 RX ADMIN — HYDROMORPHONE HYDROCHLORIDE 0.5 MG: 1 INJECTION, SOLUTION INTRAMUSCULAR; INTRAVENOUS; SUBCUTANEOUS at 22:00

## 2023-01-10 RX ADMIN — IOPAMIDOL 85 ML: 612 INJECTION, SOLUTION INTRAVENOUS at 23:51

## 2023-01-10 RX ADMIN — ONDANSETRON 4 MG: 2 INJECTION INTRAMUSCULAR; INTRAVENOUS at 23:00

## 2023-01-10 RX ADMIN — HYDROMORPHONE HYDROCHLORIDE 0.5 MG: 1 INJECTION, SOLUTION INTRAMUSCULAR; INTRAVENOUS; SUBCUTANEOUS at 23:00

## 2023-01-10 NOTE — PROGRESS NOTES
Subjective   Sherry Quevedo is a 32 y.o. female.   Chief Complaint   Patient presents with   • lower left quadrant pain       History of Present Illness     1.  Left lower quadrant abdominal pain- started yesterday in the afternoon.  Achy,.  Like pain, throbbing, constant.  Worse with movements.  Intensity 6-8 out of 10.  Patient took ibuprofen this morning and it did not help.  She had normal bowel movements today no constipation, no blood in it.  No nausea, no vomiting.  No fever or chills.  No burning sensation in urine with urination, no urgency, no frequency.  No blood seen in urine.  Periodically she has similar symptoms, but less intense.  They are occurring less than once a month.  She has a history of ovarian cyst removed in 2009.  She had hysterectomy in 2020.  She has history of endometriosis.    The following portions of the patient's history were reviewed and updated as appropriate: allergies, current medications, past family history, past medical history, past social history, past surgical history and problem list.    Review of Systems   Constitutional: Negative for chills and fever.   Gastrointestinal: Positive for abdominal pain. Negative for blood in stool, constipation, diarrhea, nausea and vomiting.   Genitourinary: Negative for dysuria, frequency, hematuria and urgency.         Objective   Wt Readings from Last 3 Encounters:   01/10/23 83.5 kg (184 lb)   12/22/22 83.5 kg (184 lb)   10/24/22 82.9 kg (182 lb 12.8 oz)      Vitals:    01/10/23 1339   BP: 110/72   Pulse: (!) 121   Temp: 98 °F (36.7 °C)   SpO2: 100%     Temp Readings from Last 3 Encounters:   01/10/23 98 °F (36.7 °C)   12/22/22 97.3 °F (36.3 °C)   10/24/22 98.3 °F (36.8 °C) (Temporal)     BP Readings from Last 3 Encounters:   01/10/23 110/72   12/22/22 98/62   10/24/22 122/78     Pulse Readings from Last 3 Encounters:   01/10/23 (!) 121   12/22/22 96   10/24/22 107     Body mass index is 32.6 kg/m².    Physical  Exam  Constitutional:       Appearance: She is well-developed.   Neck:      Thyroid: No thyromegaly.   Cardiovascular:      Rate and Rhythm: Normal rate and regular rhythm.      Heart sounds: Normal heart sounds.   Pulmonary:      Effort: Pulmonary effort is normal.      Breath sounds: Normal breath sounds.   Abdominal:      Palpations: Abdomen is soft. There is no mass.      Tenderness: There is abdominal tenderness. There is no guarding or rebound.      Comments: Tenderness to palpation in suprapubic area and left lower abdomen.   Skin:     General: Skin is warm and dry.   Neurological:      Mental Status: She is alert and oriented to person, place, and time.         Assessment & Plan   Diagnoses and all orders for this visit:    1. Left lower quadrant abdominal pain (Primary)  -     US Non-ob Transvaginal; Future  -     CBC (No Diff)        1.  Left lower abdominal pain-urine dip in the office-positive for trace of blood.  We are sending it for UA and culture.  We are checking CBC today.  We are ordering transvaginal ultrasound.  Patient is advised to schedule follow-up with her GYN.  She has a history of endometriosis and pain is recurrent.  If worsening of symptoms go to ER.

## 2023-01-10 NOTE — Clinical Note
[facility] EMERGENCY DEPARTMENT  [address]  [facility] [address]  Phone: [phone]    Sherry Quevedo was seen and treated in our emergency department on 1/10/2023.  She may return to work on 01/14/2023.  Go home and rest for 24 hours.  Take medications as needed.  Follow-up your doctor if not better in several days or return if worse.       Thank you for choosing [facility].    Freddie aPppas MD

## 2023-01-10 NOTE — Clinical Note
[facility] EMERGENCY DEPARTMENT  [address] [facility] [address]  Phone: [phone]    Sherry Quevedo was seen and treated in our emergency department on 1/10/2023.  She may return to work on 01/13/2023.         Thank you for choosing [facility].    Freddie Pappas MD

## 2023-01-10 NOTE — Clinical Note
[facility] EMERGENCY DEPARTMENT  [address]  [facility] [address]  Phone: [phone]    Sherry Quevedo was seen and treated in our emergency department on 1/10/2023.  She may return to work on 01/14/2023.  Go home and rest for 24 hours.  Take medications as needed.  Follow-up your doctor if not better in several days or return if worse.       Thank you for choosing [facility].    Freddie Pappas MD

## 2023-01-10 NOTE — Clinical Note
[facility] EMERGENCY DEPARTMENT  [address] [facility] [address]  Phone: [phone]    Sherry Quevedo was seen and treated in our emergency department on 1/10/2023.  She may return to work on 01/13/2023.         Thank you for choosing [facility].    Freddie Pappas MD

## 2023-01-11 VITALS
OXYGEN SATURATION: 100 % | BODY MASS INDEX: 32.6 KG/M2 | TEMPERATURE: 97.9 F | HEART RATE: 72 BPM | DIASTOLIC BLOOD PRESSURE: 88 MMHG | RESPIRATION RATE: 18 BRPM | SYSTOLIC BLOOD PRESSURE: 114 MMHG | HEIGHT: 63 IN | WEIGHT: 184 LBS

## 2023-01-11 LAB
ERYTHROCYTE [DISTWIDTH] IN BLOOD BY AUTOMATED COUNT: 12.6 % (ref 12.3–15.4)
HCT VFR BLD AUTO: 43.2 % (ref 34–46.6)
HGB BLD-MCNC: 14.3 G/DL (ref 12–15.9)
MCH RBC QN AUTO: 29.7 PG (ref 26.6–33)
MCHC RBC AUTO-ENTMCNC: 33.1 G/DL (ref 31.5–35.7)
MCV RBC AUTO: 89.8 FL (ref 79–97)
PLATELET # BLD AUTO: 243 10*3/MM3 (ref 140–450)
RBC # BLD AUTO: 4.81 10*6/MM3 (ref 3.77–5.28)
WBC # BLD AUTO: 8.52 10*3/MM3 (ref 3.4–10.8)

## 2023-01-11 RX ORDER — OXYCODONE AND ACETAMINOPHEN 7.5; 325 MG/1; MG/1
1 TABLET ORAL EVERY 6 HOURS PRN
Qty: 12 TABLET | Refills: 0 | Status: SHIPPED | OUTPATIENT
Start: 2023-01-11 | End: 2023-01-16

## 2023-01-11 NOTE — ED TRIAGE NOTES
Pt arrived to the ED via pv from home. C/O pain in her LLQ and suprapubic area. Denies N/VD, but has has 2 instances of loose stool today. Reports hx of ovarian cysts in the past as well as hx of IBS.

## 2023-01-11 NOTE — ED PROVIDER NOTES
EMERGENCY DEPARTMENT ENCOUNTER    Room Number:  33/33  Date seen:  1/11/2023  PCP: Temi Jones APRN  Historian: Patient      HPI:  Chief Complaint: Abdominal pain    Context: Sherry Quevedo is a 32 y.o. female who presents to the ED c/o onset of left lower quadrant pain that radiates to her suprapubic region that began yesterday.  She states that the pain has progressively worsened.  She denies fevers or chills.  She denies radiation to her flank.  She denies dysuria or hematuria.  She denies vaginal bleeding or vaginal discharge.  Patient states she is had a hysterectomy but still has her ovaries and that she is had an ovarian cyst before that is felt like this.  The patient was seen by her PCP today and had an ultrasound scheduled as an outpatient but the pain is worsened and thus she has presented to the emergency room.  The patient states she does have a history of IBS as well but this does not feel like her IBS.  She denies a history of kidney stones or diverticulitis.      PAST MEDICAL HISTORY  Active Ambulatory Problems     Diagnosis Date Noted   • Anemia 01/16/2019   • Chronic allergic rhinitis 09/24/2019   • History of renal stone 09/24/2019   • Migraine with aura and without status migrainosus, not intractable 10/03/2019   • Low serum vitamin B12 10/03/2019   • Generalized anxiety disorder 10/03/2019   • Multinodular non-toxic goiter 10/03/2019   • Irritable bowel syndrome (IBS) 09/21/2020   • Thyroid nodule    • Scoliosis 2003   • Rectal bleeding    • BRBPR (bright red blood per rectum) 06/02/2021   • PVC's (premature ventricular contractions)    • Thyromegaly 03/04/2016   • Benign paroxysmal positional vertigo due to bilateral vestibular disorder 08/06/2021   • Near syncope 08/06/2021   • Dyspnea on exertion 08/06/2021   • Lightheadedness 08/06/2021   • Fat necrosis 07/29/2021   • Palpitations 10/28/2019   • Tachycardia 10/28/2019   • SOB (shortness of breath) 10/28/2019   • History of Holter  monitoring 11/07/2019   • Low serum potassium level    • Folic acid deficiency    • Cervical adenopathy    • Endometriosis 12/2019   • Abnormal gluteal crease    • Decreased sex drive    • Fibromyalgia    • Hypoglycemia    • Breast anomaly    • Ovarian cyst 2010   • Fatigue    • Bruises easily    • Cold intolerance    • Sleep disturbance    • Renal stones    • Chronic recurrent major depressive disorder (HCC)    • Calculus of bile duct 11/07/2018   • Transaminitis 11/07/2018   • RUQ abdominal mass    • History of miscarriage    • Fissure, anal    • History of gestational diabetes    • Cholestasis during pregnancy in third trimester 2019   • Cholelithiasis 11/2018   • External hemorrhoids 12/20/2021   • Muscle spasm 02/28/2022   • Class 1 obesity with serious comorbidity and body mass index (BMI) of 32.0 to 32.9 in adult 08/29/2022   • Treatment-resistant depression 12/23/2022     Resolved Ambulatory Problems     Diagnosis Date Noted   • Transaminitis 11/07/2018   • Calculus of bile duct with cholecystitis without obstruction 11/07/2018   • Pregnancy 01/15/2019   • Left cervical lymphadenopathy 09/24/2019   • Iron deficiency anemia due to chronic blood loss 10/03/2019   • Low folic acid 10/03/2019   • Moderate episode of recurrent major depressive disorder (HCC) 10/03/2019   • Dysmenorrhea 12/04/2019   • Anxiety 09/21/2020   • Depression 09/21/2020   • Environmental and seasonal allergies 09/21/2020   • Migraines    • Change in bowel habit 06/02/2021   • Neck pain    • Rapid heart rate 11/12/2019   • Malaise and fatigue 11/12/2019   • Dysmenorrhea 12/04/2019   • Constipation    • Diarrhea    • HA (headache)    • Intractable migraine with aura without status migrainosus 10/2019   • Dysphoric mood    • Nervously anxious    • Vaginal delivery 01/2019   • History of vasectomy 2019   • Cervical lymphadenopathy    • H/O TB skin testing 02/20/2018   • Epigastric abdominal pain 11/07/2018   • Abnormal uterine bleeding (AUB)     • Allergic 01/2013     Past Medical History:   Diagnosis Date   • Abnormal computed tomography angiography (CTA) of neck 12/21/2021   • ADHD (attention deficit hyperactivity disorder) 2020   • Benign paroxysmal positional vertigo of right ear 12/01/2021   • Bipolar disorder (HCC) 2012   • Chronic pain disorder 2022   • Dizziness 08/06/2021   • Fibromyalgia, primary 2021   • Hemorrhoid    • History of kidney stones 2006/2009   • Hypocalcemia 03/2022   • Irritable bowel syndrome 1994   • Joint stiffness    • Lower extremity myoclonus 02/28/2022   • Mass of left parotid gland 12/2021   • OCD (obsessive compulsive disorder)    • RLS (restless legs syndrome)    • Thrombocytopenia (HCC) 03/2022   • Trigger index finger of right hand 01/2022         REVIEW OF SYSTEMS  All systems reviewed and negative except for those discussed in HPI.       PAST SURGICAL HISTORY  Past Surgical History:   Procedure Laterality Date   • ADENOIDECTOMY  2006   • CHOLECYSTECTOMY  2019   • CHOLECYSTECTOMY WITH INTRAOPERATIVE CHOLANGIOGRAM N/A 11/09/2018    Procedure: Laparoscopic cholecystectomy;  Surgeon: Khanh Lassiter MD;  Location: [facility];  Service: General   • COLONOSCOPY N/A 08/2019   • DILATATION AND CURETTAGE N/A 10/2015    MISCARRIAGE.   • ENDOSCOPY N/A    • HEMORRHOIDECTOMY N/A 06/16/2022    Procedure: HEMORRHOIDECTOMY;  Surgeon: iLnda Sanchez MD;  Location: [facility];  Service: General;  Laterality: N/A;   • LAPAROSCOPIC ASSISTED VAGINAL HYSTERECTOMY Bilateral 12/04/2019    Procedure: LAPAROSCOPIC ASSISTED VAGINAL HYSTERECTOMY, BILATERAL SALPINGECTOMY;  Surgeon: Hillary Nunn MD;  Location: [facility];  Service: Obstetrics/Gynecology   • OVARIAN CYST SURGERY  06/2009    Laparoscopic ovarian cyst resection, no other abdominal surgery.   • TONSILLECTOMY AND ADENOIDECTOMY Bilateral 10/2006   • WISDOM TOOTH EXTRACTION Bilateral          FAMILY HISTORY  Family History   Problem Relation Age of Onset   • COPD  Mother    • Depression Mother    • Hypertension Mother    • Heart disease Mother    • Hyperlipidemia Mother    • Asthma Father    • COPD Father    • Heart disease Father    • Alcohol abuse Father    • Hearing loss Father    • Cancer Sister    • Miscarriages / Stillbirths Sister         1 miscarriage   • Depression Sister    • Cancer Sister    • Miscarriages / Stillbirths Sister         Miscarriage & stillbirth   • Cancer Maternal Grandmother         Breast   • Diabetes Maternal Grandmother    • Depression Maternal Grandmother    • Breast cancer Maternal Grandmother    • Bleeding Disorder Paternal Grandmother         Factor V   • COPD Paternal Grandmother    • Asthma Paternal Grandmother    • Osteoarthritis Paternal Grandmother    • Arthritis Paternal Grandmother    • Alcohol abuse Paternal Grandfather    • Malig Hyperthermia Neg Hx    • Colon cancer Neg Hx          SOCIAL HISTORY  Social History     Socioeconomic History   • Marital status:    Tobacco Use   • Smoking status: Never   • Smokeless tobacco: Never   • Tobacco comments:     caffeine use 1 coke daily, occas sweet tea   Vaping Use   • Vaping Use: Never used   Substance and Sexual Activity   • Alcohol use: Yes     Alcohol/week: 2.0 standard drinks     Types: 1 Glasses of wine, 1 Shots of liquor per week   • Drug use: Never   • Sexual activity: Yes     Partners: Male     Birth control/protection: Surgical, Other     Comment: .         ALLERGIES  Hydrocodone and Tramadol      PHYSICAL EXAM  ED Triage Vitals   Temp Heart Rate Resp BP SpO2   01/10/23 2105 01/10/23 2105 01/10/23 2105 01/10/23 2137 01/10/23 2105   97.9 °F (36.6 °C) 118 16 120/80 98 %      Temp src Heart Rate Source Patient Position BP Location FiO2 (%)   01/10/23 2105 -- -- -- --   Tympanic           Physical Exam      GENERAL: 32-year-old female in no acute distress  HENT: NCAT: nares patent: Neck supple  EYES: no scleral icterus  CV: regular rhythm, normal rate  RESPIRATORY: normal  effort  ABDOMEN: soft, suprapubic/left lower quadrant tenderness with guarding but no rebound and normal bowel sounds  MUSCULOSKELETAL: no deformity  NEURO: alert with nonfocal neuro exam  PSYCH:  calm, cooperative  SKIN: warm, dry    Vital signs and nursing notes reviewed.    PPE pt does not present with symptoms for COVID19; however, I was wearing a mask and goggles throughout all patient interaction.    LAB RESULTS  Recent Results (from the past 24 hour(s))   POCT urinalysis dipstick, automated    Collection Time: 01/10/23  2:20 PM    Specimen: Urine   Result Value Ref Range    Color Yellow Yellow, Straw, Dark Yellow, Jennifer    Clarity, UA Clear Clear    Specific Gravity  1.030 1.005 - 1.030    pH, Urine 5.5 5.0 - 8.0    Leukocytes Negative Negative    Nitrite, UA Negative Negative    Protein, POC Negative Negative mg/dL    Glucose, UA Negative Negative mg/dL    Ketones, UA Negative Negative    Urobilinogen, UA Normal Normal, 0.2 E.U./dL    Bilirubin Negative Negative    Blood, UA Trace (A) Negative    Lot Number [id number]     Expiration Date 12/31/2023    Comprehensive Metabolic Panel    Collection Time: 01/10/23  9:53 PM    Specimen: Blood   Result Value Ref Range    Glucose 92 65 - 99 mg/dL    BUN 12 6 - 20 mg/dL    Creatinine 0.94 0.57 - 1.00 mg/dL    Sodium 141 136 - 145 mmol/L    Potassium 3.7 3.5 - 5.2 mmol/L    Chloride 109 (H) 98 - 107 mmol/L    CO2 21.0 (L) 22.0 - 29.0 mmol/L    Calcium 8.7 8.6 - 10.5 mg/dL    Total Protein 7.3 6.0 - 8.5 g/dL    Albumin 4.5 3.5 - 5.2 g/dL    ALT (SGPT) 10 1 - 33 U/L    AST (SGOT) 16 1 - 32 U/L    Alkaline Phosphatase 85 39 - 117 U/L    Total Bilirubin 0.2 0.0 - 1.2 mg/dL    Globulin 2.8 gm/dL    A/G Ratio 1.6 g/dL    BUN/Creatinine Ratio 12.8 7.0 - 25.0    Anion Gap 11.0 5.0 - 15.0 mmol/L    eGFR 82.3 >60.0 mL/min/1.73   Lipase    Collection Time: 01/10/23  9:53 PM    Specimen: Blood   Result Value Ref Range    Lipase 35 13 - 60 U/L   CBC Auto Differential    Collection  Time: 01/10/23  9:53 PM    Specimen: Blood   Result Value Ref Range    WBC 8.99 3.40 - 10.80 10*3/mm3    RBC 4.60 3.77 - 5.28 10*6/mm3    Hemoglobin 14.1 12.0 - 15.9 g/dL    Hematocrit 42.0 34.0 - 46.6 %    MCV 91.3 79.0 - 97.0 fL    MCH 30.7 26.6 - 33.0 pg    MCHC 33.6 31.5 - 35.7 g/dL    RDW 12.7 12.3 - 15.4 %    RDW-SD 41.6 37.0 - 54.0 fl    MPV 8.9 6.0 - 12.0 fL    Platelets 210 140 - 450 10*3/mm3    Neutrophil % 51.8 42.7 - 76.0 %    Lymphocyte % 38.6 19.6 - 45.3 %    Monocyte % 6.5 5.0 - 12.0 %    Eosinophil % 2.1 0.3 - 6.2 %    Basophil % 0.8 0.0 - 1.5 %    Immature Grans % 0.2 0.0 - 0.5 %    Neutrophils, Absolute 4.66 1.70 - 7.00 10*3/mm3    Lymphocytes, Absolute 3.47 (H) 0.70 - 3.10 10*3/mm3    Monocytes, Absolute 0.58 0.10 - 0.90 10*3/mm3    Eosinophils, Absolute 0.19 0.00 - 0.40 10*3/mm3    Basophils, Absolute 0.07 0.00 - 0.20 10*3/mm3    Immature Grans, Absolute 0.02 0.00 - 0.05 10*3/mm3    nRBC 0.0 0.0 - 0.2 /100 WBC   Urinalysis With Microscopic If Indicated (No Culture) - Urine, Clean Catch    Collection Time: 01/10/23 10:00 PM    Specimen: Urine, Clean Catch   Result Value Ref Range    Color, UA Yellow Yellow, Straw    Appearance, UA Clear Clear    pH, UA 5.5 5.0 - 8.0    Specific Gravity, UA 1.025 1.005 - 1.030    Glucose, UA Negative Negative    Ketones, UA Negative Negative    Bilirubin, UA Negative Negative    Blood, UA Negative Negative    Protein, UA Negative Negative    Leuk Esterase, UA Negative Negative    Nitrite, UA Negative Negative    Urobilinogen, UA 0.2 E.U./dL 0.2 - 1.0 E.U./dL       Ordered the above labs and reviewed the results.        RADIOLOGY  US Pelvis Complete    Result Date: 1/10/2023  PELVIC ULTRASOUND  HISTORY: Left lower quadrant pain  COMPARISON: None available.  TECHNIQUE: Grayscale, color Doppler, spectral Doppler waveform analysis was formed through the pelvis transabdominally and transvaginally.  FINDINGS: Uterus is surgically absent. Right ovary cannot be visualized  due to overlying bowel gas. Left ovary measures 3.4 x 1.7 x 2.1 cm. Normal-appearing follicles are identified on the left ovary. No dominant cyst or mass is seen. There is normal color Doppler flow. No free fluid is seen.      Normal sonographic appearance to the left ovary.  This report was finalized on 1/10/2023 10:59 PM by Dr. Leah Velazquez M.D.      CT Abdomen Pelvis With Contrast    Result Date: 1/11/2023  CT OF THE ABDOMEN AND PELVIS WITH CONTRAST  HISTORY: Left lower quadrant pain  COMPARISON: None available.  TECHNIQUE: Axial CT imaging was obtained through the abdomen and pelvis. IV contrast was administered.  FINDINGS: Images through the lung bases are clear. There is a tiny hiatal hernia. Duodenum, right adrenal gland, pancreas, and spleen are unremarkable. Gallbladder is surgically absent. There is some mild central intrahepatic biliary dilatation, as well as minimal prominence of the common bile duct, measuring up to 8 mm. Appearance is favored to be secondary to postcholecystectomy change, although correlation with liver function tests is recommended. There is a lipid rich left adrenal adenoma. The kidneys enhance symmetrically. There is no hydronephrosis. Punctate nonobstructing stones are noted within the kidneys bilaterally. Largest on the right measures 4 mm. The stone on the left measures 2 mm. There is no hydronephrosis. No distal ureteral or bladder stones are seen. Urinary bladder is normal. Uterus is absent. Small dominant right ovarian cyst is noted, measuring 1.1 cm. Left ovary is normal. The patient has inflammatory stranding seen adjacent to the distal descending colon, with some thickening of the overlying peritoneal reflection, and a central area of fat attenuation. This area measures up to 2.1 x 1.0 x 1.5 cm, and is favored to represent epiploic appendagitis, given the absence of adjacent colonic wall thickening. There is no bowel obstruction. There is no evidence of appendicitis.  No acute osseous abnormalities are seen.      Epiploic appendagitis seen adjacent to the distal descending colon  Radiation dose reduction techniques were utilized, including automated exposure control and exposure modulation based on body size.  This report was finalized on 1/11/2023 12:10 AM by Dr. Leah Velazquez M.D.        Ordered the above noted radiological studies. Reviewed by me in PACS.            PROCEDURES  Procedures          MEDICATIONS GIVEN IN ER  Medications   sodium chloride 0.9 % bolus 1,000 mL (1,000 mL Intravenous New Bag 1/10/23 2200)   ondansetron (ZOFRAN) injection 4 mg (4 mg Intravenous Given 1/10/23 2200)   HYDROmorphone (DILAUDID) injection 0.5 mg (0.5 mg Intravenous Given 1/10/23 2200)   ondansetron (ZOFRAN) injection 4 mg (4 mg Intravenous Given 1/10/23 2300)   HYDROmorphone (DILAUDID) injection 0.5 mg (0.5 mg Intravenous Given 1/10/23 2300)   iopamidol (ISOVUE-300) 61 % injection 100 mL (85 mL Intravenous Given 1/10/23 2351)             MEDICAL DECISION MAKING, PROGRESS, and CONSULTS    All labs have been independently reviewed by me.  All radiology studies have been reviewed by me and I have also reviewed the radiology report.   EKG's independently viewed and interpreted by me.  Discussion below represents my analysis of pertinent findings related to patient's condition, differential diagnosis, treatment plan and final disposition.      Additional sources:  - Shared decision making: After shared decision-making discussion with the patient we feel she is stable for discharge home and outpatient follow-up      Orders placed during this visit:  Orders Placed This Encounter   Procedures   • US Non-ob Transvaginal   • US Pelvis Complete   • US Testicular or Ovarian Vascular Limited   • CT Abdomen Pelvis With Contrast   • Comprehensive Metabolic Panel   • Lipase   • Urinalysis With Microscopic If Indicated (No Culture) - Urine, Clean Catch   • CBC Auto Differential   • CBC & Differential          Differential diagnosis:  My differential diagnosis includes but is not limited to gastritis, pancreatitis, cholecystitis, appendicitis, diverticulitis, urinary tract infection, kidney stone, or bowel obstruction.        Independent interpretation of labs, radiology studies, and discussions with consultants:  ED Course as of 01/11/23 0027   Tue Topher 10, 2023   2151 I will treat patient with IV fluids and pain medication will obtain labs and ultrasound for further evaluation [GP]   2233 Patient's CBC, CMP, lipase and urinalysis are normal.  Currently she is in ultrasound. [GP]   2307 Left ovary has normal ultrasound appearance.  With the patient's pain I will obtain a CT of the abdomen pelvis for further evaluation. [GP]   Wed Jan 11, 2023   0019 The patient's CT scan is consistent with epiploic appendagitis. [GP]   0022 On repeat examination the patient is resting much more comfortably.  I advised her of her epiploic appendagitis.  I told her I will give her a short course of pain medication and have her rest for the next 24 to 48 hours and to follow-up with her PCP if not better or return if worse.  The patient and  understand and agree with the plan. [GP]      ED Course User Index  [GP] Freddie Pappas MD               DIAGNOSIS  Final diagnoses:   Epiploic appendagitis         DISPOSITION  DISCHARGE    Patient discharged in stable condition.    Reviewed implications of results, diagnosis, meds, responsibility to follow up, warning signs and symptoms of possible worsening, potential complications and reasons to return to ER, including worsening pain, fever or intractable vomiting.    Patient/Family voiced understanding of above instructions.    Discussed plan for discharge, as there is no emergent indication for admission.  Pt/family is agreeable and understands need for follow up and repeat testing.  Pt is aware that discharge does not mean that nothing is wrong but it indicates no emergency is  present and they must continue care with follow-up as given below or physician of their choice.     FOLLOW-UP  Temi Jones, APRN  [address]  [address]  [place] [id number] [phone]    In 3 days  If symptoms worsen                  Latest Documented Vital Signs:  As of 00:27 EST  BP- 123/87 HR- 95 Temp- 97.9 °F (36.6 °C) (Tympanic) O2 sat- 98%--      --------------------  Please note that portions of this were completed with a voice recognition program.       Note Disclaimer: At [facility], we believe that sharing information builds trust and better relationships. You are receiving this note because you are receiving care at [facility] or recently visited. It is possible you will see health information before a provider has talked with you about it. This kind of information can be easy to misunderstand. To help you fully understand what it means for your health, we urge you to discuss this note with your provider.           Freddie Pappas MD  01/11/23 0027

## 2023-01-13 LAB
APPEARANCE UR: CLEAR
BACTERIA #/AREA URNS HPF: NORMAL /HPF
BACTERIA UR CULT: NORMAL
BACTERIA UR CULT: NORMAL
BILIRUB UR QL STRIP: NEGATIVE
CASTS URNS MICRO: NORMAL
COLOR UR: YELLOW
EPI CELLS #/AREA URNS HPF: NORMAL /HPF
GLUCOSE UR QL STRIP: NEGATIVE
HGB UR QL STRIP: NEGATIVE
KETONES UR QL STRIP: NEGATIVE
LEUKOCYTE ESTERASE UR QL STRIP: NEGATIVE
NITRITE UR QL STRIP: NEGATIVE
PH UR STRIP: 5.5 [PH] (ref 5–8)
PROT UR QL STRIP: NEGATIVE
RBC #/AREA URNS HPF: NORMAL /HPF
SP GR UR STRIP: 1.02 (ref 1–1.03)
UROBILINOGEN UR STRIP-MCNC: NORMAL MG/DL
WBC #/AREA URNS HPF: NORMAL /HPF

## 2023-01-16 ENCOUNTER — OFFICE VISIT (OUTPATIENT)
Dept: INTERNAL MEDICINE | Facility: CLINIC | Age: 33
End: 2023-01-16
Payer: COMMERCIAL

## 2023-01-16 VITALS
DIASTOLIC BLOOD PRESSURE: 74 MMHG | BODY MASS INDEX: 32.8 KG/M2 | HEART RATE: 93 BPM | HEIGHT: 63 IN | SYSTOLIC BLOOD PRESSURE: 104 MMHG | OXYGEN SATURATION: 100 % | WEIGHT: 185.1 LBS | TEMPERATURE: 98.2 F

## 2023-01-16 DIAGNOSIS — Z00.00 HEALTHCARE MAINTENANCE: Primary | ICD-10-CM

## 2023-01-16 DIAGNOSIS — G43.109 CHRONIC MIGRAINE WITH AURA: ICD-10-CM

## 2023-01-16 DIAGNOSIS — F33.9 CHRONIC RECURRENT MAJOR DEPRESSIVE DISORDER: ICD-10-CM

## 2023-01-16 DIAGNOSIS — D35.02 ADRENAL ADENOMA, LEFT: ICD-10-CM

## 2023-01-16 DIAGNOSIS — E66.9 CLASS 1 OBESITY WITH SERIOUS COMORBIDITY AND BODY MASS INDEX (BMI) OF 32.0 TO 32.9 IN ADULT, UNSPECIFIED OBESITY TYPE: ICD-10-CM

## 2023-01-16 DIAGNOSIS — F41.1 GENERALIZED ANXIETY DISORDER: ICD-10-CM

## 2023-01-16 PROBLEM — R00.2 PALPITATIONS: Status: RESOLVED | Noted: 2019-10-28 | Resolved: 2023-01-16

## 2023-01-16 PROBLEM — E04.2 MULTINODULAR NON-TOXIC GOITER: Status: RESOLVED | Noted: 2019-10-03 | Resolved: 2023-01-16

## 2023-01-16 PROBLEM — G43.E09 CHRONIC MIGRAINE WITH AURA: Status: ACTIVE | Noted: 2019-10-03

## 2023-01-16 PROBLEM — Z98.890 HISTORY OF HOLTER MONITORING: Status: RESOLVED | Noted: 2019-11-07 | Resolved: 2023-01-16

## 2023-01-16 PROCEDURE — 93000 ELECTROCARDIOGRAM COMPLETE: CPT | Performed by: NURSE PRACTITIONER

## 2023-01-16 PROCEDURE — 99395 PREV VISIT EST AGE 18-39: CPT | Performed by: NURSE PRACTITIONER

## 2023-01-16 NOTE — PROGRESS NOTES
Subjective   Sherry Quevedo is a 32 y.o. female.     Chief Complaint   Patient presents with   • Annual Exam        History of Present Illness   She is here today for CPE. She is fasting today for lab work.  She recently presented to the ER on 1/10 with complaints of left lower quadrant abdominal pain.  CT of the abdomen and pelvis positive for epiploic appendagitis.  There was also evidence of a lipid rich left adrenal adenoma and bilateral nephrolithiasis, the largest measuring 4 mm.  Her abdominal pain has resolved since being discharged from the ER.    Migraine with aura- she has had improvement in migraine HA frequency down to 3-4 a month from 13-16. She has improvement in acute HA with Nurtec.    Obesity- she is continue to struggle with weight loss.  She tried Plenity but did not tolerate this with GI side effects.  She has been trying to eat a healthy, balanced diet, working on portion control and trying to exercise.  She is frustrated as she continues to gain weight.    BI/MDD- UTD with behavioral health.  She feels anxiety and depression is stable on current treatment.    GYN- she is no longer seeing gynecology for Pap smears status post hysterectomy.    The following portions of the patient's history were reviewed and updated as appropriate: allergies, current medications, past family history, past medical history, past social history, past surgical history and problem list.    Review of Systems   Constitutional: Positive for fatigue and unexpected weight gain. Negative for appetite change, chills, diaphoresis, fever and unexpected weight loss.   HENT: Negative for congestion, dental problem, ear pain, hearing loss, mouth sores, nosebleeds, postnasal drip, rhinorrhea, sinus pressure, sore throat, swollen glands, tinnitus and trouble swallowing.    Eyes: Negative for blurred vision, pain, discharge, redness, itching and visual disturbance.   Respiratory: Negative for cough, chest tightness, shortness  of breath and wheezing.    Cardiovascular: Negative for chest pain, palpitations and leg swelling.   Gastrointestinal: Positive for indigestion. Negative for abdominal distention, abdominal pain, blood in stool, constipation, diarrhea, nausea, vomiting and GERD.   Endocrine: Negative for cold intolerance and heat intolerance.   Genitourinary: Negative for breast discharge, breast lump, breast pain, decreased libido, difficulty urinating, dyspareunia, dysuria, flank pain, frequency, hematuria, pelvic pain, pelvic pressure, urgency, urinary incontinence, vaginal bleeding and vaginal discharge.   Musculoskeletal: Negative for arthralgias, back pain, gait problem, joint swelling, myalgias and neck pain.   Skin: Negative for color change, rash and skin lesions.   Allergic/Immunologic: Negative for environmental allergies and food allergies.   Neurological: Positive for headache. Negative for dizziness, tremors, seizures, syncope, speech difficulty, weakness, light-headedness, numbness, memory problem and confusion.   Hematological: Negative for adenopathy. Does not bruise/bleed easily.   Psychiatric/Behavioral: Positive for depressed mood (stable). Negative for sleep disturbance, suicidal ideas and stress. The patient is nervous/anxious (stable).        Objective     Physical Exam  Constitutional:       Appearance: Normal appearance. She is well-developed.   HENT:      Head: Normocephalic and atraumatic.      Right Ear: Hearing, tympanic membrane, ear canal and external ear normal.      Left Ear: Hearing, tympanic membrane, ear canal and external ear normal.      Nose: Nose normal.      Right Sinus: No maxillary sinus tenderness or frontal sinus tenderness.      Left Sinus: No maxillary sinus tenderness or frontal sinus tenderness.      Mouth/Throat:      Lips: Pink.      Mouth: Mucous membranes are moist.      Dentition: Normal dentition.      Tongue: No lesions.      Pharynx: Oropharynx is clear. Uvula midline.       Tonsils: No tonsillar exudate.   Eyes:      General: Lids are normal.      Extraocular Movements: Extraocular movements intact.      Conjunctiva/sclera: Conjunctivae normal.      Pupils: Pupils are equal, round, and reactive to light.   Neck:      Thyroid: No thyroid mass, thyromegaly or thyroid tenderness.      Vascular: No carotid bruit.      Trachea: Trachea normal.   Cardiovascular:      Rate and Rhythm: Normal rate and regular rhythm.      Pulses: Normal pulses.           Radial pulses are 2+ on the right side and 2+ on the left side.        Popliteal pulses are 2+ on the right side and 2+ on the left side.        Dorsalis pedis pulses are 2+ on the right side and 2+ on the left side.        Posterior tibial pulses are 2+ on the right side and 2+ on the left side.      Heart sounds: S1 normal and S2 normal.   Pulmonary:      Effort: Pulmonary effort is normal.      Breath sounds: Normal breath sounds.   Chest:      Chest wall: No mass, lacerations, deformity, swelling, tenderness or crepitus.   Breasts:     Breasts are symmetrical.      Right: Normal.      Left: Normal.   Abdominal:      General: Bowel sounds are normal. There is no distension or abdominal bruit.      Palpations: Abdomen is soft. There is no hepatomegaly or splenomegaly.      Tenderness: There is no abdominal tenderness.      Hernia: No hernia is present.   Musculoskeletal:      Cervical back: Normal range of motion and neck supple.      Right lower leg: No edema.      Left lower leg: No edema.   Lymphadenopathy:      Head:      Right side of head: No submental, submandibular, tonsillar or occipital adenopathy.      Left side of head: No submental, submandibular, tonsillar or occipital adenopathy.      Cervical: No cervical adenopathy.      Upper Body:      Right upper body: No supraclavicular, axillary or pectoral adenopathy.      Left upper body: No supraclavicular, axillary or pectoral adenopathy.      Lower Body: No right inguinal  adenopathy. No left inguinal adenopathy.   Skin:     General: Skin is warm and dry.      Findings: No rash.      Nails: There is no clubbing.   Neurological:      Mental Status: She is alert and oriented to person, place, and time.      Motor: Motor function is intact.      Coordination: Coordination is intact.      Gait: Gait is intact.      Deep Tendon Reflexes:      Reflex Scores:       Patellar reflexes are 2+ on the right side and 2+ on the left side.  Psychiatric:         Attention and Perception: Attention normal.         Mood and Affect: Mood and affect normal.         Speech: Speech normal.         Behavior: Behavior normal.         Thought Content: Thought content normal.         Cognition and Memory: Cognition normal.         Vitals:    01/16/23 1322   BP: 104/74   Pulse: 93   Temp: 98.2 °F (36.8 °C)   SpO2: 100%      Body mass index is 32.8 kg/m².    Assessment & Plan   Diagnoses and all orders for this visit:    1. Healthcare maintenance (Primary)  -     Comprehensive Metabolic Panel  -     Lipid Panel With LDL / HDL Ratio    2. Class 1 obesity with serious comorbidity and body mass index (BMI) of 32.0 to 32.9 in adult, unspecified obesity type  -     Semaglutide-Weight Management 0.25 MG/0.5ML solution auto-injector; Inject 0.25 mg under the skin into the appropriate area as directed 1 (One) Time Per Week.  Dispense: 2 mL; Refill: 0  -     Semaglutide-Weight Management 0.5 MG/0.5ML solution auto-injector; Inject 0.5 mg under the skin into the appropriate area as directed 1 (One) Time Per Week.  Dispense: 2 mL; Refill: 0    3. Chronic recurrent major depressive disorder (HCC)    4. Generalized anxiety disorder    5. Chronic migraine with aura    6. Adrenal adenoma, left  -     Ambulatory Referral to Endocrinology    Other orders  -     ECG 12 Lead    BMI is >= 30 and <35. (Class 1 Obesity). The following options were offered after discussion;: exercise counseling/recommendations, nutrition  counseling/recommendations and pharmacological intervention options    ECG 12 Lead    Date/Time: 1/18/2023 8:06 AM  Performed by: Temi Jones APRN  Authorized by: Temi Jones APRN   Comparison: compared with previous ECG   Similar to previous ECG  Rhythm: sinus rhythm  Rate: normal  Conduction: conduction normal  ST Segments: ST segments normal  T Waves: T waves normal  QRS axis: normal  Other: no other findings    Clinical impression: normal ECG          1.  Preventative counseling-encouraged continuing healthy, balanced diet and regular exercise with resistance training.  Ensure adequate dietary intake of calcium and vitamin D.  2.  Obesity- not controlled.  We will try Wegovy 0.25 mg weekly.  Discussed appropriate use and adverse effects.  No personal or family history of thyroid cancer.  If she is tolerating 0.25 mg dose after 4 weeks we will increase to 0.5 mg weekly.  New prescription sent to the pharmacy.  Recommend Mediterranean-style diet and regular exercise focusing on portion control.  We will follow-up in 4 to 6 weeks for medication check.  3.  Chronic migraine with aura- improved.  Continue Topamax 50 mg nightly with as needed Nurtec for abortive therapy.  Notify if having an increase in migraine frequency or severity.  4.  Left adrenal adenoma-referral placed to endocrinology for further evaluation.  5.  Generalized anxiety/MDD- managed per behavioral health.  Follow-up as scheduled.    Dentist up-to-date  Eye exam up-to-date  Pap-no longer completing  Wear sunscreen outside    Fasting labs today, will call with results.  Follow-up in 4 weeks for medication check for obesity.

## 2023-01-17 LAB
ALBUMIN SERPL-MCNC: 4.7 G/DL (ref 3.5–5.2)
ALBUMIN/GLOB SERPL: 1.7 G/DL
ALP SERPL-CCNC: 87 U/L (ref 39–117)
ALT SERPL-CCNC: 7 U/L (ref 1–33)
AST SERPL-CCNC: 16 U/L (ref 1–32)
BILIRUB SERPL-MCNC: 0.4 MG/DL (ref 0–1.2)
BUN SERPL-MCNC: 13 MG/DL (ref 6–20)
BUN/CREAT SERPL: 13.7 (ref 7–25)
CALCIUM SERPL-MCNC: 9.2 MG/DL (ref 8.6–10.5)
CHLORIDE SERPL-SCNC: 103 MMOL/L (ref 98–107)
CHOLEST SERPL-MCNC: 174 MG/DL (ref 0–200)
CO2 SERPL-SCNC: 25 MMOL/L (ref 22–29)
CREAT SERPL-MCNC: 0.95 MG/DL (ref 0.57–1)
EGFRCR SERPLBLD CKD-EPI 2021: 81.3 ML/MIN/1.73
GLOBULIN SER CALC-MCNC: 2.7 GM/DL
GLUCOSE SERPL-MCNC: 78 MG/DL (ref 65–99)
HDLC SERPL-MCNC: 50 MG/DL (ref 40–60)
LDLC SERPL CALC-MCNC: 99 MG/DL (ref 0–100)
LDLC/HDLC SERPL: 1.91 {RATIO}
POTASSIUM SERPL-SCNC: 3.9 MMOL/L (ref 3.5–5.2)
PROT SERPL-MCNC: 7.4 G/DL (ref 6–8.5)
SODIUM SERPL-SCNC: 140 MMOL/L (ref 136–145)
TRIGL SERPL-MCNC: 142 MG/DL (ref 0–150)
VLDLC SERPL CALC-MCNC: 25 MG/DL (ref 5–40)

## 2023-01-18 PROCEDURE — 93000 ELECTROCARDIOGRAM COMPLETE: CPT | Performed by: NURSE PRACTITIONER

## 2023-01-19 DIAGNOSIS — E66.9 CLASS 1 OBESITY WITH SERIOUS COMORBIDITY AND BODY MASS INDEX (BMI) OF 32.0 TO 32.9 IN ADULT, UNSPECIFIED OBESITY TYPE: Primary | ICD-10-CM

## 2023-01-19 RX ORDER — PHENTERMINE HYDROCHLORIDE 15 MG/1
15 CAPSULE ORAL EVERY MORNING
Qty: 30 CAPSULE | Refills: 0 | Status: SHIPPED | OUTPATIENT
Start: 2023-01-19 | End: 2023-02-17 | Stop reason: DRUGHIGH

## 2023-01-21 RX ORDER — VORTIOXETINE 10 MG/1
TABLET, FILM COATED ORAL
Qty: 30 TABLET | Refills: 2 | Status: SHIPPED | OUTPATIENT
Start: 2023-01-21

## 2023-01-27 ENCOUNTER — TELEMEDICINE (OUTPATIENT)
Dept: BEHAVIORAL HEALTH | Facility: CLINIC | Age: 33
End: 2023-01-27
Payer: COMMERCIAL

## 2023-01-27 DIAGNOSIS — G47.00 INSOMNIA, UNSPECIFIED TYPE: ICD-10-CM

## 2023-01-27 DIAGNOSIS — F32.9 TREATMENT-RESISTANT DEPRESSION: Primary | ICD-10-CM

## 2023-01-27 PROCEDURE — 99214 OFFICE O/P EST MOD 30 MIN: CPT

## 2023-01-27 NOTE — PROGRESS NOTES
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 ackowledged and verbally consented.   • Patient is advised to avoid driving or operating heavy machinery if they feel drowsy or over sedated.   • Patient is agreeable to call the office with any worsening of symptoms or onset of intolerable side effects. Patient is agreeable to call 911 or go to the nearest ER should he/she begin having SI/HI.     Barriers: Stress and Too busy   Strengths: expressive of needs and motivated     Short-Term Goals: Patient will be compliant with medication management and note improvement in symptoms over the next 4 to 6 weeks or at next scheduled visit.  Long-Term Goals: Patient will continue psychotherapy as well as medication regimen without impairment in daily functioning over the next 6 months.      MEDS ORDERED DURING VISIT:  No orders of the defined types were placed in this encounter.      Return 1-2 months, for Recheck.    This document has been electronically signed by LEONELA Sexton  January 27, 2023 14:54 EST    Part of this note may be an electronic transcription/translation of spoken language to printed text using the Dragon Dictation System.    A total of 30 minutes was spent caring for this patient. That time was spent reviewing past notes, updating patient information, assessing patient  for S/S of condition being treated, educating patient on different treatment options, placing orders, and documenting in the record.

## 2023-01-27 NOTE — PATIENT INSTRUCTIONS
Plan of Care:  Depression overall improving with treatment  No medication changes today, continue Wellbutrin and Trintellix as previously ordered  No refills needed today.  Follow-up with PCP for phentermine/weight loss strategies  Patient instructed to monitor for side effects report to Af[facility] immediately  Increase positive coping skills to reduce stress and improve overall mental health, these can include hobbies, journaling, art, music, light to moderate exercise as tolerated, and time with friend/family  Please read/review attached documents on medication & + coping skills  Follow-up with Afshin in 1 to 2 months or sooner if needed    General Instructions:  Patient to monitor for side effects, worsening symptoms, and/or improvement, report to PMHNP Afshin immediatley.  If a sooner appointment is needed please call office at number listed below to schedule.  Please request refills through your pharmacy prior to reaching out to office or through [facility]  Please give office staff (1) week to schedule a referral, if you have not heard anything around that time call office and ask to speak to outgoing referral .    Ranjeet Staley   Psychiatric Mental Health Nurse Practitioner (PMHNP)  [address] [place] [address]  P: [phone]  F: [phone]

## 2023-02-16 ENCOUNTER — PATIENT MESSAGE (OUTPATIENT)
Dept: INTERNAL MEDICINE | Facility: CLINIC | Age: 33
End: 2023-02-16
Payer: COMMERCIAL

## 2023-02-17 DIAGNOSIS — E66.9 CLASS 1 OBESITY WITH SERIOUS COMORBIDITY AND BODY MASS INDEX (BMI) OF 32.0 TO 32.9 IN ADULT, UNSPECIFIED OBESITY TYPE: ICD-10-CM

## 2023-02-17 RX ORDER — PHENTERMINE HYDROCHLORIDE 37.5 MG/1
37.5 CAPSULE ORAL EVERY MORNING
Qty: 30 CAPSULE | Refills: 0 | Status: SHIPPED | OUTPATIENT
Start: 2023-02-17 | End: 2023-03-20

## 2023-02-17 NOTE — TELEPHONE ENCOUNTER
Patients appointment cancelled on 2/20/2023, she is requesting a fill, seen your note that states if she is tolerating you will up dose to 37.5, patient says she is tolerating well.

## 2023-03-08 ENCOUNTER — OFFICE VISIT (OUTPATIENT)
Dept: INTERNAL MEDICINE | Facility: CLINIC | Age: 33
End: 2023-03-08
Payer: COMMERCIAL

## 2023-03-08 VITALS
SYSTOLIC BLOOD PRESSURE: 104 MMHG | WEIGHT: 178 LBS | BODY MASS INDEX: 31.54 KG/M2 | HEART RATE: 88 BPM | DIASTOLIC BLOOD PRESSURE: 78 MMHG | TEMPERATURE: 97.5 F | HEIGHT: 63 IN | OXYGEN SATURATION: 100 %

## 2023-03-08 DIAGNOSIS — G43.109 CHRONIC MIGRAINE WITH AURA: Primary | ICD-10-CM

## 2023-03-08 DIAGNOSIS — E66.9 CLASS 1 OBESITY WITH SERIOUS COMORBIDITY AND BODY MASS INDEX (BMI) OF 31.0 TO 31.9 IN ADULT, UNSPECIFIED OBESITY TYPE: ICD-10-CM

## 2023-03-08 PROCEDURE — 99213 OFFICE O/P EST LOW 20 MIN: CPT | Performed by: NURSE PRACTITIONER

## 2023-03-08 NOTE — PROGRESS NOTES
Lilly Quevedo is a 32 y.o. female.      History of Present Illness   The patient is here today to F/U on obesity. Pt started on phentermine in end of January. Just filled her 2nd prescription.   She is a picky eater. No fruit. Vegetables sometimes. She does like meat. She does eat a lot of fried food. She is air frying, no more grease. She is watching her portion sizes. She does feel she eats less.   She does exercise 2-3 times a week. Mainly playing with kids.     Anxiety and depression- to have telehealth appointment today with [facility]     She has had 2-3 migraines in the last 2 weeks. She does have allergies. She is taking flonase regularly.   The following portions of the patient's history were reviewed and updated as appropriate: allergies, current medications, past family history, past medical history, past social history, past surgical history and problem list.    Review of Systems   Constitutional: Negative for chills and fever.   Respiratory: Negative.    Cardiovascular: Negative.    Psychiatric/Behavioral: Positive for depressed mood. Negative for dysphoric mood and suicidal ideas. The patient is nervous/anxious.        Objective   Physical Exam  Constitutional:       Appearance: Normal appearance. She is well-developed.   Neck:      Thyroid: No thyromegaly.   Cardiovascular:      Rate and Rhythm: Normal rate and regular rhythm.      Heart sounds: Normal heart sounds.   Pulmonary:      Effort: Pulmonary effort is normal.      Breath sounds: Normal breath sounds.   Musculoskeletal:      Cervical back: Normal range of motion and neck supple.   Lymphadenopathy:      Cervical: No cervical adenopathy.   Skin:     General: Skin is warm and dry.   Neurological:      Mental Status: She is alert.   Psychiatric:         Mood and Affect: Mood is anxious.         Behavior: Behavior normal.         Thought Content: Thought content normal.         Judgment: Judgment normal.         Vitals:     03/08/23 0934   BP: 104/78   Pulse: 88   Temp: 97.5 °F (36.4 °C)   SpO2: 100%     Body mass index is 31.54 kg/m².    Current Outpatient Medications:   •  buPROPion XL (Wellbutrin XL) 300 MG 24 hr tablet, Take 1 tablet by mouth Every Morning for 90 days., Disp: 30 tablet, Rfl: 2  •  phentermine 37.5 MG capsule, Take 1 capsule by mouth Every Morning., Disp: 30 capsule, Rfl: 0  •  Rimegepant Sulfate (Nurtec) 75 MG tablet dispersible tablet, Take 1 tablet by mouth 1 (One) Time As Needed (migraine headaches.). May repeat dose once after 2 hours. (Patient taking differently: Take 1 tablet by mouth 1 (One) Time As Needed (migraine headaches.). TAKING ROUGHLY ONCE A MONTH), Disp: 16 tablet, Rfl: 1  •  topiramate (Topamax) 50 MG tablet, Take 1 tablet by mouth Every Night., Disp: 90 tablet, Rfl: 1  •  Trintellix 10 MG tablet tablet, Take 1 tablet by mouth once daily with breakfast, Disp: 30 tablet, Rfl: 2   Office Visit on 01/16/2023   Component Date Value Ref Range Status   • Glucose 01/16/2023 78  65 - 99 mg/dL Final   • BUN 01/16/2023 13  6 - 20 mg/dL Final   • Creatinine 01/16/2023 0.95  0.57 - 1.00 mg/dL Final   • EGFR Result 01/16/2023 81.3  >60.0 mL/min/1.73 Final    Comment: National Kidney Foundation and American Society of  Nephrology (ASN) Task Force recommended calculation based  on the Chronic Kidney Disease Epidemiology Collaboration  (CKD-EPI) equation refit without adjustment for race.  GFR Normal >60  Chronic Kidney Disease <60  Kidney Failure <15     • BUN/Creatinine Ratio 01/16/2023 13.7  7.0 - 25.0 Final   • Sodium 01/16/2023 140  136 - 145 mmol/L Final   • Potassium 01/16/2023 3.9  3.5 - 5.2 mmol/L Final   • Chloride 01/16/2023 103  98 - 107 mmol/L Final   • Total CO2 01/16/2023 25.0  22.0 - 29.0 mmol/L Final   • Calcium 01/16/2023 9.2  8.6 - 10.5 mg/dL Final   • Total Protein 01/16/2023 7.4  6.0 - 8.5 g/dL Final   • Albumin 01/16/2023 4.7  3.5 - 5.2 g/dL Final   • Globulin 01/16/2023 2.7  gm/dL Final   •  A/G Ratio 01/16/2023 1.7  g/dL Final   • Total Bilirubin 01/16/2023 0.4  0.0 - 1.2 mg/dL Final   • Alkaline Phosphatase 01/16/2023 87  39 - 117 U/L Final   • AST (SGOT) 01/16/2023 16  1 - 32 U/L Final   • ALT (SGPT) 01/16/2023 7  1 - 33 U/L Final   • Total Cholesterol 01/16/2023 174  0 - 200 mg/dL Final    Comment: Cholesterol Reference Ranges  (U.S. Department of Health and Human Services ATP III  Classifications)  Desirable          <200 mg/dL  Borderline High    200-239 mg/dL  High Risk          >240 mg/dL  Triglyceride Reference Ranges  (U.S. Department of Health and Human Services ATP III  Classifications)  Normal           <150 mg/dL  Borderline High  150-199 mg/dL  High             200-499 mg/dL  Very High        >500 mg/dL  HDL Reference Ranges  (U.S. Department of Health and Human Services ATP III  Classifications)  Low     <40 mg/dl (major risk factor for CHD)  High    >60 mg/dl ('negative' risk factor for CHD)  LDL Reference Ranges  (U.S. Department of Health and Human Services ATP III  Classifications)  Optimal          <100 mg/dL  Near Optimal     100-129 mg/dL  Borderline High  130-159 mg/dL  High             160-189 mg/dL  Very High        >189 mg/dL     • Triglycerides 01/16/2023 142  0 - 150 mg/dL Final   • HDL Cholesterol 01/16/2023 50  40 - 60 mg/dL Final   • VLDL Cholesterol Esteban 01/16/2023 25  5 - 40 mg/dL Final   • LDL Chol Calc (NIH) 01/16/2023 99  0 - 100 mg/dL Final   • LDL/HDL RATIO 01/16/2023 1.91   Final       Assessment & Plan   Diagnoses and all orders for this visit:    1. Chronic migraine with aura (Primary)    2. Class 1 obesity with serious comorbidity and body mass index (BMI) of 31.0 to 31.9 in adult, unspecified obesity type               1.  Migraines- suggest adding on zyrtec for AR, if not helpful will bump up topamax   2. Obesity- phentermine is working well, continue to work on trading out simple carbs for complex, work on adding in more formal exercise   Answers for HPI/ROS  submitted by the patient on 3/7/2023  Please describe your symptoms.: Medication follow-up, weight gain, migranes  Have you had these symptoms before?: Yes  How long have you been having these symptoms?: Greater than 2 weeks  Please list any medications you are currently taking for this condition.: Phentermine, Topamax, Nurtec  What is the primary reason for your visit?: Other

## 2023-03-18 DIAGNOSIS — E66.9 CLASS 1 OBESITY WITH SERIOUS COMORBIDITY AND BODY MASS INDEX (BMI) OF 32.0 TO 32.9 IN ADULT, UNSPECIFIED OBESITY TYPE: ICD-10-CM

## 2023-03-20 DIAGNOSIS — G43.109 MIGRAINE WITH AURA AND WITHOUT STATUS MIGRAINOSUS, NOT INTRACTABLE: ICD-10-CM

## 2023-03-20 RX ORDER — RIMEGEPANT SULFATE 75 MG/75MG
1 TABLET, ORALLY DISINTEGRATING ORAL ONCE AS NEEDED
Qty: 16 TABLET | Refills: 1 | Status: SHIPPED | OUTPATIENT
Start: 2023-03-20

## 2023-03-20 RX ORDER — PHENTERMINE HYDROCHLORIDE 37.5 MG/1
CAPSULE ORAL
Qty: 30 CAPSULE | Refills: 0 | Status: SHIPPED | OUTPATIENT
Start: 2023-03-20

## 2023-03-30 ENCOUNTER — OFFICE VISIT (OUTPATIENT)
Dept: ENDOCRINOLOGY | Age: 33
End: 2023-03-30
Payer: COMMERCIAL

## 2023-03-30 ENCOUNTER — TELEPHONE (OUTPATIENT)
Dept: INTERNAL MEDICINE | Facility: CLINIC | Age: 33
End: 2023-03-30
Payer: COMMERCIAL

## 2023-03-30 VITALS
HEART RATE: 95 BPM | TEMPERATURE: 97.1 F | BODY MASS INDEX: 31.82 KG/M2 | SYSTOLIC BLOOD PRESSURE: 136 MMHG | HEIGHT: 63 IN | DIASTOLIC BLOOD PRESSURE: 86 MMHG | WEIGHT: 179.6 LBS | OXYGEN SATURATION: 100 %

## 2023-03-30 DIAGNOSIS — D35.02 ADENOMA OF LEFT ADRENAL GLAND: Primary | ICD-10-CM

## 2023-03-30 RX ORDER — DEXAMETHASONE 1 MG
TABLET ORAL
Qty: 1 TABLET | Refills: 0 | Status: SHIPPED | OUTPATIENT
Start: 2023-03-30

## 2023-03-30 RX ORDER — AMITRIPTYLINE HYDROCHLORIDE 10 MG/1
10 TABLET, FILM COATED ORAL
COMMUNITY
Start: 2023-03-08

## 2023-03-30 NOTE — TELEPHONE ENCOUNTER
Caller: JAIR    Relationship: [facility] PRIOR AUTHORIZATION    Best call back number: [phone] EXTENSION:[phone] (CONFIDENTIAL MAILBOX IF SHE CANT ANSWER)    What is the best time to reach you: ANYTIME    Who are you requesting to speak with (clinical staff, provider,  specific staff member): CLINICAL    What was the call regarding: JAIR IS REQUESTING A CALLBACK TO KNOW IF THE PA FOR Rimegepant Sulfate (Nurtec) 75 MG tablet dispersible tablet HAD BEEN SUBMITTED AND IF THERE WAS ANY DETERMINATION.

## 2023-03-30 NOTE — PROGRESS NOTES
New Patient      Chief Complaint    Chief Complaint   Patient presents with   • Adrenal adenoma        HPI:   Sherry Quevedo is a 32 y.o. female sent to us for consultative evaluation and management of an adrenal adenoma.  She had a CT of the abdomen and pelvis done with contrast on January 10, 2023, for evaluation of left lower quadrant pain and the CT scan and monitor things, disclosed the fact that she had what was described as a lipid rich left adrenal adenoma.  No mention was made regarding its size and its density.  This is the reason the patient is sent to us.  She had no knowledge of this adrenal adenoma prior to this examination.  She thinks that she has gained about 50 pounds in the last 1 year.  She has history of kidney stones but no bone fractures.  She does not have any history of high blood pressure.  Although she has a history of gestational diabetes with her last pregnancy, her A1c on October 5, 2022, was 4.7%.  She does not have any history of low potassium or being on potassium supplements and she does not have any features suggestive of pheochromocytoma.  We were able to call Dr. Mike Thompson in radiology who was very kind to take a look at the CT and any informed us that the size of the adrenal adenoma was 9 mm with Hounsfield units of -23 density.            Past Medical History:   Diagnosis Date   • Abnormal computed tomography angiography (CTA) of neck 12/21/2021   • Abnormal gluteal crease    • ADHD (attention deficit hyperactivity disorder) 2020   • Anemia 2019    iron defiency anemia   • Anxiety    • Benign paroxysmal positional vertigo of right ear 12/01/2021   • Bipolar disorder (HCC) 2012   • BRBPR (bright red blood per rectum) 06/02/2021    Dr[facility] Santana [facility]   • Breast anomaly    • Bruises easily    • Cervical adenopathy    • Cervical lymphadenopathy     Left side.   • Change in bowel habit 06/02/2021    Dr. Nancy Santana at [facility]   • Cholestasis during pregnancy in third  trimester 2019   • Chronic allergic rhinitis    • Chronic pain disorder 2022    [facility]   • Chronic recurrent major depressive disorder (HCC)     In partial remission.   • Cold intolerance    • Constipation    • Decreased sex drive    • Depression     History of PPD   • Diarrhea    • Dizziness 08/06/2021    LEONELA, Temi Mcrae, Cardiologist office.   • Dysmenorrhea 12/04/2019    Surgery: Laparoscopic assisted vaginal hysterectomy, Surgeon Dr. Hillary Nunn.   • Dysphoric mood    • Dyspnea on exertion 08/06/2021    LEONELA, Temi Mcrae, Cardiologist office.   • Endometriosis 12/2019    Laparoscopic assisted vaginal hysterectomy, & bilateral Salpingectomy, Surgeon Dr. Hillary Nunn on 12/04/2019.   • Environmental and seasonal allergies 09/21/2020   • Epigastric abdominal pain 11/07/2018    [facility].   • Epiploic appendagitis 01/10/2023   • Fat necrosis 07/29/2021    [facility].   • Fatigue    • Fibromyalgia, primary 2021   • Fissure, anal     history   • Folic acid deficiency    • Gestational diabetes [id number]    I had it with my 1st pregnancy   • H/O TB skin testing 02/20/2018    Negative result at [facility].   • HA (headache)    • Hemorrhoid    • History of gestational diabetes     with first baby   • History of Holter monitoring 11/07/2019    24 hour.   • History of kidney stones 2006/2009   • History of miscarriage    • History of vasectomy 2019    Spouse Vasectomy.   • Hypocalcemia 03/2022   • Hypoglycemia    • Irritable bowel syndrome 1994    IBS with both constipation/diarrhea, followed by GI Dr. Moreland.   • Joint stiffness    • Lightheadedness 08/06/2021    LEONELA, Temi Mcrae, Cardiologist office.   • Low serum potassium level    • Low serum vitamin B12    • Lower extremity myoclonus 02/28/2022    ADMITTED TO [facility]   • Malaise and fatigue 11/12/2019    Cardiologist Dr. Benji Ugalde.   • Mass of left parotid gland 12/2021   • Migraines 10/2019   • Multinodular  non-toxic goiter    • Near syncope 08/06/2021    Temi GIPSON, Cardiologist office.   • OCD (obsessive compulsive disorder)    • Ovarian cyst 2010    Surgery removed.   • Palpitations 10/28/2019   • PVC's (premature ventricular contractions)    • Rapid heart rate 11/12/2019    Cardiologist Dr. Benji Ugalde.   • Rectal bleeding 06/02/2021    Dr. Nancy Santana at [facility]   • Renal stones    • RLS (restless legs syndrome)    • RUQ abdominal mass [id number]    [facility].   • Scoliosis 2003   • Sleep disturbance    • SOB (shortness of breath) 10/28/2019   • Tachycardia 10/28/2019   • Thrombocytopenia (HCC) 03/2022   • Thyroid nodule 10/28/2019    1 cm Left Submandibular node, [facility], Dr. Kevan Hoffman.   • Thyroid nodule 10/28/2019    0.3 cm Right side of neck, [facility], Dr. Kevan Hoffman.   • Thyromegaly 03/04/2016    Boarderline Thyromegaly, Findings via X-ray at [facility], ordering provider Dr. Nancy Santana.   • Transaminitis 11/07/2018    [facility].   • Trigger index finger of right hand 01/2022   • Vaginal delivery 01/2019    Baby girl.          ROS:  Pertinent to this visit, only as mentioned above.  The rest was negative.    Social History     Socioeconomic History   • Marital status:    Tobacco Use   • Smoking status: Never   • Smokeless tobacco: Never   • Tobacco comments:     caffeine use 1 coke daily, occas sweet tea   Vaping Use   • Vaping Use: Never used   Substance and Sexual Activity   • Alcohol use: Yes     Alcohol/week: 2.0 standard drinks     Types: 1 Glasses of wine, 1 Shots of liquor per week   • Drug use: Never   • Sexual activity: Yes     Partners: Male     Birth control/protection: Surgical, Other, Hysterectomy     Comment: .     Physical Exam:  GENERAL: She looked well.  Obese.  HEENT: Normal examination.  Not cushingoid.  NECK: Normal examination without any palpable thyroid enlargement or discrete thyroid nodules  LUNGS: Clear to  auscultation bilaterally  CVS: RRR  EXTREMITIES: Normal examination.    Assessment:  1.  A 9 mm left adrenal adenoma with a very low density of -23 Hounsfield units, in a patient who does not have any features of pheochromocytoma, Cushing's or excess aldosterone production.    Diagnoses and all orders for this visit:    1. Adenoma of left adrenal gland (Primary)  -     Cortisol - AM  -     Aldosterone / Renin Ratio  -     Metanephrines, Frac. Free, Plasma  -     CT Abdomen Adrenals With & Without Contrast; Future    Other orders  -     dexamethasone (DECADRON) 1 MG tablet; By mouth take 1 tab at 11:30 PM and before 12:00 Midnight the night before having AM fasting labs before 08:30 AM.  Dispense: 1 tablet; Refill: 0    Recommendations:  1.  We reviewed with Mrs. Linder, the finding of the small and subcentimeter left adrenal adenoma with a low density.  2.  We also discussed the fact that she does not have any major symptoms to suggest presence of pheochromocytoma, Cushing's or excess aldosterone production.  3.  Nonetheless, we do recommend to do the basic biochemical screening with fractionated plasma metanephrine levels, plasma aldosterone concentration and plasma renin activity and of course an a.m. cortisol after a 1 mg overnight dexamethasone suppression test.  4.  We gave her the prescription of dexamethasone and described to her how to get the test done appropriately.  5.  If all the biochemical screening comes back negative she will obtain a follow-up CT of the abdomen 6 months after the last one, sometime in August 2023 and we will see her back for follow-up soon thereafter.  6.  We gave her the opportunity to ask questions, which we answered and we addressed her concerns.

## 2023-04-20 ENCOUNTER — OFFICE VISIT (OUTPATIENT)
Dept: INTERNAL MEDICINE | Facility: CLINIC | Age: 33
End: 2023-04-20
Payer: COMMERCIAL

## 2023-04-20 VITALS
SYSTOLIC BLOOD PRESSURE: 124 MMHG | HEART RATE: 94 BPM | HEIGHT: 63 IN | BODY MASS INDEX: 31.41 KG/M2 | OXYGEN SATURATION: 98 % | TEMPERATURE: 97.7 F | DIASTOLIC BLOOD PRESSURE: 80 MMHG | WEIGHT: 177.3 LBS

## 2023-04-20 DIAGNOSIS — R07.89 CHEST PRESSURE: Primary | ICD-10-CM

## 2023-04-20 DIAGNOSIS — R00.2 INTERMITTENT PALPITATIONS: ICD-10-CM

## 2023-04-20 DIAGNOSIS — E66.9 CLASS 1 OBESITY WITH SERIOUS COMORBIDITY AND BODY MASS INDEX (BMI) OF 31.0 TO 31.9 IN ADULT, UNSPECIFIED OBESITY TYPE: ICD-10-CM

## 2023-04-20 PROCEDURE — 93000 ELECTROCARDIOGRAM COMPLETE: CPT | Performed by: NURSE PRACTITIONER

## 2023-04-20 PROCEDURE — 99214 OFFICE O/P EST MOD 30 MIN: CPT | Performed by: NURSE PRACTITIONER

## 2023-04-20 NOTE — PROGRESS NOTES
Subjective   Sherry Quevedo is a 32 y.o. female.     Chief Complaint   Patient presents with   • Weight Loss   • Chest Pressure     Pt c/o chest pressure X Sunday numbness/tingling on rt arm on Sunday only heartburn, dry mouth X  1 month.   • Numbness   • Earache       Left earache X 2 weeks.   • Irregular Heart Beat        History of Present Illness   She is here today for follow-up on weight loss. She initially had a 7 pound weight loss, but has not seen siginficant weight loss since.  She is currently on phentermine 37.5 mg daily.  She has been on this dose for the past few months.  On Sunday she experienced an episode of middle chest pressure, palpitations, right arm numbness and tingling lasting 20-25 minutes.  This occurred at rest.  Her  is an EMT and completed and ECG which was normal.   Yesterday she came home from work and was laying on the couch.  She experienced elevated heart rate into the 140s at rest and up to 160s with walking. This lasted for a few hours. She noticed fatigue with this.  She also had an episode of acid reflux a few days ago which improved with Tums.  Symptoms have resolved.  She was started on amitriptyline, magnesium and CoQ10 3 months ago by psychiatry.   She does note that she has been under more stress over the past few weeks. She is currently studying for a test. Work has also been more stressful.  She denies any shortness of breath, lower extremity edema, syncope, PND, orthopnea, dizziness.    The following portions of the patient's history were reviewed and updated as appropriate: allergies, current medications, past family history, past medical history, past social history, past surgical history and problem list.    Review of Systems   Constitutional: Negative for activity change, chills, fatigue and fever.   Respiratory: Negative for cough, chest tightness, shortness of breath and wheezing.    Cardiovascular: Positive for chest pain (improved) and palpitations.  Negative for leg swelling.   Gastrointestinal: Positive for indigestion.   Neurological: Negative.    Psychiatric/Behavioral: Positive for stress.       Objective   Physical Exam  Constitutional:       Appearance: She is well-developed.   Neck:      Thyroid: No thyroid mass, thyromegaly or thyroid tenderness.      Vascular: No carotid bruit.      Trachea: Trachea normal.   Cardiovascular:      Rate and Rhythm: Normal rate and regular rhythm.      Chest Wall: PMI is not displaced.      Pulses:           Radial pulses are 2+ on the right side and 2+ on the left side.        Dorsalis pedis pulses are 2+ on the right side and 2+ on the left side.        Posterior tibial pulses are 2+ on the right side and 2+ on the left side.      Heart sounds: S1 normal and S2 normal.   Pulmonary:      Effort: Pulmonary effort is normal.      Breath sounds: Normal breath sounds.   Abdominal:      General: Bowel sounds are normal. There is no distension or abdominal bruit. There are no signs of injury.      Palpations: Abdomen is soft. There is no hepatomegaly or splenomegaly.      Tenderness: There is no abdominal tenderness. There is no guarding or rebound.   Musculoskeletal:      Right lower leg: No edema.      Left lower leg: No edema.   Lymphadenopathy:      Head:      Right side of head: No submental, submandibular, tonsillar or occipital adenopathy.      Left side of head: No submental, submandibular, tonsillar or occipital adenopathy.      Cervical: No cervical adenopathy.   Skin:     General: Skin is warm and dry.      Capillary Refill: Capillary refill takes less than 2 seconds.      Nails: There is no clubbing.   Neurological:      Mental Status: She is alert and oriented to person, place, and time.   Psychiatric:         Attention and Perception: Attention normal.         Mood and Affect: Mood and affect normal.         Speech: Speech normal.         Behavior: Behavior normal.         Thought Content: Thought content normal.          Cognition and Memory: Cognition normal.           Vitals:    04/20/23 0957   BP: 124/80   Pulse: 94   Temp: 97.7 °F (36.5 °C)   SpO2: 98%      Body mass index is 31.42 kg/m².    Assessment & Plan   Diagnoses and all orders for this visit:    1. Chest pressure (Primary)  -     Holter Monitor - 72 Hour Up To 15 Days    2. Intermittent palpitations  -     Holter Monitor - 72 Hour Up To 15 Days    3. Class 1 obesity with serious comorbidity and body mass index (BMI) of 31.0 to 31.9 in adult, unspecified obesity type    Other orders  -     ECG 12 Lead          ECG 12 Lead    Date/Time: 4/20/2023 12:53 PM  Performed by: Temi Jones APRN  Authorized by: Temi Jones APRN   Comparison: compared with previous ECG   Similar to previous ECG  Rhythm: sinus rhythm  Rate: normal  Conduction: conduction normal  ST Segments: ST segments normal  T Waves: T waves normal  QRS axis: normal  Other findings: non-specific ST-T wave changes  Clinical impression comment: borderline ECG          BMI is >= 30 and <35. (Class 1 Obesity). The following options were offered after discussion;: exercise counseling/recommendations and nutrition counseling/recommendations    1.  Chest pressure/intermittent palpitations-she is currently asymptomatic.  EKG completed today in office sinus rhythm with nonspecific ST and T wave abnormality, no nature change from baseline.  Discussed with her that symptoms could be contributed to current medications.  We will wean off phentermine over the next 2 days.  I will also order Holter monitor for further evaluation.  Encouraged her to hydrate well with fluids, limit caffeine intake and focus on stress management.  Also discussed trial of over-the-counter H2 blocker to help with reflux symptoms.  We will follow-up pending Holter monitor results.  To ER with severe symptoms.  2. Obesity- wean off phentermine secondary to possible side effects. Encouraged continuing healthy eating, focusing on  portion control and regular exercise. Discussed HMR program versus WW.      Answers for HPI/ROS submitted by the patient on 4/17/2023  Please describe your symptoms.: Medication/lab follow up, weight gain  Have you had these symptoms before?: Yes  How long have you been having these symptoms?: Greater than 2 weeks  What is the primary reason for your visit?: Other

## 2023-04-27 DIAGNOSIS — G43.109 MIGRAINE WITH AURA AND WITHOUT STATUS MIGRAINOSUS, NOT INTRACTABLE: ICD-10-CM

## 2023-04-27 LAB
ALDOST SERPL-MCNC: 26.3 NG/DL (ref 0–30)
ALDOST/RENIN PLAS-RTO: 9.9 {RATIO} (ref 0–30)
CORTIS AM PEAK SERPL-MCNC: 0.7 UG/DL (ref 6.2–19.4)
METANEPH FREE SERPL-MCNC: 19.8 PG/ML (ref 0–88)
NORMETANEPHRINE SERPL-MCNC: 67.2 PG/ML (ref 0–210.1)
RENIN PLAS-CCNC: 2.65 NG/ML/HR (ref 0.17–5.38)

## 2023-04-28 ENCOUNTER — TELEMEDICINE (OUTPATIENT)
Dept: FAMILY MEDICINE CLINIC | Facility: TELEHEALTH | Age: 33
End: 2023-04-28
Payer: COMMERCIAL

## 2023-04-28 DIAGNOSIS — J40 BRONCHITIS: ICD-10-CM

## 2023-04-28 DIAGNOSIS — J01.00 ACUTE MAXILLARY SINUSITIS, RECURRENCE NOT SPECIFIED: Primary | ICD-10-CM

## 2023-04-28 RX ORDER — METHYLPREDNISOLONE 4 MG/1
TABLET ORAL
Qty: 21 TABLET | Refills: 0 | Status: SHIPPED | OUTPATIENT
Start: 2023-04-28

## 2023-04-28 RX ORDER — DEXTROMETHORPHAN HYDROBROMIDE, BUPROPION HYDROCHLORIDE 105; 45 MG/1; MG/1
TABLET, MULTILAYER, EXTENDED RELEASE ORAL
COMMUNITY
Start: 2023-04-28

## 2023-04-28 RX ORDER — BENZONATATE 200 MG/1
200 CAPSULE ORAL 3 TIMES DAILY PRN
Qty: 21 CAPSULE | Refills: 0 | Status: SHIPPED | OUTPATIENT
Start: 2023-04-28

## 2023-04-28 RX ORDER — AMOXICILLIN AND CLAVULANATE POTASSIUM 875; 125 MG/1; MG/1
1 TABLET, FILM COATED ORAL 2 TIMES DAILY
Qty: 20 TABLET | Refills: 0 | Status: SHIPPED | OUTPATIENT
Start: 2023-04-28 | End: 2023-05-08

## 2023-04-28 RX ORDER — RIMEGEPANT SULFATE 75 MG/75MG
1 TABLET, ORALLY DISINTEGRATING ORAL ONCE AS NEEDED
Qty: 16 TABLET | Refills: 1 | Status: SHIPPED | OUTPATIENT
Start: 2023-04-28

## 2023-04-28 NOTE — PATIENT INSTRUCTIONS
Sinusitis, Adult  Sinusitis is inflammation of your sinuses. Sinuses are hollow spaces in the bones around your face. Your sinuses are located:  Around your eyes.  In the middle of your forehead.  Behind your nose.  In your cheekbones.  Mucus normally drains out of your sinuses. When your nasal tissues become inflamed or swollen, mucus can become trapped or blocked. This allows bacteria, viruses, and fungi to grow, which leads to infection. Most infections of the sinuses are caused by a virus.  Sinusitis can develop quickly. It can last for up to 4 weeks (acute) or for more than 12 weeks (chronic). Sinusitis often develops after a cold.  What are the causes?  This condition is caused by anything that creates swelling in the sinuses or stops mucus from draining. This includes:  Allergies.  Asthma.  Infection from bacteria or viruses.  Deformities or blockages in your nose or sinuses.  Abnormal growths in the nose (nasal polyps).  Pollutants, such as chemicals or irritants in the air.  Infection from fungi (rare).  What increases the risk?  You are more likely to develop this condition if you:  Have a weak body defense system (immune system).  Do a lot of swimming or diving.  Overuse nasal sprays.  Smoke.  What are the signs or symptoms?  The main symptoms of this condition are pain and a feeling of pressure around the affected sinuses. Other symptoms include:  Stuffy nose or congestion.  Thick drainage from your nose.  Swelling and warmth over the affected sinuses.  Headache.  Upper toothache.  A cough that may get worse at night.  Extra mucus that collects in the throat or the back of the nose (postnasal drip).  Decreased sense of smell and taste.  Fatigue.  A fever.  Sore throat.  Bad breath.  How is this diagnosed?  This condition is diagnosed based on:  Your symptoms.  Your medical history.  A physical exam.  Tests to find out if your condition is acute or chronic. This may include:  Checking your nose for nasal  polyps.  Viewing your sinuses using a device that has a light (endoscope).  Testing for allergies or bacteria.  Imaging tests, such as an MRI or CT scan.  In rare cases, a bone biopsy may be done to rule out more serious types of fungal sinus disease.  How is this treated?  Treatment for sinusitis depends on the cause and whether your condition is chronic or acute.  If caused by a virus, your symptoms should go away on their own within 10 days. You may be given medicines to relieve symptoms. They include:  Medicines that shrink swollen nasal passages (topical intranasal decongestants).  Medicines that treat allergies (antihistamines).  A spray that eases inflammation of the nostrils (topical intranasal corticosteroids).  Rinses that help get rid of thick mucus in your nose (nasal saline washes).  If caused by bacteria, your health care provider may recommend waiting to see if your symptoms improve. Most bacterial infections will get better without antibiotic medicine. You may be given antibiotics if you have:  A severe infection.  A weak immune system.  If caused by narrow nasal passages or nasal polyps, you may need to have surgery.  Follow these instructions at home:  Medicines  Take, use, or apply over-the-counter and prescription medicines only as told by your health care provider. These may include nasal sprays.  If you were prescribed an antibiotic medicine, take it as told by your health care provider. Do not stop taking the antibiotic even if you start to feel better.  Hydrate and humidify    Drink enough fluid to keep your urine pale yellow. Staying hydrated will help to thin your mucus.  Use a cool mist humidifier to keep the humidity level in your home above 50%.  Inhale steam for 10-15 minutes, 3-4 times a day, or as told by your health care provider. You can do this in the bathroom while a hot shower is running.  Limit your exposure to cool or dry air.  Rest  Rest as much as possible.  Sleep with your  head raised (elevated).  Make sure you get enough sleep each night.  General instructions    Apply a warm, moist washcloth to your face 3-4 times a day or as told by your health care provider. This will help with discomfort.  Wash your hands often with soap and water to reduce your exposure to germs. If soap and water are not available, use hand .  Do not smoke. Avoid being around people who are smoking (secondhand smoke).  Keep all follow-up visits as told by your health care provider. This is important.  Contact a health care provider if:  You have a fever.  Your symptoms get worse.  Your symptoms do not improve within 10 days.  Get help right away if:  You have a severe headache.  You have persistent vomiting.  You have severe pain or swelling around your face or eyes.  You have vision problems.  You develop confusion.  Your neck is stiff.  You have trouble breathing.  Summary  Sinusitis is soreness and inflammation of your sinuses. Sinuses are hollow spaces in the bones around your face.  This condition is caused by nasal tissues that become inflamed or swollen. The swelling traps or blocks the flow of mucus. This allows bacteria, viruses, and fungi to grow, which leads to infection.  If you were prescribed an antibiotic medicine, take it as told by your health care provider. Do not stop taking the antibiotic even if you start to feel better.  Keep all follow-up visits as told by your health care provider. This is important.  This information is not intended to replace advice given to you by your health care provider. Make sure you discuss any questions you have with your health care provider.  Document Revised: 05/20/2019 Document Reviewed: 05/20/2019  OpenText Patient Education © 2022 [employer]  Acute Bronchitis, Adult  Acute bronchitis is sudden inflammation of the main airways (bronchi) that come off the windpipe (trachea) in the lungs. The swelling causes the airways to get smaller and make  more mucus than normal. This can make it hard to breathe and can cause coughing or noisy breathing (wheezing).  Acute bronchitis may last several weeks. The cough may last longer. Allergies, asthma, and exposure to smoke may make the condition worse.  What are the causes?  This condition can be caused by germs and by substances that irritate the lungs, including:  Cold and flu viruses. The most common cause of this condition is the virus that causes the common cold.  Bacteria. This is less common.  Breathing in substances that irritate the lungs, including:  Smoke from cigarettes and other forms of tobacco.  Dust and pollen.  Fumes from household cleaning products, gases, or burned fuel.  Indoor or outdoor air pollution.  What increases the risk?  The following factors may make you more likely to develop this condition:  A weak body's defense system, also called the immune system.  A condition that affects your lungs and breathing, such as asthma.  What are the signs or symptoms?  Common symptoms of this condition include:  Coughing. This may bring up clear, yellow, or green mucus from your lungs (sputum).  Wheezing.  Runny or stuffy nose.  Having too much mucus in your lungs (chest congestion).  Shortness of breath.  Aches and pains, including sore throat or chest.  How is this diagnosed?  This condition is usually diagnosed based on:  Your symptoms and medical history.  A physical exam.  You may also have other tests, including tests to rule out other conditions, such as pneumonia. These tests include:  A test of lung function.  Test of a mucus sample to look for the presence of bacteria.  Tests to check the oxygen level in your blood.  Blood tests.  Chest X-ray.  How is this treated?  Most cases of acute bronchitis clear up over time without treatment. Your health care provider may recommend:  Drinking more fluids to help thin your mucus so it is easier to cough up.  Taking inhaled medicine (inhaler) to improve  air flow in and out of your lungs.  Using a vaporizer or a humidifier. These are machines that add water to the air to help you breathe better.  Taking a medicine that thins mucus and clears congestion (expectorant).  Taking a medicine that prevents or stops coughing (cough suppressant).  It is notcommon to take an antibiotic medicine for this condition.  Follow these instructions at home:    Take over-the-counter and prescription medicines only as told by your health care provider.  Use an inhaler, vaporizer, or humidifier as told by your health care provider.  Take two teaspoons (10 mL) of honey at bedtime to lessen coughing at night.  Drink enough fluid to keep your urine pale yellow.  Do not use any products that contain nicotine or tobacco. These products include cigarettes, chewing tobacco, and vaping devices, such as e-cigarettes. If you need help quitting, ask your health care provider.  Get plenty of rest.  Return to your normal activities as told by your health care provider. Ask your health care provider what activities are safe for you.  Keep all follow-up visits. This is important.  How is this prevented?  To lower your risk of getting this condition again:  Wash your hands often with soap and water for at least 20 seconds. If soap and water are not available, use hand .  Avoid contact with people who have cold symptoms.  Try not to touch your mouth, nose, or eyes with your hands.  Avoid breathing in smoke or chemical fumes. Breathing smoke or chemical fumes will make your condition worse.  Get the flu shot every year.  Contact a health care provider if:  Your symptoms do not improve after 2 weeks.  You have trouble coughing up the mucus.  Your cough keeps you awake at night.  You have a fever.  Get help right away if you:  Cough up blood.  Feel pain in your chest.  Have severe shortness of breath.  Faint or keep feeling like you are going to faint.  Have a severe headache.  Have a fever or  chills that get worse.  These symptoms may represent a serious problem that is an emergency. Do not wait to see if the symptoms will go away. Get medical help right away. Call your local emergency services (911 in the [place] Do not drive yourself to the hospital.  Summary  Acute bronchitis is inflammation of the main airways (bronchi) that come off the windpipe (trachea) in the lungs. The swelling causes the airways to get smaller and make more mucus than normal.  Drinking more fluids can help thin your mucus so it is easier to cough up.  Take over-the-counter and prescription medicines only as told by your health care provider.  Do not use any products that contain nicotine or tobacco. These products include cigarettes, chewing tobacco, and vaping devices, such as e-cigarettes. If you need help quitting, ask your health care provider.  Contact a health care provider if your symptoms do not improve after 2 weeks.  This information is not intended to replace advice given to you by your health care provider. Make sure you discuss any questions you have with your health care provider.  Document Revised: 04/20/2022 Document Reviewed: 04/20/2022  Elsevier Patient Education © 2022 [employer]

## 2023-04-28 NOTE — PROGRESS NOTES
You have chosen to receive care through a telehealth visit.  Do you consent to use a video/audio connection for your medical care today? Yes     CHIEF COMPLAINT  No chief complaint on file.        HPI  Sherry Quevedo is a 32 y.o. female  presents with complaint of 1-2 week history of nasal congestion with yellow/green discharge, PND, occasional productive cough with thick yellow/green sputum, ear pain, sore throat.  Denies fever, wheezing, shortness of breath.     Review of Systems  See HPI    Past Medical History:   Diagnosis Date    Abnormal computed tomography angiography (CTA) of neck 12/21/2021    Abnormal gluteal crease     ADHD (attention deficit hyperactivity disorder) 2020    Anemia 2019    iron defiency anemia    Anxiety     Benign paroxysmal positional vertigo of right ear 12/01/2021    Bipolar disorder 2012    BRBPR (bright red blood per rectum) 06/02/2021    Dr[facility] Santana at [facility]    Breast anomaly     Bruises easily     Cervical adenopathy     Cervical lymphadenopathy     Left side.    Change in bowel habit 06/02/2021    Dr. Nancy Santana at [facility]    Cholestasis during pregnancy in third trimester 2019    Chronic allergic rhinitis     Chronic pain disorder 2022    Fibro    Chronic recurrent major depressive disorder     In partial remission.    Cold intolerance     Constipation     Decreased sex drive     Depression     History of PPD    Diarrhea     Dizziness 08/06/2021    LEONELA, Temi Mcrae, Cardiologist office.    Dysmenorrhea 12/04/2019    Surgery: Laparoscopic assisted vaginal hysterectomy, Surgeon Dr. Hillary Nunn.    Dysphoric mood     Dyspnea on exertion 08/06/2021    LEONELA, Temi Mcrae, Cardiologist office.    Endometriosis 12/2019    Laparoscopic assisted vaginal hysterectomy, & bilateral Salpingectomy, Surgeon Dr. Hillary Nunn on 12/04/2019.    Environmental and seasonal allergies 09/21/2020    Epigastric abdominal pain 11/07/2018    [facility].    Epiploic appendagitis  01/10/2023    Fat necrosis 07/29/2021    [facility]    Fatigue     Fibromyalgia, primary 2021    Fissure, anal     history    Folic acid deficiency     Gestational diabetes [id number]    I had it with my 1st pregnancy    H/O TB skin testing 02/20/2018    Negative result at [facility].    HA (headache)     Hemorrhoid     History of gestational diabetes     with first baby    History of Holter monitoring 11/07/2019    24 hour.    History of kidney stones 2006/2009    History of miscarriage     History of vasectomy 2019    Spouse Vasectomy.    Hypocalcemia 03/2022    Hypoglycemia     Irritable bowel syndrome 1994    IBS with both constipation/diarrhea, followed by GI Dr. Moreland.    Joint stiffness     Lightheadedness 08/06/2021    APRN, Temi Mcrae, Cardiologist office.    Low serum potassium level     Low serum vitamin B12     Lower extremity myoclonus 02/28/2022    ADMITTED TO [facility]    Malaise and fatigue 11/12/2019    Cardiologist Dr. Benji Ugalde.    Mass of left parotid gland 12/2021    Migraines 10/2019    Multinodular non-toxic goiter     Near syncope 08/06/2021    APRN, Temi Mcrae, Cardiologist office.    OCD (obsessive compulsive disorder)     Ovarian cyst 2010    Surgery removed.    Palpitations 10/28/2019    PVC's (premature ventricular contractions)     Rapid heart rate 11/12/2019    Cardiologist Dr. Benji Ugalde.    Rectal bleeding 06/02/2021    Dr. Nancy Santana at [facility]    Renal stones     RLS (restless legs syndrome)     RUQ abdominal mass [id number]    [facility].    Scoliosis 2003    Sleep disturbance     SOB (shortness of breath) 10/28/2019    Tachycardia 10/28/2019    Thrombocytopenia 03/2022    Thyroid nodule 10/28/2019    1 cm Left Submandibular node, [facility], Dr. Kevan Hoffman.    Thyroid nodule 10/28/2019    0.3 cm Right side of neck, [facility], Dr. Kevan Hoffman.    Thyromegaly 03/04/2016    Boarderline Thyromegaly,  Findings via X-ray at [facility], ordering provider Dr. Nancy Santana.    Transaminitis 11/07/2018    [facility].    Trigger index finger of right hand 01/2022    Vaginal delivery 01/2019    Baby girl.       Family History   Problem Relation Age of Onset    COPD Mother     Depression Mother     Hypertension Mother     Heart disease Mother     Hyperlipidemia Mother     Asthma Father     COPD Father     Heart disease Father     Alcohol abuse Father     Hearing loss Father     Cancer Sister     Miscarriages / Stillbirths Sister         1 miscarriage    Depression Sister     Cancer Sister     Miscarriages / Stillbirths Sister         Miscarriage & stillbirth    Cancer Maternal Grandmother         Breast    Diabetes Maternal Grandmother     Depression Maternal Grandmother     Breast cancer Maternal Grandmother     Bleeding Disorder Paternal Grandmother         Factor V    COPD Paternal Grandmother     Asthma Paternal Grandmother     Osteoarthritis Paternal Grandmother     Arthritis Paternal Grandmother     Alcohol abuse Paternal Grandfather     Malig Hyperthermia Neg Hx     Colon cancer Neg Hx        Social History     Socioeconomic History    Marital status:    Tobacco Use    Smoking status: Never    Smokeless tobacco: Never    Tobacco comments:     caffeine use 1 coke daily, occas sweet tea   Vaping Use    Vaping Use: Never used   Substance and Sexual Activity    Alcohol use: Yes     Alcohol/week: 2.0 standard drinks     Types: 1 Glasses of wine, 1 Shots of liquor per week    Drug use: Never    Sexual activity: Yes     Partners: Male     Birth control/protection: Surgical, Other, Hysterectomy     Comment: .       Sherry Quevedo  reports that she has never smoked. She has never used smokeless tobacco..               LMP  (LMP Unknown)     PHYSICAL EXAM  Physical Exam   Constitutional: She is oriented to person, place, and time. She appears well-developed and well-nourished. She does not have a  sickly appearance. She does not appear ill.   HENT:   Head: Normocephalic and atraumatic.   Maxillary sinus tenderness   Pulmonary/Chest: Effort normal.  No respiratory distress (persistent cough during visit).  Neurological: She is alert and oriented to person, place, and time.       Diagnoses and all orders for this visit:    1. Acute maxillary sinusitis, recurrence not specified (Primary)  -     amoxicillin-clavulanate (AUGMENTIN) 875-125 MG per tablet; Take 1 tablet by mouth 2 (Two) Times a Day for 10 days.  Dispense: 20 tablet; Refill: 0    2. Bronchitis  -     methylPREDNISolone (MEDROL) 4 MG dose pack; Take as directed on package instructions.  Dispense: 21 tablet; Refill: 0  -     benzonatate (TESSALON) 200 MG capsule; Take 1 capsule by mouth 3 (Three) Times a Day As Needed for Cough.  Dispense: 21 capsule; Refill: 0    --take medications as prescribed  --increase fluids, rest as needed, tylenol or ibuprofen for pain  --f/u in 5-7 days if no improvement        FOLLOW-UP  As discussed during visit with PCP/[facility] if no improvement or Urgent Care/Emergency Department if worsening of symptoms    Patient verbalizes understanding of medication dosage, comfort measures, instructions for treatment and follow-up.    Priscilla Preciado, LEONELA  04/28/2023  15:09 EDT    The use of a video visit has been reviewed with the patient and verbal informed consent has been obtained. Myself and Sherry Quevedo participated in this visit. The patient is located in [address] [place] [address].    I am located in [place], [place]. [employer] and [employer] were utilized. I spent 8 minutes in the patient's chart for this visit.

## 2023-05-05 RX ORDER — FLUCONAZOLE 150 MG/1
150 TABLET ORAL ONCE
Qty: 2 TABLET | Refills: 0 | Status: SHIPPED | OUTPATIENT
Start: 2023-05-05 | End: 2023-05-05

## 2023-05-09 ENCOUNTER — OFFICE VISIT (OUTPATIENT)
Dept: INTERNAL MEDICINE | Facility: CLINIC | Age: 33
End: 2023-05-09
Payer: COMMERCIAL

## 2023-05-09 VITALS
SYSTOLIC BLOOD PRESSURE: 120 MMHG | OXYGEN SATURATION: 98 % | TEMPERATURE: 98 F | DIASTOLIC BLOOD PRESSURE: 80 MMHG | HEIGHT: 63 IN | BODY MASS INDEX: 31.36 KG/M2 | HEART RATE: 101 BPM | WEIGHT: 177 LBS

## 2023-05-09 DIAGNOSIS — N76.1 SUBACUTE VAGINITIS: Primary | ICD-10-CM

## 2023-05-09 DIAGNOSIS — N89.8 VAGINAL ITCHING: ICD-10-CM

## 2023-05-09 LAB
BILIRUB BLD-MCNC: NEGATIVE MG/DL
CLARITY, POC: ABNORMAL
COLOR UR: ABNORMAL
EXPIRATION DATE: ABNORMAL
GLUCOSE UR STRIP-MCNC: NEGATIVE MG/DL
KETONES UR QL: NEGATIVE
LEUKOCYTE EST, POC: NEGATIVE
Lab: ABNORMAL
NITRITE UR-MCNC: NEGATIVE MG/ML
PH UR: 5.5 [PH] (ref 5–8)
PROT UR STRIP-MCNC: NEGATIVE MG/DL
RBC # UR STRIP: NEGATIVE /UL
SP GR UR: 1.03 (ref 1–1.03)
UROBILINOGEN UR QL: ABNORMAL

## 2023-05-09 PROCEDURE — 99214 OFFICE O/P EST MOD 30 MIN: CPT | Performed by: NURSE PRACTITIONER

## 2023-05-09 PROCEDURE — 81003 URINALYSIS AUTO W/O SCOPE: CPT | Performed by: NURSE PRACTITIONER

## 2023-05-09 RX ORDER — CLOTRIMAZOLE 2 G/100G
1 CREAM VAGINAL NIGHTLY
Qty: 21 G | Refills: 0 | Status: SHIPPED | OUTPATIENT
Start: 2023-05-09

## 2023-05-09 NOTE — PROGRESS NOTES
Lilly Quevedo is a 32 y.o. female. Patient is here today for   Chief Complaint   Patient presents with   • Vaginal Itching   .    History of Present Illness   C/o vaginal itching for about 10 days. She has taken 3 doses of diflucan with the 3rd dose being yesterday. She was on augmentin and stopped it due to her vaginal symptoms. No vaginal discharge. She has tried monistat cream and suppositories also with no relief. Denies any urinary symptoms.      The following portions of the patient's history were reviewed and updated as appropriate: allergies, current medications, past family history, past medical history, past social history, past surgical history and problem list.    Review of Systems    Objective   Vitals:    05/09/23 1446   BP: 120/80   Pulse: 101   Temp: 98 °F (36.7 °C)   SpO2: 98%     Body mass index is 31.36 kg/m².  Physical Exam  Vitals and nursing note reviewed. Exam conducted with a chaperone present.   Constitutional:       Appearance: Normal appearance. She is well-developed.   Cardiovascular:      Rate and Rhythm: Normal rate and regular rhythm.      Heart sounds: Normal heart sounds.   Pulmonary:      Effort: Pulmonary effort is normal.      Breath sounds: Normal breath sounds.   Genitourinary:     Exam position: Lithotomy position.      Vagina: Vaginal discharge (scant, thin, white) present.      Cervix: Normal.   Skin:     General: Skin is warm and dry.   Neurological:      Mental Status: She is alert and oriented to person, place, and time.   Psychiatric:         Speech: Speech normal.         Behavior: Behavior normal.         Thought Content: Thought content normal.         Results for orders placed or performed in visit on 05/09/23   POCT urinalysis dipstick, automated    Specimen: Urine   Result Value Ref Range    Color Jennifer Yellow, Straw, Dark Yellow, Jennifer    Clarity, UA Cloudy (A) Clear    Specific Gravity  1.030 1.005 - 1.030    pH, Urine 5.5 5.0 - 8.0    Leukocytes  Negative Negative    Nitrite, UA Negative Negative    Protein, POC Negative Negative mg/dL    Glucose, UA Negative Negative mg/dL    Ketones, UA Negative Negative    Urobilinogen, UA 0.2 E.U./dL Normal, 0.2 E.U./dL    Bilirubin Negative Negative    Blood, UA Negative Negative    Lot Number [id number]     Expiration Date 6,302,024        Assessment & Plan   Diagnoses and all orders for this visit:    1. Subacute vaginitis (Primary)  -     Clotrimazole (GNP Clotrimazole 3) 2 % vaginal cream; Insert 1 Applicatorful into the vagina Every Night. For 3 nights  Dispense: 21 g; Refill: 0  -     NuSwab Vaginitis (VG) - Swab, Vagina    2. Vaginal itching  -     POCT urinalysis dipstick, automated      Vaginitis - will send a vaginal swab to test for yeast, bacterial vaginosis. Will treat with clotrimazole. Will call with results.

## 2023-05-10 ENCOUNTER — PATIENT MESSAGE (OUTPATIENT)
Dept: INTERNAL MEDICINE | Facility: CLINIC | Age: 33
End: 2023-05-10
Payer: COMMERCIAL

## 2023-05-11 DIAGNOSIS — N89.8 VAGINAL ITCHING: Primary | ICD-10-CM

## 2023-05-11 LAB
A VAGINAE DNA VAG QL NAA+PROBE: NORMAL SCORE
BVAB2 DNA VAG QL NAA+PROBE: NORMAL SCORE
C ALBICANS DNA VAG QL NAA+PROBE: NEGATIVE
C GLABRATA DNA VAG QL NAA+PROBE: NEGATIVE
MEGA1 DNA VAG QL NAA+PROBE: NORMAL SCORE
T VAGINALIS DNA VAG QL NAA+PROBE: NEGATIVE

## 2023-05-11 RX ORDER — CLOBETASOL PROPIONATE 0.5 MG/G
1 CREAM TOPICAL 2 TIMES DAILY
Qty: 15 G | Refills: 0 | Status: SHIPPED | OUTPATIENT
Start: 2023-05-11

## 2023-05-16 LAB
MAXIMAL PREDICTED HEART RATE: 187 BPM
STRESS TARGET HR: 159 BPM

## 2023-06-01 ENCOUNTER — OFFICE VISIT (OUTPATIENT)
Dept: INTERNAL MEDICINE | Facility: CLINIC | Age: 33
End: 2023-06-01

## 2023-06-01 VITALS
BODY MASS INDEX: 32.78 KG/M2 | DIASTOLIC BLOOD PRESSURE: 74 MMHG | WEIGHT: 185 LBS | SYSTOLIC BLOOD PRESSURE: 118 MMHG | OXYGEN SATURATION: 100 % | TEMPERATURE: 97.3 F | HEIGHT: 63 IN | HEART RATE: 113 BPM

## 2023-06-01 DIAGNOSIS — B37.0 ORAL THRUSH: ICD-10-CM

## 2023-06-01 DIAGNOSIS — J01.01 ACUTE RECURRENT MAXILLARY SINUSITIS: ICD-10-CM

## 2023-06-01 DIAGNOSIS — J20.9 ACUTE BRONCHITIS, UNSPECIFIED ORGANISM: Primary | ICD-10-CM

## 2023-06-01 PROCEDURE — 99213 OFFICE O/P EST LOW 20 MIN: CPT | Performed by: NURSE PRACTITIONER

## 2023-06-01 RX ORDER — LEVALBUTEROL TARTRATE 45 UG/1
1-2 AEROSOL, METERED ORAL EVERY 4 HOURS PRN
Qty: 1 EACH | Refills: 0 | Status: SHIPPED | OUTPATIENT
Start: 2023-06-01

## 2023-06-01 RX ORDER — FLUCONAZOLE 150 MG/1
150 TABLET ORAL ONCE
Qty: 2 TABLET | Refills: 0 | Status: SHIPPED | OUTPATIENT
Start: 2023-06-01 | End: 2023-06-01

## 2023-06-01 RX ORDER — TRIAMCINOLONE ACETONIDE 55 UG/1
SPRAY, METERED NASAL
COMMUNITY
Start: 2023-04-01

## 2023-06-01 RX ORDER — DOXYCYCLINE HYCLATE 100 MG/1
100 CAPSULE ORAL 2 TIMES DAILY
Qty: 14 CAPSULE | Refills: 0 | Status: SHIPPED | OUTPATIENT
Start: 2023-06-01 | End: 2023-06-08

## 2023-06-01 NOTE — PROGRESS NOTES
Subjective   Sherry Quevedo is a 33 y.o. female.     Chief Complaint   Patient presents with   • URI     Pt c/o cough, congestion, sob, left earache that radiate to throat sinus pressure X 14.        History of Present Illness   She is here today with complaints of worsening cough, congestion, sinus pain and pressure and left ear pain.  Symptoms initially started 1 month ago.  Her symptoms improved initially after being seen and treated with Augmentin, Medrol Dosepak and Tessalon, but then became worse 14 days ago. She was not able to complete the full dose of augmentin secondary to vaginal yeast infection. She was able to complete 5-6 dose of augmentin.  She has a cough, occasionally productive of yellow sputum. She notes left maxillary sinus pain and pressure along with left ear pain.  She has been using zyrtec, nasacort, Dayquil and nyquil with minimal improvement.   She notes SOB and wheezing with coughing. Her chest hurts with coughing.   She denies any fever, chills.   Symptoms initially improved, but then became worse.      The following portions of the patient's history were reviewed and updated as appropriate: allergies, current medications, past family history, past medical history, past social history, past surgical history and problem list.    Review of Systems   Constitutional: Positive for fatigue. Negative for chills and fever.   HENT: Positive for congestion, ear pain, postnasal drip, rhinorrhea, sinus pressure and sore throat.    Respiratory: Positive for cough, shortness of breath and wheezing. Negative for chest tightness.    Cardiovascular: Negative for chest pain, palpitations and leg swelling.   Musculoskeletal: Negative for myalgias.   Neurological: Positive for headache.       Objective   Physical Exam  Constitutional:       General: She is awake.      Appearance: She is well-developed. She is ill-appearing.   HENT:      Head: Normocephalic and atraumatic.      Right Ear: Hearing, ear  canal and external ear normal. Tympanic membrane is scarred.      Left Ear: Hearing, ear canal and external ear normal. Tympanic membrane is scarred.      Ears:      Comments: Fluid present bilateral TM.     Nose: Congestion present.      Right Turbinates: Not enlarged.      Left Turbinates: Enlarged.      Right Sinus: No maxillary sinus tenderness or frontal sinus tenderness.      Left Sinus: Maxillary sinus tenderness present. No frontal sinus tenderness.      Mouth/Throat:      Lips: Pink.      Mouth: Mucous membranes are moist. No injury or oral lesions.      Dentition: Normal dentition.      Tongue: No lesions. Tongue does not deviate from midline.      Palate: No mass and lesions.      Pharynx: Uvula midline. No pharyngeal swelling, oropharyngeal exudate, posterior oropharyngeal erythema or uvula swelling.      Comments: Thrush present on tongue.  Posterior pharynx with clear drainage.  Neck:      Thyroid: No thyroid mass, thyromegaly or thyroid tenderness.      Vascular: No carotid bruit.      Trachea: Trachea normal.   Cardiovascular:      Rate and Rhythm: Normal rate and regular rhythm.      Chest Wall: PMI is not displaced.      Pulses:           Radial pulses are 2+ on the right side and 2+ on the left side.        Dorsalis pedis pulses are 2+ on the right side and 2+ on the left side.        Posterior tibial pulses are 2+ on the right side and 2+ on the left side.      Heart sounds: S1 normal and S2 normal.   Pulmonary:      Effort: Pulmonary effort is normal.      Breath sounds: Examination of the right-upper field reveals wheezing. Examination of the left-upper field reveals wheezing. Wheezing present. No decreased breath sounds, rhonchi or rales.      Comments: Intermittent bronchitic cough with deep breathing.  Musculoskeletal:      Right lower leg: No edema.      Left lower leg: No edema.   Lymphadenopathy:      Head:      Right side of head: No submental, submandibular, tonsillar or occipital  adenopathy.      Left side of head: No submental, submandibular, tonsillar or occipital adenopathy.      Cervical: No cervical adenopathy.   Skin:     General: Skin is warm and dry.      Capillary Refill: Capillary refill takes less than 2 seconds.      Nails: There is no clubbing.   Neurological:      Mental Status: She is alert and oriented to person, place, and time.   Psychiatric:         Attention and Perception: Attention normal.         Mood and Affect: Mood and affect normal.         Speech: Speech normal.         Behavior: Behavior normal. Behavior is cooperative.         Thought Content: Thought content normal.         Cognition and Memory: Cognition normal.         Vitals:    06/01/23 1446   BP: 118/74   Pulse: 113   Temp: 97.3 °F (36.3 °C)   SpO2: 100%      Body mass index is 32.78 kg/m².    Assessment & Plan   Diagnoses and all orders for this visit:    1. Acute bronchitis, unspecified organism (Primary)    2. Acute recurrent maxillary sinusitis    3. Oral thrush  -     nystatin (MYCOSTATIN) 100,000 unit/mL suspension; Swish and swallow 5 mL 3 (Three) Times a Day.  Dispense: 473 mL; Refill: 0    Other orders  -     doxycycline (VIBRAMYCIN) 100 MG capsule; Take 1 capsule by mouth 2 (Two) Times a Day for 7 days.  Dispense: 14 capsule; Refill: 0  -     levalbuterol (XOPENEX HFA) 45 MCG/ACT inhaler; Inhale 1-2 puffs Every 4 (Four) Hours As Needed for Wheezing or Shortness of Air.  Dispense: 1 each; Refill: 0  -     fluconazole (Diflucan) 150 MG tablet; Take 1 tablet by mouth 1 (One) Time for 1 dose. May repeat dose once after 3 days.  Dispense: 2 tablet; Refill: 0      1.  Acute bronchitis- prescription sent for Xopenex inhaler 1 to 2 puffs every 4-6 hours as needed for any shortness of breath or wheezing.  She cannot take regular albuterol secondary to current medication Auvelity.  Recommend Tessalon Perles 3 times daily as needed for cough.  Hydrate well with fluids, vitamin C vitamin D.  If cough persist  would consider addition of ICS.  2.  Acute recurrent maxillary sinusitis-start doxycycline 100 mg twice daily x7 days.  Recommend Tylenol as needed, warm facial compresses, continuing nasal steroid spray.  Start a probiotic separate from the antibiotic and continue this for 2 weeks after completion of antibiotic.  We will also give Diflucan 150 mg to use once as needed for vaginal yeast infection.  3.  Oral thrush-likely related to recent steroid use.  Start nystatin swish and swallow 3 times daily for 7 days.  Notify if no improvement in symptoms.

## 2023-06-06 LAB
MAXIMAL PREDICTED HEART RATE: 187 BPM
STRESS TARGET HR: 159 BPM

## 2023-06-07 DIAGNOSIS — R00.0 SINUS TACHYCARDIA: Primary | ICD-10-CM

## 2023-06-07 RX ORDER — METOPROLOL SUCCINATE 25 MG/1
25 TABLET, EXTENDED RELEASE ORAL DAILY
Qty: 30 TABLET | Refills: 5 | Status: SHIPPED | OUTPATIENT
Start: 2023-06-07

## 2023-08-10 ENCOUNTER — OFFICE VISIT (OUTPATIENT)
Dept: INTERNAL MEDICINE | Facility: CLINIC | Age: 33
End: 2023-08-10
Payer: COMMERCIAL

## 2023-08-10 VITALS
HEART RATE: 81 BPM | WEIGHT: 181.4 LBS | DIASTOLIC BLOOD PRESSURE: 66 MMHG | SYSTOLIC BLOOD PRESSURE: 110 MMHG | TEMPERATURE: 98.2 F | OXYGEN SATURATION: 100 % | BODY MASS INDEX: 32.14 KG/M2 | HEIGHT: 63 IN

## 2023-08-10 DIAGNOSIS — J20.9 ACUTE BRONCHITIS, UNSPECIFIED ORGANISM: Primary | ICD-10-CM

## 2023-08-10 DIAGNOSIS — J01.40 ACUTE NON-RECURRENT PANSINUSITIS: ICD-10-CM

## 2023-08-10 PROCEDURE — 99213 OFFICE O/P EST LOW 20 MIN: CPT | Performed by: NURSE PRACTITIONER

## 2023-08-10 RX ORDER — PREDNISONE 20 MG/1
40 TABLET ORAL DAILY
Qty: 10 TABLET | Refills: 0 | Status: SHIPPED | OUTPATIENT
Start: 2023-08-10 | End: 2023-08-15

## 2023-08-10 RX ORDER — GUAIFENESIN AND CODEINE PHOSPHATE 100; 10 MG/5ML; MG/5ML
5 SOLUTION ORAL 3 TIMES DAILY PRN
Qty: 180 ML | Refills: 0 | Status: SHIPPED | OUTPATIENT
Start: 2023-08-10

## 2023-08-10 RX ORDER — BROMPHENIRAMINE MALEATE, PSEUDOEPHEDRINE HYDROCHLORIDE, AND DEXTROMETHORPHAN HYDROBROMIDE 2; 30; 10 MG/5ML; MG/5ML; MG/5ML
SYRUP ORAL
COMMUNITY
Start: 2023-08-08 | End: 2023-08-10

## 2023-08-10 RX ORDER — BREXPIPRAZOLE 0.5 MG/1
0.5 TABLET ORAL DAILY
COMMUNITY
Start: 2023-08-03

## 2023-08-10 RX ORDER — FLUCONAZOLE 150 MG/1
150 TABLET ORAL ONCE
Qty: 1 TABLET | Refills: 0 | Status: SHIPPED | OUTPATIENT
Start: 2023-08-10 | End: 2023-08-10

## 2023-08-10 RX ORDER — DOXYCYCLINE HYCLATE 100 MG/1
100 CAPSULE ORAL 2 TIMES DAILY
Qty: 14 CAPSULE | Refills: 0 | Status: SHIPPED | OUTPATIENT
Start: 2023-08-10 | End: 2023-08-17

## 2023-08-10 NOTE — PROGRESS NOTES
Subjective   Sherry Quevedo is a 33 y.o. female.     Chief Complaint   Patient presents with    Cough     Pt c/o productive cough, congestion, chest hurt from coughing, wheezing, sinus pressure/HA, sore throat X 2 weeks.    sinus pressure    Sore Throat    Shortness of Breath    Headache        History of Present Illness   She is here today with complaints of productive cough, chest congestion, shortness of breath and wheezing.  She notes headache and sinus pressure.  Symptoms started approximately 2 weeks ago.  She is experiencing frontal and maxillary sinus pressure and pain, postnasal drainage, cough. Cough is occasionally productive of yellow-green thick sputum. She notes some SOB with activity and intermittent wheezing. Her chest and middle back now hurt with coughing and deep breathing.  She notes laryngitis.  She has been using her levoalbuterol inhaler, sudafed and cough medicine with minimal improvement.   She denies any fever, chills.  Symptoms are not improving.    The following portions of the patient's history were reviewed and updated as appropriate: allergies, current medications, past family history, past medical history, past social history, past surgical history, and problem list.    Review of Systems   Constitutional:  Positive for fatigue. Negative for chills and fever.   HENT:  Positive for congestion, postnasal drip, sinus pressure, sore throat and voice change. Negative for ear pain and rhinorrhea.    Respiratory:  Positive for cough, shortness of breath and wheezing. Negative for chest tightness.    Cardiovascular:  Positive for chest pain (with coughing and deep breathing). Negative for palpitations and leg swelling.   Musculoskeletal:  Negative for myalgias.   Neurological:  Positive for headache.     Objective   Physical Exam  Constitutional:       General: She is awake.      Appearance: She is well-developed. She is ill-appearing.   HENT:      Head: Normocephalic and atraumatic.       Right Ear: Ear canal and external ear normal. Decreased hearing noted. Tympanic membrane is scarred.      Left Ear: Ear canal and external ear normal. Decreased hearing noted. Tympanic membrane is scarred.      Nose: Rhinorrhea present. Rhinorrhea is purulent.      Right Turbinates: Enlarged.      Left Turbinates: Enlarged.      Right Sinus: Maxillary sinus tenderness and frontal sinus tenderness present.      Left Sinus: Maxillary sinus tenderness and frontal sinus tenderness present.      Mouth/Throat:      Lips: Pink.      Mouth: Mucous membranes are moist. No injury or oral lesions.      Dentition: Normal dentition.      Tongue: No lesions. Tongue does not deviate from midline.      Palate: No mass and lesions.      Pharynx: Uvula midline. No pharyngeal swelling, oropharyngeal exudate, posterior oropharyngeal erythema or uvula swelling.      Comments: Posterior pharynx with clear drainage.  Neck:      Thyroid: Thyromegaly present. No thyroid mass or thyroid tenderness.      Vascular: No carotid bruit.      Trachea: Trachea normal.   Cardiovascular:      Rate and Rhythm: Normal rate and regular rhythm.      Chest Wall: PMI is not displaced.      Pulses:           Radial pulses are 2+ on the right side and 2+ on the left side.        Dorsalis pedis pulses are 2+ on the right side and 2+ on the left side.        Posterior tibial pulses are 2+ on the right side and 2+ on the left side.      Heart sounds: S1 normal and S2 normal.   Pulmonary:      Effort: Pulmonary effort is normal.      Breath sounds: Examination of the right-upper field reveals wheezing. Examination of the left-upper field reveals wheezing. Wheezing present. No decreased breath sounds, rhonchi or rales.   Musculoskeletal:      Right lower leg: No edema.      Left lower leg: No edema.   Lymphadenopathy:      Head:      Right side of head: No submental, submandibular, tonsillar or occipital adenopathy.      Left side of head: No submental,  submandibular, tonsillar or occipital adenopathy.      Cervical: No cervical adenopathy.   Skin:     General: Skin is warm and dry.      Capillary Refill: Capillary refill takes less than 2 seconds.      Nails: There is no clubbing.   Neurological:      Mental Status: She is alert and oriented to person, place, and time.   Psychiatric:         Attention and Perception: Attention normal.         Mood and Affect: Mood and affect normal.         Speech: Speech normal.         Behavior: Behavior normal. Behavior is cooperative.         Thought Content: Thought content normal.         Cognition and Memory: Cognition normal.       Vitals:    08/10/23 1546   BP: 110/66   Pulse: 81   Temp: 98.2 øF (36.8 øC)   SpO2: 100%      Body mass index is 32.14 kg/mý.    Assessment & Plan   Problems Addressed this Visit    None  Visit Diagnoses       Acute bronchitis, unspecified organism    -  Primary    Relevant Medications    predniSONE (DELTASONE) 20 MG tablet    guaiFENesin-codeine (GUAIFENESIN AC) 100-10 MG/5ML liquid    Acute non-recurrent pansinusitis        Relevant Medications    doxycycline (VIBRAMYCIN) 100 MG capsule          Diagnoses         Codes Comments    Acute bronchitis, unspecified organism    -  Primary ICD-10-CM: J20.9  ICD-9-CM: 466.0     Acute non-recurrent pansinusitis     ICD-10-CM: J01.40  ICD-9-CM: 461.8           1.  Acute bronchitis-with her history of asthma we will treat with prednisone 40 mg daily x 5 days.  Recommend continuing levo albuterol 1 to 2 puffs every 4 hours as needed for shortness of breath and wheezing.  Prescription sent for guaifenesin-codeine cough syrup to use 3 times daily as needed for cough. Discussed with her that cough with bronchitis can last several weeks.  She will notify me if she is still with shortness of breath and wheezing and would recommend a daily ICS.  2.  Acute pansinusitis-start doxycycline 100 mg twice daily x 7 days.  Take antibiotic separate from vitamins and  supplements.  Recommend a daily probiotic separate from antibiotic.  Will also send a prescription for Diflucan as she has a history of yeast infections with antibiotics.  Notify if no improvement in symptoms.

## 2023-08-31 ENCOUNTER — OFFICE (AMBULATORY)
Dept: URBAN - METROPOLITAN AREA CLINIC 76 | Facility: CLINIC | Age: 33
End: 2023-08-31
Payer: COMMERCIAL

## 2023-08-31 VITALS
SYSTOLIC BLOOD PRESSURE: 118 MMHG | HEART RATE: 90 BPM | WEIGHT: 195 LBS | DIASTOLIC BLOOD PRESSURE: 809 MMHG | OXYGEN SATURATION: 97 % | HEIGHT: 64 IN

## 2023-08-31 DIAGNOSIS — K64.8 OTHER HEMORRHOIDS: ICD-10-CM

## 2023-08-31 DIAGNOSIS — R10.13 EPIGASTRIC PAIN: ICD-10-CM

## 2023-08-31 DIAGNOSIS — K31.4 GASTRIC DIVERTICULUM: ICD-10-CM

## 2023-08-31 DIAGNOSIS — K21.9 GASTRO-ESOPHAGEAL REFLUX DISEASE WITHOUT ESOPHAGITIS: ICD-10-CM

## 2023-08-31 PROCEDURE — 99214 OFFICE O/P EST MOD 30 MIN: CPT | Performed by: NURSE PRACTITIONER

## 2023-08-31 RX ORDER — PANTOPRAZOLE 40 MG/1
TABLET, DELAYED RELEASE ORAL
Qty: 30 | Refills: 1 | Status: ACTIVE

## 2023-08-31 RX ORDER — FAMOTIDINE 20 MG/1
40 TABLET, FILM COATED ORAL
Refills: 0 | Status: COMPLETED
End: 2023-08-31

## 2023-09-12 VITALS
HEART RATE: 71 BPM | DIASTOLIC BLOOD PRESSURE: 77 MMHG | HEART RATE: 72 BPM | SYSTOLIC BLOOD PRESSURE: 97 MMHG | HEART RATE: 77 BPM | RESPIRATION RATE: 9 BRPM | HEIGHT: 64 IN | HEART RATE: 67 BPM | SYSTOLIC BLOOD PRESSURE: 104 MMHG | WEIGHT: 190 LBS | OXYGEN SATURATION: 97 % | DIASTOLIC BLOOD PRESSURE: 64 MMHG | DIASTOLIC BLOOD PRESSURE: 47 MMHG | SYSTOLIC BLOOD PRESSURE: 80 MMHG | SYSTOLIC BLOOD PRESSURE: 94 MMHG | DIASTOLIC BLOOD PRESSURE: 57 MMHG | HEART RATE: 64 BPM | SYSTOLIC BLOOD PRESSURE: 92 MMHG | HEART RATE: 75 BPM | SYSTOLIC BLOOD PRESSURE: 107 MMHG | DIASTOLIC BLOOD PRESSURE: 82 MMHG | DIASTOLIC BLOOD PRESSURE: 76 MMHG | RESPIRATION RATE: 18 BRPM | RESPIRATION RATE: 17 BRPM | TEMPERATURE: 97.2 F | RESPIRATION RATE: 16 BRPM | RESPIRATION RATE: 15 BRPM | HEART RATE: 68 BPM | SYSTOLIC BLOOD PRESSURE: 109 MMHG | SYSTOLIC BLOOD PRESSURE: 108 MMHG | HEART RATE: 66 BPM | RESPIRATION RATE: 11 BRPM | OXYGEN SATURATION: 98 % | RESPIRATION RATE: 14 BRPM | DIASTOLIC BLOOD PRESSURE: 49 MMHG | OXYGEN SATURATION: 100 % | OXYGEN SATURATION: 99 %

## 2023-09-15 ENCOUNTER — OFFICE (AMBULATORY)
Dept: URBAN - METROPOLITAN AREA PATHOLOGY 4 | Facility: PATHOLOGY | Age: 33
End: 2023-09-15
Payer: COMMERCIAL

## 2023-09-15 ENCOUNTER — AMBULATORY SURGICAL CENTER (AMBULATORY)
Dept: URBAN - METROPOLITAN AREA SURGERY 17 | Facility: SURGERY | Age: 33
End: 2023-09-15
Payer: COMMERCIAL

## 2023-09-15 DIAGNOSIS — R10.11 RIGHT UPPER QUADRANT PAIN: ICD-10-CM

## 2023-09-15 DIAGNOSIS — K31.9 DISEASE OF STOMACH AND DUODENUM, UNSPECIFIED: ICD-10-CM

## 2023-09-15 DIAGNOSIS — K21.9 GASTRO-ESOPHAGEAL REFLUX DISEASE WITHOUT ESOPHAGITIS: ICD-10-CM

## 2023-09-15 DIAGNOSIS — K31.4 GASTRIC DIVERTICULUM: ICD-10-CM

## 2023-09-15 DIAGNOSIS — R93.3 ABNORMAL FINDINGS ON DIAGNOSTIC IMAGING OF OTHER PARTS OF DI: ICD-10-CM

## 2023-09-15 DIAGNOSIS — K44.9 DIAPHRAGMATIC HERNIA WITHOUT OBSTRUCTION OR GANGRENE: ICD-10-CM

## 2023-09-15 DIAGNOSIS — K31.89 OTHER DISEASES OF STOMACH AND DUODENUM: ICD-10-CM

## 2023-09-15 DIAGNOSIS — K21.00 GASTRO-ESOPHAGEAL REFLUX DISEASE WITH ESOPHAGITIS, WITHOUT B: ICD-10-CM

## 2023-09-15 PROBLEM — K22.89 OTHER SPECIFIED DISEASE OF ESOPHAGUS: Status: ACTIVE | Noted: 2023-09-15

## 2023-09-15 LAB
GI HISTOLOGY: A. UNSPECIFIED: (no result)
GI HISTOLOGY: B. UNSPECIFIED: (no result)
GI HISTOLOGY: PDF REPORT: (no result)

## 2023-09-15 PROCEDURE — 43239 EGD BIOPSY SINGLE/MULTIPLE: CPT | Performed by: INTERNAL MEDICINE

## 2023-09-15 PROCEDURE — 88305 TISSUE EXAM BY PATHOLOGIST: CPT | Performed by: INTERNAL MEDICINE

## 2023-09-15 PROCEDURE — 88342 IMHCHEM/IMCYTCHM 1ST ANTB: CPT | Performed by: INTERNAL MEDICINE

## 2023-11-13 ENCOUNTER — HOSPITAL ENCOUNTER (OUTPATIENT)
Dept: GENERAL RADIOLOGY | Facility: HOSPITAL | Age: 33
Discharge: HOME | End: 2023-11-13
Admitting: NURSE PRACTITIONER
Payer: COMMERCIAL

## 2023-11-13 ENCOUNTER — OFFICE VISIT (OUTPATIENT)
Dept: INTERNAL MEDICINE | Facility: CLINIC | Age: 33
End: 2023-11-13
Payer: COMMERCIAL

## 2023-11-13 VITALS
BODY MASS INDEX: 33.46 KG/M2 | TEMPERATURE: 99.8 F | WEIGHT: 196 LBS | SYSTOLIC BLOOD PRESSURE: 102 MMHG | DIASTOLIC BLOOD PRESSURE: 72 MMHG | HEART RATE: 85 BPM | HEIGHT: 64 IN | OXYGEN SATURATION: 97 %

## 2023-11-13 DIAGNOSIS — J40 BRONCHITIS: Primary | ICD-10-CM

## 2023-11-13 DIAGNOSIS — N76.1 SUBACUTE VAGINITIS: ICD-10-CM

## 2023-11-13 DIAGNOSIS — R05.9 COUGH, UNSPECIFIED TYPE: ICD-10-CM

## 2023-11-13 LAB
EXPIRATION DATE: NORMAL
FLUAV AG UPPER RESP QL IA.RAPID: NOT DETECTED
FLUBV AG UPPER RESP QL IA.RAPID: NOT DETECTED
INTERNAL CONTROL: NORMAL
Lab: NORMAL
SARS-COV-2 AG UPPER RESP QL IA.RAPID: NOT DETECTED

## 2023-11-13 PROCEDURE — 87428 SARSCOV & INF VIR A&B AG IA: CPT | Performed by: NURSE PRACTITIONER

## 2023-11-13 PROCEDURE — 71046 X-RAY EXAM CHEST 2 VIEWS: CPT

## 2023-11-13 PROCEDURE — 99213 OFFICE O/P EST LOW 20 MIN: CPT | Performed by: NURSE PRACTITIONER

## 2023-11-13 RX ORDER — AZITHROMYCIN 250 MG/1
TABLET, FILM COATED ORAL
Qty: 6 TABLET | Refills: 0 | Status: SHIPPED | OUTPATIENT
Start: 2023-11-13

## 2023-11-13 RX ORDER — FLUCONAZOLE 150 MG/1
150 TABLET ORAL ONCE
Qty: 1 TABLET | Refills: 0 | Status: SHIPPED | OUTPATIENT
Start: 2023-11-13 | End: 2023-11-13

## 2023-11-13 RX ORDER — GUAIFENESIN AND CODEINE PHOSPHATE 100; 10 MG/5ML; MG/5ML
5 SOLUTION ORAL 3 TIMES DAILY PRN
Qty: 180 ML | Refills: 0 | Status: SHIPPED | OUTPATIENT
Start: 2023-11-13

## 2023-11-13 RX ORDER — PREDNISONE 20 MG/1
20 TABLET ORAL DAILY
Qty: 10 TABLET | Refills: 0 | Status: SHIPPED | OUTPATIENT
Start: 2023-11-13

## 2023-11-13 NOTE — PROGRESS NOTES
Subjective   Sherry Quevedo is a 33 y.o. female. Patient is here today for   Chief Complaint   Patient presents with    URI     Patient complains of congestion and wheezing for a week, body aches for two days fever for 4 days, highest being at 101.3 today fever is low grade     Wheezing           Fever    Cough           Shortness of Breath          .    History of Present Illness   C/o cough associated with wheezing, throat congestion, some shortness of breath, fever. Her fever was as high as 101.3F. She has tried Dayquil, Nyquil, xopenex as needed with minimal relief.   She does have a history of allergies and was on immunotherapy in the past.     The following portions of the patient's history were reviewed and updated as appropriate: allergies, current medications, past family history, past medical history, past social history, past surgical history and problem list.    Review of Systems    Objective   Vitals:    11/13/23 1037   BP: 102/72   Pulse: 85   Temp: 99.8 °F (37.7 °C)   SpO2: 97%     Body mass index is 33.63 kg/m².  Physical Exam  Vitals and nursing note reviewed.   Constitutional:       Appearance: Normal appearance. She is well-developed.   HENT:      Right Ear: Ear canal normal. A middle ear effusion is present. Tympanic membrane is scarred.      Left Ear: Ear canal normal. A middle ear effusion is present.      Mouth/Throat:      Pharynx: Oropharynx is clear.   Cardiovascular:      Rate and Rhythm: Normal rate and regular rhythm.      Heart sounds: Normal heart sounds.   Pulmonary:      Effort: Pulmonary effort is normal.      Breath sounds: Normal breath sounds. Examination of the right-lower field reveals wheezing.   Skin:     General: Skin is warm and dry.   Neurological:      Mental Status: She is alert and oriented to person, place, and time.   Psychiatric:         Speech: Speech normal.         Behavior: Behavior normal.         Thought Content: Thought content normal.       Results for  orders placed or performed in visit on 11/13/23   POCT SARS-CoV-2 + Flu Antigen LEE    Specimen: Swab   Result Value Ref Range    SARS Antigen Not Detected Not Detected, Presumptive Negative    Influenza A Antigen LEE Not Detected Not Detected    Influenza B Antigen LEE Not Detected Not Detected    Internal Control Passed Passed    Lot Number [id number]     Expiration Date 10,102,024        Assessment & Plan   Diagnoses and all orders for this visit:    1. Bronchitis (Primary)  -     predniSONE (DELTASONE) 20 MG tablet; Take 1 tablet by mouth Daily.  Dispense: 10 tablet; Refill: 0  -     azithromycin (Zithromax Z-Chai) 250 MG tablet; Take 2 tablets by mouth on day 1, then 1 tablet daily on days 2-5  Dispense: 6 tablet; Refill: 0  -     guaiFENesin-codeine (GUAIFENESIN AC) 100-10 MG/5ML liquid; Take 5 mL by mouth 3 (Three) Times a Day As Needed for Cough.  Dispense: 180 mL; Refill: 0    2. Cough, unspecified type  -     POCT SARS-CoV-2 + Flu Antigen LEE  -     XR Chest PA & Lateral; Future    3. Subacute vaginitis  -     fluconazole (Diflucan) 150 MG tablet; Take 1 tablet by mouth 1 (One) Time for 1 dose.  Dispense: 1 tablet; Refill: 0      Patient will be treated for bronchitis. Will check a chest x-ray to rule out pneumonia.     Patient requested a script for diflucan because she gets yeast infections with antibiotic usage.

## 2023-11-29 ENCOUNTER — HOSPITAL ENCOUNTER (OUTPATIENT)
Dept: GENERAL RADIOLOGY | Facility: HOSPITAL | Age: 33
Discharge: HOME | End: 2023-11-29
Admitting: NURSE PRACTITIONER
Payer: COMMERCIAL

## 2023-11-29 ENCOUNTER — OFFICE VISIT (OUTPATIENT)
Dept: INTERNAL MEDICINE | Facility: CLINIC | Age: 33
End: 2023-11-29
Payer: COMMERCIAL

## 2023-11-29 VITALS
WEIGHT: 210.5 LBS | BODY MASS INDEX: 35.94 KG/M2 | TEMPERATURE: 99.3 F | DIASTOLIC BLOOD PRESSURE: 76 MMHG | HEART RATE: 86 BPM | OXYGEN SATURATION: 99 % | HEIGHT: 64 IN | SYSTOLIC BLOOD PRESSURE: 110 MMHG

## 2023-11-29 DIAGNOSIS — M25.512 LEFT ANTERIOR SHOULDER PAIN: Primary | ICD-10-CM

## 2023-11-29 DIAGNOSIS — J18.9 PNEUMONIA OF LEFT UPPER LOBE DUE TO INFECTIOUS ORGANISM: ICD-10-CM

## 2023-11-29 DIAGNOSIS — R29.898 SHOULDER WEAKNESS: ICD-10-CM

## 2023-11-29 PROCEDURE — 99213 OFFICE O/P EST LOW 20 MIN: CPT | Performed by: NURSE PRACTITIONER

## 2023-11-29 PROCEDURE — 73030 X-RAY EXAM OF SHOULDER: CPT

## 2023-11-29 RX ORDER — BUPROPION HYDROCHLORIDE 300 MG/1
TABLET ORAL
COMMUNITY
Start: 2023-11-29

## 2023-11-29 RX ORDER — PANTOPRAZOLE SODIUM 40 MG/1
40 TABLET, DELAYED RELEASE ORAL EVERY MORNING
COMMUNITY
Start: 2023-11-24

## 2023-11-29 RX ORDER — DESVENLAFAXINE SUCCINATE 50 MG/1
TABLET, EXTENDED RELEASE ORAL
COMMUNITY
Start: 2023-10-06

## 2023-11-29 RX ORDER — MELOXICAM 15 MG/1
15 TABLET ORAL DAILY
Qty: 30 TABLET | Refills: 0 | Status: SHIPPED | OUTPATIENT
Start: 2023-11-29

## 2023-11-29 NOTE — PROGRESS NOTES
Subjective   Sherry Quevedo is a 33 y.o. female.     Chief Complaint   Patient presents with    Shoulder Pain     Pt c/o left shoulder pain X yesterday.        History of Present Illness   She is here today with complaints of left shoulder pain.  Symptoms started yesterday on her way home from work and progressively became worse. She notes pain is predominately along the anterior shoulder. Pain is worse with reaching overhead, raising her arm to the side, and forward movements with the left arm. Pain will occasionally radiate into the left bicep with paresthesias. She notes some weakness in the left bicep with lifting.   She has tried diclofenac and voltaren without improvement.   She denies any neck pain.    She was also recently seen in the office for respiratory symptoms.  Chest x-ray was positive for pneumonia.  She completed azithromycin and prednisone.  She is due for repeat chest x-ray in 2 weeks.    The following portions of the patient's history were reviewed and updated as appropriate: allergies, current medications, past family history, past medical history, past social history, past surgical history, and problem list.    Review of Systems   Constitutional:  Negative for chills, fatigue and fever.   Respiratory:  Negative for cough, chest tightness, shortness of breath and wheezing.    Cardiovascular:  Negative for chest pain, palpitations and leg swelling.   Musculoskeletal:  Positive for arthralgias. Negative for neck pain.   Neurological:  Positive for weakness. Negative for numbness.   Psychiatric/Behavioral:  Negative for suicidal ideas and depressed mood. The patient is not nervous/anxious.        Objective   Physical Exam  Constitutional:       Appearance: She is well-developed.   Neck:      Thyroid: No thyroid mass, thyromegaly or thyroid tenderness.      Vascular: No carotid bruit.      Trachea: Trachea normal.   Cardiovascular:      Rate and Rhythm: Normal rate and regular rhythm.      Chest  Wall: PMI is not displaced.      Pulses:           Radial pulses are 2+ on the right side and 2+ on the left side.        Dorsalis pedis pulses are 2+ on the right side and 2+ on the left side.        Posterior tibial pulses are 2+ on the right side and 2+ on the left side.      Heart sounds: S1 normal and S2 normal.   Pulmonary:      Effort: Pulmonary effort is normal.      Breath sounds: Normal breath sounds.   Musculoskeletal:      Left shoulder: Tenderness present. No swelling, deformity, effusion, laceration, bony tenderness or crepitus. Decreased range of motion. Decreased strength. Normal pulse.      Right lower leg: No edema.      Left lower leg: No edema.      Comments: Positive painful arc test left shoulder at 40 degrees.  Positive impingement sign left shoulder.  Negative empty can test left arm.   Lymphadenopathy:      Head:      Right side of head: No submental, submandibular, tonsillar or occipital adenopathy.      Left side of head: No submental, submandibular, tonsillar or occipital adenopathy.      Cervical: No cervical adenopathy.   Skin:     General: Skin is warm and dry.      Capillary Refill: Capillary refill takes less than 2 seconds.      Nails: There is no clubbing.   Neurological:      Mental Status: She is alert and oriented to person, place, and time.   Psychiatric:         Attention and Perception: Attention normal.         Mood and Affect: Mood and affect normal.         Speech: Speech normal.         Behavior: Behavior normal.         Thought Content: Thought content normal.         Cognition and Memory: Cognition normal.         Vitals:    11/29/23 1542   BP: 110/76   Pulse: 86   Temp: 99.3 °F (37.4 °C)   SpO2: 99%      Body mass index is 36.11 kg/m².    Assessment & Plan   Problems Addressed this Visit    None  Visit Diagnoses       Left anterior shoulder pain    -  Primary    Relevant Medications    meloxicam (MOBIC) 15 MG tablet    Other Relevant Orders    XR shoulder 2+ vw left     Ambulatory Referral to Orthopedic Surgery    Shoulder weakness        Relevant Medications    meloxicam (MOBIC) 15 MG tablet    Other Relevant Orders    XR shoulder 2+ vw left    Ambulatory Referral to Orthopedic Surgery    Pneumonia of left upper lobe due to infectious organism        Relevant Orders    XR Chest PA & Lateral          Diagnoses         Codes Comments    Left anterior shoulder pain    -  Primary ICD-10-CM: M25.512  ICD-9-CM: 719.41     Shoulder weakness     ICD-10-CM: R29.898  ICD-9-CM: 719.61     Pneumonia of left upper lobe due to infectious organism     ICD-10-CM: J18.9  ICD-9-CM: 486           1.  Left anterior shoulder pain and weakness-?  Rotator cuff pathology.  She does have significant weakness and decreased range of motion in the left shoulder on exam today.  Check x-ray left shoulder today.  Start meloxicam 15 mg daily.  Take NSAID with food.  No other NSAIDs while taking this.  Tylenol is okay to use.  Recommend application of heat/ice, avoid offending activities and heavy lifting.  Referral placed to orthopedic surgery for further evaluation.  Also discussed with her today possible referral to PT.  She would like to wait until after she sees orthopedics.  2.  Left upper lobe pneumonia-lungs CTA on exam today.  Repeat chest x-ray ordered for 2 weeks.

## 2023-11-30 NOTE — PROGRESS NOTES
[facility]  New Problem      Patient Name: Sherry Quevedo  : 1990  Primary Care Physician: Temi Jones APRN        Chief Complaint:  Left shoulder pain    HPI:   Sherry Quevedo is a 33 y.o. year old who presents today for evaluation of left shoulder pain. Tuesday and she noticed it whole doing dishes while putting cups away. Couldn't reach the top of the cupboard. By evening she really couldn't move it at all. Had a bad flare of fibro on Monday otherwise no event or injury. Took diclofenac and applied topical voltaren- no help. Couldn't function at work the next day.     Still really bad- maybe slightly better. Just sore today. Little bit into her neck today. Just started Meloxicam on Wednesday. No numbness or tingling, no dedicated neck pain.    RHD. Not sleeping very well at all with this. 7th Grade .       Past Medical/Surgical, Social and Family History:  I have reviewed and/or updated pertinent history as noted in the medical record including:  Past Medical History:   Diagnosis Date    Abnormal computed tomography angiography (CTA) of neck 2021    Abnormal gluteal crease     ADHD (attention deficit hyperactivity disorder) 2020    Anemia 2019    iron defiency anemia    Anxiety     Benign paroxysmal positional vertigo of right ear 2021    Bipolar disorder 2012    BRBPR (bright red blood per rectum) 2021    Dr[facility] Nancy Santana at [facility]    Breast anomaly     Bruises easily     Cervical adenopathy     Cervical lymphadenopathy     Left side.    Change in bowel habit 2021    Dr. Nancy Santana at [facility]    Cholestasis during pregnancy in third trimester     Chronic allergic rhinitis     Chronic pain disorder     Fibro    Chronic recurrent major depressive disorder     In partial remission.    Cold intolerance     Constipation     Decreased sex drive     Depression     History of PPD    Diarrhea     Dizziness 2021    Temi GIPSON,  Cardiologist office.    Dysmenorrhea 12/04/2019    Surgery: Laparoscopic assisted vaginal hysterectomy, Surgeon Dr. Hillary Nunn.    Dysphoric mood     Dyspnea on exertion 08/06/2021    LEONELA, Temi Mcrae, Cardiologist office.    Endometriosis 12/2019    Laparoscopic assisted vaginal hysterectomy, & bilateral Salpingectomy, Surgeon Dr. Hillary Nunn on 12/04/2019.    Environmental and seasonal allergies 09/21/2020    Epigastric abdominal pain 11/07/2018    [facility].    Epiploic appendagitis 01/10/2023    Fat necrosis 07/29/2021    [facility].    Fatigue     Fibromyalgia, primary 2021    Fissure, anal     history    Folic acid deficiency     Gestational diabetes [id number]    I had it with my 1st pregnancy    H/O TB skin testing 02/20/2018    Negative result at [facility].    HA (headache)     Hemorrhoid     History of gestational diabetes     with first baby    History of Holter monitoring 11/07/2019    24 hour.    History of kidney stones 2006/2009    History of miscarriage     History of vasectomy 2019    Spouse Vasectomy.    Hypocalcemia 03/2022    Hypoglycemia     Irritable bowel syndrome 1994    IBS with both constipation/diarrhea, followed by GI Dr. Moreland.    Joint stiffness     Lightheadedness 08/06/2021    LEONELA, Temi Mcrae, Cardiologist office.    Low serum potassium level     Low serum vitamin B12     Lower extremity myoclonus 02/28/2022    ADMITTED TO [facility]    Malaise and fatigue 11/12/2019    Cardiologist Dr. Benji Ugalde.    Mass of left parotid gland 12/2021    Migraines 10/2019    Multinodular non-toxic goiter     Near syncope 08/06/2021    LEONELA, Temi Mcrae, Cardiologist office.    OCD (obsessive compulsive disorder)     Ovarian cyst 2010    Surgery removed.    Palpitations 10/28/2019    PVC's (premature ventricular contractions)     Rapid heart rate 11/12/2019    Cardiologist Dr. Benji Ugalde.    Rectal bleeding 06/02/2021    Dr. Nancy Santana  at [facility]    Renal stones     RLS (restless legs syndrome)     RUQ abdominal mass [id number]    [facility].    Scoliosis 2003    Sleep disturbance     SOB (shortness of breath) 10/28/2019    Tachycardia 10/28/2019    Thrombocytopenia 03/2022    Thyroid nodule 10/28/2019    1 cm Left Submandibular node, [facility], Dr. Kevan HUTCHINSON [place].    Thyroid nodule 10/28/2019    0.3 cm Right side of neck, [facility], Dr. Kevan HUTCHINSON [place].    Thyromegaly 03/04/2016    Boarderline Thyromegaly, Findings via X-ray at [facility], ordering provider Dr. Nancy Santana.    Transaminitis 11/07/2018    [facility].    Trigger index finger of right hand 01/2022    Vaginal delivery 01/2019    Baby girl.     Past Surgical History:   Procedure Laterality Date    ADENOIDECTOMY  2006    CHOLECYSTECTOMY  2019    CHOLECYSTECTOMY WITH INTRAOPERATIVE CHOLANGIOGRAM N/A 11/09/2018    Procedure: Laparoscopic cholecystectomy;  Surgeon: Khanh Lassiter MD;  Location: [facility] OR;  Service: General    COLONOSCOPY N/A 08/2019    DILATATION AND CURETTAGE N/A 10/2015    MISCARRIAGE.    ENDOSCOPY N/A     HEMORRHOIDECTOMY N/A 06/16/2022    Procedure: HEMORRHOIDECTOMY;  Surgeon: Linda Sanchez MD;  Location: [facility] OR;  Service: General;  Laterality: N/A;    LAPAROSCOPIC ASSISTED VAGINAL HYSTERECTOMY Bilateral 12/04/2019    Procedure: LAPAROSCOPIC ASSISTED VAGINAL HYSTERECTOMY, BILATERAL SALPINGECTOMY;  Surgeon: Hillary Nunn MD;  Location: [facility];  Service: Obstetrics/Gynecology    OVARIAN CYST SURGERY  06/2009    Laparoscopic ovarian cyst resection, no other abdominal surgery.    TONSILLECTOMY AND ADENOIDECTOMY Bilateral 10/2006    WISDOM TOOTH EXTRACTION Bilateral      Social History     Occupational History    Occupation: teacher     Employer: [employer]   Tobacco Use    Smoking status: Never    Smokeless tobacco: Never    Tobacco comments:     caffeine use 1 coke daily, occas sweet tea   Vaping Use     Vaping Use: Never used   Substance and Sexual Activity    Alcohol use: Yes     Alcohol/week: 2.0 standard drinks of alcohol     Types: 1 Glasses of wine, 1 Shots of liquor per week    Drug use: Never    Sexual activity: Yes     Partners: Male     Birth control/protection: Surgical, Other, Hysterectomy     Comment: .          Allergies:   Allergies   Allergen Reactions    Hydrocodone Itching and Rash    Tramadol Nausea And Vomiting       Medications:   Home Medications:  Current Outpatient Medications on File Prior to Visit   Medication Sig    buPROPion XL (WELLBUTRIN XL) 300 MG 24 hr tablet     Cetirizine HCl (ZYRTEC ALLERGY PO)     Coenzyme Q10 (CoQ-10) 100 MG capsule     desvenlafaxine (PRISTIQ) 50 MG 24 hr tablet TAKE 1 TABLET BY MOUTH IN THE MORNING BEGINNING 9/11    Famotidine (PEPCID AC PO)     levalbuterol (XOPENEX HFA) 45 MCG/ACT inhaler Inhale 1-2 puffs Every 4 (Four) Hours As Needed for Wheezing or Shortness of Air.    Magnesium 100 MG tablet     meloxicam (MOBIC) 15 MG tablet Take 1 tablet by mouth Daily.    metoprolol succinate XL (Toprol XL) 25 MG 24 hr tablet Take 1 tablet by mouth Daily.    pantoprazole (PROTONIX) 40 MG EC tablet Take 1 tablet by mouth Every Morning.    Rimegepant Sulfate (Nurtec) 75 MG tablet dispersible tablet Take 1 tablet by mouth 1 (One) Time As Needed (migraine headaches.). May repeat dose once after 2 hours.    topiramate (Topamax) 50 MG tablet Take 1 tablet by mouth Every Night.    Triamcinolone Acetonide (NASACORT) 55 MCG/ACT nasal inhaler     Trintellix 10 MG tablet tablet Take 1 tablet by mouth once daily with breakfast     No current facility-administered medications on file prior to visit.         ROS:  14 point review of systems was negative except as listed in the HPI.    Physical Exam:   33 y.o. female  Body mass index is 37.25 kg/m²., 95.4 kg (210 lb 4.8 oz)  Vitals:    12/01/23 1503   Temp: 98.6 °F (37 °C)     General: Alert, cooperative, appears well and in  no observable distress. Appears stated age and BMI as listed above.  HEENT: Normocephalic, atraumatic on external visual inspection.  CV: No significant peripheral edema.  Respiratory: Normal respiratory effort.  Skin: Warm & well perfused; appropriate skin turgor.  Psych: Appropriate mood & affect.  Neuro: Gross sensation and motor intact in affected extremity/extremities.  Vascular: Peripheral pulses palpable in affected extremity/extremities.     MSK Exam:    Left Shoulder  No obvious deformities or wounds.   No redness or significant swelling.  Tenderness not significant with palpation. No AC joint tenderness.  +Definitive painful arc.  +Impingement signs  +Neer Test  +Hawkin's Sign  +ACT  Flexion 120- passively I can get her to 180.  ER 80  IR lateral hip  Strength is 4+/5 strength with isometric testing of the rotator cuff and painful.    Right Shoulder  No deformity or wounds.  No redness or swelling.  No tenderness to palpation.  Full, painless AROM.  Cuff Strength 4+ to 5/5 with isometric testing of the rotator cuff.  Negative provocative testing.    Brief examination of the cervical spine did not reproduce any specific radicular pattern or symptoms.   Strength is reasonable and symmetric in the upper extremities.      Radiology:    The following X-rays were ordered/reviewed today to evaluate the patient's symptoms: SCAP Y and Axillary views appear essentially normal. Slightly curved acromion but no obvious impingement. She had prior 2 views of the shoulder at her PCP which I also reviewed and those appear normal.    Procedure:   See Procedure Note: The potential risks and benefits of performing a diagnostic and therapeutic injection were discussed with the patient prior to procedure. Risks include, but are not limited to infection, swelling, transient increase in pain, bleeding, bruising. Patient was advised that injections are a diagnostic and therapeutic tool meaning they may not alleviate symptoms at  all, or may only provide partial or temporary relief. Injection precautions and aftercare discussed.    [facility]. Data/Labs: N/A    Assessment & Plan:    ICD-10-CM ICD-9-CM   1. Subacromial bursitis of left shoulder joint  M75.52 726.19   2. Left shoulder pain, unspecified chronicity  M25.512 719.41     No orders of the defined types were placed in this encounter.    Orders Placed This Encounter   Procedures    Large Joint Arthrocentesis: L subacromial bursa    XR Shoulder 2+ View Left     This is a 32 y/o female with acute left shoulder pain. I think this is likely coming from her shoulder- suspicion for subacromial bursitis or cuff impingement. She has reasonable strength but it is painful. No clear radicular pattern. I am going to inject her today and have her continue on the meloxicam. Will give her a HEP and I would like her to just work on ROM for now to prevent frozen shoulder. I asked her to touch base with me in 2-3 weeks and let me know how things are going. If she is improving we might consider some PT to work on mechanics. I told her I will be out in January but if her symptoms persist I will ask Dr. Marie to see her in follow up.     Return in about 2 weeks (around 12/15/2023).-     Patient encouraged to call with questions or concerns prior to follow up.  Recommend ICE and/or HEAT PRN as discussed.  Will discuss with attending physician as needed.  Consider additional referrals, work up and/or advanced imaging as indicated or if patient fails to respond to conservative care.    Large Joint Arthrocentesis: L subacromial bursa  Date/Time: 12/1/2023 3:30 PM  Consent given by: patient  Site marked: site marked  Timeout: Immediately prior to procedure a time out was called to verify the correct patient, procedure, equipment, support staff and site/side marked as required   Supporting Documentation  Indications: pain   Procedure Details  Location: shoulder - L subacromial bursa  Preparation: Patient was prepped  and draped in the usual sterile fashion  Needle gauge: 21G.  Approach: posterior  Medications administered: 80 mg methylPREDNISolone acetate 80 MG/ML; 2 mL lidocaine PF 1% 1 %  Patient tolerance: patient tolerated the procedure well with no immediate complications              Constantino Gorman, APRN

## 2023-12-01 ENCOUNTER — OFFICE VISIT (OUTPATIENT)
Dept: ORTHOPEDIC SURGERY | Facility: CLINIC | Age: 33
End: 2023-12-01
Payer: COMMERCIAL

## 2023-12-01 VITALS — HEIGHT: 63 IN | BODY MASS INDEX: 37.26 KG/M2 | WEIGHT: 210.3 LBS | TEMPERATURE: 98.6 F

## 2023-12-01 DIAGNOSIS — M75.52 SUBACROMIAL BURSITIS OF LEFT SHOULDER JOINT: Primary | ICD-10-CM

## 2023-12-01 DIAGNOSIS — M25.512 LEFT SHOULDER PAIN, UNSPECIFIED CHRONICITY: ICD-10-CM

## 2023-12-01 RX ORDER — METHYLPREDNISOLONE ACETATE 80 MG/ML
80 INJECTION, SUSPENSION INTRA-ARTICULAR; INTRALESIONAL; INTRAMUSCULAR; SOFT TISSUE
Status: COMPLETED | OUTPATIENT
Start: 2023-12-01 | End: 2023-12-01

## 2023-12-01 RX ORDER — LIDOCAINE HYDROCHLORIDE 10 MG/ML
2 INJECTION, SOLUTION EPIDURAL; INFILTRATION; INTRACAUDAL; PERINEURAL
Status: COMPLETED | OUTPATIENT
Start: 2023-12-01 | End: 2023-12-01

## 2023-12-01 RX ADMIN — METHYLPREDNISOLONE ACETATE 80 MG: 80 INJECTION, SUSPENSION INTRA-ARTICULAR; INTRALESIONAL; INTRAMUSCULAR; SOFT TISSUE at 15:30

## 2023-12-01 RX ADMIN — LIDOCAINE HYDROCHLORIDE 2 ML: 10 INJECTION, SOLUTION EPIDURAL; INFILTRATION; INTRACAUDAL; PERINEURAL at 15:30

## 2023-12-05 DIAGNOSIS — R00.0 SINUS TACHYCARDIA: ICD-10-CM

## 2023-12-06 RX ORDER — METOPROLOL SUCCINATE 25 MG/1
25 TABLET, EXTENDED RELEASE ORAL DAILY
Qty: 30 TABLET | Refills: 5 | Status: SHIPPED | OUTPATIENT
Start: 2023-12-06

## 2023-12-23 DIAGNOSIS — G43.109 MIGRAINE WITH AURA AND WITHOUT STATUS MIGRAINOSUS, NOT INTRACTABLE: ICD-10-CM

## 2023-12-26 RX ORDER — TOPIRAMATE 50 MG/1
50 TABLET, FILM COATED ORAL NIGHTLY
Qty: 90 TABLET | Refills: 0 | Status: SHIPPED | OUTPATIENT
Start: 2023-12-26

## 2024-01-18 ENCOUNTER — TELEPHONE (OUTPATIENT)
Dept: INTERNAL MEDICINE | Facility: CLINIC | Age: 34
End: 2024-01-18
Payer: COMMERCIAL

## 2024-01-19 ENCOUNTER — OFFICE VISIT (OUTPATIENT)
Dept: INTERNAL MEDICINE | Facility: CLINIC | Age: 34
End: 2024-01-19
Payer: COMMERCIAL

## 2024-01-19 VITALS
WEIGHT: 214.6 LBS | BODY MASS INDEX: 38.02 KG/M2 | HEIGHT: 63 IN | TEMPERATURE: 98.6 F | SYSTOLIC BLOOD PRESSURE: 108 MMHG | HEART RATE: 91 BPM | OXYGEN SATURATION: 98 % | DIASTOLIC BLOOD PRESSURE: 70 MMHG

## 2024-01-19 DIAGNOSIS — R05.1 ACUTE COUGH: Primary | ICD-10-CM

## 2024-01-19 LAB
EXPIRATION DATE: ABNORMAL
FLUAV AG UPPER RESP QL IA.RAPID: DETECTED
FLUBV AG UPPER RESP QL IA.RAPID: NOT DETECTED
INTERNAL CONTROL: ABNORMAL
Lab: ABNORMAL
SARS-COV-2 AG UPPER RESP QL IA.RAPID: NOT DETECTED

## 2024-01-19 PROCEDURE — 99214 OFFICE O/P EST MOD 30 MIN: CPT | Performed by: INTERNAL MEDICINE

## 2024-01-19 PROCEDURE — 87428 SARSCOV & INF VIR A&B AG IA: CPT | Performed by: INTERNAL MEDICINE

## 2024-01-19 RX ORDER — VORTIOXETINE 20 MG/1
1 TABLET, FILM COATED ORAL DAILY
COMMUNITY
Start: 2024-01-12

## 2024-01-19 RX ORDER — BUPROPION HYDROCHLORIDE 150 MG/1
150 TABLET ORAL EVERY MORNING
COMMUNITY
Start: 2023-12-24

## 2024-01-19 RX ORDER — AMITRIPTYLINE HYDROCHLORIDE 10 MG/1
10 TABLET, FILM COATED ORAL
COMMUNITY
Start: 2023-12-24

## 2024-01-19 RX ORDER — HYDROCODONE POLISTIREX AND CHLORPHENIRAMINE POLISTIREX 10; 8 MG/5ML; MG/5ML
5 SUSPENSION, EXTENDED RELEASE ORAL EVERY 12 HOURS PRN
Qty: 120 ML | Refills: 0 | Status: SHIPPED | OUTPATIENT
Start: 2024-01-19

## 2024-01-30 ENCOUNTER — TELEPHONE (OUTPATIENT)
Dept: INTERNAL MEDICINE | Facility: CLINIC | Age: 34
End: 2024-01-30
Payer: COMMERCIAL

## 2024-01-30 NOTE — TELEPHONE ENCOUNTER
Caller: [facility]    Relationship to patient: Other    Best call back number: [phone]     Patient is needing: WOULD LIKE TO VERIFY QUANTITY REQUESTED, AND NUMBER OF MIGRAINE DAYS PATIENT HAS, SO THEY CAN COMPLETE PRIOR AUTHORIZATION

## 2024-03-06 ENCOUNTER — OFFICE VISIT (OUTPATIENT)
Dept: INTERNAL MEDICINE | Facility: CLINIC | Age: 34
End: 2024-03-06
Payer: COMMERCIAL

## 2024-03-06 VITALS
DIASTOLIC BLOOD PRESSURE: 80 MMHG | OXYGEN SATURATION: 98 % | SYSTOLIC BLOOD PRESSURE: 110 MMHG | TEMPERATURE: 98.6 F | BODY MASS INDEX: 38.55 KG/M2 | HEIGHT: 63 IN | HEART RATE: 66 BPM | WEIGHT: 217.6 LBS

## 2024-03-06 DIAGNOSIS — G43.E09 CHRONIC MIGRAINE WITH AURA WITHOUT STATUS MIGRAINOSUS, NOT INTRACTABLE: ICD-10-CM

## 2024-03-06 DIAGNOSIS — E53.8 LOW SERUM VITAMIN B12: ICD-10-CM

## 2024-03-06 DIAGNOSIS — R20.2 TINGLING OF BOTH FEET: ICD-10-CM

## 2024-03-06 DIAGNOSIS — E04.1 THYROID NODULE: ICD-10-CM

## 2024-03-06 DIAGNOSIS — E53.8 FOLIC ACID DEFICIENCY: ICD-10-CM

## 2024-03-06 DIAGNOSIS — Z86.32 HISTORY OF GESTATIONAL DIABETES: ICD-10-CM

## 2024-03-06 DIAGNOSIS — R20.0 NUMBNESS AND TINGLING IN BOTH HANDS: ICD-10-CM

## 2024-03-06 DIAGNOSIS — Z00.00 HEALTHCARE MAINTENANCE: ICD-10-CM

## 2024-03-06 DIAGNOSIS — R25.2 NOCTURNAL MUSCLE CRAMPS: Primary | ICD-10-CM

## 2024-03-06 DIAGNOSIS — R20.2 NUMBNESS AND TINGLING IN BOTH HANDS: ICD-10-CM

## 2024-03-06 RX ORDER — DESVENLAFAXINE SUCCINATE 50 MG
TABLET, EXTENDED RELEASE 24 HR ORAL
COMMUNITY
Start: 2024-02-17

## 2024-03-06 RX ORDER — TRAZODONE HYDROCHLORIDE 50 MG/1
TABLET ORAL
COMMUNITY
Start: 2024-02-17

## 2024-03-06 NOTE — PROGRESS NOTES
Subjective   Sherry Quevedo is a 33 y.o. female.     Chief Complaint   Patient presents with    Muscle Pain     Pt c/o muscle cramps on bilateral calf, legs X 3 months.    Tingling     Pt c/o tingling on arms, bilateral feet X 3 months that got worse 2-3 weks.        History of Present Illness   She is here today with complaints of muscle cramps and paresthesias bilateral arms and legs.  Symptoms started 3 months ago but became worse 2 to 3 weeks ago.  She notes muscle cramps are described as patsy horses occurring at night. This occurs on average 4-6 times a night. She has started taking magnesium at bedtime without improvement.  She notes tingling in the hands and fingers, described as pins and needles. Occasionally she will note pain and paresthesias in the forearms. She notes occasional neck discomfort and low back pain.  She denies any upper or lower extremity weakness, saddle anesthesia, bowel or bladder dysfunction.  The pins and needles also occur in the bottoms of the feet bilaterally.    She is unsure of any triggers for paresthesias.   She has previously undergone cervical x-ray, negative and lumbar MRI normal.  She notes leg cramps will occur only at night, worse with stretching.   She is struggling some with hydration, not currently drinking any water during the day as she is afraid to drink a lot of fluids at work secondary to need to use the restroom.  She is drinking a glass of chocolate milk in the evening, a Mountain Dew or iced coffee during the day.  Positive history of B12 and folate deficiency as well as carpal tunnel bilateral wrists for which she has undergone injections.    The following portions of the patient's history were reviewed and updated as appropriate: allergies, current medications, past family history, past medical history, past social history, past surgical history, and problem list.    Review of Systems   Constitutional: Negative.    Respiratory: Negative.      Cardiovascular: Negative.    Musculoskeletal:  Positive for back pain, myalgias and neck pain.   Neurological:  Positive for numbness. Negative for weakness.       Objective   Physical Exam  Constitutional:       Appearance: She is well-developed.   Neck:      Thyroid: No thyroid mass, thyromegaly or thyroid tenderness.      Vascular: No carotid bruit.      Trachea: Trachea normal.   Cardiovascular:      Rate and Rhythm: Normal rate and regular rhythm.      Chest Wall: PMI is not displaced.      Pulses:           Radial pulses are 2+ on the right side and 2+ on the left side.        Dorsalis pedis pulses are 2+ on the right side and 2+ on the left side.        Posterior tibial pulses are 2+ on the right side and 2+ on the left side.      Heart sounds: S1 normal and S2 normal.   Pulmonary:      Effort: Pulmonary effort is normal.      Breath sounds: Normal breath sounds.   Musculoskeletal:      Right elbow: Normal.      Left elbow: No swelling, deformity, effusion or lacerations. Normal range of motion. Tenderness present in medial epicondyle.      Right forearm: Normal.      Left forearm: Normal.      Right wrist: No swelling, deformity, effusion, lacerations, tenderness, bony tenderness, snuff box tenderness or crepitus. Normal range of motion. Normal pulse.      Left wrist: No swelling, deformity, effusion, lacerations, tenderness, bony tenderness, snuff box tenderness or crepitus. Normal range of motion. Normal pulse.      Cervical back: Tenderness present. No swelling, edema, deformity, erythema, signs of trauma, lacerations, rigidity, spasms, torticollis, bony tenderness or crepitus. Pain with movement present. Normal range of motion.      Thoracic back: Normal.      Lumbar back: Tenderness present. No swelling, edema, deformity, signs of trauma, lacerations, spasms or bony tenderness. Normal range of motion. Negative right straight leg raise test and negative left straight leg raise test. Scoliosis present.       Right lower leg: No edema.      Left lower leg: No edema.      Right foot: No foot drop.      Left foot: No foot drop.      Comments: Positive Phalen sign bilateral wrists.  Upper and lower extremity strength 4 out of 5 symmetric and equal bilaterally.  Bilateral hamstrings tight on exam.   Lymphadenopathy:      Head:      Right side of head: No submental, submandibular, tonsillar or occipital adenopathy.      Left side of head: No submental, submandibular, tonsillar or occipital adenopathy.      Cervical: No cervical adenopathy.   Skin:     General: Skin is warm and dry.      Capillary Refill: Capillary refill takes less than 2 seconds.      Nails: There is no clubbing.   Neurological:      Mental Status: She is alert and oriented to person, place, and time.   Psychiatric:         Attention and Perception: Attention normal.         Mood and Affect: Mood and affect normal.         Speech: Speech normal.         Behavior: Behavior normal.         Thought Content: Thought content normal.         Cognition and Memory: Cognition normal.         Vitals:    03/06/24 1512   BP: 110/80   Pulse: 66   Temp: 98.6 °F (37 °C)   SpO2: 98%      Body mass index is 38.55 kg/m².    Assessment & Plan   Problems Addressed this Visit       Chronic migraine with aura    Relevant Medications    Pristiq 50 MG 24 hr tablet    traZODone (DESYREL) 50 MG tablet    Other Relevant Orders    CBC & Differential    Comprehensive Metabolic Panel    Vitamin B12    Folate    Magnesium    Low serum vitamin B12    Relevant Orders    CBC & Differential    Vitamin B12    Folate    Thyroid nodule    Relevant Orders    TSH    T4, Free    T3, Free    Folic acid deficiency    Relevant Orders    CBC & Differential    Vitamin B12    Folate    History of gestational diabetes    Relevant Orders    Comprehensive Metabolic Panel    Hemoglobin A1c     Other Visit Diagnoses       Nocturnal muscle cramps    -  Primary    Relevant Orders    CBC & Differential     Comprehensive Metabolic Panel    Ferritin    Iron Profile    Vitamin B12    Folate    TSH    T4, Free    T3, Free    Magnesium    Numbness and tingling in both hands        Relevant Orders    CBC & Differential    Comprehensive Metabolic Panel    Ferritin    Iron Profile    Hemoglobin A1c    Vitamin B12    Folate    TSH    T4, Free    T3, Free    Tingling of both feet        Relevant Orders    CBC & Differential    Comprehensive Metabolic Panel    Ferritin    Iron Profile    Hemoglobin A1c    Vitamin B12    Folate    TSH    T4, Free    T3, Free    Healthcare maintenance        Relevant Orders    CBC & Differential    Comprehensive Metabolic Panel    Ferritin    Iron Profile    Hemoglobin A1c    Vitamin B12    Folate    TSH    T4, Free    T3, Free    Lipid Panel With LDL / HDL Ratio    Magnesium          Diagnoses         Codes Comments    Nocturnal muscle cramps    -  Primary ICD-10-CM: R25.2  ICD-9-CM: 729.82     Numbness and tingling in both hands     ICD-10-CM: R20.0, R20.2  ICD-9-CM: 782.0     Tingling of both feet     ICD-10-CM: R20.2  ICD-9-CM: 782.0     Healthcare maintenance     ICD-10-CM: Z00.00  ICD-9-CM: V70.0     Low serum vitamin B12     ICD-10-CM: E53.8  ICD-9-CM: 266.2     Folic acid deficiency     ICD-10-CM: E53.8  ICD-9-CM: 266.2     History of gestational diabetes     ICD-10-CM: Z86.32  ICD-9-CM: V12.21     Thyroid nodule     ICD-10-CM: E04.1  ICD-9-CM: 241.0     Chronic migraine with aura without status migrainosus, not intractable     ICD-10-CM: G43.E09  ICD-9-CM: 346.00           1.  Nocturnal muscle cramps-discussed with her today this is likely related to dehydration.  Emphasized the importance of increasing water intake as well as electrolyte replacement therapy.  Check lab work tomorrow.  Recommend starting a regular exercise program and stretching in the evening.  Okay to continue magnesium at bedtime.  2.  Numbness and tingling in both hands-discussed with her today this could be related  to carpal tunnel.  Recommend wearing wrist splints nightly and trying topical diclofenac gel.  Check labs tomorrow including B12 and folate as she has a history of deficiency.  If symptoms persist despite conservative treatment recommend referral to hand surgery.  Also discussed with her today symptoms may be coming from her neck.  May need referral to physical therapy also.  3.  Tingling of both feet-check labs tomorrow including B12 and folate.  Recommend working on increasing hydration status as well as regular exercise.

## 2024-03-08 DIAGNOSIS — R79.89 ELEVATED SERUM CREATININE: ICD-10-CM

## 2024-03-08 DIAGNOSIS — R74.8 ELEVATED LIVER ENZYMES: Primary | ICD-10-CM

## 2024-03-08 LAB
ALBUMIN SERPL-MCNC: 4.5 G/DL (ref 3.9–4.9)
ALBUMIN/GLOB SERPL: 1.7 {RATIO} (ref 1.2–2.2)
ALP SERPL-CCNC: 85 IU/L (ref 44–121)
ALT SERPL-CCNC: 61 IU/L (ref 0–32)
AST SERPL-CCNC: 55 IU/L (ref 0–40)
BASOPHILS # BLD AUTO: 0.1 X10E3/UL (ref 0–0.2)
BASOPHILS NFR BLD AUTO: 1 %
BILIRUB SERPL-MCNC: 0.4 MG/DL (ref 0–1.2)
BUN SERPL-MCNC: 13 MG/DL (ref 6–20)
BUN/CREAT SERPL: 13 (ref 9–23)
CALCIUM SERPL-MCNC: 9.2 MG/DL (ref 8.7–10.2)
CHLORIDE SERPL-SCNC: 108 MMOL/L (ref 96–106)
CHOLEST SERPL-MCNC: 186 MG/DL (ref 100–199)
CO2 SERPL-SCNC: 21 MMOL/L (ref 20–29)
CREAT SERPL-MCNC: 1.03 MG/DL (ref 0.57–1)
EGFRCR SERPLBLD CKD-EPI 2021: 73 ML/MIN/1.73
EOSINOPHIL # BLD AUTO: 0.1 X10E3/UL (ref 0–0.4)
EOSINOPHIL NFR BLD AUTO: 2 %
ERYTHROCYTE [DISTWIDTH] IN BLOOD BY AUTOMATED COUNT: 12.7 % (ref 11.7–15.4)
FERRITIN SERPL-MCNC: 64 NG/ML (ref 15–150)
FOLATE SERPL-MCNC: 3.4 NG/ML
GLOBULIN SER CALC-MCNC: 2.7 G/DL (ref 1.5–4.5)
GLUCOSE SERPL-MCNC: 90 MG/DL (ref 70–99)
HBA1C MFR BLD: 5.4 % (ref 4.8–5.6)
HCT VFR BLD AUTO: 41.6 % (ref 34–46.6)
HDLC SERPL-MCNC: 43 MG/DL
HGB BLD-MCNC: 14.1 G/DL (ref 11.1–15.9)
IMM GRANULOCYTES # BLD AUTO: 0 X10E3/UL (ref 0–0.1)
IMM GRANULOCYTES NFR BLD AUTO: 0 %
IRON SATN MFR SERPL: 24 % (ref 15–55)
IRON SERPL-MCNC: 92 UG/DL (ref 27–159)
LDLC SERPL CALC-MCNC: 123 MG/DL (ref 0–99)
LDLC/HDLC SERPL: 2.9 RATIO (ref 0–3.2)
LYMPHOCYTES # BLD AUTO: 2.2 X10E3/UL (ref 0.7–3.1)
LYMPHOCYTES NFR BLD AUTO: 27 %
MAGNESIUM SERPL-MCNC: 2.2 MG/DL (ref 1.6–2.3)
MCH RBC QN AUTO: 30.6 PG (ref 26.6–33)
MCHC RBC AUTO-ENTMCNC: 33.9 G/DL (ref 31.5–35.7)
MCV RBC AUTO: 90 FL (ref 79–97)
MONOCYTES # BLD AUTO: 0.4 X10E3/UL (ref 0.1–0.9)
MONOCYTES NFR BLD AUTO: 5 %
NEUTROPHILS # BLD AUTO: 5.3 X10E3/UL (ref 1.4–7)
NEUTROPHILS NFR BLD AUTO: 65 %
PLATELET # BLD AUTO: 225 X10E3/UL (ref 150–450)
POTASSIUM SERPL-SCNC: 4.2 MMOL/L (ref 3.5–5.2)
PROT SERPL-MCNC: 7.2 G/DL (ref 6–8.5)
RBC # BLD AUTO: 4.61 X10E6/UL (ref 3.77–5.28)
SODIUM SERPL-SCNC: 143 MMOL/L (ref 134–144)
T3FREE SERPL-MCNC: 3.1 PG/ML (ref 2–4.4)
T4 FREE SERPL-MCNC: 0.9 NG/DL (ref 0.82–1.77)
TIBC SERPL-MCNC: 378 UG/DL (ref 250–450)
TRIGL SERPL-MCNC: 110 MG/DL (ref 0–149)
TSH SERPL DL<=0.005 MIU/L-ACNC: 1.07 UIU/ML (ref 0.45–4.5)
UIBC SERPL-MCNC: 286 UG/DL (ref 131–425)
VIT B12 SERPL-MCNC: 773 PG/ML (ref 232–1245)
VLDLC SERPL CALC-MCNC: 20 MG/DL (ref 5–40)
WBC # BLD AUTO: 8.1 X10E3/UL (ref 3.4–10.8)

## 2024-03-21 ENCOUNTER — OFFICE VISIT (OUTPATIENT)
Dept: INTERNAL MEDICINE | Facility: CLINIC | Age: 34
End: 2024-03-21
Payer: COMMERCIAL

## 2024-03-21 VITALS
HEART RATE: 85 BPM | SYSTOLIC BLOOD PRESSURE: 110 MMHG | HEIGHT: 63 IN | WEIGHT: 213.8 LBS | TEMPERATURE: 98.3 F | OXYGEN SATURATION: 98 % | BODY MASS INDEX: 37.88 KG/M2 | DIASTOLIC BLOOD PRESSURE: 78 MMHG

## 2024-03-21 DIAGNOSIS — J32.9 FREQUENT EPISODES OF SINUSITIS: ICD-10-CM

## 2024-03-21 DIAGNOSIS — J34.2 DEVIATED SEPTUM: ICD-10-CM

## 2024-03-21 DIAGNOSIS — J40 BRONCHITIS: ICD-10-CM

## 2024-03-21 DIAGNOSIS — J01.00 ACUTE MAXILLARY SINUSITIS, RECURRENCE NOT SPECIFIED: Primary | ICD-10-CM

## 2024-03-21 PROCEDURE — 99213 OFFICE O/P EST LOW 20 MIN: CPT | Performed by: NURSE PRACTITIONER

## 2024-03-21 RX ORDER — TRIAMCINOLONE ACETONIDE 55 UG/1
2 SPRAY, METERED NASAL DAILY
COMMUNITY

## 2024-03-21 RX ORDER — ALBUTEROL SULFATE AND BUDESONIDE 90; 80 UG/1; UG/1
2 AEROSOL, METERED RESPIRATORY (INHALATION) EVERY 6 HOURS PRN
Qty: 10.7 G | Refills: 0 | Status: SHIPPED | OUTPATIENT
Start: 2024-03-21

## 2024-03-21 RX ORDER — CEFDINIR 300 MG/1
300 CAPSULE ORAL 2 TIMES DAILY
Qty: 14 CAPSULE | Refills: 0 | Status: SHIPPED | OUTPATIENT
Start: 2024-03-21 | End: 2024-03-28

## 2024-03-21 NOTE — PROGRESS NOTES
Subjective   Sherry Quevedo is a 33 y.o. female.     Chief Complaint   Patient presents with    sinus pressure     Pt c/o c/o some cough, sinus pressure, HA, congestion X 1 week.        History of Present Illness   She is here today with complaints of URI symptoms.  She notes symptom onset 1 week ago.  She is experiencing sinus pain and pressure, headache, congestion and cough. She notes maxillary sinus pain. Her teeth are hurting. She notes that her glands are swollen. Cough occurs with postnasal drainage.  She is concerned about cough and coming worse as she notes typically sinusitis will turn into bronchitis.  She notes mild shortness of breath.  She has had several respiratory infections over the past several months including COVID, bronchitis, pneumonia and influenza A within a 2 month span.  She denies any fever, chills, wheezing, chest pain.  Her  is currently recovering from similar symptoms.   She has been using Zyrtec daily, Nyquil and Dayquil.  She notes frequent sinusitis averaging 4-5 sinus infections a year.  She was recently seen by ENT for right tympanostomy tube placement.  She had a nasal scope done showing deviated septum.    The following portions of the patient's history were reviewed and updated as appropriate: allergies, current medications, past family history, past medical history, past social history, past surgical history, and problem list.    Review of Systems   Constitutional:  Positive for fatigue. Negative for chills and fever.   HENT:  Positive for congestion, postnasal drip, sinus pressure and swollen glands. Negative for ear pain and sore throat.    Respiratory:  Positive for cough and shortness of breath. Negative for chest tightness and wheezing.    Cardiovascular:  Negative for chest pain, palpitations and leg swelling.   Neurological:  Positive for headache.       Objective   Physical Exam  Constitutional:       General: She is awake.      Appearance: She is  well-developed. She is ill-appearing.   HENT:      Head: Normocephalic and atraumatic.      Right Ear: Hearing, ear canal and external ear normal.      Left Ear: Hearing, tympanic membrane, ear canal and external ear normal.      Ears:      Comments: Right tympanostomy tube present.     Nose: Septal deviation and congestion present.      Right Turbinates: Enlarged.      Left Turbinates: Enlarged.      Right Sinus: Maxillary sinus tenderness present. No frontal sinus tenderness.      Left Sinus: Maxillary sinus tenderness present. No frontal sinus tenderness.      Mouth/Throat:      Lips: Pink.      Mouth: Mucous membranes are moist. No injury or oral lesions.      Dentition: Normal dentition.      Tongue: No lesions. Tongue does not deviate from midline.      Palate: No mass and lesions.      Pharynx: Uvula midline. Posterior oropharyngeal erythema present. No pharyngeal swelling, oropharyngeal exudate or uvula swelling.      Comments: Posterior pharynx with clear drainage.  Neck:      Thyroid: No thyroid mass, thyromegaly or thyroid tenderness.      Vascular: No carotid bruit.      Trachea: Trachea normal.   Cardiovascular:      Rate and Rhythm: Normal rate and regular rhythm.      Chest Wall: PMI is not displaced.      Pulses:           Radial pulses are 2+ on the right side and 2+ on the left side.        Dorsalis pedis pulses are 2+ on the right side and 2+ on the left side.        Posterior tibial pulses are 2+ on the right side and 2+ on the left side.      Heart sounds: S1 normal and S2 normal.   Pulmonary:      Effort: Pulmonary effort is normal.      Breath sounds: Normal breath sounds. No decreased breath sounds, wheezing, rhonchi or rales.      Comments: Intermittent cough with deep breathing.  Musculoskeletal:      Right lower leg: No edema.      Left lower leg: No edema.   Lymphadenopathy:      Head:      Right side of head: No submental, submandibular, tonsillar or occipital adenopathy.      Left side  of head: No submental, submandibular, tonsillar or occipital adenopathy.      Cervical: No cervical adenopathy.   Skin:     General: Skin is warm and dry.      Capillary Refill: Capillary refill takes less than 2 seconds.      Nails: There is no clubbing.   Neurological:      Mental Status: She is alert and oriented to person, place, and time.   Psychiatric:         Attention and Perception: Attention normal.         Mood and Affect: Mood and affect normal.         Speech: Speech normal.         Behavior: Behavior normal. Behavior is cooperative.         Thought Content: Thought content normal.         Cognition and Memory: Cognition normal.         Vitals:    03/21/24 1516   BP: 110/78   Pulse: 85   Temp: 98.3 °F (36.8 °C)   SpO2: 98%      Body mass index is 37.87 kg/m².    Assessment & Plan   Problems Addressed this Visit    None  Visit Diagnoses       Acute maxillary sinusitis, recurrence not specified    -  Primary    Relevant Medications    cefdinir (OMNICEF) 300 MG capsule    Frequent episodes of sinusitis        Relevant Orders    CT Sinus Without Contrast    Bronchitis        Relevant Medications    Triamcinolone Acetonide (NASACORT) 55 MCG/ACT nasal inhaler    Albuterol-Budesonide (Airsupra) 90-80 MCG/ACT aerosol    Deviated septum        Relevant Orders    CT Sinus Without Contrast          Diagnoses         Codes Comments    Acute maxillary sinusitis, recurrence not specified    -  Primary ICD-10-CM: J01.00  ICD-9-CM: 461.0     Frequent episodes of sinusitis     ICD-10-CM: J32.9  ICD-9-CM: 473.9     Bronchitis     ICD-10-CM: J40  ICD-9-CM: 490     Deviated septum     ICD-10-CM: J34.2  ICD-9-CM: 470           1.  Acute maxillary sinusitis/bronchitis-start cefdinir 3 mg twice daily x 7 days.  Recommend restarting Nasacort 2 puffs in each nostril daily.  Hold Zyrtec if sinuses become too dry.  Recommend following up with her allergist as uncontrolled allergies may be contributing to sinusitis.  Take a  probiotic or yogurt separate from the antibiotic.  Hydrate with fluids, vitamin C, vitamin D.  Prescription sent for air supra to use 2 puffs every 6 hours as needed for any shortness of breath or wheezing.  Rinse mouth out or brush teeth after use.  2.  Recurrent sinusitis/deviated septum-check CT scan of the sinuses without contrast as she has had frequent episodes of sinusitis.  Would recommend seeing ENT back pending CT results.

## 2024-03-22 DIAGNOSIS — G43.109 MIGRAINE WITH AURA AND WITHOUT STATUS MIGRAINOSUS, NOT INTRACTABLE: ICD-10-CM

## 2024-03-22 RX ORDER — RIMEGEPANT SULFATE 75 MG/75MG
TABLET, ORALLY DISINTEGRATING ORAL
Qty: 16 TABLET | Refills: 1 | Status: SHIPPED | OUTPATIENT
Start: 2024-03-22

## 2024-03-26 DIAGNOSIS — G43.109 MIGRAINE WITH AURA AND WITHOUT STATUS MIGRAINOSUS, NOT INTRACTABLE: ICD-10-CM

## 2024-03-27 ENCOUNTER — TELEPHONE (OUTPATIENT)
Dept: INTERNAL MEDICINE | Facility: CLINIC | Age: 34
End: 2024-03-27
Payer: COMMERCIAL

## 2024-03-27 RX ORDER — TOPIRAMATE 50 MG/1
50 TABLET, FILM COATED ORAL NIGHTLY
Qty: 90 TABLET | Refills: 0 | Status: SHIPPED | OUTPATIENT
Start: 2024-03-27

## 2024-03-27 NOTE — TELEPHONE ENCOUNTER
----- Message from Sherry Quevedo sent at 3/27/2024  7:39 AM EDT -----  Regarding: Sinus infection  Contact: [phone]  Morning Temi,  My CT is scheduled for April 10th on my sinuses but this round of antibiotics do not seem to be helping.  I'm actually home from work today because I cannot lift my head off the pillow because the left side of my face/head hurts so bad and feels so full.  My teeth are so sensitive I cannot put them together. I've tried using Afrin, suddafed, and Dayquil; taking a hot shower, and it's not helping.  I don't feel like it's done in my chest it's just the left side of my face and head. When I blow my nose and get anything out it's super thick and yellow still.  Any suggestions on what else I can do/take.  It's not been this bad in a long time in regards to sinus pain. My head has been so bad I've seen spots/waves and tried taking Nurtec but it doesn't help either.   Thanks,  Sherry

## 2024-03-27 NOTE — TELEPHONE ENCOUNTER
Pt agreed to do Ct now and jaison is working her getting her in [facility].

## 2024-03-28 ENCOUNTER — APPOINTMENT (OUTPATIENT)
Dept: CT IMAGING | Facility: HOSPITAL | Age: 34
End: 2024-03-28
Payer: COMMERCIAL

## 2024-03-28 ENCOUNTER — HOSPITAL ENCOUNTER (EMERGENCY)
Facility: HOSPITAL | Age: 34
Discharge: HOME | End: 2024-03-28
Attending: EMERGENCY MEDICINE
Payer: COMMERCIAL

## 2024-03-28 VITALS
HEIGHT: 63 IN | HEART RATE: 86 BPM | BODY MASS INDEX: 38.98 KG/M2 | TEMPERATURE: 99.2 F | SYSTOLIC BLOOD PRESSURE: 120 MMHG | WEIGHT: 220 LBS | DIASTOLIC BLOOD PRESSURE: 91 MMHG | OXYGEN SATURATION: 98 % | RESPIRATION RATE: 16 BRPM

## 2024-03-28 DIAGNOSIS — J32.4 CHRONIC PANSINUSITIS: Primary | ICD-10-CM

## 2024-03-28 LAB
FLUAV SUBTYP SPEC NAA+PROBE: NOT DETECTED
FLUBV RNA ISLT QL NAA+PROBE: NOT DETECTED
SARS-COV-2 RNA RESP QL NAA+PROBE: NOT DETECTED

## 2024-03-28 PROCEDURE — 87636 SARSCOV2 & INF A&B AMP PRB: CPT | Performed by: EMERGENCY MEDICINE

## 2024-03-28 PROCEDURE — 99283 EMERGENCY DEPT VISIT LOW MDM: CPT | Performed by: EMERGENCY MEDICINE

## 2024-03-28 PROCEDURE — 70486 CT MAXILLOFACIAL W/O DYE: CPT

## 2024-03-28 PROCEDURE — 99284 EMERGENCY DEPT VISIT MOD MDM: CPT

## 2024-03-28 RX ORDER — LEVOFLOXACIN 500 MG/1
500 TABLET, FILM COATED ORAL DAILY
Qty: 14 TABLET | Refills: 0 | Status: SHIPPED | OUTPATIENT
Start: 2024-03-28 | End: 2024-04-11

## 2024-03-28 RX ORDER — OXYCODONE HYDROCHLORIDE AND ACETAMINOPHEN 5; 325 MG/1; MG/1
1 TABLET ORAL EVERY 6 HOURS PRN
Qty: 10 TABLET | Refills: 0 | Status: SHIPPED | OUTPATIENT
Start: 2024-03-28

## 2024-03-28 RX ORDER — FLUCONAZOLE 150 MG/1
150 TABLET ORAL ONCE
Qty: 1 TABLET | Refills: 0 | Status: SHIPPED | OUTPATIENT
Start: 2024-03-28 | End: 2024-03-28

## 2024-03-28 NOTE — DISCHARGE INSTRUCTIONS
Take antibiotics as prescribed, complete the entire course.  Pain medication as needed.  Follow-up with ENT for continued treatment of your condition.  Return to ER for any concerns.

## 2024-03-28 NOTE — FSED PROVIDER NOTE
Subjective   History of Present Illness  Patient is a 33-year-old female presents emergency room with complaints of persistent sinus infection.  Patient is on Omnicef.  She has been on it for a week and reports that her sinus infection is no better.  She has severe pressure in her frontal sinus as well as her left maxillary sinus.  She denies any fever.  She denies any pain swallowing.  Denies any dental issues.        Review of Systems   Constitutional: Negative.  Negative for chills, fatigue and fever.   HENT:  Positive for sinus pressure and sinus pain.    Eyes: Negative.    Respiratory:  Negative for cough, chest tightness and shortness of breath.    Cardiovascular:  Negative for chest pain and palpitations.   Gastrointestinal:  Negative for abdominal pain, diarrhea, nausea and vomiting.   Genitourinary: Negative.    Musculoskeletal: Negative.    Skin: Negative.  Negative for rash.   Neurological: Negative.  Negative for syncope, weakness, numbness and headaches.   Psychiatric/Behavioral: Negative.     All other systems reviewed and are negative.      Past Medical History:   Diagnosis Date    Abnormal computed tomography angiography (CTA) of neck 12/21/2021    Abnormal gluteal crease     ADHD (attention deficit hyperactivity disorder) 2020    Anemia 2019    iron defiency anemia    Anxiety     Benign paroxysmal positional vertigo of right ear 12/01/2021    Bipolar disorder 2012    BRBPR (bright red blood per rectum) 06/02/2021    Dr. [facility] Santana at [facility]    Breast anomaly     Bruises easily     Cervical adenopathy     Cervical lymphadenopathy     Left side.    Change in bowel habit 06/02/2021    Dr. Nancy Santana at [facility]    Cholestasis during pregnancy in third trimester 2019    Chronic allergic rhinitis     Chronic pain disorder 2022    Fibro    Chronic recurrent major depressive disorder     In partial remission.    Cold intolerance     Constipation     Decreased sex drive     Depression     History of PPD     Diarrhea     Dizziness 08/06/2021    LEONELA, Temi Mcrae, Cardiologist office.    Dysmenorrhea 12/04/2019    Surgery: Laparoscopic assisted vaginal hysterectomy, Surgeon Dr. Hillary Nunn.    Dysphoric mood     Dyspnea on exertion 08/06/2021    LEONELA, Temi Mcrae, Cardiologist office.    Endometriosis 12/2019    Laparoscopic assisted vaginal hysterectomy, & bilateral Salpingectomy, Surgeon Dr. Hillary Nunn on 12/04/2019.    Environmental and seasonal allergies 09/21/2020    Epigastric abdominal pain 11/07/2018    [facility].    Epiploic appendagitis 01/10/2023    Fat necrosis 07/29/2021    [facility].    Fatigue     Fibromyalgia, primary 2021    Fissure, anal     history    Folic acid deficiency     Gestational diabetes [id number]    I had it with my 1st pregnancy    H/O TB skin testing 02/20/2018    Negative result at [facility].    HA (headache)     Hemorrhoid     History of gestational diabetes     with first baby    History of Holter monitoring 11/07/2019    24 hour.    History of kidney stones 2006/2009    History of miscarriage     History of vasectomy 2019    Spouse Vasectomy.    Hypocalcemia 03/2022    Hypoglycemia     Irritable bowel syndrome 1994    IBS with both constipation/diarrhea, followed by GI Dr. Moreland.    Joint stiffness     Lightheadedness 08/06/2021    LEONELA, Temi Mcrae, Cardiologist office.    Low serum potassium level     Low serum vitamin B12     Lower extremity myoclonus 02/28/2022    ADMITTED TO [facility]    Malaise and fatigue 11/12/2019    Cardiologist Dr. Benji Ugalde.    Mass of left parotid gland 12/2021    Migraines 10/2019    Multinodular non-toxic goiter     Near syncope 08/06/2021    LEONELA, Temi Mcrae, Cardiologist office.    OCD (obsessive compulsive disorder)     Ovarian cyst 2010    Surgery removed.    Palpitations 10/28/2019    PVC's (premature ventricular contractions)     Rapid heart rate 11/12/2019    Cardiologist Dr. Leblanc  [facility] Ugal[facility].    Rectal bleeding 06/02/2021    [facility] at [facility]    Renal stones     RLS (restless legs syndrome)     RUQ abdominal mass [id number]    [facility].    Scoliosis 2003    Sleep disturbance     SOB (shortness of breath) 10/28/2019    Tachycardia 10/28/2019    Thrombocytopenia 03/2022    Thyroid nodule 10/28/2019    1 cm Left Submandibular node, [facility], Dr. Kevan HUTCHINSON Forwith.    Thyroid nodule 10/28/2019    0.3 cm Right side of neck, [facility], Dr. Kevan HUTCHINSON Forlatrell.    Thyromegaly 03/04/2016    Boarderline Thyromegaly, Findings via X-ray at [facility], ordering provider Dr. Nancy Santana.    Transaminitis 11/07/2018    [facility].    Trigger index finger of right hand 01/2022    Vaginal delivery 01/2019    Baby girl.       Allergies   Allergen Reactions    Hydrocodone Itching and Rash    Tramadol Nausea And Vomiting       Past Surgical History:   Procedure Laterality Date    ADENOIDECTOMY  2006    CHOLECYSTECTOMY  2019    CHOLECYSTECTOMY WITH INTRAOPERATIVE CHOLANGIOGRAM N/A 11/09/2018    Procedure: Laparoscopic cholecystectomy;  Surgeon: Khanh Lassiter MD;  Location: [facility];  Service: General    COLONOSCOPY N/A 08/2019    DILATATION AND CURETTAGE N/A 10/2015    MISCARRIAGE.    ENDOSCOPY N/A     HEMORRHOIDECTOMY N/A 06/16/2022    Procedure: HEMORRHOIDECTOMY;  Surgeon: Linda Sanchez MD;  Location: [facility] OR;  Service: General;  Laterality: N/A;    LAPAROSCOPIC ASSISTED VAGINAL HYSTERECTOMY Bilateral 12/04/2019    Procedure: LAPAROSCOPIC ASSISTED VAGINAL HYSTERECTOMY, BILATERAL SALPINGECTOMY;  Surgeon: Hillary Nunn MD;  Location: [facility];  Service: Obstetrics/Gynecology    OVARIAN CYST SURGERY  06/2009    Laparoscopic ovarian cyst resection, no other abdominal surgery.    TONSILLECTOMY AND ADENOIDECTOMY Bilateral 10/2006    WISDOM TOOTH EXTRACTION Bilateral        Family History   Problem Relation Age of Onset    COPD Mother     Depression Mother      Hypertension Mother     Heart disease Mother     Hyperlipidemia Mother     Other Mother         Diverticulitis    Asthma Father     COPD Father     Heart disease Father     Alcohol abuse Father     Hearing loss Father     Cancer Sister     Miscarriages / Stillbirths Sister         1 miscarriage    Depression Sister     Cancer Sister     Miscarriages / Stillbirths Sister         Miscarriage & stillbirth    Cancer Maternal Grandmother         Breast    Diabetes Maternal Grandmother     Depression Maternal Grandmother     Breast cancer Maternal Grandmother     Bleeding Disorder Paternal Grandmother         Factor V    COPD Paternal Grandmother     Asthma Paternal Grandmother     Osteoarthritis Paternal Grandmother     Arthritis Paternal Grandmother     Alcohol abuse Paternal Grandfather     Malig Hyperthermia Neg Hx     Colon cancer Neg Hx        Social History     Socioeconomic History    Marital status:    Tobacco Use    Smoking status: Never    Smokeless tobacco: Never    Tobacco comments:     caffeine use 1 coke daily, occas sweet tea   Vaping Use    Vaping status: Never Used   Substance and Sexual Activity    Alcohol use: Yes     Alcohol/week: 2.0 standard drinks of alcohol     Types: 1 Glasses of wine, 1 Shots of liquor per week    Drug use: Never    Sexual activity: Yes     Partners: Male     Birth control/protection: Surgical, Other, Hysterectomy     Comment: .           Objective   Physical Exam  Vitals and nursing note reviewed.   Constitutional:       General: She is not in acute distress.     Appearance: Normal appearance. She is normal weight.   HENT:      Right Ear: External ear normal.      Left Ear: External ear normal.      Nose:      Right Sinus: Maxillary sinus tenderness and frontal sinus tenderness present.      Left Sinus: Maxillary sinus tenderness and frontal sinus tenderness present.   Cardiovascular:      Rate and Rhythm: Normal rate.   Pulmonary:      Effort: Pulmonary effort is  normal.   Musculoskeletal:         General: Normal range of motion.      Cervical back: Normal range of motion.   Skin:     Capillary Refill: Capillary refill takes less than 2 seconds.   Neurological:      General: No focal deficit present.      Mental Status: She is alert and oriented to person, place, and time.   Psychiatric:         Mood and Affect: Mood normal.         Procedures           ED Course  ED Course as of 03/28/24 1735   Thu Mar 28, 2024   1656 Persistent sinusitis is noted on scan. [KZ]      ED Course User Index  [KZ] Delano Cameron MD                                   [facility] reviewed by Delano Cameron MD       Medical Decision Making  Patient presents to the emergency room with sinus pressure.  CT of her sinuses ordered to evaluate for sinusitis.  Differential does include dental abscess as well.  The scan shows sinusitis.  Patient started on Levaquin and given outpatient follow-up with ENT.    Problems Addressed:  Chronic pansinusitis: complicated acute illness or injury    Amount and/or Complexity of Data Reviewed  Radiology: ordered. Decision-making details documented in ED Course.    Risk  Prescription drug management.        Final diagnoses:   Chronic pansinusitis       ED Disposition  ED Disposition       ED Disposition   Discharge    Condition   Stable    Comment   --               Isaias Carmen MD  [address] [address]  [phone]    Call today  For repeat evaluation         Medication List        New Prescriptions      fluconazole 150 MG tablet  Commonly known as: DIFLUCAN  Take 1 tablet by mouth 1 (One) Time for 1 dose.     levoFLOXacin 500 MG tablet  Commonly known as: LEVAQUIN  Take 1 tablet by mouth Daily for 14 days.     oxyCODONE-acetaminophen 5-325 MG per tablet  Commonly known as: PERCOCET  Take 1 tablet by mouth Every 6 (Six) Hours As Needed for Severe Pain.            Stop      cefdinir 300 MG capsule  Commonly known as: OMNICEF                Where to Get Your Medications        These medications were sent to [place] [address] - [facility], [place] - [address] - [phone]  - [phone] [facility] [place] [address]      Phone: [phone]   fluconazole 150 MG tablet  levoFLOXacin 500 MG tablet  oxyCODONE-acetaminophen 5-325 MG per tablet

## 2024-03-28 NOTE — Clinical Note
[facility]  [address]  [place] [address]    Sherry Quevedo was seen and treated in our emergency department on 3/28/2024.  She may return to work on 04/01/2024.         Thank you for choosing [facility].    Delano Cameron MD

## 2024-05-21 ENCOUNTER — OFFICE VISIT (OUTPATIENT)
Dept: SPORTS MEDICINE | Facility: CLINIC | Age: 34
End: 2024-05-21
Payer: COMMERCIAL

## 2024-05-21 VITALS
DIASTOLIC BLOOD PRESSURE: 75 MMHG | BODY MASS INDEX: 38.98 KG/M2 | TEMPERATURE: 97.7 F | OXYGEN SATURATION: 98 % | WEIGHT: 220 LBS | RESPIRATION RATE: 14 BRPM | HEIGHT: 63 IN | SYSTOLIC BLOOD PRESSURE: 107 MMHG | HEART RATE: 74 BPM

## 2024-05-21 DIAGNOSIS — R29.898 LEFT HAND WEAKNESS: ICD-10-CM

## 2024-05-21 DIAGNOSIS — G56.03 CARPAL TUNNEL SYNDROME, BILATERAL: ICD-10-CM

## 2024-05-21 PROCEDURE — 99213 OFFICE O/P EST LOW 20 MIN: CPT | Performed by: STUDENT IN AN ORGANIZED HEALTH CARE EDUCATION/TRAINING PROGRAM

## 2024-05-21 RX ORDER — ATOMOXETINE 25 MG/1
1 CAPSULE ORAL DAILY
COMMUNITY
Start: 2024-04-25

## 2024-05-21 NOTE — PROGRESS NOTES
"Chief Complaint   Patient presents with    Left Wrist - Pain, Initial Evaluation     Carpal tunnel-end of 2023 but has gotten unbearable in the last couple months    Right Wrist - Pain, Initial Evaluation     Carpal tunnel-end of 2023 but has gotten unbearable in the last couple months       History of Present Illness  Sherry is a 33 y.o. year old RHD female here today for bilateral symptoms concerning for carpal tunnel that have been going on for a couple years, but with progressive worsening and more consistent symptoms for the past year. No known injury or trauma.   Pain is currently rated 7-8/10 and described as a constant throbbing, aching, and tingling.  Admits to associated stiffness and weakness, worse in the left. Has been having difficulty holding anything in her hand. Has numbness and tingling to thumb and index finger bilaterally, sometimes up to elbow. Has also noticed occasional cramping/twitching in her left hand (not on right).   Pain worsens with coloring, writing on the board, etc.  Has been managing symptoms with Tylenol, ibuprofen, topical and oral Voltaren, Flexeril, and meloxicam, though nothing seems to help. Has tried bracing off and on but admits to not being consistent.   She has a history of trigger finger years ago and received an injection. Thinks it was her right hand.     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   All other systems reviewed and are negative.      /75 (BP Location: Left arm, Patient Position: Sitting, Cuff Size: Adult)   Pulse 74   Temp 97.7 °F (36.5 °C) (Infrared)   Resp 14   Ht 160 cm (63\")   Wt 99.8 kg (220 lb)   LMP  (LMP Unknown)   SpO2 98%   BMI 38.97 kg/m²        Physical Exam  Vital signs reviewed.   General: Well developed, well nourished; No acute distress.  Eyes: conjunctiva clear; pupils equally round and reactive  ENT: external ears and nose atraumatic; hearing normal  CV: no peripheral " edema, 2+ distal pulses  Resp: normal respiratory effort, no use of accessory muscles  Skin: normal color and pigmentation; no rashes or wounds; normal turgor  Psych: alert and oriented; mood and affect appropriate; recent and remote memory intact    MSK Exam:  The bilateral hands and wrists are without obvious signs of acute bony deformity, swelling, erythema or ecchymosis. There is no tenderness to the dorsal or volar joint line. No bony tenderness of the distal radius, distal ulna, or carpal bones. Active and passive range of motion at the wrist are full, symmetrical, and pain-free. Strength testing of the hand and wrist are 4/5 on the left compared to the right.  Tinel's is significantly and immediately positive on the left carpal tunnel, mild but positive on the right. Positive Tinel's at the cubital tunnel on the right.   Decreased sensation to light touch of the left thumb and index finger. Normal vascular exam.      Assessment and Plan  Diagnoses and all orders for this visit:    1. Carpal tunnel syndrome, bilateral  -     EMG & Nerve Conduction Test; Future    2. Left hand weakness  -     EMG & Nerve Conduction Test; Future    Sherry is a 33 y.o. year old RHD female here today for bilateral symptoms concerning for carpal tunnel that have been going on for a couple years, but with progressive worsening and more consistent symptoms for the past year. History and exam consistent with carpal tunnel syndrome, with question cubital tunnel syndrome. Symptoms on the left appear more severe. We reviewed relevant anatomy, pathophysiology, surgical and conservative treatment options, and possible complications. Given the severity of her symptoms on the left, I recommended EMG to further evaluate. If EMG shows more significant nerve injury, I would have a lower threshold for recommending surgical release given her symptom severity and duration. If EMG is more mild, will consider steroid injections. For now, I  recommended more conservative and supportive management. She was provided bilateral wrist splints to be worn consistently at night and as needed/tolerated during the day. May continue with OTC medications as needed. She may continue with activity as tolerated but should avoid painful or overly strenuous activity. We will follow-up after EMG is obtained and make additional treatment recommendations accordingly. All of her questions were answered and she is agreeable with the plan.    Dictated utilizing Dragon dictation.

## 2024-05-23 ENCOUNTER — PATIENT MESSAGE (OUTPATIENT)
Dept: SPORTS MEDICINE | Facility: CLINIC | Age: 34
End: 2024-05-23
Payer: COMMERCIAL

## 2024-05-23 ENCOUNTER — PATIENT ROUNDING (BHMG ONLY) (OUTPATIENT)
Dept: SPORTS MEDICINE | Facility: CLINIC | Age: 34
End: 2024-05-23
Payer: COMMERCIAL

## 2024-05-23 NOTE — TELEPHONE ENCOUNTER
Called patient to let her know that we do not use [facility] for our EMG nerve conduction studies. We use Dr Wisdom. I informed patient that the order for this has been sent to Dr Wisdom and his office will call to schedule. Patient stated understanding.

## 2024-05-23 NOTE — PROGRESS NOTES
May 23, 2024    A [employer] Message has been sent to the patient for PATIENT ROUNDING with [facility]         yes...

## 2024-05-29 ENCOUNTER — OFFICE (AMBULATORY)
Dept: URBAN - METROPOLITAN AREA CLINIC 76 | Facility: CLINIC | Age: 34
End: 2024-05-29

## 2024-05-29 VITALS
SYSTOLIC BLOOD PRESSURE: 118 MMHG | HEIGHT: 64 IN | HEART RATE: 72 BPM | DIASTOLIC BLOOD PRESSURE: 76 MMHG | WEIGHT: 213 LBS

## 2024-05-29 DIAGNOSIS — K64.8 OTHER HEMORRHOIDS: ICD-10-CM

## 2024-05-29 DIAGNOSIS — R19.7 DIARRHEA, UNSPECIFIED: ICD-10-CM

## 2024-05-29 DIAGNOSIS — K31.4 GASTRIC DIVERTICULUM: ICD-10-CM

## 2024-05-29 DIAGNOSIS — K21.9 GASTRO-ESOPHAGEAL REFLUX DISEASE WITHOUT ESOPHAGITIS: ICD-10-CM

## 2024-05-29 PROCEDURE — 99214 OFFICE O/P EST MOD 30 MIN: CPT

## 2024-05-29 RX ORDER — PANTOPRAZOLE SODIUM 40 MG/1
80 TABLET, DELAYED RELEASE ORAL
Qty: 180 | Refills: 3 | Status: ACTIVE
Start: 2024-05-29

## 2024-06-01 DIAGNOSIS — R00.0 SINUS TACHYCARDIA: ICD-10-CM

## 2024-06-03 RX ORDER — METOPROLOL SUCCINATE 25 MG/1
25 TABLET, EXTENDED RELEASE ORAL DAILY
Qty: 30 TABLET | Refills: 5 | Status: SHIPPED | OUTPATIENT
Start: 2024-06-03

## 2024-06-07 DIAGNOSIS — G43.109 MIGRAINE WITH AURA AND WITHOUT STATUS MIGRAINOSUS, NOT INTRACTABLE: ICD-10-CM

## 2024-06-07 RX ORDER — TOPIRAMATE 50 MG/1
50 TABLET, FILM COATED ORAL NIGHTLY
Qty: 90 TABLET | Refills: 0 | Status: SHIPPED | OUTPATIENT
Start: 2024-06-07

## 2024-06-21 DIAGNOSIS — R20.2 PARESTHESIA AND PAIN OF BOTH UPPER EXTREMITIES: Primary | ICD-10-CM

## 2024-06-21 DIAGNOSIS — M79.602 PARESTHESIA AND PAIN OF BOTH UPPER EXTREMITIES: Primary | ICD-10-CM

## 2024-06-21 DIAGNOSIS — G56.23 CUBITAL TUNNEL SYNDROME OF BOTH UPPER EXTREMITIES: ICD-10-CM

## 2024-06-21 DIAGNOSIS — R29.898 LEFT HAND WEAKNESS: ICD-10-CM

## 2024-06-21 DIAGNOSIS — M79.601 PARESTHESIA AND PAIN OF BOTH UPPER EXTREMITIES: Primary | ICD-10-CM

## 2024-06-28 ENCOUNTER — OFFICE VISIT (OUTPATIENT)
Dept: SPORTS MEDICINE | Facility: CLINIC | Age: 34
End: 2024-06-28
Payer: COMMERCIAL

## 2024-06-28 VITALS — HEIGHT: 63 IN | TEMPERATURE: 98 F | WEIGHT: 220 LBS | BODY MASS INDEX: 38.98 KG/M2

## 2024-06-28 DIAGNOSIS — M79.602 PARESTHESIA AND PAIN OF BOTH UPPER EXTREMITIES: Primary | ICD-10-CM

## 2024-06-28 DIAGNOSIS — R29.898 LEFT HAND WEAKNESS: ICD-10-CM

## 2024-06-28 DIAGNOSIS — R20.2 PARESTHESIA AND PAIN OF BOTH UPPER EXTREMITIES: Primary | ICD-10-CM

## 2024-06-28 DIAGNOSIS — G54.0 THORACIC OUTLET SYNDROME: ICD-10-CM

## 2024-06-28 DIAGNOSIS — M79.601 PARESTHESIA AND PAIN OF BOTH UPPER EXTREMITIES: Primary | ICD-10-CM

## 2024-06-28 PROCEDURE — 99214 OFFICE O/P EST MOD 30 MIN: CPT | Performed by: STUDENT IN AN ORGANIZED HEALTH CARE EDUCATION/TRAINING PROGRAM

## 2024-06-28 RX ORDER — CETIRIZINE HYDROCHLORIDE 10 MG/1
TABLET, CHEWABLE ORAL
COMMUNITY

## 2024-06-28 RX ORDER — MAGNESIUM GLYCINATE 100 MG
TABLET ORAL
COMMUNITY

## 2024-06-28 RX ORDER — DESVENLAFAXINE 100 MG/1
100 TABLET, EXTENDED RELEASE ORAL DAILY
COMMUNITY

## 2024-06-28 RX ORDER — PREDNISONE 20 MG/1
TABLET ORAL
Qty: 30 TABLET | Refills: 0 | Status: SHIPPED | OUTPATIENT
Start: 2024-06-28 | End: 2024-07-13

## 2024-06-28 RX ORDER — UBIDECARENONE 200 MG
CAPSULE ORAL
COMMUNITY
Start: 2024-05-29

## 2024-06-28 RX ORDER — ATOMOXETINE 40 MG/1
CAPSULE ORAL
COMMUNITY

## 2024-06-28 NOTE — PROGRESS NOTES
Chief Complaint   Patient presents with    Left Wrist - Initial Evaluation    Right Wrist - Initial Evaluation       History of Present Illness  Sherry is a 33 y.o. year old RHD female here today for follow-up of bilateral upper extremity symptoms initially concerning for carpal tunnel syndrome that have been going on for a couple years, but with progressive worsening and more consistent symptoms for the past year. On initial exam, there were also findings of questionable cubital tunnel syndrome. Symptoms on the left appeared to be more severe so I recommended EMG to further evaluate along with conservative and supportive management. EMG showed bilateral ulnar neuropathy, which did not correlate with symptoms, so decided to move forward with MRI of the cervical spine. Please see previous notes for complete history and treatment course. She returns today to review MRI results and for follow-up. She continues to have worsening symptoms, especially since the since the EMG. She has been having throbbing, aching, and tingling pain in both arms and hands. She feels significantly limited, especially with anything that requires her to , grasp, or hold anything. Left arm is worse than the right. She also still feels it most in the fatty part between thumb and index finger, as well as the palm of the hand. Also admits to cramping sensation that is worse at night, and feeling like her hands have been going to sleep. Also has been having cramping/patsy horses in legs, especially if she straightens out her legs. Has been doing bracing and splinting her elbows, but she states that this seems like it is worsening her symptoms as she waking up with arms more asleep. No new injuries or complaints.     Has tried managing symptoms with Tylenol, ibuprofen, topical and oral Voltaren, Flexeril, and meloxicam, though nothing seems to help.     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 There is no bony tenderness of the UE and no midline cervical tenderness. There is some soft tissue tenderness and hypertonicity of the shoulder region. Active and passive range of motion at the shoulder and wrist are full, symmetrical, and pain-free. Cervical ROM is symmetrical and without focal deficit. Strength testing of the hand and wrist are 4/5 on the left compared to the right.  Tinel's remains significantly and immediately positive on the left carpal tunnel, mild but positive on the right. Positive Tinel's at the cubital tunnel on the right. Decreased sensation to light touch on the left. Normal radial pulse and cap refill bilaterally.  Positive Donovan and Adson's test with reproduction of symptoms Negative Spurling's exam bilaterally.     Assessment and Plan  Diagnoses and all orders for this visit:    1. Paresthesia and pain of both upper extremities (Primary)  -     Ambulatory Referral to Physical Therapy  -     Ambulatory Referral to Thoracic Surgery  -     predniSONE (DELTASONE) 20 MG tablet; Take 3 tablets by mouth Daily for 5 days, THEN 2 tablets Daily for 5 days, THEN 1 tablet Daily for 5 days.  Dispense: 30 tablet; Refill: 0    2. Left hand weakness  -     Ambulatory Referral to Physical Therapy  -     Ambulatory Referral to Thoracic Surgery  -     predniSONE (DELTASONE) 20 MG tablet; Take 3 tablets by mouth Daily for 5 days, THEN 2 tablets Daily for 5 days, THEN 1 tablet Daily for 5 days.  Dispense: 30 tablet; Refill: 0    3. Thoracic outlet syndrome  -     Ambulatory Referral to Physical Therapy  -     Ambulatory Referral to Thoracic Surgery  -     predniSONE (DELTASONE) 20 MG tablet; Take 3 tablets by mouth Daily for 5 days, THEN 2 tablets Daily for 5 days, " THEN 1 tablet Daily for 5 days.  Dispense: 30 tablet; Refill: 0      Sherry is a 34 y.o. year old RHD female here today for follow-up of bilateral upper extremity symptoms initially concerning for carpal tunnel syndrome that have been going on for a couple years, but with progressive worsening and more consistent symptoms for the past year. On initial exam, there were also findings of questionable cubital tunnel syndrome. Symptoms on the left appeared to be more severe so I recommended EMG to further evaluate along with conservative and supportive management.  EMG showed bilateral ulnar neuropathy, which did not correlate with symptoms, so decided to move forward with MRI of the cervical spine. MRI showed mild degenerative changes, but no significant stenosis. At this time, I believe that her symptoms are likely secondary to thoracic outlet syndrome. A referral was placed to thoracic surgery for further evaluation and management. Until then. I recommended that she establish with PT to see if that can lead to some improvement in her symptoms. She was also provided with a short course of oral steroids to hopefully provide some relief of pain and inflammation. She may continue with activity as tolerated but should avoid painful or overly strenuous activity. We will follow-up as needed. All of her questions were answered and she is agreeable with the plan.    Dictated utilizing Dragon dictation.

## 2024-07-02 ENCOUNTER — TELEPHONE (OUTPATIENT)
Dept: SURGERY | Facility: CLINIC | Age: 34
End: 2024-07-02
Payer: COMMERCIAL

## 2024-07-02 NOTE — TELEPHONE ENCOUNTER
PT [facility] 7/23/24 IN [place], PT STATED UNDERSTANDING AND WAS AGREEABLE

## 2024-07-19 ENCOUNTER — TREATMENT (OUTPATIENT)
Dept: PHYSICAL THERAPY | Facility: CLINIC | Age: 34
End: 2024-07-19
Payer: COMMERCIAL

## 2024-07-19 DIAGNOSIS — R29.3 ABNORMAL POSTURE: ICD-10-CM

## 2024-07-19 DIAGNOSIS — G54.0 NEUROGENIC THORACIC OUTLET SYNDROME OF BOTH BRACHIAL PLEXUSES: Primary | ICD-10-CM

## 2024-07-22 NOTE — PROGRESS NOTES
Physical Therapy Initial Evaluation and Plan of Care  [facility] [address], [address]  [place] [address]     Patient: Sherry Quevedo   : 1990  Referring practitioner: Ekaterina Brown DO  Date of Initial Visit: 2024  Today's Date: 2024  Patient seen for 1 sessions  PT Clinic location: [address], [address] [place] [address].  [id number]          Visit Diagnoses:    ICD-10-CM ICD-9-CM   1. Neurogenic thoracic outlet syndrome of both brachial plexuses  G54.0 353.0   2. Abnormal posture  R29.3 781.92       Subjective Questionnaire: QuickDASH: 61.36%    Subjective Evaluation    History of Present Illness  Mechanism of injury: Pt presents with 2+ year history of bilateral hand numbness, tingling, and pain. She initially visited a hand surgeon who believed she had bilateral carpal tunnel, however EMG was taken with no focal findings.    Current suggestion by  is bilateral TOS. She has referral to thoracic surgeon soon.    Her primary current symptoms are tumbness and tingling as well as pain in bilateral thenar eminence region L>R, however this increases with use of UE's to entire arm and hand. This has been worse recently with pain and severity of numbness/tingling increasing from distal to proximal with increased frequency.   She notes throbbing and aching with other activity, including school (work), dishes, and sleeping.    She has 3 children that she participates in the caring for. She works as .     She recently had MRI of cervical spine with no significant specific findings.    She denies regular physical activities.    Pain  At worst pain ratin      Medical history: fibromyalgia, tachycardia, Hx of BPPV, scoliosis, anxiety. See chart for further detail.   Therapy Precautions: N/A        Objective          Static Posture     Head  Forward.    Shoulders  Rounded.    Scapulae  Left  anteriorly tipped, right anteriorly tipped, left protracted and right protracted.    Thoracic Spine  Hyperkyphosis.    Palpation   Left   Tenderness of the infraspinatus, levator scapulae, pectoralis major, pectoralis minor, scalenes, suboccipitals and upper trapezius.   Trigger point to infraspinatus, levator scapulae, pectoralis minor, scalenes and suboccipitals.     Right Tenderness of the infraspinatus, levator scapulae, pectoralis major, pectoralis minor, scalenes, suboccipitals and upper trapezius.   Trigger point to infraspinatus, levator scapulae, pectoralis minor, scalenes and suboccipitals.     Cervical Spine Comments  Right scalenes: referral to R UE.     Left Shoulder Comments  Left infraspinatus: referral to distal L UE.     Right Shoulder Comments  Right infraspinatus: referral to distal R UE.     Active Range of Motion   Left Shoulder   Flexion: 160 degrees   Abduction: 163 degrees   External rotation 0°: 55 degrees   External rotation BTH: T4   Internal rotation BTB: T4     Right Shoulder   Flexion: 165 degrees   Abduction: 165 degrees   External rotation 0°: 55 degrees   External rotation BTH: T3   Internal rotation BTB: T7     Additional Active Range of Motion Details  Cervical:  Flex 80% decreased upper cervical motion  Ext 100% bilateral tingling upper arm with sustained position ~30 sec  L rot 80% w/ protraction  R rot 80% w/ protraction  L % w/ heaviness L UE  R SB 70%    Thoracic:  Rotation WNL  Flex WNL  Ext WNL    Passive Range of Motion     Additional Passive Range of Motion Details  C2-5 UPA WNL  C6-T1 Stiff B UPA    Ribs L Stiff 1-5 on inf/PA assessment with tenderness  Ribs R mild stiff Rib 1 on inf glide, Rib 2-5 stiff on PA assessment    Strength/Myotome Testing     Left Shoulder     Planes of Motion   Flexion: 5 (increased scapular elevation to stabilize)   Abduction: 5   External rotation at 0°: 5   External rotation at 90°: 5   Internal rotation at 0°: 5     Isolated Muscles    Rhomboids: 4-     Right Shoulder     Planes of Motion   Flexion: 5 (increased scapular elevation to stabilize)   Abduction: 5   External rotation at 0°: 5   External rotation at 90°: 5   Internal rotation at 0°: 5     Isolated Muscles   Rhomboids: 4     Left Elbow   Flexion: 5  Extension: 5    Right Elbow   Flexion: 5  Extension: 5    Left Wrist/Hand   Wrist extension: 5  Wrist flexion: 5    Right Wrist/Hand   Wrist extension: 4+  Wrist flexion: 5    Tests   Cervical     Left   Positive ULTT1 (mild) and ULTT4.   Negative Spurling's sign and ULTT3.     Right   Positive ULTT1 (mild) and ULTT4 (mild).   Negative Spurling's sign and ULTT3.     Additional Tests Details  Donovan test positive bilaterally, L>R  Pec minor stretch position results in tingling B UE            Assessment & Plan       Assessment  Impairments: abnormal or restricted ROM, activity intolerance, impaired physical strength, lacks appropriate home exercise program and pain with function   Functional limitations: carrying objects, lifting, uncomfortable because of pain, sitting, reaching overhead and unable to perform repetitive tasks   Assessment details: The patient is a 34 y.o. female who presents to physical therapy today for bilateral UE pain, numbness, and tingling. Upon initial evaluation, the patient demonstrates the following impairments: decreased cervical ROM and joint mobility, decreased B rib mobility, decreased B shoulder ROM, positive special test findings, and pain on palpation to B cervical and shoulder musculature. Examination findings indicate bilateral neurogenic thoracic outlet syndrome. Due to these impairments, the patient is unable to perform or has difficulty with the following functional tasks: reaching, lifting, working at school, sitting, sleeping. The patient would benefit from skilled PT services to address functional limitations and impairments and to improve patient quality of life.      Prognosis: good    Goals  Plan  Goals: ST. Pt will be independent and compliant with initial HEP in 2 weeks.  2. Pt will report a 40% improvement in symptoms since starting therapy in 4 weeks.  3. Pt will report pain level at worst <6 during sleep activity in 4 weeks.  4. Pt will be independent with postural corrections in 2 weeks in order to decrease strain on cervical and thoracic spine.     LT. Pt will be independent with final HEP for self-management of condition by DC.  2. Pt will improve score on QDASH to less than 30% by DC.   3. Pt will report a 75% improvement in symptoms by DC in order to allow return to PLOF.  4. Pt will increase cervical AROM to 100% WNL in order to improve positional tolerance by DC.       Plan  Therapy options: will be seen for skilled therapy services  Planned modality interventions: cryotherapy, electrical stimulation/Russian stimulation, TENS, thermotherapy (hydrocollator packs), traction and ultrasound  Planned therapy interventions: body mechanics training, flexibility, functional ROM exercises, home exercise program, joint mobilization, manual therapy, motor coordination training, neuromuscular re-education, postural training, soft tissue mobilization, spinal/joint mobilization, strengthening, stretching and therapeutic activities  Frequency: 2x week  Duration in weeks: 12  Treatment plan discussed with: patient        See flowsheets for treatment detail.  Education: POC, HEP, pathoanatomy, rationale    History # of Personal Factors and/or Comorbidities: MODERATE (1-2)  Examination of Body System(s): # of elements: MODERATE (3)  Clinical Presentation: EVOLVING  Clinical Decision Making: MODERATE      Timed:         Manual Therapy:         mins  30822;     Therapeutic Exercise:         mins  16300;     Neuromuscular Jose Rafael:    12    mins  20165;    Therapeutic Activity:     10     mins  87814;     Gait Training:           mins  52860;     Ultrasound:          mins  23181;    Ionto                                    mins   46790  Self Care                       15     mins   63255      Un-Timed:  Electrical Stimulation:         mins  61811 ( );  Dry Needling          mins self-pay  Traction          mins 39107  Low Eval          Mins  17735  Mod Eval     20     Mins  54535  High Eval                            Mins  04160  Re-Eval                               mins  95215      Timed Treatment:   37   mins   Total Treatment:     57   mins    PT SIGNATURE: Isaias Ramon, PT   [place] PT license #: [id number]  DATE TREATMENT INITIATED: 7/22/2024    Initial Certification  Certification Period: 10/19/2024  I certify that the therapy services are furnished while this patient is under my care.  The services outlined above are required by this patient, and will be reviewed every 90 days.    PHYSICIAN: Ekaterina Brown DO  NPI: [id number]                                      DATE:     Please sign and return via fax to [facility] - Fax #: [phone]. Thank you, [facility].

## 2024-07-23 ENCOUNTER — OFFICE VISIT (OUTPATIENT)
Dept: SURGERY | Facility: CLINIC | Age: 34
End: 2024-07-23
Payer: COMMERCIAL

## 2024-07-23 ENCOUNTER — TREATMENT (OUTPATIENT)
Dept: PHYSICAL THERAPY | Facility: CLINIC | Age: 34
End: 2024-07-23
Payer: COMMERCIAL

## 2024-07-23 VITALS
SYSTOLIC BLOOD PRESSURE: 116 MMHG | WEIGHT: 216.3 LBS | BODY MASS INDEX: 38.32 KG/M2 | OXYGEN SATURATION: 98 % | HEART RATE: 82 BPM | HEIGHT: 63 IN | DIASTOLIC BLOOD PRESSURE: 66 MMHG

## 2024-07-23 DIAGNOSIS — G54.0 NEUROGENIC THORACIC OUTLET SYNDROME OF BOTH BRACHIAL PLEXUSES: Primary | ICD-10-CM

## 2024-07-23 DIAGNOSIS — G54.0 THORACIC OUTLET SYNDROME: Primary | ICD-10-CM

## 2024-07-23 PROCEDURE — 99205 OFFICE O/P NEW HI 60 MIN: CPT | Performed by: SURGERY

## 2024-07-23 NOTE — PROGRESS NOTES
Physical Therapy Daily Treatment Note  [facility] [place]  [address], [address]  [place] [id number]     Patient: Sherry Quevedo  : 1990  Referring practitioner: Ekaterina Brown DO  Today's Date: 2024    VISIT#: 2    Visit Diagnoses:    ICD-10-CM ICD-9-CM   1. Neurogenic thoracic outlet syndrome of both brachial plexuses  G54.0 353.0       Subjective   Sherry Quevedo reports: that she feels like some of her exercises have been aggravating her symptoms, particularly open books causing shoulder soreness. She feels like gripping the band aggravates her thenar eminence.      Objective       See Exercise, Manual, and Modality Logs for complete treatment.     Patient Education: technique modification; HEP update      Assessment/Plan  Pt tolerated session well overall with frequent cuing for technique with all tasks. She tended to over-work each exercise, with excessive gripping during band tasks, and excessive effort with  scapular retraction and chin tucks. Discussed modifications and new exercise with emphasis on gentle performance of each exercise.      Progress per Plan of Care          Timed:         Manual Therapy:         mins  56755;     Therapeutic Exercise:         mins  62438;     Neuromuscular Jose Rafael:    20    mins  43129;    Therapeutic Activity:     12     mins  69490;     Gait Training:           mins  65425;     Ultrasound:          mins  66688;    Ionto:                                   mins  32143  Self Care:                       8     mins  16564    Un-Timed:  Electrical Stimulation:         mins  85405 ( );  Dry Needling          mins self-pay  Traction          mins 03854  Re-Eval                               mins  24422  Group Therapy           ___ mins 01835    Timed Treatment:   40   mins   Total Treatment:     40   mins    Isaias Ramon PT  Physical Therapist  [facility] license #: [id number]

## 2024-07-23 NOTE — PROGRESS NOTES
THORACIC SURGERY CLINIC CONSULT NOTE    REASON FOR CONSULT: Bilateral neurogenic thoracic outlet syndrome    REFERRING PROVIDER: Ekaterina Brown DO    Subjective   HISTORY OF PRESENTING ILLNESS:   Sherry Quevedo is a 34 y.o. female who has significant medical problems as mentioned in the medical chart.     History of Present Illness  The patient presents for evaluation of bilateral arm pain.    She has been experiencing pain in both arms for the past 2 years, which has progressively worsened in both arms over the past several months. 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 An EMG and nerve conduction study were conducted, initially suspecting carpal tunnel syndrome, but the results were negative. She describes the pain as throbbing, numbness, and tingling, constant in both arms. An MRI was also conducted. Her orthopedist referred her to physical therapy, which she attended once last week. She denies any injury or trauma. She has a history of scoliosis and one leg is significantly longer than the other, despite playing tennis and cheerleading during her teenage years. She denies any injuries or surgery on the neck or chest. She is not on any blood thinners. Activities such as dishwashing, washing her hair, writing, and coloring worsen her pain, leading to cramps in her hands and arm pain. Simple daily activities and making a fist while driving also cause her pain. She has tried Tylenol, ibuprofen, and Voltaren gel, but none have provided relief. Bracing worsened her pain. She is right-handed, but now she is beginning to experience pain in her right arm as well. She has been attending physical therapy twice a week and performing exercises at home, but finds them painful.    Past Medical History:   Diagnosis Date    Abnormal computed tomography angiography (CTA) of neck 12/21/2021    Abnormal  gluteal crease     ADHD (attention deficit hyperactivity disorder) 2020    Anemia 2019    iron defiency anemia    Anxiety     Benign paroxysmal positional vertigo of right ear 12/01/2021    Bipolar disorder 2012    BRBPR (bright red blood per rectum) 06/02/2021    Dr. Nancy Santana at [facility]    Breast anomaly     Bruises easily     Cervical adenopathy     Cervical lymphadenopathy     Left side.    Change in bowel habit 06/02/2021    Dr. Nancy Santana at [facility]    Cholestasis during pregnancy in third trimester 2019    Chronic allergic rhinitis     Chronic pain disorder 2022    Fibro    Chronic recurrent major depressive disorder     In partial remission.    Cold intolerance     Constipation     CTS (carpal tunnel syndrome) 2019    Decreased sex drive     Depression     History of PPD    Diarrhea     Dizziness 08/06/2021    APRPINA, Temi Mcrae, Cardiologist office.    Dysmenorrhea 12/04/2019    Surgery: Laparoscopic assisted vaginal hysterectomy, Surgeon Dr. Hillary Nunn.    Dysphoric mood     Dyspnea on exertion 08/06/2021    LEONELA, Temi Mcrae, Cardiologist office.    Endometriosis 12/2019    Laparoscopic assisted vaginal hysterectomy, & bilateral Salpingectomy, Surgeon Dr. Hillary Nunn on 12/04/2019.    Environmental and seasonal allergies 09/21/2020    Epigastric abdominal pain 11/07/2018    [facility].    Epiploic appendagitis 01/10/2023    Fat necrosis 07/29/2021    [facility].    Fatigue     Fibromyalgia, primary 2021    Fissure, anal     history    Folic acid deficiency     Gestational diabetes [id number]    I had it with my 1st pregnancy    H/O TB skin testing 02/20/2018    Negative result at [facility].    HA (headache)     Hemorrhoid     History of gestational diabetes     with first baby    History of Holter monitoring 11/07/2019    24 hour.    History of kidney stones 2006/2009    History of miscarriage     History of vasectomy 2019    Spouse Vasectomy.    Hypocalcemia  03/2022    Hypoglycemia     Irritable bowel syndrome 1994    IBS with both constipation/diarrhea, followed by GI Dr. Moreland.    Joint stiffness     Lightheadedness 08/06/2021    APRN, Temi Mcrae, Cardiologist office.    Low serum potassium level     Low serum vitamin B12     Lower extremity myoclonus 02/28/2022    ADMITTED TO [facility]    Malaise and fatigue 11/12/2019    Cardiologist Dr. Benji Ugalde.    Mass of left parotid gland 12/2021    Migraines 10/2019    Multinodular non-toxic goiter     Near syncope 08/06/2021    APRN, Temi Mcrae, Cardiologist office.    OCD (obsessive compulsive disorder)     Ovarian cyst 2010    Surgery removed.    Palpitations 10/28/2019    PVC's (premature ventricular contractions)     Rapid heart rate 11/12/2019    Cardiologist Dr. Benji Ugalde.    Rectal bleeding 06/02/2021    Dr. Nancy Santana at ..    Renal stones     RLS (restless legs syndrome)     RUQ abdominal mass [id number]    [facility].    Scoliosis 2003    Sleep disturbance     SOB (shortness of breath) 10/28/2019    Tachycardia 10/28/2019    Thrombocytopenia 03/2022    Thyroid nodule 10/28/2019    1 cm Left Submandibular node, [facility], Dr. Kevan HUTCHINSON [place].    Thyroid nodule 10/28/2019    0.3 cm Right side of neck, [facility], Dr. Kevan HUTCHINSON [place].    Thyromegaly 03/04/2016    Boarderline Thyromegaly, Findings via X-ray at [facility], ordering provider Dr. Nancy Santana.    Transaminitis 11/07/2018    [facility].    Trigger index finger of right hand 01/2022    Vaginal delivery 01/2019    Baby girl.       Past Surgical History:   Procedure Laterality Date    ADENOIDECTOMY  2006    CHOLECYSTECTOMY  2019    CHOLECYSTECTOMY WITH INTRAOPERATIVE CHOLANGIOGRAM N/A 11/09/2018    Procedure: Laparoscopic cholecystectomy;  Surgeon: Khanh Lassiter MD;  Location: [facility] OR;  Service: General    COLONOSCOPY N/A 08/2019    DILATATION AND CURETTAGE N/A 10/2015    MISCARRIAGE.     ENDOSCOPY N/A     HEMORRHOIDECTOMY N/A 06/16/2022    Procedure: HEMORRHOIDECTOMY;  Surgeon: Linda Sanchez MD;  Location: [facility] MAIN OR;  Service: General;  Laterality: N/A;    LAPAROSCOPIC ASSISTED VAGINAL HYSTERECTOMY Bilateral 12/04/2019    Procedure: LAPAROSCOPIC ASSISTED VAGINAL HYSTERECTOMY, BILATERAL SALPINGECTOMY;  Surgeon: Hillary Nunn MD;  Location:  [facility];  Service: Obstetrics/Gynecology    OVARIAN CYST SURGERY  06/2009    Laparoscopic ovarian cyst resection, no other abdominal surgery.    TONSILLECTOMY AND ADENOIDECTOMY Bilateral 10/2006    WISDOM TOOTH EXTRACTION Bilateral        Family History   Problem Relation Age of Onset    COPD Mother     Depression Mother     Hypertension Mother     Heart disease Mother     Hyperlipidemia Mother     Other Mother         Diverticulitis    Asthma Father     COPD Father     Heart disease Father     Alcohol abuse Father     Hearing loss Father     Cancer Sister     Miscarriages / Stillbirths Sister         1 miscarriage    Depression Sister     Cancer Sister     Miscarriages / Stillbirths Sister         Miscarriage & stillbirth    Cancer Maternal Grandmother         Breast    Diabetes Maternal Grandmother     Depression Maternal Grandmother     Breast cancer Maternal Grandmother     Bleeding Disorder Paternal Grandmother         Factor V    COPD Paternal Grandmother     Asthma Paternal Grandmother     Osteoarthritis Paternal Grandmother     Arthritis Paternal Grandmother     Cancer Paternal Grandmother         Skin (ear)    Clotting disorder Paternal Grandmother         Factor V    Osteoporosis Paternal Grandmother     Alcohol abuse Paternal Grandfather     Malig Hyperthermia Neg Hx     Colon cancer Neg Hx        Social History     Socioeconomic History    Marital status:    Tobacco Use    Smoking status: Never    Smokeless tobacco: Never    Tobacco comments:     Vape on occasion   Vaping Use    Vaping status: Never Used   Substance and Sexual  "Activity    Alcohol use: Yes     Alcohol/week: 2.0 standard drinks of alcohol     Types: 1 Glasses of wine, 1 Shots of liquor per week    Drug use: Never    Sexual activity: Yes     Partners: Male     Birth control/protection: Hysterectomy     Comment: .         Current Outpatient Medications:     cetirizine (ZyrTEC) 10 MG chewable tablet, tablet, Disp: , Rfl:     Cetirizine HCl (ZYRTEC ALLERGY PO), , Disp: , Rfl:     Coenzyme Q10 (CoQ-10) 100 MG capsule, , Disp: , Rfl:     desvenlafaxine (PRISTIQ) 100 MG 24 hr tablet, Take 1 tablet by mouth Daily. FOR 30 DAYS, Disp: , Rfl:     Famotidine (PEPCID AC PO), , Disp: , Rfl:     Folate 400 MCG tablet, , Disp: , Rfl:     Magnesium 100 MG tablet, , Disp: , Rfl:     Magnesium Bisglycinate (Mag Glycinate) 100 MG tablet, tablet, Disp: , Rfl:     metoprolol succinate XL (TOPROL-XL) 25 MG 24 hr tablet, Take 1 tablet by mouth once daily, Disp: 30 tablet, Rfl: 5    Nurtec 75 MG dispersible tablet, DISSOLVE 1 TABLET ON THE TONGUE 1 TIME AS NEEDED FOR MIGRAINE HEADACHE. MAY REPEAT DOSE 1 TIME AFTER 2 HOURS, Disp: 16 tablet, Rfl: 1    pantoprazole (PROTONIX) 40 MG EC tablet, Take 1 tablet by mouth Every Morning., Disp: , Rfl:     Pristiq 50 MG 24 hr tablet, , Disp: , Rfl:     topiramate (TOPAMAX) 50 MG tablet, Take 1 tablet by mouth Every Night., Disp: 90 tablet, Rfl: 0    traZODone (DESYREL) 50 MG tablet, TAKE 1 TABLET BY MOUTH ONCE DAILY AT BEDTIME FOR 30 DAYS, Disp: , Rfl:     atomoxetine (STRATTERA) 40 MG capsule, take 1 capsule by mouth once daily for 30 days (Patient not taking: Reported on 7/23/2024), Disp: , Rfl:      Allergies   Allergen Reactions    Hydrocodone Itching and Rash    Tramadol Nausea And Vomiting             Objective    OBJECTIVE:     VITAL SIGNS:  /66 (BP Location: Left arm, Patient Position: Sitting, Cuff Size: Adult)   Pulse 82   Ht 160 cm (62.99\")   Wt 98.1 kg (216 lb 4.8 oz)   LMP  (LMP Unknown)   SpO2 98%   BMI 38.33 kg/m²     PHYSICAL " EXAM:  Normal appearance.   Head is normocephalic.   Nose appears normal.   No obvious deformity of the mouth and throat.  Conjunctivae normal.   Heart rate and rhythm is normal.  Pulmonary effort is normal.   Moving all 4 extremities.  Extremities warm.  No focal deficit present.   He is alert and oriented to person, place, and time.     LAB RESULTS:  I have reviewed all the available laboratory results in the chart.    RESULTS REVIEW:  I have reviewed the patient's all relevant laboratory and imaging findings.     Results  Imaging  MRI spine looks fine.    Testing  EMG and nerve conduction showed a little bit of tunnel, but nothing significant.    ASSESSMENT & PLAN:  Sherry Quevedo is a 34 y.o. female with significant medical conditions as mentioned above presented to my clinic.    Diagnosis: Bilateral neurogenic thoracic outlet syndrome  Assessment & Plan  1. Bilateral arm pain.  Her signs and symptoms are suggestive of thoracic outlet syndrome. Her MRI spine appears normal. Continuation of physical therapy was recommended. A pain consultation was suggested, including a left scalene muscle block for diagnostic and therapeutic purposes.  I also recommended duplex ultrasound of the bilateral upper extremity arteries with TOS protocol. A referral to [facility] [place], operated by [facility] was made. Should physical therapy prove ineffective, alternative treatment options will be considered.      I discussed the patients findings and my recommendations with the patient. The patient was given adequate time to ask questions and all questions were answered to patient satisfaction. Thank you for this consult and allowing us to participate in the care of your patient.      Lobo Parisi MD  Thoracic Surgeon  [facility]        Dictated utilizing Dragon dictation    I spent 60 minutes caring for Sherry on this date of service. This time includes time spent by me in the following activities:preparing for the  visit, reviewing tests, obtaining and/or reviewing a separately obtained history, performing a medically appropriate examination and/or evaluation , counseling and educating the patient/family/caregiver, ordering medications, tests, or procedures, referring and communicating with other health care professionals , documenting information in the medical record, independently interpreting results and communicating that information with the patient/family/caregiver, and care coordination and more than half the time was spent in direct face to face evaluation and decision making.     Patient or patient representative verbalized consent for the use of Ambient Listening during the visit with  Lobo Parisi MD for chart documentation. 7/29/2024  09:41 EDT

## 2024-07-26 ENCOUNTER — PATIENT ROUNDING (BHMG ONLY) (OUTPATIENT)
Dept: SURGERY | Facility: CLINIC | Age: 34
End: 2024-07-26
Payer: COMMERCIAL

## 2024-07-26 NOTE — PROGRESS NOTES
July 26, 2024    Hello, may I speak with Sherry Quevedo?    My name is Faith Conner      I am  with [facility]ACIC [facility]  [address] [address] [phone].    Before we get started may I verify your date of birth? 1990    I am calling to officially welcome you to our practice and ask about your recent visit. Is this a good time to talk? yes    Tell me about your visit with us. What things went well?  Patient stated everything went well.        We're always looking for ways to make our patients' experiences even better. Do you have recommendations on ways we may improve?  no    Overall were you satisfied with your first visit to our practice? yes       I appreciate you taking the time to speak with me today. Is there anything else I can do for you? Yes, t wanted scheduling number? I offered the number for the patient.       Thank you, and have a great day.

## 2024-07-29 ENCOUNTER — TELEPHONE (OUTPATIENT)
Dept: SURGERY | Facility: CLINIC | Age: 34
End: 2024-07-29
Payer: COMMERCIAL

## 2024-07-29 ENCOUNTER — TREATMENT (OUTPATIENT)
Dept: PHYSICAL THERAPY | Facility: CLINIC | Age: 34
End: 2024-07-29
Payer: COMMERCIAL

## 2024-07-29 DIAGNOSIS — G54.0 NEUROGENIC THORACIC OUTLET SYNDROME OF BOTH BRACHIAL PLEXUSES: Primary | ICD-10-CM

## 2024-07-29 DIAGNOSIS — R29.3 ABNORMAL POSTURE: ICD-10-CM

## 2024-07-29 DIAGNOSIS — G54.0 THORACIC OUTLET SYNDROME: Primary | ICD-10-CM

## 2024-07-29 PROCEDURE — 97535 SELF CARE MNGMENT TRAINING: CPT | Performed by: PHYSICAL THERAPIST

## 2024-07-29 PROCEDURE — 97112 NEUROMUSCULAR REEDUCATION: CPT | Performed by: PHYSICAL THERAPIST

## 2024-07-29 PROCEDURE — G0283 ELEC STIM OTHER THAN WOUND: HCPCS | Performed by: PHYSICAL THERAPIST

## 2024-07-29 PROCEDURE — 97530 THERAPEUTIC ACTIVITIES: CPT | Performed by: PHYSICAL THERAPIST

## 2024-07-29 NOTE — TELEPHONE ENCOUNTER
I notified pt that note has been signed for her to be scheduled in for pain management and location has been changed to [place] where she lives    DB 9:58  7/29/2024

## 2024-07-29 NOTE — PROGRESS NOTES
Physical Therapy Daily Treatment Note  [facility] [place]  [address], [address]  [place] [id number]     Patient: Sherry Quevedo  : 1990  Referring practitioner: Ekaterina Brown DO  Today's Date: 2024    VISIT#: 3    Visit Diagnoses:    ICD-10-CM ICD-9-CM   1. Neurogenic thoracic outlet syndrome of both brachial plexuses  G54.0 353.0   2. Abnormal posture  R29.3 781.92       Subjective   Sherry Quevedo reports: that she felt more relaxed on her vacation, but hasn't noticed any change in symptom frequency or severity.      Objective       See Exercise, Manual, and Modality Logs for complete treatment.     Patient Education: TENS trial; TDN trial discussion; continue HEP      Assessment/Plan  Pt tolerated session well with moderate soreness in B UE reproduced both with infraspinatus and scalene palpation. She noted some relief following manual interventions and exercise tasks, however states that behind head ball pass task makes her shoulders tired and increases her UE numbness after a while.   Trialed use of TENS with pt noting relief of symptoms.      Progress per Plan of Care          Timed:         Manual Therapy:   15      mins  55740;     Therapeutic Exercise:         mins  73583;     Neuromuscular Jose Rafael:    12    mins  62473;    Therapeutic Activity:     10     mins  53017;     Gait Training:           mins  35113;     Ultrasound:          mins  98058;    Ionto:                                   mins  88726  Self Care:                       8     mins  90672    Un-Timed:  Electrical Stimulation:    10     mins  41783 ( );  Dry Needling          mins self-pay  Traction          mins 77104  Re-Eval                               mins  45334  Group Therapy           ___ mins 89203    Timed Treatment:   45   mins   Total Treatment:     55   mins    Isaias Ramon PT  Physical Therapist  [place] PT license #: [id number]

## 2024-07-29 NOTE — TELEPHONE ENCOUNTER
I spoke with patient and NP has put in the correct orders for her Doppler and I sent a message to provider to sign the note so that she can be scheduled for pain management.,    DB 9:18  7/29/2024

## 2024-07-29 NOTE — TELEPHONE ENCOUNTER
PT CALLED LETTING US KNOW THE ORDERS FOR HER DOPPLER ARE INCORRECT AND NEED TO BE REORDERED. SHE ALSO WANTED AN UPDATE ON THE OFFICE NOTES BEING SIGNED. INFORMED HER I WOULD SEND THE MESSAGE OUT. PT STATED UNDERSTANDING AND WAS AGREEABLE

## 2024-07-31 ENCOUNTER — TREATMENT (OUTPATIENT)
Dept: PHYSICAL THERAPY | Facility: CLINIC | Age: 34
End: 2024-07-31
Payer: COMMERCIAL

## 2024-07-31 DIAGNOSIS — R29.3 ABNORMAL POSTURE: Primary | ICD-10-CM

## 2024-07-31 DIAGNOSIS — G54.0 NEUROGENIC THORACIC OUTLET SYNDROME OF BOTH BRACHIAL PLEXUSES: ICD-10-CM

## 2024-07-31 NOTE — PROGRESS NOTES
Physical Therapy Daily Treatment Note  [facility] [place]  [address], [address]  [place] [address]     Patient: Sherry Quevedo  : 1990  Referring practitioner: Ekaterina Brown DO  Today's Date: 2024    VISIT#: 4    Visit Diagnoses:    ICD-10-CM ICD-9-CM   1. Abnormal posture  R29.3 781.92   2. Neurogenic thoracic outlet syndrome of both brachial plexuses  G54.0 353.0       Subjective   Sherry Quevedo reports:   Went to [facility] this past weekend, did well with driving.  Feels more pain than N/T symptoms at present in (B) UE.      Objective     DN Rx performed by evaluating PT at onset of today's session.    See Exercise, Manual, and Modality Logs for complete treatment.     Pt Education:  HEP review  Anatomy and structure of affected musculature  Posture/Postural awareness  Verbal/Tactile cues to ensure correct exercise performance/technique    Assessment/Plan  Tolerated continued progression of therapeutic exercise, neuromuscular re-ed and manual intervention well today, no increased pain reported during or after session.    Noting inc pain during open books with lying on (R) side, not as much pain on (L).      Progress per Plan of Care          Timed:         Manual Therapy:   15      mins  10504;     Therapeutic Exercise:     10    mins  62536;     Neuromuscular Jose Rafael:    10    mins  70296;    Therapeutic Activity:          mins  44999;     Gait Training:           mins  51816;     Ultrasound:          mins  37324;    Ionto:                                   mins  03016  Self Care:                            mins  98876    Un-Timed:  Electrical Stimulation:         mins  15923 ( );  Dry Needling          mins self-pay  Traction          mins 16653  Re-Eval                               mins  98884  Group Therapy           ___ mins 83217    Timed Treatment:   35   mins   Total Treatment:     55   mins    Oziel Lowry, PTA  [place] License  #[id number]  Physical Therapist Assistant

## 2024-08-02 ENCOUNTER — HOSPITAL ENCOUNTER (OUTPATIENT)
Dept: CARDIOLOGY | Facility: HOSPITAL | Age: 34
Discharge: HOME | End: 2024-08-02
Admitting: NURSE PRACTITIONER
Payer: COMMERCIAL

## 2024-08-02 DIAGNOSIS — G54.0 THORACIC OUTLET SYNDROME: ICD-10-CM

## 2024-08-02 LAB
BH CV UPPER ARTERIAL LEFT 2ND DIGIT SYS MAX: 125
BH CV UPPER ARTERIAL LEFT FBI 2ND DIGIT RATIO: 1.04
BH CV UPPER ARTERIAL LEFT WBI RATIO: 1.32
BH CV UPPER ARTERIAL RIGHT 2ND DIGIT SYS MAX: 125
BH CV UPPER ARTERIAL RIGHT FBI 2ND DIGIT RATIO: 1.04
BH CV UPPER ARTERIAL RIGHT WBI RATIO: 1.5
UPPER ARTERIAL LEFT ARM BRACHIAL SYS MAX: 120
UPPER ARTERIAL LEFT ARM RADIAL SYS MAX: 158
UPPER ARTERIAL LEFT ARM ULNAR SYS MAX: 164
UPPER ARTERIAL RIGHT ARM BRACHIAL SYS MAX: 117
UPPER ARTERIAL RIGHT ARM RADIAL SYS MAX: 180
UPPER ARTERIAL RIGHT ARM ULNAR SYS MAX: 165

## 2024-08-02 PROCEDURE — 93923 UPR/LXTR ART STDY 3+ LVLS: CPT

## 2024-08-06 ENCOUNTER — TELEPHONE (OUTPATIENT)
Dept: SURGERY | Facility: CLINIC | Age: 34
End: 2024-08-06
Payer: COMMERCIAL

## 2024-08-06 NOTE — TELEPHONE ENCOUNTER
I spoke with Hyun from [facility] and was able to clarify exactly where the patient would like to attend PM. I then informed the patient that all communications were made to the referral and it was sent out for review. I made pt aware at the most review should take 3 days, sometimes less. I notified pt that if she was not schedule by Monday to reach back out to me. Pt was agreeable and satisfied    CASSIE 8:28  8/6/2024

## 2024-08-07 ENCOUNTER — TREATMENT (OUTPATIENT)
Dept: PHYSICAL THERAPY | Facility: CLINIC | Age: 34
End: 2024-08-07
Payer: COMMERCIAL

## 2024-08-07 DIAGNOSIS — G54.0 NEUROGENIC THORACIC OUTLET SYNDROME OF BOTH BRACHIAL PLEXUSES: ICD-10-CM

## 2024-08-07 DIAGNOSIS — R29.3 ABNORMAL POSTURE: Primary | ICD-10-CM

## 2024-08-07 NOTE — PROGRESS NOTES
Physical Therapy Daily Treatment Note  [facility] [place]  [address], [address]  [place] [address]     Patient: Sherry Quevedo  : 1990  Referring practitioner: Ekaterina Brown DO  Today's Date: 2024    VISIT#: 5    Visit Diagnoses:    ICD-10-CM ICD-9-CM   1. Abnormal posture  R29.3 781.92   2. Neurogenic thoracic outlet syndrome of both brachial plexuses  G54.0 353.0       Subjective   Sherry Quevedo reports: that after TDN intervention she had no pain the next day, for the first time in several years. 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      Objective   TOP L>R infra, teres minor, subscap, serratus ant with referral into each UE of familiar symptoms    See Exercise, Manual, and Modality Logs for complete treatment.     Patient Education: self-STM for symptom management, HEP update for shoulder complex strengthening.      Assessment/Plan  Pt tolerated session with moderate reproduction of UE symptoms during manual therapy, with resolution of symptoms noted following interventions. Added several UE strengthening tasks with focus on shoulder complex strength/stability, tolerated well with some fatigue.      Progress per Plan of Care          Timed:         Manual Therapy:    15     mins  88014;     Therapeutic Exercise:         mins  57327;     Neuromuscular Jose Rafael:        mins  87986;    Therapeutic Activity:     10     mins  75601;     Gait Training:           mins  19992;     Ultrasound:          mins  74299;    Ionto:                                   mins  72189  Self Care:                       10     mins  03319    Un-Timed:  Electrical Stimulation:         mins  94149 ( );  Dry Needling          mins self-pay  Traction          mins 45875  Re-Eval                               mins  15527  Group Therapy           ___ mins 90024    Timed Treatment:    35   mins   Total Treatment:     35   mins    Isaias Ramon PT  Physical Therapist  [employer] license #: [id number]

## 2024-08-09 ENCOUNTER — TREATMENT (OUTPATIENT)
Dept: PHYSICAL THERAPY | Facility: CLINIC | Age: 34
End: 2024-08-09
Payer: COMMERCIAL

## 2024-08-09 DIAGNOSIS — R29.3 ABNORMAL POSTURE: Primary | ICD-10-CM

## 2024-08-09 DIAGNOSIS — G54.0 NEUROGENIC THORACIC OUTLET SYNDROME OF BOTH BRACHIAL PLEXUSES: ICD-10-CM

## 2024-08-09 NOTE — PROGRESS NOTES
Physical Therapy Daily Treatment Note  [facility] [place]  [address], [address]  [place] [id number]     Patient: Sherry Quevedo  : 1990  Referring practitioner: Ekaterina Brown DO  Today's Date: 2024    VISIT#: 6    Visit Diagnoses:    ICD-10-CM ICD-9-CM   1. Abnormal posture  R29.3 781.92   2. Neurogenic thoracic outlet syndrome of both brachial plexuses  G54.0 353.0       Subjective   Sherry Quevedo reports: that she had more numbness and tingling the day after last session, think it may have been from new W's exercise. She reports mild bruising around R subscap triggerpoint from manual interventions last session.  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        Objective     See Exercise, Manual, and Modality Logs for complete treatment.     Patient Education: ongoing progression of strengthening. Avoidance of provocative activities.        Assessment/Plan  Pt only able to tolerate 4m15s on UBE prior to onset of numbness in R UE. Following TDN intervention she noted resolution of all distal UE symptoms with only some soreness/ache in shoulder region from needling.  New cheerleaders exercise was performed well without any aggravation of symptoms. Discussed ongoing focus on gradual UE strengthening particularly around shoulder blade and rotator cuff.        Progress per Plan of Care          Timed:         Manual Therapy:         mins  12938;     Therapeutic Exercise:         mins  36376;     Neuromuscular Jose Rafael:        mins  27899;    Therapeutic Activity:     15     mins  23898;     Gait Training:           mins  29182;     Ultrasound:          mins  70347;    Ionto:                                   mins  50831  Self Care:                       5     mins  67452    Un-Timed:  Electrical Stimulation:         mins  76647  ( );  Dry Needling     15     mins self-pay  Traction          mins 58727  Re-Eval                               mins  45760  Group Therapy           ___ mins 32147    Timed Treatment:   20   mins   Total Treatment:     35   mins    Isaias Ramon PT  Physical Therapist  [employer] [facility] license #: [id number]

## 2024-08-12 ENCOUNTER — TREATMENT (OUTPATIENT)
Dept: PHYSICAL THERAPY | Facility: CLINIC | Age: 34
End: 2024-08-12
Payer: COMMERCIAL

## 2024-08-12 DIAGNOSIS — R29.3 ABNORMAL POSTURE: Primary | ICD-10-CM

## 2024-08-12 DIAGNOSIS — G54.0 NEUROGENIC THORACIC OUTLET SYNDROME OF BOTH BRACHIAL PLEXUSES: ICD-10-CM

## 2024-08-12 PROCEDURE — 97140 MANUAL THERAPY 1/> REGIONS: CPT | Performed by: PHYSICAL THERAPIST

## 2024-08-12 PROCEDURE — 97530 THERAPEUTIC ACTIVITIES: CPT | Performed by: PHYSICAL THERAPIST

## 2024-08-12 PROCEDURE — 97535 SELF CARE MNGMENT TRAINING: CPT | Performed by: PHYSICAL THERAPIST

## 2024-08-12 NOTE — PROGRESS NOTES
Physical Therapy Daily Treatment Note  [facility] Therapy [place]  [address] [address], [address]  [place] [id number]     Patient: Sherry Quevedo  : 1990  Referring practitioner: Ekaterina Brown DO  Today's Date: 2024    VISIT#: 7    Visit Diagnoses:    ICD-10-CM ICD-9-CM   1. Abnormal posture  R29.3 781.92   2. Neurogenic thoracic outlet syndrome of both brachial plexuses  G54.0 353.0       Subjective   Sherry Quevedo reports: that she had some relief from TDN intervention on Friday last week, but that she woke up at night on her side with some tingling in her hands. She reports that she had less paresthesia during the day but is still having some issues in her hands (thenar eminence/web space) particularly, but less overall in her upper arms and shoulders.      Objective   TOP B QL   TOP R wrist extensor mass and radial nerve pathway    See Exercise, Manual, and Modality Logs for complete treatment.     Patient Education: HEP update; self-STM forearms      Assessment/Plan  Pt tolerated session well with reports of no UE paresthesia in supine following STM today. She was educated on additional thoracic mobility and QL stretching and suggested STM for her forearms to ease peripheral secondary compression of radial nerves.      Progress per Plan of Care          Timed:         Manual Therapy:    13     mins  71744;     Therapeutic Exercise:    5     mins  62436;     Neuromuscular Jose Rafael:        mins  40697;    Therapeutic Activity:     10     mins  23123;     Gait Training:           mins  48375;     Ultrasound:          mins  49313;    Ionto:                                   mins  45746  Self Care:                       10     mins  85661    Un-Timed:  Electrical Stimulation:         mins  77812 ( );  Dry Needling          mins self-pay  Traction          mins 74836  Re-Eval                               mins  63782  Group Therapy           ___ mins 44116    Timed  Treatment:   38   mins   Total Treatment:     38   mins    Isaias Ramon PT  Physical Therapist  [place] license #: [id number]

## 2024-08-19 ENCOUNTER — TELEPHONE (OUTPATIENT)
Dept: PAIN MEDICINE | Facility: CLINIC | Age: 34
End: 2024-08-19
Payer: COMMERCIAL

## 2024-08-20 ENCOUNTER — PREP FOR SURGERY (OUTPATIENT)
Dept: SURGERY | Facility: SURGERY CENTER | Age: 34
End: 2024-08-20
Payer: COMMERCIAL

## 2024-08-20 ENCOUNTER — OFFICE VISIT (OUTPATIENT)
Dept: PAIN MEDICINE | Facility: CLINIC | Age: 34
End: 2024-08-20
Payer: COMMERCIAL

## 2024-08-20 VITALS
HEIGHT: 63 IN | OXYGEN SATURATION: 99 % | BODY MASS INDEX: 39.9 KG/M2 | TEMPERATURE: 98.2 F | HEART RATE: 93 BPM | RESPIRATION RATE: 20 BRPM | WEIGHT: 225.2 LBS | DIASTOLIC BLOOD PRESSURE: 80 MMHG | SYSTOLIC BLOOD PRESSURE: 131 MMHG

## 2024-08-20 DIAGNOSIS — G54.0 NEUROGENIC THORACIC OUTLET SYNDROME: Primary | ICD-10-CM

## 2024-08-20 PROCEDURE — 99214 OFFICE O/P EST MOD 30 MIN: CPT | Performed by: NURSE PRACTITIONER

## 2024-08-20 NOTE — PROGRESS NOTES
"CHIEF COMPLAINT  Pt here to discuss TOS right sided is worse.  Pain starts in her shoulder and runs down,tingling,throbbing and numb.  \"Feels like my arms are going to sleep most of the time.\"    Lilly Quevedo is a 34 y.o. female.   She presents to the office for evaluation of bilateral arm pain. She was referred here by Dr. Parisi.    Today her pain is 7/10VAS in severity.  She states that she developed hand pain 2 years ago, with progressive arm pain increasing 3-4 months ago without inciting event. She describes her bilateral arm pain as numbing, tingling, and throbbing pain (R>L).     Hx of fibromyalgia--no relief with Savella.     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 She does have a family history of carpal tunnel in her grandmother. She drinks etoh on occasion. She smoke cigarettes very sparingly (once every 2 weeks), she denies any illicit substance use including THC.     Past pain medications: Mobic Flexeril     Current pain medications: PTC tylenol, ibuprofen     Past therapies:  Physical Therapy: Yes, some relief  Dry Needling: Yes, helpful  Chiropractor: None  Massage Therapy: None  TENS: Yes, temporary relief  Neck or back surgery: None. No hx of shoulder surgery.   Past pain management: None     Previous Injections:   None    Arm Pain   The pain is present in the right shoulder, upper right arm, left hand, right hand, left shoulder, right forearm, left forearm and upper left arm. The quality of the pain is described as burning (numbning, tingling, and throbbing pain). The pain is at a severity of 7/10. Associated symptoms include numbness (hands). Pertinent negatives include no chest pain. She has tried NSAIDs and acetaminophen for the symptoms.      PEG Assessment   What number best describes your pain on average in the past week?7  What number best describes how, during the past week, pain has interfered with your enjoyment of life?7  What number " best describes how, during the past week, pain has interfered with your general activity?  7    Current Outpatient Medications:     atomoxetine (STRATTERA) 40 MG capsule, , Disp: , Rfl:     cetirizine (ZyrTEC) 10 MG chewable tablet, tablet, Disp: , Rfl:     Cetirizine HCl (ZYRTEC ALLERGY PO), , Disp: , Rfl:     Coenzyme Q10 (CoQ-10) 100 MG capsule, , Disp: , Rfl:     desvenlafaxine (PRISTIQ) 100 MG 24 hr tablet, Take 1 tablet by mouth Daily. FOR 30 DAYS, Disp: , Rfl:     Famotidine (PEPCID AC PO), , Disp: , Rfl:     Folate 400 MCG tablet, , Disp: , Rfl:     Magnesium 100 MG tablet, , Disp: , Rfl:     Magnesium Bisglycinate (Mag Glycinate) 100 MG tablet, tablet, Disp: , Rfl:     metoprolol succinate XL (TOPROL-XL) 25 MG 24 hr tablet, Take 1 tablet by mouth once daily, Disp: 30 tablet, Rfl: 5    Nurtec 75 MG dispersible tablet, DISSOLVE 1 TABLET ON THE TONGUE 1 TIME AS NEEDED FOR MIGRAINE HEADACHE. MAY REPEAT DOSE 1 TIME AFTER 2 HOURS, Disp: 16 tablet, Rfl: 1    pantoprazole (PROTONIX) 40 MG EC tablet, Take 1 tablet by mouth Every Morning., Disp: , Rfl:     topiramate (TOPAMAX) 50 MG tablet, Take 1 tablet by mouth Every Night., Disp: 90 tablet, Rfl: 0    traZODone (DESYREL) 50 MG tablet, TAKE 1 TABLET BY MOUTH ONCE DAILY AT BEDTIME FOR 30 DAYS, Disp: , Rfl:     Pristiq 50 MG 24 hr tablet, , Disp: , Rfl:     The following portions of the patient's history were reviewed and updated as appropriate: allergies, current medications, past family history, past medical history, past social history, past surgical history, and problem list.    REVIEW OF PERTINENT MEDICAL DATA    Office visit office visit from 7/23/2024 with Dr. Parisi reviewed.  Patient has been experiencing pain in both arms for the past 2 years.  This is progressively worsened over the past several months.  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  EMG/NCS were negative.  Her orthopedist referred her to physical therapy which she has attended once.  She has a history of scoliosis and would like a significantly longer than the other.  She denies any injury or surgeries on her neck or chest.  She has tried Tylenol, ibuprofen, Voltaren gel.  Signs and symptoms are suggestive of thoracic outlet syndrome.  MRI of spine appears normal, continue physical therapy.  Pain consultation suggested including a left scalene muscle block for diagnostic and therapeutic purposes.  Recommend duplex ultrasound of the bilateral upper extremities with TOS protocol.  Referral to [facility].       [facility]  Performing Staff   Cardiology: Aditya Moss MD    Tech: Nancy Red, T, [facility]        Clinical Indication    work up for thoracic outlet   Dx: Thoracic outlet syndrome [G54.0 (ICD-10-CM)]     Interpretation Summary         Normal arterial pressures on the right. Normal digital pressures noted on the right. Thoracic outlet syndrome has a positive result for the right.    Normal arterial pressures on the left. Normal digital pressures noted on the left. Thoracic outlet syndrome testing was negative on the left.    Review of Systems   Constitutional:  Negative for activity change (less), fatigue and fever.   Respiratory:  Negative for cough and chest tightness.    Cardiovascular:  Negative for chest pain.   Gastrointestinal:  Negative for abdominal pain, constipation and diarrhea.   Genitourinary:  Negative for difficulty urinating and dysuria.   Musculoskeletal:  Positive for back pain.   Neurological:  Positive for weakness (hands) and numbness (hands). Negative for dizziness, light-headedness and headaches.   Psychiatric/Behavioral:  Negative for agitation, sleep disturbance and suicidal ideas. The patient is nervous/anxious.      --  The aforementioned information the Chief Complaint section and above subjective data  "including any HPI data, and also the Review of Systems data, has been personally reviewed and affirmed.  --    Vitals:    08/20/24 1518   BP: 131/80   Pulse: 93   Resp: 20   Temp: 98.2 °F (36.8 °C)   TempSrc: Temporal   SpO2: 99%   Weight: 102 kg (225 lb 3.2 oz)   Height: 160 cm (62.99\")   PainSc:   7     Objective   Physical Exam  Vitals and nursing note reviewed.   Constitutional:       Appearance: Normal appearance. She is well-developed.   Eyes:      General: Lids are normal.   Cardiovascular:      Rate and Rhythm: Normal rate.   Pulmonary:      Effort: Pulmonary effort is normal.   Musculoskeletal:      Comments:   +Donovan   Neurological:      Mental Status: She is alert and oriented to person, place, and time.   Psychiatric:         Attention and Perception: Attention normal.         Mood and Affect: Mood normal.         Speech: Speech normal.         Behavior: Behavior normal.         Judgment: Judgment normal.       Assessment & Plan   Diagnoses and all orders for this visit:    1. Neurogenic thoracic outlet syndrome (Primary)      --- Sherry Quevedo reports a pain score of 7.  Given her pain assessment as noted, treatment options were discussed and the following options were decided upon as a follow-up plan to address the patient's pain: steroid injections.    --- Right Interscalene block with steroid x 2, 2-3 weeks apart. (OK with Dr. Parisi to focus on the right side first)  Reviewed the procedure at length with the patient.  Included in the review was expectations, complications, risk and benefits.The procedure was described in detail and the risks, benefits and alternatives were discussed with the patient (including but not limited to: bleeding, infection, nerve damage, worsening of pain, inability to perform injection, paralysis, seizures, coma, no pain relief and death) who agreed to proceed.  Discussed the potential for sedation if warranted/wanted.  The procedure will plan to be performed at [facility] with fluoroscopic guidance(unless ultrasound is indicated) and could potentially have steroids and contrast dye used. Questions were answered and in a way the patient could understand.  Patient verbalized understanding and wishes to proceed.  This intervention will be ordered.  Discussed with patient that all procedures are part of a multimodal plan of care and include either formal PT or a home exercise program.  Patient has no evidence of coagulopathy or current infection.    --- Will plan to focus on the left side once right side is completed.   --- Follow-up after procedure.      ZEINAB REPORT  ZEINAB report has been reviewed and scanned into the patient's chart.    As the clinician, I personally reviewed the ZEINAB from 8/20/2024 while the patient was in the office today.    Dictated utilizing Dragon dictation.

## 2024-08-29 RX ORDER — BUPIVACAINE HYDROCHLORIDE 2.5 MG/ML
5 INJECTION, SOLUTION EPIDURAL; INFILTRATION; INTRACAUDAL ONCE
OUTPATIENT
Start: 2024-08-29 | End: 2024-08-29

## 2024-08-29 RX ORDER — LIDOCAINE HYDROCHLORIDE 10 MG/ML
5 INJECTION, SOLUTION EPIDURAL; INFILTRATION; INTRACAUDAL; PERINEURAL ONCE
OUTPATIENT
Start: 2024-08-29 | End: 2024-08-29

## 2024-08-29 RX ORDER — DEXAMETHASONE SODIUM PHOSPHATE 10 MG/ML
10 INJECTION, SOLUTION INTRAMUSCULAR; INTRAVENOUS ONCE
OUTPATIENT
Start: 2024-08-29 | End: 2024-08-29

## 2024-08-30 ENCOUNTER — LAB (OUTPATIENT)
Dept: LAB | Facility: HOSPITAL | Age: 34
End: 2024-08-30
Payer: COMMERCIAL

## 2024-08-30 ENCOUNTER — OFFICE VISIT (OUTPATIENT)
Dept: INTERNAL MEDICINE | Facility: CLINIC | Age: 34
End: 2024-08-30
Payer: COMMERCIAL

## 2024-08-30 ENCOUNTER — HOSPITAL ENCOUNTER (OUTPATIENT)
Dept: GENERAL RADIOLOGY | Facility: HOSPITAL | Age: 34
Discharge: HOME | End: 2024-08-30
Payer: COMMERCIAL

## 2024-08-30 VITALS
DIASTOLIC BLOOD PRESSURE: 70 MMHG | HEIGHT: 63 IN | HEART RATE: 115 BPM | SYSTOLIC BLOOD PRESSURE: 100 MMHG | WEIGHT: 224 LBS | OXYGEN SATURATION: 98 % | BODY MASS INDEX: 39.69 KG/M2

## 2024-08-30 DIAGNOSIS — M25.511 ACUTE PAIN OF RIGHT SHOULDER: ICD-10-CM

## 2024-08-30 DIAGNOSIS — R60.0 LOCALIZED EDEMA: Primary | ICD-10-CM

## 2024-08-30 LAB
ANION GAP SERPL CALCULATED.3IONS-SCNC: 9.8 MMOL/L (ref 5–15)
BUN SERPL-MCNC: 12 MG/DL (ref 6–20)
BUN/CREAT SERPL: 11.2 (ref 7–25)
CALCIUM SPEC-SCNC: 9.7 MG/DL (ref 8.6–10.5)
CHLORIDE SERPL-SCNC: 106 MMOL/L (ref 98–107)
CO2 SERPL-SCNC: 25.2 MMOL/L (ref 22–29)
CREAT SERPL-MCNC: 1.07 MG/DL (ref 0.57–1)
EGFRCR SERPLBLD CKD-EPI 2021: 69.6 ML/MIN/1.73
GLUCOSE SERPL-MCNC: 84 MG/DL (ref 65–99)
POTASSIUM SERPL-SCNC: 3.9 MMOL/L (ref 3.5–5.2)
SODIUM SERPL-SCNC: 141 MMOL/L (ref 136–145)

## 2024-08-30 PROCEDURE — 73030 X-RAY EXAM OF SHOULDER: CPT

## 2024-08-30 PROCEDURE — 99214 OFFICE O/P EST MOD 30 MIN: CPT | Performed by: NURSE PRACTITIONER

## 2024-08-30 PROCEDURE — 36415 COLL VENOUS BLD VENIPUNCTURE: CPT | Performed by: NURSE PRACTITIONER

## 2024-08-30 PROCEDURE — 80048 BASIC METABOLIC PNL TOTAL CA: CPT | Performed by: NURSE PRACTITIONER

## 2024-08-30 RX ORDER — LEVOTHYROXINE, LIOTHYRONINE 19; 4.5 UG/1; UG/1
30 TABLET ORAL DAILY
COMMUNITY
Start: 2024-08-26

## 2024-08-30 RX ORDER — TRAZODONE HYDROCHLORIDE 100 MG/1
100 TABLET ORAL NIGHTLY
COMMUNITY
Start: 2024-07-29

## 2024-08-30 NOTE — PROGRESS NOTES
Subjective   Sherry Quevedo is a 34 y.o. female. Patient is here today for   Chief Complaint   Patient presents with    Joint Swelling    Leg Swelling    Shoulder Pain     Patient complains of shoulder pain for several weeks, has been going to PT and they did provide her with exercises that have not been helping.    .    History of Present Illness   C/o swelling in bilateral feet and ankles for a few days. 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 The swelling is better today. Patient had pictures on her phone of the swelling. It was worse on the left side.     C/o right shoulder pain for about 6 weeks. She was in physical therapy. She feels like it is getting worse. Range of motion is decreased.     The following portions of the patient's history were reviewed and updated as appropriate: allergies, current medications, past family history, past medical history, past social history, past surgical history and problem list.    Review of Systems    Objective   Vitals:    08/30/24 1328   BP: 100/70   Pulse: 115   SpO2: 98%     Body mass index is 39.69 kg/m².  Physical Exam  Vitals and nursing note reviewed.   Constitutional:       Appearance: Normal appearance. She is well-developed.   Cardiovascular:      Rate and Rhythm: Normal rate and regular rhythm.      Heart sounds: Normal heart sounds.      Comments: Very mild non-pitting edema bilateral lower extremities  Pulmonary:      Effort: Pulmonary effort is normal.      Breath sounds: Normal breath sounds.   Musculoskeletal:      Right shoulder: Tenderness present. Decreased range of motion.      Right lower leg: Edema present.      Left lower leg: Edema present.   Skin:     General: Skin is warm and dry.   Neurological:      Mental Status: She is alert and oriented to person, place, and time.   Psychiatric:         Speech: Speech normal.         Behavior: Behavior normal.         Thought Content: Thought content normal.         Assessment & Plan   Diagnoses and  all orders for this visit:    1. Localized edema (Primary)  -     Basic Metabolic Panel    2. Acute pain of right shoulder  -     Ambulatory Referral to Orthopedic Surgery  -     XR Shoulder 2+ View Right; Future    Localized edema - will check BMP.  If BMP normal, will send in HCTZ 12.5 mg daily as needed.  I think that her swelling may have been due to a combination of being so hot the last few days and her standing all day at work.    Shoulder pain - will check an x-ray and refer to ortho.  She has seen Ortho recently, but it was for her left shoulder pain

## 2024-09-03 ENCOUNTER — HOSPITAL ENCOUNTER (OUTPATIENT)
Dept: PAIN MEDICINE | Facility: HOSPITAL | Age: 34
Discharge: HOME | End: 2024-09-03
Payer: COMMERCIAL

## 2024-09-04 ENCOUNTER — TELEMEDICINE (OUTPATIENT)
Dept: FAMILY MEDICINE CLINIC | Facility: TELEHEALTH | Age: 34
End: 2024-09-04
Payer: COMMERCIAL

## 2024-09-04 DIAGNOSIS — G43.109 MIGRAINE WITH AURA AND WITHOUT STATUS MIGRAINOSUS, NOT INTRACTABLE: ICD-10-CM

## 2024-09-04 DIAGNOSIS — J01.00 ACUTE MAXILLARY SINUSITIS, RECURRENCE NOT SPECIFIED: Primary | ICD-10-CM

## 2024-09-04 DIAGNOSIS — J40 BRONCHITIS: ICD-10-CM

## 2024-09-04 RX ORDER — FLUCONAZOLE 150 MG/1
TABLET ORAL
Qty: 2 TABLET | Refills: 0 | Status: SHIPPED | OUTPATIENT
Start: 2024-09-04

## 2024-09-04 RX ORDER — TOPIRAMATE 50 MG/1
50 TABLET, FILM COATED ORAL NIGHTLY
Qty: 90 TABLET | Refills: 0 | Status: SHIPPED | OUTPATIENT
Start: 2024-09-04

## 2024-09-04 RX ORDER — DOXYCYCLINE 100 MG/1
100 CAPSULE ORAL 2 TIMES DAILY
Qty: 20 CAPSULE | Refills: 0 | Status: SHIPPED | OUTPATIENT
Start: 2024-09-04 | End: 2024-09-14

## 2024-09-04 RX ORDER — PREDNISONE 10 MG/1
TABLET ORAL
Qty: 21 TABLET | Refills: 0 | Status: SHIPPED | OUTPATIENT
Start: 2024-09-04

## 2024-09-04 RX ORDER — ALBUTEROL SULFATE 90 UG/1
2 AEROSOL, METERED RESPIRATORY (INHALATION) EVERY 4 HOURS PRN
Qty: 18 G | Refills: 0 | Status: SHIPPED | OUTPATIENT
Start: 2024-09-04

## 2024-09-04 RX ORDER — DEXTROMETHORPHAN HYDROBROMIDE AND PROMETHAZINE HYDROCHLORIDE 15; 6.25 MG/5ML; MG/5ML
5 SYRUP ORAL 4 TIMES DAILY PRN
Qty: 150 ML | Refills: 0 | Status: SHIPPED | OUTPATIENT
Start: 2024-09-04

## 2024-09-04 NOTE — PROGRESS NOTES
You have chosen to receive care through a telehealth visit.  Do you consent to use a video/audio connection for your medical care today? Yes     CHIEF COMPLAINT  No chief complaint on file.        HPI  Sherry Quevedo is a 34 y.o. female  presents with complaint of several days (7-10) history of nasal congestion with thick yellow discharge, facial pain/pressure, PND, cough without sputum production, chest congestion that hurts when breathing.  Denies fever, wheezing, shortness of breath.     2 neg COVID tests    Review of Systems  See HPI    Past Medical History:   Diagnosis Date    Abnormal computed tomography angiography (CTA) of neck 12/21/2021    Abnormal gluteal crease     ADHD (attention deficit hyperactivity disorder) 2020    Anemia 2019    iron defiency anemia    Anxiety     Benign paroxysmal positional vertigo of right ear 12/01/2021    Bipolar disorder 2012    BRBPR (bright red blood per rectum) 06/02/2021    [facility]    Breast anomaly     Bruises easily     Cervical adenopathy     Cervical lymphadenopathy     Left side.    Change in bowel habit 06/02/2021    Dr. Nancy Santana at [facility]    Cholestasis during pregnancy in third trimester 2019    Chronic allergic rhinitis     Chronic pain disorder 2022    Fibro    Chronic recurrent major depressive disorder     In partial remission.    Cold intolerance     Constipation     CTS (carpal tunnel syndrome) 2019    Decreased sex drive     Depression     History of PPD    Diarrhea     Dizziness 08/06/2021    LEONELA, Temi Mcrae, Cardiologist office.    Dysmenorrhea 12/04/2019    Surgery: Laparoscopic assisted vaginal hysterectomy, Surgeon Dr. Hillary Nunn.    Dysphoric mood     Dyspnea on exertion 08/06/2021    LEONELA, Temi Mcrae, Cardiologist office.    Endometriosis 12/2019    Hysterectomy in 2019 cured    Environmental and seasonal allergies 09/21/2020    Epigastric abdominal pain 11/07/2018    [facility].    Epiploic appendagitis  01/10/2023    Extremity pain 2022    TOS diagnosed in August    Fat necrosis 07/29/2021    [facility].    Fatigue     Fibromyalgia, primary 2021    Fissure, anal     history    Folic acid deficiency     Gestational diabetes [id number]    I had it with my 1st pregnancy    H/O TB skin testing 02/20/2018    Negative result at [facility].    HA (headache)     Headache, tension-type     Hemorrhoid     History of gestational diabetes     with first baby    History of Holter monitoring 11/07/2019    24 hour.    History of kidney stones 2006/2009    History of miscarriage     History of vasectomy 2019    Spouse Vasectomy.    Hypocalcemia 03/2022    Hypoglycemia     Irritable bowel syndrome 1994    IBS with both constipation/diarrhea, followed by GI Dr. Moreland.    Joint pain     Fibro / TOS    Joint stiffness     Lightheadedness 08/06/2021    APRN, Temi Mcrae, Cardiologist office.    Low back pain     Lower back/middle    Low serum potassium level     Low serum vitamin B12     Lower extremity myoclonus 02/28/2022    ADMITTED TO [facility]    Malaise and fatigue 11/12/2019    Cardiologist Dr. Benji Ugalde.    Mass of left parotid gland 12/2021    Migraines 10/2019    Multinodular non-toxic goiter     Near syncope 08/06/2021    APRN, Temi Mcrae, Cardiologist office.    Neck pain     OCD (obsessive compulsive disorder)     Ovarian cyst 2010    Surgery removed.    Palpitations 10/28/2019    PVC's (premature ventricular contractions)     Rapid heart rate 11/12/2019    Cardiologist Dr. Benji Ugalde.    Rectal bleeding 06/02/2021    Dr. Nancy Santana at [facility]    Renal stones     RLS (restless legs syndrome)     RUQ abdominal mass [id number]    [facility].    Scoliosis 2003    Sleep disturbance     SOB (shortness of breath) 10/28/2019    Tachycardia 10/28/2019    Thrombocytopenia 03/2022    Thyroid nodule 10/28/2019    1 cm Left Submandibular node, [facility], Dr. Kevan Hoffman.     Thyroid nodule 10/28/2019    0.3 cm Right side of neck, [facility], Dr. Kevan Hoffman.    Thyromegaly 03/04/2016    Boarderline Thyromegaly, Findings via X-ray at [facility], ordering provider Dr. Nancy Santana.    Transaminitis 11/07/2018    [facility].    Trigger index finger of right hand 01/2022    Vaginal delivery 01/2019    Baby girl.       Family History   Problem Relation Age of Onset    COPD Mother     Depression Mother     Hypertension Mother     Heart disease Mother     Hyperlipidemia Mother     Asthma Father     COPD Father     Heart disease Father     Alcohol abuse Father     Hearing loss Father     Cancer Sister     Miscarriages / Stillbirths Sister         1 miscarriage    Depression Sister     Depression Sister     Cancer Sister     Miscarriages / Stillbirths Sister         Miscarriage & stillbirth    Cancer Maternal Grandmother         Breast    Diabetes Maternal Grandmother     Depression Maternal Grandmother     Breast cancer Maternal Grandmother     Cancer Paternal Grandmother         Skin (ear) kidney failure    Bleeding Disorder Paternal Grandmother         Factor V    COPD Paternal Grandmother     Asthma Paternal Grandmother     Osteoarthritis Paternal Grandmother     Arthritis Paternal Grandmother     Clotting disorder Paternal Grandmother         Factor V    Osteoporosis Paternal Grandmother     Alcohol abuse Paternal Grandfather     Malig Hyperthermia Neg Hx     Colon cancer Neg Hx        Social History     Socioeconomic History    Marital status:    Tobacco Use    Smoking status: Never    Smokeless tobacco: Never    Tobacco comments:     Vape on oçcasion   Vaping Use    Vaping status: Never Used   Substance and Sexual Activity    Alcohol use: Yes     Alcohol/week: 2.0 - 4.0 standard drinks of alcohol     Types: 1 - 2 Glasses of wine, 1 - 2 Shots of liquor per week     Comment: Weekly    Drug use: Never    Sexual activity: Yes     Partners: Male     Birth  control/protection: Hysterectomy     Comment: .       Sherry Quevedo  reports that she has never smoked. She has never used smokeless tobacco.               LMP  (LMP Unknown)     PHYSICAL EXAM  Physical Exam   Constitutional: She is oriented to person, place, and time. She appears well-developed and well-nourished. She does not have a sickly appearance. She does not appear ill.   HENT:   Head: Normocephalic and atraumatic.   Nose: Right sinus exhibits maxillary sinus tenderness. Left sinus exhibits maxillary sinus tenderness.   Pulmonary/Chest: Effort normal.  No respiratory distress (persistent cough during visit).  Neurological: She is alert and oriented to person, place, and time.           Diagnoses and all orders for this visit:    1. Acute maxillary sinusitis, recurrence not specified (Primary)  -     amoxicillin-clavulanate (AUGMENTIN) 875-125 MG per tablet; Take 1 tablet by mouth 2 (Two) Times a Day for 10 days.  Dispense: 20 tablet; Refill: 0  -     fluconazole (DIFLUCAN) 150 MG tablet; Take 1 tablet now, repeat in 72 hours if symptoms continue  Dispense: 2 tablet; Refill: 0    2. Bronchitis  -     predniSONE (DELTASONE) 10 MG (21) dose pack; Use as directed on package  Dispense: 21 tablet; Refill: 0  -     albuterol sulfate  (90 Base) MCG/ACT inhaler; Inhale 2 puffs Every 4 (Four) Hours As Needed for Wheezing or Shortness of Air (cough).  Dispense: 18 g; Refill: 0  -     promethazine-dextromethorphan (PROMETHAZINE-DM) 6.25-15 MG/5ML syrup; Take 5 mL by mouth 4 (Four) Times a Day As Needed for Cough.  Dispense: 150 mL; Refill: 0    --take medications as prescribed  --increase fluids, rest as needed, tylenol or ibuprofen for pain  --f/u in 5-7 days if no improvement        FOLLOW-UP  As discussed during visit with PCP/[facility] if no improvement or Urgent Care/Emergency Department if worsening of symptoms    Patient verbalizes understanding of medication dosage, comfort measures,  instructions for treatment and follow-up.    Priscilla Preciado, APRN  09/04/2024  17:15 EDT    The use of a video visit has been reviewed with the patient and verbal informed consent has been obtained. Myself and Sherry Quevedo participated in this visit. The patient is located in [address] [place] [address].    I am located in [place], [place]. Mychart and Twilio were utilized. I spent 8 minutes in the patient's chart for this visit.      Note Disclaimer: At [facility], we believe that sharing information builds trust and better   relationships. You are receiving this note because you recently visited [facility]. It is possible you   will see health information before a provider has talked with you about it. This kind of information can   be easy to misunderstand. To help you fully understand what it means for your health, we urge you to   discuss this note with your provider.

## 2024-09-04 NOTE — PATIENT INSTRUCTIONS
Sinus Infection, Adult  A sinus infection, also called sinusitis, is inflammation of your sinuses. Sinuses are hollow spaces in the bones around your face. Your sinuses are located:  Around your eyes.  In the middle of your forehead.  Behind your nose.  In your cheekbones.  Mucus normally drains out of your sinuses. When your nasal tissues become inflamed or swollen, mucus can become trapped or blocked. This allows bacteria, viruses, and fungi to grow, which leads to infection. Most infections of the sinuses are caused by a virus.  A sinus infection can develop quickly. It can last for up to 4 weeks (acute) or for more than 12 weeks (chronic). A sinus infection often develops after a cold.  What are the causes?  This condition is caused by anything that creates swelling in the sinuses or stops mucus from draining. This includes:  Allergies.  Asthma.  Infection from bacteria or viruses.  Deformities or blockages in your nose or sinuses.  Abnormal growths in the nose (nasal polyps).  Pollutants, such as chemicals or irritants in the air.  Infection from fungi. This is rare.  What increases the risk?  You are more likely to develop this condition if you:  Have a weak body defense system (immune system).  Do a lot of swimming or diving.  Overuse nasal sprays.  Smoke.  What are the signs or symptoms?  The main symptoms of this condition are pain and a feeling of pressure around the affected sinuses. Other symptoms include:  Stuffy nose or congestion that makes it difficult to breathe through your nose.  Thick yellow or greenish drainage from your nose.  Tenderness, swelling, and warmth over the affected sinuses.  A cough that may get worse at night.  Decreased sense of smell and taste.  Extra mucus that collects in the throat or the back of the nose (postnasal drip) causing a sore throat or bad breath.  Tiredness (fatigue).  Fever.  How is this diagnosed?  This condition is diagnosed based on:  Your symptoms.  Your  medical history.  A physical exam.  Tests to find out if your condition is acute or chronic. This may include:  Checking your nose for nasal polyps.  Viewing your sinuses using a device that has a light (endoscope).  Testing for allergies or bacteria.  Imaging tests, such as an MRI or CT scan.  In rare cases, a bone biopsy may be done to rule out more serious types of fungal sinus disease.  How is this treated?  Treatment for a sinus infection depends on the cause and whether your condition is chronic or acute.  If caused by a virus, your symptoms should go away on their own within 10 days. You may be given medicines to relieve symptoms. They include:  Medicines that shrink swollen nasal passages (decongestants).  A spray that eases inflammation of the nostrils (topical intranasal corticosteroids).  Rinses that help get rid of thick mucus in your nose (nasal saline washes).  Medicines that treat allergies (antihistamines).  Over-the-counter pain relievers.  If caused by bacteria, your health care provider may recommend waiting to see if your symptoms improve. Most bacterial infections will get better without antibiotic medicine. You may be given antibiotics if you have:  A severe infection.  A weak immune system.  If caused by narrow nasal passages or nasal polyps, surgery may be needed.  Follow these instructions at home:  Medicines  Take, use, or apply over-the-counter and prescription medicines only as told by your health care provider. These may include nasal sprays.  If you were prescribed an antibiotic medicine, take it as told by your health care provider. Do not stop taking the antibiotic even if you start to feel better.  Hydrate and humidify    Drink enough fluid to keep your urine pale yellow. Staying hydrated will help to thin your mucus.  Use a cool mist humidifier to keep the humidity level in your home above 50%.  Inhale steam for 10-15 minutes, 3-4 times a day, or as told by your health care  provider. You can do this in the bathroom while a hot shower is running.  Limit your exposure to cool or dry air.  Rest  Rest as much as possible.  Sleep with your head raised (elevated).  Make sure you get enough sleep each night.  General instructions    Apply a warm, moist washcloth to your face 3-4 times a day or as told by your health care provider. This will help with discomfort.  Use nasal saline washes as often as told by your health care provider.  Wash your hands often with soap and water to reduce your exposure to germs. If soap and water are not available, use hand .  Do not smoke. Avoid being around people who are smoking (secondhand smoke).  Keep all follow-up visits. This is important.  Contact a health care provider if:  You have a fever.  Your symptoms get worse.  Your symptoms do not improve within 10 days.  Get help right away if:  You have a severe headache.  You have persistent vomiting.  You have severe pain or swelling around your face or eyes.  You have vision problems.  You develop confusion.  Your neck is stiff.  You have trouble breathing.  These symptoms may be an emergency. Get help right away. Call 911.  Do not wait to see if the symptoms will go away.  Do not drive yourself to the hospital.  Summary  A sinus infection is soreness and inflammation of your sinuses. Sinuses are hollow spaces in the bones around your face.  This condition is caused by nasal tissues that become inflamed or swollen. The swelling traps or blocks the flow of mucus. This allows bacteria, viruses, and fungi to grow, which leads to infection.  If you were prescribed an antibiotic medicine, take it as told by your health care provider. Do not stop taking the antibiotic even if you start to feel better.  Keep all follow-up visits. This is important.  This information is not intended to replace advice given to you by your health care provider. Make sure you discuss any questions you have with your health  care provider.  Document Revised: 11/22/2022 Document Reviewed: 11/22/2022  [employer] Patient Education © 2024 [employer]  Acute Bronchitis, Adult    Acute bronchitis is sudden inflammation of the main airways (bronchi) that come off the windpipe (trachea) in the lungs. The swelling causes the airways to get smaller and make more mucus than normal. This can make it hard to breathe and can cause coughing or noisy breathing (wheezing).  Acute bronchitis may last several weeks. The cough may last longer. Allergies, asthma, and exposure to smoke may make the condition worse.  What are the causes?  This condition can be caused by germs and by substances that irritate the lungs, including:  Cold and flu viruses. The most common cause of this condition is the virus that causes the common cold.  Bacteria. This is less common.  Breathing in substances that irritate the lungs, including:  Smoke from cigarettes and other forms of tobacco.  Dust and pollen.  Fumes from household cleaning products, gases, or burned fuel.  Indoor or outdoor air pollution.  What increases the risk?  The following factors may make you more likely to develop this condition:  A weak body's defense system, also called the immune system.  A condition that affects your lungs and breathing, such as asthma.  What are the signs or symptoms?  Common symptoms of this condition include:  Coughing. This may bring up clear, yellow, or green mucus from your lungs (sputum).  Wheezing.  Runny or stuffy nose.  Having too much mucus in your lungs (chest congestion).  Shortness of breath.  Aches and pains, including sore throat or chest.  How is this diagnosed?  This condition is usually diagnosed based on:  Your symptoms and medical history.  A physical exam.  You may also have other tests, including tests to rule out other conditions, such as pneumonia. These tests include:  A test of lung function.  Test of a mucus sample to look for the presence of  bacteria.  Tests to check the oxygen level in your blood.  Blood tests.  Chest X-ray.  How is this treated?  Most cases of acute bronchitis clear up over time without treatment. Your health care provider may recommend:  Drinking more fluids to help thin your mucus so it is easier to cough up.  Taking inhaled medicine (inhaler) to improve air flow in and out of your lungs.  Using a vaporizer or a humidifier. These are machines that add water to the air to help you breathe better.  Taking a medicine that thins mucus and clears congestion (expectorant).  Taking a medicine that prevents or stops coughing (cough suppressant).  It is not common to take an antibiotic medicine for this condition.  Follow these instructions at home:    Take over-the-counter and prescription medicines only as told by your health care provider.  Use an inhaler, vaporizer, or humidifier as told by your health care provider.  Take two teaspoons (10 mL) of honey at bedtime to lessen coughing at night.  Drink enough fluid to keep your urine pale yellow.  Do not use any products that contain nicotine or tobacco. These products include cigarettes, chewing tobacco, and vaping devices, such as e-cigarettes. If you need help quitting, ask your health care provider.  Get plenty of rest.  Return to your normal activities as told by your health care provider. Ask your health care provider what activities are safe for you.  Keep all follow-up visits. This is important.  How is this prevented?  To lower your risk of getting this condition again:  Wash your hands often with soap and water for at least 20 seconds. If soap and water are not available, use hand .  Avoid contact with people who have cold symptoms.  Try not to touch your mouth, nose, or eyes with your hands.  Avoid breathing in smoke or chemical fumes. Breathing smoke or chemical fumes will make your condition worse.  Get the flu shot every year.  Contact a health care provider if:  Your  symptoms do not improve after 2 weeks.  You have trouble coughing up the mucus.  Your cough keeps you awake at night.  You have a fever.  Get help right away if you:  Cough up blood.  Feel pain in your chest.  Have severe shortness of breath.  Faint or keep feeling like you are going to faint.  Have a severe headache.  Have a fever or chills that get worse.  These symptoms may represent a serious problem that is an emergency. Do not wait to see if the symptoms will go away. Get medical help right away. Call your local emergency services (911 in the [place] Do not drive yourself to the hospital.  Summary  Acute bronchitis is inflammation of the main airways (bronchi) that come off the windpipe (trachea) in the lungs. The swelling causes the airways to get smaller and make more mucus than normal.  Drinking more fluids can help thin your mucus so it is easier to cough up.  Take over-the-counter and prescription medicines only as told by your health care provider.  Do not use any products that contain nicotine or tobacco. These products include cigarettes, chewing tobacco, and vaping devices, such as e-cigarettes. If you need help quitting, ask your health care provider.  Contact a health care provider if your symptoms do not improve after 2 weeks.  This information is not intended to replace advice given to you by your health care provider. Make sure you discuss any questions you have with your health care provider.  Document Revised: 03/30/2023 Document Reviewed: 04/20/2022  [employer] Patient Education © 2024 [employer]

## 2024-09-20 ENCOUNTER — OFFICE VISIT (OUTPATIENT)
Dept: ORTHOPEDIC SURGERY | Facility: CLINIC | Age: 34
End: 2024-09-20
Payer: COMMERCIAL

## 2024-09-20 VITALS — BODY MASS INDEX: 40.93 KG/M2 | HEIGHT: 63 IN | WEIGHT: 231 LBS | TEMPERATURE: 98 F

## 2024-09-20 DIAGNOSIS — M75.41 ROTATOR CUFF IMPINGEMENT SYNDROME OF RIGHT SHOULDER: Primary | ICD-10-CM

## 2024-09-20 RX ADMIN — LIDOCAINE HYDROCHLORIDE 2 ML: 10 INJECTION, SOLUTION EPIDURAL; INFILTRATION; INTRACAUDAL; PERINEURAL at 14:59

## 2024-09-20 RX ADMIN — METHYLPREDNISOLONE ACETATE 80 MG: 80 INJECTION, SUSPENSION INTRA-ARTICULAR; INTRALESIONAL; INTRAMUSCULAR; SOFT TISSUE at 14:59

## 2024-09-23 RX ORDER — LIDOCAINE HYDROCHLORIDE 10 MG/ML
2 INJECTION, SOLUTION EPIDURAL; INFILTRATION; INTRACAUDAL; PERINEURAL
Status: COMPLETED | OUTPATIENT
Start: 2024-09-20 | End: 2024-09-20

## 2024-09-23 RX ORDER — METHYLPREDNISOLONE ACETATE 80 MG/ML
80 INJECTION, SUSPENSION INTRA-ARTICULAR; INTRALESIONAL; INTRAMUSCULAR; SOFT TISSUE
Status: COMPLETED | OUTPATIENT
Start: 2024-09-20 | End: 2024-09-20

## 2024-09-27 DIAGNOSIS — G43.109 MIGRAINE WITH AURA AND WITHOUT STATUS MIGRAINOSUS, NOT INTRACTABLE: ICD-10-CM

## 2024-09-27 RX ORDER — TOPIRAMATE 50 MG/1
50 TABLET, FILM COATED ORAL NIGHTLY
Qty: 90 TABLET | Refills: 0 | Status: SHIPPED | OUTPATIENT
Start: 2024-09-27

## 2024-10-22 DIAGNOSIS — G43.109 MIGRAINE WITH AURA AND WITHOUT STATUS MIGRAINOSUS, NOT INTRACTABLE: ICD-10-CM

## 2024-10-23 RX ORDER — RIMEGEPANT SULFATE 75 MG/75MG
TABLET, ORALLY DISINTEGRATING ORAL
Qty: 16 TABLET | Refills: 1 | Status: SHIPPED | OUTPATIENT
Start: 2024-10-23 | End: 2024-10-28 | Stop reason: SDUPTHER

## 2024-10-24 ENCOUNTER — OFFICE VISIT (OUTPATIENT)
Dept: ORTHOPEDIC SURGERY | Facility: CLINIC | Age: 34
End: 2024-10-24
Payer: COMMERCIAL

## 2024-10-24 VITALS — WEIGHT: 217.2 LBS | TEMPERATURE: 98.4 F | BODY MASS INDEX: 38.48 KG/M2 | HEIGHT: 63 IN

## 2024-10-24 DIAGNOSIS — M75.41 ROTATOR CUFF IMPINGEMENT SYNDROME OF RIGHT SHOULDER: ICD-10-CM

## 2024-10-24 DIAGNOSIS — S43.431D TEAR OF RIGHT GLENOID LABRUM, SUBSEQUENT ENCOUNTER: Primary | ICD-10-CM

## 2024-10-24 NOTE — PROGRESS NOTES
[facility]              Follow Up      Patient Name: Sherry Quevedo  : 1990  Primary Care Physician: Temi Jones APRN        Chief Complaint:  Right shoulder pain    HPI:   Sherry Quevedo is a 34 y.o. year old who presents today for evaluation.  History of Present Illness  The patient presents for evaluation of right shoulder pain.    She reports an improvement in her shoulder condition, with increased mobility. However, she still experiences intermittent pain and weakness in the front of the shoulder. The pain is severe enough to disrupt her sleep, particularly when lying on her right side. She also notes that the pain radiates to other areas and is exacerbated by lifting objects from the ground. She also mentions occasional clicking sounds from the shoulder. She has done some intermittent anti-inflammatories, we injected her subacromial space at the last visit with some improvement, but not back to normal. She had been doing pretty extensive PT over the last year and continues to do her exercises somewhat regularly without sufficient improvement in symptoms.    She has a history of TOS treated conservatively. Previous history of L shoulder impingement that improved 100% after an injection last year.        Past Medical/Surgical, Social and Family History:  I have reviewed and/or updated pertinent history as noted in the medical record including:  Past Medical History:   Diagnosis Date    Abnormal computed tomography angiography (CTA) of neck 2021    Abnormal gluteal crease     ADHD (attention deficit hyperactivity disorder) 2020    Anemia 2019    iron defiency anemia    Anxiety     Benign paroxysmal positional vertigo of right ear 2021    Bipolar disorder 2012    BRBPR (bright red blood per rectum) 2021    Dr. [facility] Santana at [facility]    Breast anomaly     Bruises easily     Cervical adenopathy     Cervical lymphadenopathy     Left side.    Change in bowel habit 2021     Dr. Nancy Santana at [facility]    Cholestasis during pregnancy in third trimester 2019    Chronic allergic rhinitis     Chronic pain disorder 2022    Fibro    Chronic recurrent major depressive disorder     In partial remission.    Cold intolerance     Constipation     CTS (carpal tunnel syndrome) 2019    Decreased sex drive     Depression     History of PPD    Diarrhea     Dizziness 08/06/2021    LEONELA, Temi Mcrae, Cardiologist office.    Dysmenorrhea 12/04/2019    Surgery: Laparoscopic assisted vaginal hysterectomy, Surgeon Dr. Hillary Nunn.    Dysphoric mood     Dyspnea on exertion 08/06/2021    LEONELA, Temi Mcrae, Cardiologist office.    Endometriosis 12/2019    Hysterectomy in 2019 cured    Environmental and seasonal allergies 09/21/2020    Epigastric abdominal pain 11/07/2018    [facility].    Epiploic appendagitis 01/10/2023    Extremity pain 2022    TOS diagnosed in August    Fat necrosis 07/29/2021    [facility].    Fatigue     Fibromyalgia, primary 2021    Fissure, anal     history    Folic acid deficiency     Gestational diabetes [id number]    I had it with my 1st pregnancy    H/O TB skin testing 02/20/2018    Negative result at [facility].    HA (headache)     Headache, tension-type     Hemorrhoid     History of gestational diabetes     with first baby    History of Holter monitoring 11/07/2019    24 hour.    History of kidney stones 2006/2009    History of miscarriage     History of vasectomy 2019    Spouse Vasectomy.    Hypocalcemia 03/2022    Hypoglycemia     Irritable bowel syndrome 1994    IBS with both constipation/diarrhea, followed by GI Dr. Moreland.    Joint pain     Fibro / TOS    Joint stiffness     Lightheadedness 08/06/2021    LEONELA, Temi Mcrae, Cardiologist office.    Low back pain     Lower back/middle    Low serum potassium level     Low serum vitamin B12     Lower extremity myoclonus 02/28/2022    ADMITTED TO [facility]    Malaise and fatigue 11/12/2019     Cardiologist Dr. Benji Ugalde.    Mass of left parotid gland 12/2021    Migraines 10/2019    Multinodular non-toxic goiter     Near syncope 08/06/2021    LEONELA, Temi Mcrae, Cardiologist office.    Neck pain     OCD (obsessive compulsive disorder)     Ovarian cyst 2010    Surgery removed.    Palpitations 10/28/2019    PVC's (premature ventricular contractions)     Rapid heart rate 11/12/2019    Cardiologist Dr. Benji Ugalde.    Rectal bleeding 06/02/2021    Dr. aNncy Santana at [facility].    Renal stones     RLS (restless legs syndrome)     RUQ abdominal mass [id number]    [facility].    Scoliosis 2003    Sleep disturbance     SOB (shortness of breath) 10/28/2019    Tachycardia 10/28/2019    Thrombocytopenia 03/2022    Thyroid nodule 10/28/2019    1 cm Left Submandibular node, [facility], Dr. Kevan HUTCHINSON Forwith.    Thyroid nodule 10/28/2019    0.3 cm Right side of neck, [facility], Dr. Kevan HUTCHINSON Forlatrell.    Thyromegaly 03/04/2016    Boarderline Thyromegaly, Findings via X-ray at [facility], ordering provider Dr. Nancy Santana.    Transaminitis 11/07/2018    [facility].    Trigger index finger of right hand 01/2022    Vaginal delivery 01/2019    Baby girl.     Past Surgical History:   Procedure Laterality Date    ADENOIDECTOMY  2006    CHOLECYSTECTOMY  2019    CHOLECYSTECTOMY WITH INTRAOPERATIVE CHOLANGIOGRAM N/A 11/09/2018    Procedure: Laparoscopic cholecystectomy;  Surgeon: Khanh Lassiter MD;  Location: [facility] OR;  Service: General    COLONOSCOPY N/A 08/2019    DILATATION AND CURETTAGE N/A 10/2015    MISCARRIAGE.    ENDOSCOPY N/A     HEMORRHOIDECTOMY N/A 06/16/2022    Procedure: HEMORRHOIDECTOMY;  Surgeon: Linda Sanchez MD;  Location: [facility] OR;  Service: General;  Laterality: N/A;    LAPAROSCOPIC ASSISTED VAGINAL HYSTERECTOMY Bilateral 12/04/2019    Procedure: LAPAROSCOPIC ASSISTED VAGINAL HYSTERECTOMY, BILATERAL SALPINGECTOMY;  Surgeon: Hillary Nunn MD;   Location: [facility] OR [facility];  Service: Obstetrics/Gynecology    OVARIAN CYST SURGERY  06/2009    Laparoscopic ovarian cyst resection, no other abdominal surgery.    TONSILLECTOMY AND ADENOIDECTOMY Bilateral 10/2006    WISDOM TOOTH EXTRACTION Bilateral      Social History     Occupational History    Occupation: teacher     Employer: [employer]   Tobacco Use    Smoking status: Never    Smokeless tobacco: Never    Tobacco comments:     Vape on oçcasion   Vaping Use    Vaping status: Never Used   Substance and Sexual Activity    Alcohol use: Yes     Alcohol/week: 2.0 - 4.0 standard drinks of alcohol     Types: 1 - 2 Glasses of wine, 1 - 2 Shots of liquor per week     Comment: Weekly    Drug use: Never    Sexual activity: Yes     Partners: Male     Birth control/protection: Hysterectomy     Comment: .          Allergies:   Allergies   Allergen Reactions    Hydrocodone Itching and Rash    Tramadol Nausea And Vomiting       Medications:   Home Medications:  Current Outpatient Medications on File Prior to Visit   Medication Sig    albuterol sulfate  (90 Base) MCG/ACT inhaler Inhale 2 puffs Every 4 (Four) Hours As Needed for Wheezing or Shortness of Air (cough).    atomoxetine (STRATTERA) 40 MG capsule     cetirizine (ZyrTEC) 10 MG chewable tablet tablet    Cetirizine HCl (ZYRTEC ALLERGY PO)     Coenzyme Q10 (CoQ-10) 100 MG capsule     desvenlafaxine (PRISTIQ) 100 MG 24 hr tablet Take 1 tablet by mouth Daily. FOR 30 DAYS    Famotidine (PEPCID AC PO)     Folate 400 MCG tablet     Magnesium 100 MG tablet     Magnesium Bisglycinate (Mag Glycinate) 100 MG tablet tablet    metoprolol succinate XL (TOPROL-XL) 25 MG 24 hr tablet Take 1 tablet by mouth once daily    NP Thyroid 30 MG tablet Take 1 tablet by mouth Daily.    Nurtec 75 MG dispersible tablet DISSOLVE 1 TABLET ON THE TONGUE 1 TIME AS NEEDED FOR MIGRAINE HEADACHE. MAY REPEAT DOSE 1 TIME AFTER 2 HOURS    pantoprazole (PROTONIX) 40 MG EC tablet  Take 1 tablet by mouth Every Morning.    PROGESTERONE  mg.    topiramate (TOPAMAX) 50 MG tablet TAKE 1 TABLET BY MOUTH ONCE DAILY AT NIGHT    traZODone (DESYREL) 100 MG tablet Take 1 tablet by mouth Every Night.     No current facility-administered medications on file prior to visit.         ROS:  ROS negative except as listed in the HPI.    Physical Exam:   34 y.o. female  Body mass index is 38.48 kg/m²., 98.5 kg (217 lb 3.2 oz)  Vitals:    10/24/24 0850   Temp: 98.4 °F (36.9 °C)     General: Alert, cooperative, appears well and in no observable distress. Appears stated age and BMI as listed above.  HEENT: Normocephalic, atraumatic on external visual inspection.  CV: No significant peripheral edema.  Respiratory: Normal respiratory effort.  Skin: Warm & well perfused; appropriate skin turgor.  Psych: Appropriate mood & affect.  Neuro: Gross sensation and motor intact in affected extremity/extremities.  Vascular: Peripheral pulses palpable in affected extremity/extremities.   Physical Exam      MSK Exam:  No obvious deformities or wounds.   No redness or significant swelling.  +Definitive painful arc.  +Impingement signs  +[place]'s  -Speed  Flexion 180  ER 60  IR lower thoracic spine  Strength is 4+/5 strength with isometric testing of the rotator cuff and painful     Left Shoulder  No deformity or wounds.  No redness or swelling.  No tenderness to palpation.  Full, painless AROM.  Cuff Strength 4+ to 5/5 with isometric testing of the rotator cuff.  Negative provocative testing.     Brief examination of the cervical spine did not reproduce any specific radicular pattern or symptoms.   Strength is reasonable and symmetric in the upper extremities.     Radiology:    No new images were needed at the visit today.     Images were taken prior to the visit today and were independently reviewed by me.  2 views of the right shoulder were taken on August 30, 2024 and are essentially normal     Procedure:    N/A      Misc. Data/Labs: N/A    Assessment & Plan:  Assessment & Plan  1. Right shoulder pain.  The AC joint appears normal on the x-ray, with no visible arthritic changes. The alignment of the humeral head is satisfactory. The patient reports improvement in shoulder movement but still experiences breakthrough pain, weakness, and discomfort, especially when lying on the right side or picking up objects. She has done physical therapy and continues to work on her exercises, has done OTC meds for pain without sufficient improvement. Physical examination reveals tenderness in the biceps tendon area and positive labral tests. An MRI arthrogram of the right shoulder has been ordered to further investigate potential labral pathology, biceps tendon issues, or AC joint involvement. The results will be communicated upon receipt. If significant findings are present, a referral to Dr. Marie or Dr. Verdin will be made for further evaluation and discussion of treatment options, including surgery if necessary.        ICD-10-CM ICD-9-CM   1. Tear of right glenoid labrum, subsequent encounter  S43.431D V58.89     840.8   2. Rotator cuff impingement syndrome of right shoulder  M75.41 726.10     No orders of the defined types were placed in this encounter.    Orders Placed This Encounter   Procedures    FL Contrast Injection CT / MRI    MRI shoulder right arthrogram         Continue with activity modifications as needed/discussed.  Continue ICE and/or HEAT PRN.  Recommend to continue activity as tolerated, focus on stretching and strengthening of the joint.    Focus on posture and body mechanics to improve/prevent symptoms.   Patient encouraged to call with questions or concerns prior to follow up.  Will discuss with attending as needed.  Consider additional referrals, work up and/or advanced imaging as indicated or if patient fails to respond to conservative care.    Return for follow up after the MRI.  Patient or patient  representative verbalized consent for the use of Ambient Listening during the visit with  LEONELA Head for chart documentation. 10/24/2024  10:48 EDT    LEONELA Bran    Dictation software was used to complete a portion or all of this note.

## 2024-10-25 ENCOUNTER — APPOINTMENT (OUTPATIENT)
Dept: CT IMAGING | Facility: HOSPITAL | Age: 34
End: 2024-10-25
Payer: COMMERCIAL

## 2024-10-25 ENCOUNTER — HOSPITAL ENCOUNTER (EMERGENCY)
Facility: HOSPITAL | Age: 34
Discharge: HOME | End: 2024-10-25
Attending: EMERGENCY MEDICINE
Payer: COMMERCIAL

## 2024-10-25 VITALS
WEIGHT: 218 LBS | OXYGEN SATURATION: 100 % | BODY MASS INDEX: 38.62 KG/M2 | HEART RATE: 89 BPM | TEMPERATURE: 97.5 F | DIASTOLIC BLOOD PRESSURE: 80 MMHG | SYSTOLIC BLOOD PRESSURE: 125 MMHG | HEIGHT: 63 IN | RESPIRATION RATE: 16 BRPM

## 2024-10-25 DIAGNOSIS — N20.0 KIDNEY STONE: Primary | ICD-10-CM

## 2024-10-25 LAB
ALBUMIN SERPL-MCNC: 4.2 G/DL (ref 3.5–5.2)
ALBUMIN/GLOB SERPL: 1.9 G/DL
ALP SERPL-CCNC: 80 U/L (ref 39–117)
ALT SERPL W P-5'-P-CCNC: 29 U/L (ref 1–33)
ANION GAP SERPL CALCULATED.3IONS-SCNC: 7.9 MMOL/L (ref 5–15)
AST SERPL-CCNC: 23 U/L (ref 1–32)
BASOPHILS # BLD AUTO: 0.03 10*3/MM3 (ref 0–0.2)
BASOPHILS NFR BLD AUTO: 0.4 % (ref 0–1.5)
BILIRUB SERPL-MCNC: 0.2 MG/DL (ref 0–1.2)
BILIRUB UR QL STRIP: NEGATIVE
BUN SERPL-MCNC: 12 MG/DL (ref 6–20)
BUN/CREAT SERPL: 11 (ref 7–25)
CALCIUM SPEC-SCNC: 8.8 MG/DL (ref 8.6–10.5)
CHLORIDE SERPL-SCNC: 108 MMOL/L (ref 98–107)
CLARITY UR: CLEAR
CO2 SERPL-SCNC: 25.1 MMOL/L (ref 22–29)
COLOR UR: YELLOW
CREAT SERPL-MCNC: 1.09 MG/DL (ref 0.57–1)
DEPRECATED RDW RBC AUTO: 41.8 FL (ref 37–54)
EGFRCR SERPLBLD CKD-EPI 2021: 68.1 ML/MIN/1.73
EOSINOPHIL # BLD AUTO: 0.07 10*3/MM3 (ref 0–0.4)
EOSINOPHIL NFR BLD AUTO: 1 % (ref 0.3–6.2)
ERYTHROCYTE [DISTWIDTH] IN BLOOD BY AUTOMATED COUNT: 12.3 % (ref 12.3–15.4)
GLOBULIN UR ELPH-MCNC: 2.2 GM/DL
GLUCOSE SERPL-MCNC: 112 MG/DL (ref 65–99)
GLUCOSE UR STRIP-MCNC: NEGATIVE MG/DL
HCT VFR BLD AUTO: 39 % (ref 34–46.6)
HGB BLD-MCNC: 13 G/DL (ref 12–15.9)
HGB UR QL STRIP.AUTO: ABNORMAL
IMM GRANULOCYTES # BLD AUTO: 0 10*3/MM3 (ref 0–0.05)
IMM GRANULOCYTES NFR BLD AUTO: 0 % (ref 0–0.5)
KETONES UR QL STRIP: ABNORMAL
LEUKOCYTE ESTERASE UR QL STRIP.AUTO: NEGATIVE
LYMPHOCYTES # BLD AUTO: 1.66 10*3/MM3 (ref 0.7–3.1)
LYMPHOCYTES NFR BLD AUTO: 23.4 % (ref 19.6–45.3)
MCH RBC QN AUTO: 30.5 PG (ref 26.6–33)
MCHC RBC AUTO-ENTMCNC: 33.3 G/DL (ref 31.5–35.7)
MCV RBC AUTO: 91.5 FL (ref 79–97)
MONOCYTES # BLD AUTO: 0.44 10*3/MM3 (ref 0.1–0.9)
MONOCYTES NFR BLD AUTO: 6.2 % (ref 5–12)
NEUTROPHILS NFR BLD AUTO: 4.88 10*3/MM3 (ref 1.7–7)
NEUTROPHILS NFR BLD AUTO: 69 % (ref 42.7–76)
NITRITE UR QL STRIP: NEGATIVE
PH UR STRIP.AUTO: 6 [PH] (ref 5–8)
PLATELET # BLD AUTO: 165 10*3/MM3 (ref 140–450)
PMV BLD AUTO: 9.2 FL (ref 6–12)
POTASSIUM SERPL-SCNC: 3.5 MMOL/L (ref 3.5–5.2)
PROT SERPL-MCNC: 6.4 G/DL (ref 6–8.5)
PROT UR QL STRIP: NEGATIVE
RBC # BLD AUTO: 4.26 10*6/MM3 (ref 3.77–5.28)
SODIUM SERPL-SCNC: 141 MMOL/L (ref 136–145)
SP GR UR STRIP: >=1.03 (ref 1–1.03)
UROBILINOGEN UR QL STRIP: ABNORMAL
WBC NRBC COR # BLD AUTO: 7.08 10*3/MM3 (ref 3.4–10.8)

## 2024-10-25 PROCEDURE — 74176 CT ABD & PELVIS W/O CONTRAST: CPT

## 2024-10-25 PROCEDURE — 25010000002 KETOROLAC TROMETHAMINE PER 15 MG: Performed by: PHYSICIAN ASSISTANT

## 2024-10-25 PROCEDURE — 99284 EMERGENCY DEPT VISIT MOD MDM: CPT | Performed by: PHYSICIAN ASSISTANT

## 2024-10-25 PROCEDURE — 81003 URINALYSIS AUTO W/O SCOPE: CPT | Performed by: PHYSICIAN ASSISTANT

## 2024-10-25 PROCEDURE — 96374 THER/PROPH/DIAG INJ IV PUSH: CPT

## 2024-10-25 PROCEDURE — 80053 COMPREHEN METABOLIC PANEL: CPT | Performed by: PHYSICIAN ASSISTANT

## 2024-10-25 PROCEDURE — 96375 TX/PRO/DX INJ NEW DRUG ADDON: CPT

## 2024-10-25 PROCEDURE — 25010000002 ONDANSETRON PER 1 MG: Performed by: PHYSICIAN ASSISTANT

## 2024-10-25 PROCEDURE — 85025 COMPLETE CBC W/AUTO DIFF WBC: CPT | Performed by: PHYSICIAN ASSISTANT

## 2024-10-25 PROCEDURE — 99284 EMERGENCY DEPT VISIT MOD MDM: CPT

## 2024-10-25 RX ORDER — OXYCODONE AND ACETAMINOPHEN 5; 325 MG/1; MG/1
1 TABLET ORAL EVERY 6 HOURS PRN
Qty: 10 TABLET | Refills: 0 | Status: SHIPPED | OUTPATIENT
Start: 2024-10-25

## 2024-10-25 RX ORDER — ONDANSETRON 2 MG/ML
4 INJECTION INTRAMUSCULAR; INTRAVENOUS ONCE
Status: COMPLETED | OUTPATIENT
Start: 2024-10-25 | End: 2024-10-25

## 2024-10-25 RX ORDER — KETOROLAC TROMETHAMINE 15 MG/ML
15 INJECTION, SOLUTION INTRAMUSCULAR; INTRAVENOUS ONCE
Status: COMPLETED | OUTPATIENT
Start: 2024-10-25 | End: 2024-10-25

## 2024-10-25 RX ORDER — KETOROLAC TROMETHAMINE 10 MG/1
10 TABLET, FILM COATED ORAL EVERY 6 HOURS PRN
Qty: 15 TABLET | Refills: 0 | Status: SHIPPED | OUTPATIENT
Start: 2024-10-25

## 2024-10-25 RX ORDER — ONDANSETRON 4 MG/1
4 TABLET, ORALLY DISINTEGRATING ORAL 4 TIMES DAILY
Qty: 12 TABLET | Refills: 0 | Status: SHIPPED | OUTPATIENT
Start: 2024-10-25

## 2024-10-25 RX ADMIN — KETOROLAC TROMETHAMINE 15 MG: 15 INJECTION, SOLUTION INTRAMUSCULAR; INTRAVENOUS at 14:40

## 2024-10-25 RX ADMIN — ONDANSETRON 4 MG: 2 INJECTION, SOLUTION INTRAMUSCULAR; INTRAVENOUS at 14:40

## 2024-10-25 NOTE — FSED PROVIDER NOTE
Subjective   History of Present Illness  Patient is a 34-year-old female presents emergency room with a left lower groin pain.  Started during the night and she will sometimes feel it rating into her vagina.  She had had some pink in her urine a couple days ago.  She never had actual back/flank pain but has passed kidney stones in the past.  She is concerned she is having a kidney stone.  She has increased urinary frequency.  She has had urine without vomiting.  She has some chronic diarrhea since she had her gallbladder taken out but says she has not noticed it worse than usual.  She is also had a hysterectomy in the past.  She denies any vaginal itching, burning or discharge.  Says she does not have risk factors for an STD.      Review of Systems   HENT: Negative.     Eyes: Negative.    Respiratory: Negative.     Cardiovascular: Negative.    Gastrointestinal:  Positive for abdominal pain, diarrhea (Chronic) and nausea. Negative for constipation and vomiting.   Genitourinary: Negative.    Musculoskeletal: Negative.    Skin: Negative.    Neurological: Negative.    Psychiatric/Behavioral: Negative.     All other systems reviewed and are negative.      Past Medical History:   Diagnosis Date    Abnormal computed tomography angiography (CTA) of neck 12/21/2021    Abnormal gluteal crease     ADHD (attention deficit hyperactivity disorder) 2020    Anemia 2019    iron defiency anemia    Anxiety     Benign paroxysmal positional vertigo of right ear 12/01/2021    Bipolar disorder 2012    BRBPR (bright red blood per rectum) 06/02/2021    [facility]    Breast anomaly     Bruises easily     Cervical adenopathy     Cervical lymphadenopathy     Left side.    Change in bowel habit 06/02/2021    Dr. Nancy Santana at [facility]    Cholestasis during pregnancy in third trimester 2019    Chronic allergic rhinitis     Chronic pain disorder 2022    Fibro    Chronic recurrent major depressive disorder     In partial remission.     Cold intolerance     Constipation     CTS (carpal tunnel syndrome) 2019    Decreased sex drive     Depression     History of PPD    Diarrhea     Dizziness 08/06/2021    APRN, Temi Mcrae, Cardiologist office.    Dysmenorrhea 12/04/2019    Surgery: Laparoscopic assisted vaginal hysterectomy, Surgeon Dr. Hillary Nunn.    Dysphoric mood     Dyspnea on exertion 08/06/2021    APRN, Temi Mcrae, Cardiologist office.    Endometriosis 12/2019    Hysterectomy in 2019 cured    Environmental and seasonal allergies 09/21/2020    Epigastric abdominal pain 11/07/2018    [facility].    Epiploic appendagitis 01/10/2023    Extremity pain 2022    TOS diagnosed in August    Fat necrosis 07/29/2021    [facility].    Fatigue     Fibromyalgia, primary 2021    Fissure, anal     history    Folic acid deficiency     Gestational diabetes [id number]    I had it with my 1st pregnancy    H/O TB skin testing 02/20/2018    Negative result at [facility].    HA (headache)     Headache, tension-type     Hemorrhoid     History of gestational diabetes     with first baby    History of Holter monitoring 11/07/2019    24 hour.    History of kidney stones 2006/2009    History of miscarriage     History of vasectomy 2019    Spouse Vasectomy.    Hypocalcemia 03/2022    Hypoglycemia     Irritable bowel syndrome 1994    IBS with both constipation/diarrhea, followed by GI Dr. Moreland.    Joint pain     Fibro / TOS    Joint stiffness     Lightheadedness 08/06/2021    APRN, Temi Mcrae, Cardiologist office.    Low back pain     Lower back/middle    Low serum potassium level     Low serum vitamin B12     Lower extremity myoclonus 02/28/2022    ADMITTED TO [facility]    Malaise and fatigue 11/12/2019    Cardiologist Dr. Benji Ugalde.    Mass of left parotid gland 12/2021    Migraines 10/2019    Multinodular non-toxic goiter     Near syncope 08/06/2021    APRPINA, Temi Mcrae, Cardiologist office.    Neck pain      OCD (obsessive compulsive disorder)     Ovarian cyst 2010    Surgery removed.    Palpitations 10/28/2019    PVC's (premature ventricular contractions)     Rapid heart rate 11/12/2019    Cardiologist Dr. Benji Ugalde.    Rectal bleeding 06/02/2021    Dr. Nancy Santana at [facility].    Renal stones     RLS (restless legs syndrome)     RUQ abdominal mass [id number]    [facility].    Scoliosis 2003    Sleep disturbance     SOB (shortness of breath) 10/28/2019    Tachycardia 10/28/2019    Thrombocytopenia 03/2022    Thyroid nodule 10/28/2019    1 cm Left Submandibular node, [facility], Dr. Kevan Hoffman.    Thyroid nodule 10/28/2019    0.3 cm Right side of neck, [facility], Dr. Kevan Hoffman.    Thyromegaly 03/04/2016    Boarderline Thyromegaly, Findings via X-ray at [facility], ordering provider Dr. Nancy Santana.    Transaminitis 11/07/2018    [facility].    Trigger index finger of right hand 01/2022    Vaginal delivery 01/2019    Baby girl.       Allergies   Allergen Reactions    Hydrocodone Itching and Rash    Tramadol Nausea And Vomiting       Past Surgical History:   Procedure Laterality Date    ADENOIDECTOMY  2006    CHOLECYSTECTOMY  2019    CHOLECYSTECTOMY WITH INTRAOPERATIVE CHOLANGIOGRAM N/A 11/09/2018    Procedure: Laparoscopic cholecystectomy;  Surgeon: Khanh Lassiter MD;  Location: [facility] OR;  Service: General    COLONOSCOPY N/A 08/2019    DILATATION AND CURETTAGE N/A 10/2015    MISCARRIAGE.    ENDOSCOPY N/A     HEMORRHOIDECTOMY N/A 06/16/2022    Procedure: HEMORRHOIDECTOMY;  Surgeon: Linda Sanchez MD;  Location: [facility] OR;  Service: General;  Laterality: N/A;    LAPAROSCOPIC ASSISTED VAGINAL HYSTERECTOMY Bilateral 12/04/2019    Procedure: LAPAROSCOPIC ASSISTED VAGINAL HYSTERECTOMY, BILATERAL SALPINGECTOMY;  Surgeon: Hillary Nunn MD;  Location: [facility], [place];  Service: Obstetrics/Gynecology    OVARIAN CYST SURGERY  06/2009    Laparoscopic ovarian cyst resection,  no other abdominal surgery.    TONSILLECTOMY AND ADENOIDECTOMY Bilateral 10/2006    WISDOM TOOTH EXTRACTION Bilateral        Family History   Problem Relation Age of Onset    COPD Mother     Depression Mother     Hypertension Mother     Heart disease Mother     Hyperlipidemia Mother     Asthma Father     COPD Father     Heart disease Father     Alcohol abuse Father     Hearing loss Father     Cancer Sister     Miscarriages / Stillbirths Sister         1 miscarriage    Depression Sister     Depression Sister     Cancer Sister     Miscarriages / Stillbirths Sister         Miscarriage & stillbirth    Cancer Maternal Grandmother         Breast    Diabetes Maternal Grandmother     Depression Maternal Grandmother     Breast cancer Maternal Grandmother     Cancer Paternal Grandmother         Skin (ear)    Bleeding Disorder Paternal Grandmother         Factor V    COPD Paternal Grandmother     Asthma Paternal Grandmother     Osteoarthritis Paternal Grandmother     Arthritis Paternal Grandmother     Clotting disorder Paternal Grandmother         Factor V    Osteoporosis Paternal Grandmother     Alcohol abuse Paternal Grandfather     Malig Hyperthermia Neg Hx     Colon cancer Neg Hx        Social History     Socioeconomic History    Marital status:    Tobacco Use    Smoking status: Never    Smokeless tobacco: Never    Tobacco comments:     Vape on oçcasion   Vaping Use    Vaping status: Never Used   Substance and Sexual Activity    Alcohol use: Yes     Alcohol/week: 2.0 - 4.0 standard drinks of alcohol     Types: 1 - 2 Glasses of wine, 1 - 2 Shots of liquor per week     Comment: Weekly    Drug use: Never    Sexual activity: Yes     Partners: Male     Birth control/protection: Hysterectomy     Comment: .           Objective   Physical Exam  Vitals reviewed.   Constitutional:       Appearance: Normal appearance.   Cardiovascular:      Rate and Rhythm: Regular rhythm.      Pulses: Normal pulses.   Pulmonary:       Effort: Pulmonary effort is normal.   Abdominal:      General: Abdomen is flat. Bowel sounds are normal. There is no distension.      Tenderness: There is no abdominal tenderness. There is no right CVA tenderness, left CVA tenderness or guarding.   Musculoskeletal:         General: Normal range of motion.      Cervical back: Normal range of motion.   Skin:     General: Skin is warm and dry.   Neurological:      Mental Status: She is alert.      Gait: Gait normal.   Psychiatric:         Mood and Affect: Mood normal.         Behavior: Behavior normal.         Procedures           ED Course                                           Medical Decision Making  Patient says she is feeling better with the Toradol and Zofran.  Urine does show blood in it and CT shows a 5 mm distal ureteral stone.  No leukocyte esterase or nitrite suggestive of infection.  [facility] does [facility] is [facility] but it has been years since she is gone and she will follow-up with them.  We gave her Percocet which is Alvin appropriate.  Also some p.o. Toradol and Zofran.  Strict return precautions.  She is out of work this week because of fall break does not need a work note.  Medically cleared at this time diagnosis of kidney stone.    Problems Addressed:  Kidney stone: complicated acute illness or injury    Amount and/or Complexity of Data Reviewed  Labs: ordered.  Radiology: ordered.    Risk  Prescription drug management.        Final diagnoses:   Kidney stone       ED Disposition  ED Disposition       ED Disposition   Discharge    Condition   Stable    Comment   --               [facility]  [address] [place] [id number] [phone]  In 1 week           Medication List        New Prescriptions      ketorolac 10 MG tablet  Commonly known as: TORADOL  Take 1 tablet by mouth Every 6 (Six) Hours As Needed for Moderate Pain.     ondansetron ODT 4 MG disintegrating tablet  Commonly known as: ZOFRAN-ODT  Place 1 tablet on  the tongue 4 (Four) Times a Day.     oxyCODONE-acetaminophen 5-325 MG per tablet  Commonly known as: PERCOCET  Take 1 tablet by mouth Every 6 (Six) Hours As Needed for Moderate Pain for up to 10 doses.               Where to Get Your Medications        These medications were sent to [address], [place] - [address] [facility] - [phone]  - [phone]   [address] [facility] [address]      Phone: [phone]   ketorolac 10 MG tablet  ondansetron ODT 4 MG disintegrating tablet  oxyCODONE-acetaminophen 5-325 MG per tablet

## 2024-10-28 DIAGNOSIS — G43.109 MIGRAINE WITH AURA AND WITHOUT STATUS MIGRAINOSUS, NOT INTRACTABLE: ICD-10-CM

## 2024-10-28 RX ORDER — RIMEGEPANT SULFATE 75 MG/75MG
1 TABLET, ORALLY DISINTEGRATING ORAL DAILY
Qty: 8 TABLET | Refills: 1 | Status: SHIPPED | OUTPATIENT
Start: 2024-10-28

## 2024-10-31 ENCOUNTER — HOSPITAL ENCOUNTER (OUTPATIENT)
Facility: HOSPITAL | Age: 34
Setting detail: HOSPITAL OUTPATIENT SURGERY
Discharge: HOME | End: 2024-10-31
Attending: UROLOGY | Admitting: UROLOGY
Payer: COMMERCIAL

## 2024-10-31 ENCOUNTER — APPOINTMENT (OUTPATIENT)
Dept: GENERAL RADIOLOGY | Facility: HOSPITAL | Age: 34
End: 2024-10-31
Payer: COMMERCIAL

## 2024-10-31 ENCOUNTER — ANESTHESIA EVENT (OUTPATIENT)
Dept: PERIOP | Facility: HOSPITAL | Age: 34
End: 2024-10-31
Payer: COMMERCIAL

## 2024-10-31 ENCOUNTER — ANESTHESIA (OUTPATIENT)
Dept: PERIOP | Facility: HOSPITAL | Age: 34
End: 2024-10-31
Payer: COMMERCIAL

## 2024-10-31 VITALS
SYSTOLIC BLOOD PRESSURE: 114 MMHG | HEART RATE: 71 BPM | DIASTOLIC BLOOD PRESSURE: 89 MMHG | RESPIRATION RATE: 16 BRPM | TEMPERATURE: 98.3 F | OXYGEN SATURATION: 100 %

## 2024-10-31 DIAGNOSIS — N20.1 URETERAL CALCULUS: Primary | ICD-10-CM

## 2024-10-31 DIAGNOSIS — N20.1 LEFT URETERAL CALCULUS: ICD-10-CM

## 2024-10-31 PROCEDURE — 25810000003 LACTATED RINGERS PER 1000 ML: Performed by: STUDENT IN AN ORGANIZED HEALTH CARE EDUCATION/TRAINING PROGRAM

## 2024-10-31 PROCEDURE — 25010000002 LIDOCAINE 2% SOLUTION: Performed by: STUDENT IN AN ORGANIZED HEALTH CARE EDUCATION/TRAINING PROGRAM

## 2024-10-31 PROCEDURE — 25010000002 PROPOFOL 10 MG/ML EMULSION: Performed by: STUDENT IN AN ORGANIZED HEALTH CARE EDUCATION/TRAINING PROGRAM

## 2024-10-31 PROCEDURE — 25010000002 HYDROMORPHONE PER 4 MG: Performed by: STUDENT IN AN ORGANIZED HEALTH CARE EDUCATION/TRAINING PROGRAM

## 2024-10-31 PROCEDURE — 76000 FLUOROSCOPY <1 HR PHYS/QHP: CPT

## 2024-10-31 PROCEDURE — 25010000002 FENTANYL CITRATE (PF) 50 MCG/ML SOLUTION: Performed by: STUDENT IN AN ORGANIZED HEALTH CARE EDUCATION/TRAINING PROGRAM

## 2024-10-31 PROCEDURE — 82365 CALCULUS SPECTROSCOPY: CPT | Performed by: UROLOGY

## 2024-10-31 PROCEDURE — 25010000002 CEFAZOLIN PER 500 MG: Performed by: UROLOGY

## 2024-10-31 PROCEDURE — 25010000002 DROPERIDOL PER 5 MG: Performed by: STUDENT IN AN ORGANIZED HEALTH CARE EDUCATION/TRAINING PROGRAM

## 2024-10-31 PROCEDURE — 25010000002 ONDANSETRON PER 1 MG: Performed by: STUDENT IN AN ORGANIZED HEALTH CARE EDUCATION/TRAINING PROGRAM

## 2024-10-31 PROCEDURE — C1889 IMPLANT/INSERT DEVICE, NOC: HCPCS | Performed by: UROLOGY

## 2024-10-31 PROCEDURE — 25010000002 DEXAMETHASONE SODIUM PHOSPHATE 20 MG/5ML SOLUTION: Performed by: STUDENT IN AN ORGANIZED HEALTH CARE EDUCATION/TRAINING PROGRAM

## 2024-10-31 PROCEDURE — C2617 STENT, NON-COR, TEM W/O DEL: HCPCS | Performed by: UROLOGY

## 2024-10-31 DEVICE — URETERAL STENT
Type: IMPLANTABLE DEVICE | Site: URETER | Status: FUNCTIONAL
Brand: POLARIS™ ULTRA

## 2024-10-31 RX ORDER — TAMSULOSIN HYDROCHLORIDE 0.4 MG/1
1 CAPSULE ORAL DAILY
Qty: 14 CAPSULE | Refills: 0 | Status: SHIPPED | OUTPATIENT
Start: 2024-10-31 | End: 2024-11-14

## 2024-10-31 RX ORDER — SODIUM CHLORIDE 0.9 % (FLUSH) 0.9 %
3-10 SYRINGE (ML) INJECTION AS NEEDED
Status: DISCONTINUED | OUTPATIENT
Start: 2024-10-31 | End: 2024-10-31 | Stop reason: HOSPADM

## 2024-10-31 RX ORDER — DROPERIDOL 2.5 MG/ML
0.62 INJECTION, SOLUTION INTRAMUSCULAR; INTRAVENOUS ONCE AS NEEDED
Status: DISCONTINUED | OUTPATIENT
Start: 2024-10-31 | End: 2024-10-31 | Stop reason: HOSPADM

## 2024-10-31 RX ORDER — OXYCODONE AND ACETAMINOPHEN 7.5; 325 MG/1; MG/1
1 TABLET ORAL EVERY 4 HOURS PRN
Qty: 30 TABLET | Refills: 0 | Status: SHIPPED | OUTPATIENT
Start: 2024-10-31 | End: 2024-11-14

## 2024-10-31 RX ORDER — NALOXONE HCL 0.4 MG/ML
0.4 VIAL (ML) INJECTION AS NEEDED
Status: DISCONTINUED | OUTPATIENT
Start: 2024-10-31 | End: 2024-10-31 | Stop reason: HOSPADM

## 2024-10-31 RX ORDER — LIDOCAINE HYDROCHLORIDE 20 MG/ML
INJECTION, SOLUTION INFILTRATION; PERINEURAL AS NEEDED
Status: DISCONTINUED | OUTPATIENT
Start: 2024-10-31 | End: 2024-10-31 | Stop reason: SURG

## 2024-10-31 RX ORDER — SODIUM CHLORIDE, SODIUM LACTATE, POTASSIUM CHLORIDE, CALCIUM CHLORIDE 600; 310; 30; 20 MG/100ML; MG/100ML; MG/100ML; MG/100ML
INJECTION, SOLUTION INTRAVENOUS CONTINUOUS PRN
Status: DISCONTINUED | OUTPATIENT
Start: 2024-10-31 | End: 2024-10-31 | Stop reason: SURG

## 2024-10-31 RX ORDER — TIRZEPATIDE 2.5 MG/.5ML
2.5 INJECTION, SOLUTION SUBCUTANEOUS WEEKLY
COMMUNITY

## 2024-10-31 RX ORDER — DIPHENHYDRAMINE HYDROCHLORIDE 50 MG/ML
12.5 INJECTION INTRAMUSCULAR; INTRAVENOUS
Status: DISCONTINUED | OUTPATIENT
Start: 2024-10-31 | End: 2024-10-31 | Stop reason: HOSPADM

## 2024-10-31 RX ORDER — CEPHALEXIN 500 MG/1
500 CAPSULE ORAL 3 TIMES DAILY
Qty: 21 CAPSULE | Refills: 0 | Status: SHIPPED | OUTPATIENT
Start: 2024-10-31 | End: 2024-11-07

## 2024-10-31 RX ORDER — DEXAMETHASONE SODIUM PHOSPHATE 4 MG/ML
INJECTION, SOLUTION INTRA-ARTICULAR; INTRALESIONAL; INTRAMUSCULAR; INTRAVENOUS; SOFT TISSUE AS NEEDED
Status: DISCONTINUED | OUTPATIENT
Start: 2024-10-31 | End: 2024-10-31 | Stop reason: SURG

## 2024-10-31 RX ORDER — SODIUM CHLORIDE, SODIUM LACTATE, POTASSIUM CHLORIDE, CALCIUM CHLORIDE 600; 310; 30; 20 MG/100ML; MG/100ML; MG/100ML; MG/100ML
9 INJECTION, SOLUTION INTRAVENOUS CONTINUOUS
Status: DISCONTINUED | OUTPATIENT
Start: 2024-10-31 | End: 2024-10-31 | Stop reason: HOSPADM

## 2024-10-31 RX ORDER — ONDANSETRON 4 MG/1
4 TABLET, ORALLY DISINTEGRATING ORAL ONCE AS NEEDED
Status: DISCONTINUED | OUTPATIENT
Start: 2024-10-31 | End: 2024-10-31 | Stop reason: HOSPADM

## 2024-10-31 RX ORDER — FENTANYL CITRATE 50 UG/ML
25 INJECTION, SOLUTION INTRAMUSCULAR; INTRAVENOUS
Status: DISCONTINUED | OUTPATIENT
Start: 2024-10-31 | End: 2024-10-31 | Stop reason: HOSPADM

## 2024-10-31 RX ORDER — OXYCODONE HYDROCHLORIDE 5 MG/1
5 TABLET ORAL ONCE AS NEEDED
Status: COMPLETED | OUTPATIENT
Start: 2024-10-31 | End: 2024-10-31

## 2024-10-31 RX ORDER — DIPHENHYDRAMINE HYDROCHLORIDE 50 MG/ML
12.5 INJECTION INTRAMUSCULAR; INTRAVENOUS ONCE AS NEEDED
Status: DISCONTINUED | OUTPATIENT
Start: 2024-10-31 | End: 2024-10-31 | Stop reason: HOSPADM

## 2024-10-31 RX ORDER — ENALAPRILAT 1.25 MG/ML
2.5 INJECTION INTRAVENOUS ONCE AS NEEDED
Status: DISCONTINUED | OUTPATIENT
Start: 2024-10-31 | End: 2024-10-31 | Stop reason: HOSPADM

## 2024-10-31 RX ORDER — SODIUM CHLORIDE 0.9 % (FLUSH) 0.9 %
3 SYRINGE (ML) INJECTION EVERY 12 HOURS SCHEDULED
Status: DISCONTINUED | OUTPATIENT
Start: 2024-10-31 | End: 2024-10-31 | Stop reason: HOSPADM

## 2024-10-31 RX ORDER — HYDROMORPHONE HYDROCHLORIDE 1 MG/ML
0.5 INJECTION, SOLUTION INTRAMUSCULAR; INTRAVENOUS; SUBCUTANEOUS
Status: DISCONTINUED | OUTPATIENT
Start: 2024-10-31 | End: 2024-10-31 | Stop reason: HOSPADM

## 2024-10-31 RX ORDER — FENTANYL CITRATE 50 UG/ML
50 INJECTION, SOLUTION INTRAMUSCULAR; INTRAVENOUS ONCE AS NEEDED
Status: DISCONTINUED | OUTPATIENT
Start: 2024-10-31 | End: 2024-10-31 | Stop reason: HOSPADM

## 2024-10-31 RX ORDER — ONDANSETRON 2 MG/ML
INJECTION INTRAMUSCULAR; INTRAVENOUS AS NEEDED
Status: DISCONTINUED | OUTPATIENT
Start: 2024-10-31 | End: 2024-10-31 | Stop reason: SURG

## 2024-10-31 RX ORDER — ONDANSETRON 2 MG/ML
4 INJECTION INTRAMUSCULAR; INTRAVENOUS ONCE AS NEEDED
Status: DISCONTINUED | OUTPATIENT
Start: 2024-10-31 | End: 2024-10-31 | Stop reason: HOSPADM

## 2024-10-31 RX ORDER — OXYCODONE AND ACETAMINOPHEN 7.5; 325 MG/1; MG/1
1 TABLET ORAL ONCE AS NEEDED
Status: DISCONTINUED | OUTPATIENT
Start: 2024-10-31 | End: 2024-10-31 | Stop reason: HOSPADM

## 2024-10-31 RX ORDER — IPRATROPIUM BROMIDE AND ALBUTEROL SULFATE 2.5; .5 MG/3ML; MG/3ML
3 SOLUTION RESPIRATORY (INHALATION) ONCE AS NEEDED
Status: DISCONTINUED | OUTPATIENT
Start: 2024-10-31 | End: 2024-10-31 | Stop reason: HOSPADM

## 2024-10-31 RX ORDER — MIDAZOLAM HYDROCHLORIDE 1 MG/ML
2 INJECTION, SOLUTION INTRAMUSCULAR; INTRAVENOUS
Status: DISCONTINUED | OUTPATIENT
Start: 2024-10-31 | End: 2024-10-31 | Stop reason: HOSPADM

## 2024-10-31 RX ORDER — ROCURONIUM BROMIDE 10 MG/ML
INJECTION, SOLUTION INTRAVENOUS AS NEEDED
Status: DISCONTINUED | OUTPATIENT
Start: 2024-10-31 | End: 2024-10-31 | Stop reason: SURG

## 2024-10-31 RX ORDER — OXYCODONE HYDROCHLORIDE 5 MG/1
10 TABLET ORAL EVERY 4 HOURS PRN
Status: DISCONTINUED | OUTPATIENT
Start: 2024-10-31 | End: 2024-10-31 | Stop reason: HOSPADM

## 2024-10-31 RX ORDER — MAGNESIUM HYDROXIDE 1200 MG/15ML
LIQUID ORAL AS NEEDED
Status: DISCONTINUED | OUTPATIENT
Start: 2024-10-31 | End: 2024-10-31 | Stop reason: HOSPADM

## 2024-10-31 RX ORDER — DROPERIDOL 2.5 MG/ML
INJECTION, SOLUTION INTRAMUSCULAR; INTRAVENOUS AS NEEDED
Status: DISCONTINUED | OUTPATIENT
Start: 2024-10-31 | End: 2024-10-31 | Stop reason: SURG

## 2024-10-31 RX ORDER — FENTANYL CITRATE 50 UG/ML
INJECTION, SOLUTION INTRAMUSCULAR; INTRAVENOUS AS NEEDED
Status: DISCONTINUED | OUTPATIENT
Start: 2024-10-31 | End: 2024-10-31 | Stop reason: SURG

## 2024-10-31 RX ORDER — LIDOCAINE HYDROCHLORIDE 10 MG/ML
0.5 INJECTION, SOLUTION INFILTRATION; PERINEURAL ONCE AS NEEDED
Status: DISCONTINUED | OUTPATIENT
Start: 2024-10-31 | End: 2024-10-31 | Stop reason: HOSPADM

## 2024-10-31 RX ORDER — FAMOTIDINE 10 MG/ML
20 INJECTION, SOLUTION INTRAVENOUS ONCE
Status: COMPLETED | OUTPATIENT
Start: 2024-10-31 | End: 2024-10-31

## 2024-10-31 RX ORDER — HYDRALAZINE HYDROCHLORIDE 20 MG/ML
5 INJECTION INTRAMUSCULAR; INTRAVENOUS
Status: DISCONTINUED | OUTPATIENT
Start: 2024-10-31 | End: 2024-10-31 | Stop reason: HOSPADM

## 2024-10-31 RX ORDER — PROPOFOL 10 MG/ML
VIAL (ML) INTRAVENOUS AS NEEDED
Status: DISCONTINUED | OUTPATIENT
Start: 2024-10-31 | End: 2024-10-31 | Stop reason: SURG

## 2024-10-31 RX ORDER — LABETALOL HYDROCHLORIDE 5 MG/ML
5 INJECTION, SOLUTION INTRAVENOUS
Status: DISCONTINUED | OUTPATIENT
Start: 2024-10-31 | End: 2024-10-31 | Stop reason: HOSPADM

## 2024-10-31 RX ADMIN — DEXAMETHASONE SODIUM PHOSPHATE 10 MG: 4 INJECTION, SOLUTION INTRAMUSCULAR; INTRAVENOUS at 18:50

## 2024-10-31 RX ADMIN — PROPOFOL 180 MG: 10 INJECTION, EMULSION INTRAVENOUS at 18:45

## 2024-10-31 RX ADMIN — DROPERIDOL 0.62 MG: 2.5 INJECTION, SOLUTION INTRAMUSCULAR; INTRAVENOUS at 18:50

## 2024-10-31 RX ADMIN — FENTANYL CITRATE 50 MCG: 50 INJECTION, SOLUTION INTRAMUSCULAR; INTRAVENOUS at 18:59

## 2024-10-31 RX ADMIN — ROCURONIUM BROMIDE 20 MG: 10 INJECTION, SOLUTION INTRAVENOUS at 18:46

## 2024-10-31 RX ADMIN — PROPOFOL 20 MG: 10 INJECTION, EMULSION INTRAVENOUS at 18:46

## 2024-10-31 RX ADMIN — OXYCODONE HYDROCHLORIDE 5 MG: 5 TABLET ORAL at 19:45

## 2024-10-31 RX ADMIN — HYDROMORPHONE HYDROCHLORIDE 0.5 MG: 1 INJECTION, SOLUTION INTRAMUSCULAR; INTRAVENOUS; SUBCUTANEOUS at 19:29

## 2024-10-31 RX ADMIN — ONDANSETRON 4 MG: 2 INJECTION INTRAMUSCULAR; INTRAVENOUS at 18:50

## 2024-10-31 RX ADMIN — FAMOTIDINE 20 MG: 10 INJECTION INTRAVENOUS at 17:15

## 2024-10-31 RX ADMIN — SODIUM CHLORIDE 2000 MG: 900 INJECTION INTRAVENOUS at 18:30

## 2024-10-31 RX ADMIN — LIDOCAINE HYDROCHLORIDE 100 MG: 20 INJECTION, SOLUTION INFILTRATION; PERINEURAL at 18:45

## 2024-10-31 RX ADMIN — SODIUM CHLORIDE, POTASSIUM CHLORIDE, SODIUM LACTATE AND CALCIUM CHLORIDE: 600; 310; 30; 20 INJECTION, SOLUTION INTRAVENOUS at 18:39

## 2024-10-31 RX ADMIN — SODIUM CHLORIDE, POTASSIUM CHLORIDE, SODIUM LACTATE AND CALCIUM CHLORIDE 9 ML/HR: 600; 310; 30; 20 INJECTION, SOLUTION INTRAVENOUS at 17:15

## 2024-10-31 NOTE — H&P
[facility] Surgical History and Physical    Patient Care Team:  Temi Jones APRN as PCP - General (Nurse Practitioner)  Delano Guzman MD as Consulting Physician (Gastroenterology)  Khanh Lassiter MD as Consulting Physician (General Surgery)  Justo Moreland MD as Consulting Physician (Gastroenterology)  Joe Estrada MD as Consulting Physician (Allergy and Immunology)  Kevan Hoffman MD as Consulting Physician (Otolaryngology)  Preston Warren Jr., MD (Psychiatry)  Linda Sanchez MD as Consulting Physician (Colon and Rectal Surgery)  Lobo Parisi MD as Surgeon (Thoracic Surgery)    Chief complaint left flank pain    Subjective     Patient is a 34 y.o. female presents with left ureteral calculus 5 mm distal ureter which failed to pass.  Seen in the office today.  Having intractable pain and irritable urinary symptoms.     Review of Systems   Pertinent items are noted in HPI    Past Medical History:   Diagnosis Date    Abnormal computed tomography angiography (CTA) of neck 12/21/2021    Abnormal gluteal crease     ADHD (attention deficit hyperactivity disorder) 2020    Anemia 2019    iron defiency anemia    Anxiety     Benign paroxysmal positional vertigo of right ear 12/01/2021    Bipolar disorder 2012    BRBPR (bright red blood per rectum) 06/02/2021    Dr[facility] Santana at [facility]    Breast anomaly     Bruises easily     Cervical adenopathy     Cervical lymphadenopathy     Left side.    Change in bowel habit 06/02/2021    Dr. [facility] Santana at [facility]    Cholestasis during pregnancy in third trimester 2019    Chronic allergic rhinitis     Chronic pain disorder 2022    Fibro    Chronic recurrent major depressive disorder     In partial remission.    Cold intolerance     Constipation     CTS (carpal tunnel syndrome) 2019    Decreased sex drive     Depression     History of PPD    Diarrhea     Dizziness 08/06/2021    Temi GIPSON, Cardiologist office.    Dysmenorrhea 12/04/2019     Surgery: Laparoscopic assisted vaginal hysterectomy, Surgeon Dr. Hillary Nunn.    Dysphoric mood     Dyspnea on exertion 08/06/2021    APRN, Temi Mcrae, Cardiologist office.    Endometriosis 12/2019    Hysterectomy in 2019 cured    Environmental and seasonal allergies 09/21/2020    Epigastric abdominal pain 11/07/2018    [facility].    Epiploic appendagitis 01/10/2023    Extremity pain 2022    TOS diagnosed in August    Fat necrosis 07/29/2021    [facility].    Fatigue     Fibromyalgia, primary 2021    Fissure, anal     history    Folic acid deficiency     Gestational diabetes [id number]    I had it with my 1st pregnancy    H/O TB skin testing 02/20/2018    Negative result at [facility].    HA (headache)     Headache, tension-type     Hemorrhoid     History of gestational diabetes     with first baby    History of Holter monitoring 11/07/2019    24 hour.    History of kidney stones 2006/2009    History of miscarriage     History of vasectomy 2019    Spouse Vasectomy.    Hypocalcemia 03/2022    Hypoglycemia     Irritable bowel syndrome 1994    IBS with both constipation/diarrhea, followed by GI Dr. Moreland.    Joint pain     Fibro / TOS    Joint stiffness     Lightheadedness 08/06/2021    APRN, Temi Mcrae, Cardiologist office.    Low back pain     Lower back/middle    Low serum potassium level     Low serum vitamin B12     Lower extremity myoclonus 02/28/2022    ADMITTED TO [facility]    Malaise and fatigue 11/12/2019    Cardiologist Dr. Benji Ugalde.    Mass of left parotid gland 12/2021    Migraines 10/2019    Multinodular non-toxic goiter     Near syncope 08/06/2021    LEONELA, Temi Mcrae, Cardiologist office.    Neck pain     OCD (obsessive compulsive disorder)     Ovarian cyst 2010    Surgery removed.    Palpitations 10/28/2019    PVC's (premature ventricular contractions)     Rapid heart rate 11/12/2019    Cardiologist Dr. Benji Ugalde.    Rectal bleeding  06/02/2021    Dr. Nancy Santana at ..    Renal stones     RLS (restless legs syndrome)     RUQ abdominal mass [id number]    [facility].    Scoliosis 2003    Sleep disturbance     SOB (shortness of breath) 10/28/2019    Tachycardia 10/28/2019    Thrombocytopenia 03/2022    Thyroid nodule 10/28/2019    1 cm Left Submandibular node, [facility], Dr. Kevan HUTCHINSON [place].    Thyroid nodule 10/28/2019    0.3 cm Right side of neck, [facility], Dr. Kevan HUTCHINSON [place].    Thyromegaly 03/04/2016    Boarderline Thyromegaly, Findings via X-ray at [facility], ordering provider Dr. Nancy Santana.    Transaminitis 11/07/2018    [facility].    Trigger index finger of right hand 01/2022    Vaginal delivery 01/2019    Baby girl.     Past Surgical History:   Procedure Laterality Date    ADENOIDECTOMY  2006    CHOLECYSTECTOMY  2019    CHOLECYSTECTOMY WITH INTRAOPERATIVE CHOLANGIOGRAM N/A 11/09/2018    Procedure: Laparoscopic cholecystectomy;  Surgeon: Khanh Lassiter MD;  Location: [facility] OR;  Service: General    COLONOSCOPY N/A 08/2019    DILATATION AND CURETTAGE N/A 10/2015    MISCARRIAGE.    ENDOSCOPY N/A     HEMORRHOIDECTOMY N/A 06/16/2022    Procedure: HEMORRHOIDECTOMY;  Surgeon: Linda Sanchez MD;  Location: [facility] OR;  Service: General;  Laterality: N/A;    LAPAROSCOPIC ASSISTED VAGINAL HYSTERECTOMY Bilateral 12/04/2019    Procedure: LAPAROSCOPIC ASSISTED VAGINAL HYSTERECTOMY, BILATERAL SALPINGECTOMY;  Surgeon: Hillary Nunn MD;  Location: [facility];  Service: Obstetrics/Gynecology    OVARIAN CYST SURGERY  06/2009    Laparoscopic ovarian cyst resection, no other abdominal surgery.    TONSILLECTOMY AND ADENOIDECTOMY Bilateral 10/2006    WISDOM TOOTH EXTRACTION Bilateral      Family History   Problem Relation Age of Onset    COPD Mother     Depression Mother     Hypertension Mother     Heart disease Mother     Hyperlipidemia Mother     Asthma Father     COPD Father     Heart disease Father      Alcohol abuse Father     Hearing loss Father     Cancer Sister     Miscarriages / Stillbirths Sister         1 miscarriage    Depression Sister     Depression Sister     Cancer Sister     Miscarriages / Stillbirths Sister         Miscarriage & stillbirth    Cancer Maternal Grandmother         Breast    Diabetes Maternal Grandmother     Depression Maternal Grandmother     Breast cancer Maternal Grandmother     Cancer Paternal Grandmother         Skin (ear)    Bleeding Disorder Paternal Grandmother         Factor V    COPD Paternal Grandmother     Asthma Paternal Grandmother     Osteoarthritis Paternal Grandmother     Arthritis Paternal Grandmother     Clotting disorder Paternal Grandmother         Factor V    Osteoporosis Paternal Grandmother     Alcohol abuse Paternal Grandfather     Malig Hyperthermia Neg Hx     Colon cancer Neg Hx      Social History     Tobacco Use    Smoking status: Never    Smokeless tobacco: Never    Tobacco comments:     Vape on oçcasion   Vaping Use    Vaping status: Never Used   Substance Use Topics    Alcohol use: Yes     Alcohol/week: 2.0 - 4.0 standard drinks of alcohol     Types: 1 - 2 Glasses of wine, 1 - 2 Shots of liquor per week     Comment: Weekly    Drug use: Never       Meds:  Medications Prior to Admission   Medication Sig Dispense Refill Last Dose/Taking    albuterol sulfate  (90 Base) MCG/ACT inhaler Inhale 2 puffs Every 4 (Four) Hours As Needed for Wheezing or Shortness of Air (cough). 18 g 0     atomoxetine (STRATTERA) 40 MG capsule        cetirizine (ZyrTEC) 10 MG chewable tablet tablet       Cetirizine HCl (ZYRTEC ALLERGY PO)        Coenzyme Q10 (CoQ-10) 100 MG capsule        desvenlafaxine (PRISTIQ) 100 MG 24 hr tablet Take 1 tablet by mouth Daily. FOR 30 DAYS       Famotidine (PEPCID AC PO)        Folate 400 MCG tablet        ketorolac (TORADOL) 10 MG tablet Take 1 tablet by mouth Every 6 (Six) Hours As Needed for Moderate Pain. 15 tablet 0     Magnesium 100 MG  tablet        Magnesium Bisglycinate (Mag Glycinate) 100 MG tablet tablet       metoprolol succinate XL (TOPROL-XL) 25 MG 24 hr tablet Take 1 tablet by mouth once daily 30 tablet 5     NP Thyroid 30 MG tablet Take 1 tablet by mouth Daily.       ondansetron ODT (ZOFRAN-ODT) 4 MG disintegrating tablet Place 1 tablet on the tongue 4 (Four) Times a Day. 12 tablet 0     oxyCODONE-acetaminophen (PERCOCET) 5-325 MG per tablet Take 1 tablet by mouth Every 6 (Six) Hours As Needed for Moderate Pain for up to 10 doses. 10 tablet 0     pantoprazole (PROTONIX) 40 MG EC tablet Take 1 tablet by mouth Every Morning.       PROGESTERONE  mg.       Rimegepant Sulfate (Nurtec) 75 MG tablet dispersible tablet Take 1 tablet by mouth Daily. 8 tablet 1     topiramate (TOPAMAX) 50 MG tablet TAKE 1 TABLET BY MOUTH ONCE DAILY AT NIGHT 90 tablet 0     traZODone (DESYREL) 100 MG tablet Take 1 tablet by mouth Every Night.          Allergies:  Hydrocodone and Tramadol    Debilities:      Objective     Vital Signs     No intake or output data in the 24 hours ending 10/31/24 1616       Physical Exam:     General Appearance:     Alert, cooperative, NAD   HEENT:     No trauma, pupils reactive, hearing intact   Back:      No CVA tenderness   Lungs:      Respirations unlabored, no wheezing    Heart:     RRR, intact peripheral pulses   Abdomen:      Soft, NDNT, no masses, no guarding   :     Genitalia normal   Extremities:    No edema, no deformity   Lymphatic:    No neck or groin LAD   Skin:    No bleeding, bruising or rashes   Neuro/Psych:    Orientation intact, mood/affect pleasant, no focal findings     Results Review:     Results for orders placed or performed during the hospital encounter of 10/25/24   Urinalysis without microscopic (no culture) - Urine, Clean Catch    Collection Time: 10/25/24  1:52 PM    Specimen: Urine, Clean Catch   Result Value Ref Range    Color, UA Yellow Yellow, Straw    Appearance, UA Clear Clear    pH, UA 6.0 5.0  - 8.0    Specific Gravity, UA >=1.030 1.005 - 1.030    Glucose, UA Negative Negative    Ketones, UA 15 mg/dL (1+) (A) Negative    Bilirubin, UA Negative Negative    Blood, UA Moderate (2+) (A) Negative    Protein, UA Negative Negative    Leuk Esterase, UA Negative Negative    Nitrite, UA Negative Negative    Urobilinogen, UA 0.2 E.U./dL 0.2 - 1.0 E.U./dL   Comprehensive Metabolic Panel    Collection Time: 10/25/24  2:03 PM    Specimen: Blood   Result Value Ref Range    Glucose 112 (H) 65 - 99 mg/dL    BUN 12 6 - 20 mg/dL    Creatinine 1.09 (H) 0.57 - 1.00 mg/dL    Sodium 141 136 - 145 mmol/L    Potassium 3.5 3.5 - 5.2 mmol/L    Chloride 108 (H) 98 - 107 mmol/L    CO2 25.1 22.0 - 29.0 mmol/L    Calcium 8.8 8.6 - 10.5 mg/dL    Total Protein 6.4 6.0 - 8.5 g/dL    Albumin 4.2 3.5 - 5.2 g/dL    ALT (SGPT) 29 1 - 33 U/L    AST (SGOT) 23 1 - 32 U/L    Alkaline Phosphatase 80 39 - 117 U/L    Total Bilirubin 0.2 0.0 - 1.2 mg/dL    Globulin 2.2 gm/dL    A/G Ratio 1.9 g/dL    BUN/Creatinine Ratio 11.0 7.0 - 25.0    Anion Gap 7.9 5.0 - 15.0 mmol/L    eGFR 68.1 >60.0 mL/min/1.73   CBC Auto Differential    Collection Time: 10/25/24  2:03 PM    Specimen: Blood   Result Value Ref Range    WBC 7.08 3.40 - 10.80 10*3/mm3    RBC 4.26 3.77 - 5.28 10*6/mm3    Hemoglobin 13.0 12.0 - 15.9 g/dL    Hematocrit 39.0 34.0 - 46.6 %    MCV 91.5 79.0 - 97.0 fL    MCH 30.5 26.6 - 33.0 pg    MCHC 33.3 31.5 - 35.7 g/dL    RDW 12.3 12.3 - 15.4 %    RDW-SD 41.8 37.0 - 54.0 fl    MPV 9.2 6.0 - 12.0 fL    Platelets 165 140 - 450 10*3/mm3    Neutrophil % 69.0 42.7 - 76.0 %    Lymphocyte % 23.4 19.6 - 45.3 %    Monocyte % 6.2 5.0 - 12.0 %    Eosinophil % 1.0 0.3 - 6.2 %    Basophil % 0.4 0.0 - 1.5 %    Immature Grans % 0.0 0.0 - 0.5 %    Neutrophils, Absolute 4.88 1.70 - 7.00 10*3/mm3    Lymphocytes, Absolute 1.66 0.70 - 3.10 10*3/mm3    Monocytes, Absolute 0.44 0.10 - 0.90 10*3/mm3    Eosinophils, Absolute 0.07 0.00 - 0.40 10*3/mm3    Basophils, Absolute  0.03 0.00 - 0.20 10*3/mm3    Immature Grans, Absolute 0.00 0.00 - 0.05 10*3/mm3       I reviewed the patient's prior history and old records to the best of my ability.   I have personally reviewed all pertinent imaging myself and their accompanying reports.   I have reviewed all labs.     Assessment:  Left obstructive ureteral calculus    Plan:    Left ureteroscopy laser lithotripsy possible stent placement    I discussed the patient's findings and my recommendations with patient.   Risks, complications, outcomes and alternatives discussed with the patient at the bedside and office.    Williams Ferguson MD  10/31/24  16:16 EDT

## 2024-10-31 NOTE — ANESTHESIA PREPROCEDURE EVALUATION
Anesthesia Evaluation     Patient summary reviewed and Nursing notes reviewed   no history of anesthetic complications:   NPO Solid Status: > 8 hours  NPO Liquid Status: > 2 hours           Airway   Mallampati: II  TM distance: >3 FB  Dental          Pulmonary - normal exam   (-) COPD, asthma  Cardiovascular   Exercise tolerance: good (4-7 METS)    Patient on routine beta blocker  Rate: normal    (-) past MI, angina, cardiac stents      Neuro/Psych  (+) headaches, dizziness/light headedness (vertigo), psychiatric history Bipolar  (-) seizures, CVA  GI/Hepatic/Renal/Endo    (+) renal disease- stones, thyroid problem thyroid nodules  (-) GERD, liver disease    ROS Comment: BMI 38  GLP-1 <1week    Musculoskeletal     (+) myalgias (FM)  Abdominal    Substance History - negative use     OB/GYN          Other - negative ROS                     Anesthesia Plan    ASA 2     general   Rapid sequence  (Asp Precautions, pepcid, RSI, GETA    I have reviewed the patient's history and chart with the patient, including all pertinent laboratory results and imaging. I have explained the risks of anesthesia including but not limited to dental or airway injury, nausea, cardio-pulmonary complications, such as aspiration, MI, stroke, or death.     VITALS:  /83 (BP Location: Right arm, Patient Position: Lying)   Pulse 83   Temp 37 °C (98.6 °F) (Oral)   Resp 16   LMP  (LMP Unknown)   SpO2 100% )  intravenous induction     Anesthetic plan, risks, benefits, and alternatives have been provided, discussed and informed consent has been obtained with: patient.  Pre-procedure education provided    CODE STATUS:

## 2024-10-31 NOTE — OP NOTE
CYSTOSCOPY URETEROSCOPY LASER LITHOTRIPSY  Procedure Note    Sherry Quevedo  10/31/2024    Pre-op Diagnosis:   Left ureteral calculus    Post-op Diagnosis:     Left ureteral calculus      Procedure(s):  CYSTOSCOPY URETEROSCOPY LASER LITHOTRIPSY, STONE BASKET EXTRACTION    Surgeon(s):  Williams Ferguson MD    Anesthesia: General    Staff:   Circulator: Joelle Fuller RN  Radiology Technologist: Jerod Powell  Scranila Person: Gris Jean-Baptiste    Estimated Blood Loss: none    Specimens:                Order Name Source Comment Collection Info Order Time   STONE ANALYSIS Ureter, Left  Collected By: Williams Ferguson MD 10/31/2024  7:03 PM     Release to patient   Routine Release              Drains: * No LDAs found *    Findings: Left distal ureteral calculus with adjacent inflammatory changes of the left ureter which would not facilitate stone passage.  Ureter was dilated and stone removed.    Complications: None apparent    Indications: 34-year-old female with an obstructing left ureteral stone now presents for left ureteroscopy.    Procedure: Patient was taken the operative suite after informed sent was obtained.  Given general endotracheal anesthesia.  Placed lithotomy.  Prepped and draped in a sterile fashion.  Surgical timeout was performed.  Cystoscope was inserted.  The bladder was thoroughly visualized.  Trigone was normal in appearance.  The left orifice was cannulated with a guidewire passed up the renal pelvis.  Rigid ureteroscopy was performed.  The distal 1 cm of ureter was normal and then it became very tight and inflamed and I could see the stone just proximal to this.  I dilated the ureter to 10 Italian and I was able to pass up the ureteroscope.  The stone was visualized at this point and it looked pretty amenable to removing so I engaged it with a Secura basket and remove the stone intact.  The ureter was minimally traumatized.  A 6 Italian by 24 cm stent was then placed the  left collecting system.  We left a tether on it was trimmed and tucked into the vagina.  She was awoken and taken to recovery in stable condition.  Will have her remove her stent in about 3 to 4 days.  Discharge home this evening.      Williams Ferguson MD     Date: 10/31/2024  Time: 19:13 EDT

## 2024-10-31 NOTE — ANESTHESIA PROCEDURE NOTES
Airway  Urgency: elective    Date/Time: 10/31/2024 6:48 PM  Airway not difficult    General Information and Staff    Patient location during procedure: OR  Anesthesiologist: Shine Dudley MD    Indications and Patient Condition  Indications for airway management: airway protection    Preoxygenated: yes  Mask difficulty assessment: 1 - vent by mask    Final Airway Details  Final airway type: endotracheal airway      Successful airway: ETT  Cuffed: yes   Successful intubation technique: direct laryngoscopy  Facilitating devices/methods: intubating stylet and anterior pressure/BURP  Endotracheal tube insertion site: oral  Blade: Humaira  Blade size: 3  ETT size (mm): 7.0  Cormack-Lehane Classification: grade I - full view of glottis  Placement verified by: chest auscultation   Cuff volume (mL): 6  Measured from: teeth  ETT/EBT  to teeth (cm): 22  Number of attempts at approach: 1  Assessment: lips, teeth, and gum same as pre-op and atraumatic intubation

## 2024-11-02 NOTE — ANESTHESIA POSTPROCEDURE EVALUATION
Patient: Sherry Quevedo    Procedure Summary       Date: 10/31/24 Room / Location: [facility]  / [facility]    Anesthesia Start: 1839 Anesthesia Stop: 1924    Procedure: CYSTOSCOPY, LEFT URETEROSCOPY, STONE BASKET EXTRACTION, LEFT STENT PLACEMENT Diagnosis:       Ureteral calculus      (Left ureteral calculus)    Surgeons: Williams Ferguson MD Provider: Shine Dudley MD    Anesthesia Type: general ASA Status: 2            Anesthesia Type: general    Vitals  Vitals Value Taken Time   /89 10/31/24 1945   Temp 36.8 °C (98.3 °F) 10/31/24 1915   Pulse 71 10/31/24 1945   Resp     SpO2 100 % 10/31/24 1945           Post Anesthesia Care and Evaluation    Patient location: did not personally evaluate patient.    Comments: Discharge criteria met per RN

## 2024-11-03 ENCOUNTER — APPOINTMENT (OUTPATIENT)
Dept: CT IMAGING | Facility: HOSPITAL | Age: 34
End: 2024-11-03
Payer: COMMERCIAL

## 2024-11-03 ENCOUNTER — HOSPITAL ENCOUNTER (OUTPATIENT)
Facility: HOSPITAL | Age: 34
Setting detail: OBSERVATION
Discharge: HOME | End: 2024-11-04
Attending: EMERGENCY MEDICINE | Admitting: STUDENT IN AN ORGANIZED HEALTH CARE EDUCATION/TRAINING PROGRAM
Payer: COMMERCIAL

## 2024-11-03 DIAGNOSIS — R10.9 ACUTE LEFT FLANK PAIN: Primary | ICD-10-CM

## 2024-11-03 DIAGNOSIS — N23 RENAL COLIC ON LEFT SIDE: ICD-10-CM

## 2024-11-03 LAB
ALBUMIN SERPL-MCNC: 4.4 G/DL (ref 3.5–5.2)
ALBUMIN/GLOB SERPL: 1.7 G/DL
ALP SERPL-CCNC: 82 U/L (ref 39–117)
ALT SERPL W P-5'-P-CCNC: 35 U/L (ref 1–33)
ANION GAP SERPL CALCULATED.3IONS-SCNC: 10.4 MMOL/L (ref 5–15)
AST SERPL-CCNC: 24 U/L (ref 1–32)
BACTERIA UR QL AUTO: ABNORMAL /HPF
BASOPHILS # BLD AUTO: 0.04 10*3/MM3 (ref 0–0.2)
BASOPHILS NFR BLD AUTO: 0.4 % (ref 0–1.5)
BILIRUB SERPL-MCNC: 0.2 MG/DL (ref 0–1.2)
BILIRUB UR QL STRIP: NEGATIVE
BUN SERPL-MCNC: 13 MG/DL (ref 6–20)
BUN/CREAT SERPL: 11.8 (ref 7–25)
CALCIUM SPEC-SCNC: 9.3 MG/DL (ref 8.6–10.5)
CHLORIDE SERPL-SCNC: 106 MMOL/L (ref 98–107)
CLARITY UR: ABNORMAL
CO2 SERPL-SCNC: 25.6 MMOL/L (ref 22–29)
COLOR UR: YELLOW
CREAT SERPL-MCNC: 1.1 MG/DL (ref 0.57–1)
D-LACTATE SERPL-SCNC: 1.3 MMOL/L (ref 0.5–2)
DEPRECATED RDW RBC AUTO: 41.7 FL (ref 37–54)
EGFRCR SERPLBLD CKD-EPI 2021: 67.3 ML/MIN/1.73
EOSINOPHIL # BLD AUTO: 0.13 10*3/MM3 (ref 0–0.4)
EOSINOPHIL NFR BLD AUTO: 1.4 % (ref 0.3–6.2)
ERYTHROCYTE [DISTWIDTH] IN BLOOD BY AUTOMATED COUNT: 12.8 % (ref 12.3–15.4)
GLOBULIN UR ELPH-MCNC: 2.6 GM/DL
GLUCOSE SERPL-MCNC: 138 MG/DL (ref 65–99)
GLUCOSE UR STRIP-MCNC: NEGATIVE MG/DL
HCT VFR BLD AUTO: 41.1 % (ref 34–46.6)
HGB BLD-MCNC: 13.9 G/DL (ref 12–15.9)
HGB UR QL STRIP.AUTO: ABNORMAL
HYALINE CASTS UR QL AUTO: ABNORMAL /LPF
IMM GRANULOCYTES # BLD AUTO: 0.02 10*3/MM3 (ref 0–0.05)
IMM GRANULOCYTES NFR BLD AUTO: 0.2 % (ref 0–0.5)
KETONES UR QL STRIP: NEGATIVE
LEUKOCYTE ESTERASE UR QL STRIP.AUTO: ABNORMAL
LYMPHOCYTES # BLD AUTO: 2.13 10*3/MM3 (ref 0.7–3.1)
LYMPHOCYTES NFR BLD AUTO: 23.7 % (ref 19.6–45.3)
MCH RBC QN AUTO: 30.8 PG (ref 26.6–33)
MCHC RBC AUTO-ENTMCNC: 33.8 G/DL (ref 31.5–35.7)
MCV RBC AUTO: 90.9 FL (ref 79–97)
MONOCYTES # BLD AUTO: 0.7 10*3/MM3 (ref 0.1–0.9)
MONOCYTES NFR BLD AUTO: 7.8 % (ref 5–12)
NEUTROPHILS NFR BLD AUTO: 5.95 10*3/MM3 (ref 1.7–7)
NEUTROPHILS NFR BLD AUTO: 66.5 % (ref 42.7–76)
NITRITE UR QL STRIP: NEGATIVE
NRBC BLD AUTO-RTO: 0 /100 WBC (ref 0–0.2)
PH UR STRIP.AUTO: 6 [PH] (ref 5–8)
PLATELET # BLD AUTO: 202 10*3/MM3 (ref 140–450)
PMV BLD AUTO: 9.5 FL (ref 6–12)
POTASSIUM SERPL-SCNC: 3.2 MMOL/L (ref 3.5–5.2)
PROCALCITONIN SERPL-MCNC: 0.05 NG/ML (ref 0–0.25)
PROT SERPL-MCNC: 7 G/DL (ref 6–8.5)
PROT UR QL STRIP: ABNORMAL
RBC # BLD AUTO: 4.52 10*6/MM3 (ref 3.77–5.28)
RBC # UR STRIP: ABNORMAL /HPF
REF LAB TEST METHOD: ABNORMAL
SODIUM SERPL-SCNC: 142 MMOL/L (ref 136–145)
SP GR UR STRIP: 1.01 (ref 1–1.03)
SQUAMOUS #/AREA URNS HPF: ABNORMAL /HPF
UROBILINOGEN UR QL STRIP: ABNORMAL
WBC # UR STRIP: ABNORMAL /HPF
WBC NRBC COR # BLD AUTO: 8.97 10*3/MM3 (ref 3.4–10.8)

## 2024-11-03 PROCEDURE — 84145 PROCALCITONIN (PCT): CPT | Performed by: EMERGENCY MEDICINE

## 2024-11-03 PROCEDURE — 81001 URINALYSIS AUTO W/SCOPE: CPT | Performed by: EMERGENCY MEDICINE

## 2024-11-03 PROCEDURE — 25510000001 IOPAMIDOL 61 % SOLUTION: Performed by: EMERGENCY MEDICINE

## 2024-11-03 PROCEDURE — 80053 COMPREHEN METABOLIC PANEL: CPT | Performed by: EMERGENCY MEDICINE

## 2024-11-03 PROCEDURE — 85025 COMPLETE CBC W/AUTO DIFF WBC: CPT | Performed by: EMERGENCY MEDICINE

## 2024-11-03 PROCEDURE — 87086 URINE CULTURE/COLONY COUNT: CPT | Performed by: EMERGENCY MEDICINE

## 2024-11-03 PROCEDURE — 25010000002 ONDANSETRON PER 1 MG: Performed by: EMERGENCY MEDICINE

## 2024-11-03 PROCEDURE — 36415 COLL VENOUS BLD VENIPUNCTURE: CPT

## 2024-11-03 PROCEDURE — 87040 BLOOD CULTURE FOR BACTERIA: CPT | Performed by: EMERGENCY MEDICINE

## 2024-11-03 PROCEDURE — 96365 THER/PROPH/DIAG IV INF INIT: CPT

## 2024-11-03 PROCEDURE — 25810000003 SODIUM CHLORIDE 0.9 % SOLUTION: Performed by: EMERGENCY MEDICINE

## 2024-11-03 PROCEDURE — G0378 HOSPITAL OBSERVATION PER HR: HCPCS

## 2024-11-03 PROCEDURE — 25010000002 HYDROMORPHONE PER 4 MG: Performed by: EMERGENCY MEDICINE

## 2024-11-03 PROCEDURE — 99285 EMERGENCY DEPT VISIT HI MDM: CPT

## 2024-11-03 PROCEDURE — 74177 CT ABD & PELVIS W/CONTRAST: CPT

## 2024-11-03 PROCEDURE — 96375 TX/PRO/DX INJ NEW DRUG ADDON: CPT

## 2024-11-03 PROCEDURE — 25010000002 CEFTRIAXONE PER 250 MG: Performed by: EMERGENCY MEDICINE

## 2024-11-03 PROCEDURE — 83605 ASSAY OF LACTIC ACID: CPT | Performed by: EMERGENCY MEDICINE

## 2024-11-03 RX ORDER — HYDROMORPHONE HYDROCHLORIDE 1 MG/ML
0.5 INJECTION, SOLUTION INTRAMUSCULAR; INTRAVENOUS; SUBCUTANEOUS ONCE
Status: COMPLETED | OUTPATIENT
Start: 2024-11-03 | End: 2024-11-03

## 2024-11-03 RX ORDER — HYDROMORPHONE HYDROCHLORIDE 1 MG/ML
0.5 INJECTION, SOLUTION INTRAMUSCULAR; INTRAVENOUS; SUBCUTANEOUS ONCE
Status: COMPLETED | OUTPATIENT
Start: 2024-11-04 | End: 2024-11-04

## 2024-11-03 RX ORDER — IOPAMIDOL 612 MG/ML
100 INJECTION, SOLUTION INTRAVASCULAR
Status: COMPLETED | OUTPATIENT
Start: 2024-11-03 | End: 2024-11-03

## 2024-11-03 RX ORDER — KETOROLAC TROMETHAMINE 15 MG/ML
15 INJECTION, SOLUTION INTRAMUSCULAR; INTRAVENOUS ONCE
Status: COMPLETED | OUTPATIENT
Start: 2024-11-04 | End: 2024-11-04

## 2024-11-03 RX ORDER — ONDANSETRON 2 MG/ML
4 INJECTION INTRAMUSCULAR; INTRAVENOUS ONCE
Status: COMPLETED | OUTPATIENT
Start: 2024-11-03 | End: 2024-11-03

## 2024-11-03 RX ORDER — SODIUM CHLORIDE 0.9 % (FLUSH) 0.9 %
10 SYRINGE (ML) INJECTION AS NEEDED
Status: DISCONTINUED | OUTPATIENT
Start: 2024-11-03 | End: 2024-11-04 | Stop reason: HOSPADM

## 2024-11-03 RX ADMIN — HYDROMORPHONE HYDROCHLORIDE 0.5 MG: 1 INJECTION, SOLUTION INTRAMUSCULAR; INTRAVENOUS; SUBCUTANEOUS at 21:55

## 2024-11-03 RX ADMIN — SODIUM CHLORIDE 1000 ML: 9 INJECTION, SOLUTION INTRAVENOUS at 21:55

## 2024-11-03 RX ADMIN — ONDANSETRON 4 MG: 2 INJECTION, SOLUTION INTRAMUSCULAR; INTRAVENOUS at 21:55

## 2024-11-03 RX ADMIN — IOPAMIDOL 85 ML: 612 INJECTION, SOLUTION INTRAVENOUS at 23:06

## 2024-11-03 RX ADMIN — CEFTRIAXONE 2000 MG: 2 INJECTION, POWDER, FOR SOLUTION INTRAMUSCULAR; INTRAVENOUS at 23:24

## 2024-11-04 ENCOUNTER — READMISSION MANAGEMENT (OUTPATIENT)
Dept: CALL CENTER | Facility: HOSPITAL | Age: 34
End: 2024-11-04
Payer: COMMERCIAL

## 2024-11-04 VITALS
HEIGHT: 63 IN | RESPIRATION RATE: 16 BRPM | OXYGEN SATURATION: 98 % | SYSTOLIC BLOOD PRESSURE: 99 MMHG | WEIGHT: 215 LBS | HEART RATE: 79 BPM | TEMPERATURE: 97.7 F | DIASTOLIC BLOOD PRESSURE: 62 MMHG | BODY MASS INDEX: 38.09 KG/M2

## 2024-11-04 PROBLEM — N12 PYELITIS: Status: ACTIVE | Noted: 2024-11-04

## 2024-11-04 LAB
ANION GAP SERPL CALCULATED.3IONS-SCNC: 10 MMOL/L (ref 5–15)
BUN SERPL-MCNC: 10 MG/DL (ref 6–20)
BUN/CREAT SERPL: 10.6 (ref 7–25)
CALCIUM SPEC-SCNC: 8.2 MG/DL (ref 8.6–10.5)
CHLORIDE SERPL-SCNC: 111 MMOL/L (ref 98–107)
CO2 SERPL-SCNC: 24 MMOL/L (ref 22–29)
CREAT SERPL-MCNC: 0.94 MG/DL (ref 0.57–1)
DEPRECATED RDW RBC AUTO: 41.3 FL (ref 37–54)
EGFRCR SERPLBLD CKD-EPI 2021: 81.3 ML/MIN/1.73
ERYTHROCYTE [DISTWIDTH] IN BLOOD BY AUTOMATED COUNT: 12.6 % (ref 12.3–15.4)
GLUCOSE SERPL-MCNC: 86 MG/DL (ref 65–99)
HCT VFR BLD AUTO: 36.6 % (ref 34–46.6)
HGB BLD-MCNC: 12.7 G/DL (ref 12–15.9)
MCH RBC QN AUTO: 31.8 PG (ref 26.6–33)
MCHC RBC AUTO-ENTMCNC: 34.7 G/DL (ref 31.5–35.7)
MCV RBC AUTO: 91.5 FL (ref 79–97)
PLATELET # BLD AUTO: 169 10*3/MM3 (ref 140–450)
PMV BLD AUTO: 9.6 FL (ref 6–12)
POTASSIUM SERPL-SCNC: 3.2 MMOL/L (ref 3.5–5.2)
RBC # BLD AUTO: 4 10*6/MM3 (ref 3.77–5.28)
SODIUM SERPL-SCNC: 145 MMOL/L (ref 136–145)
WBC NRBC COR # BLD AUTO: 6.58 10*3/MM3 (ref 3.4–10.8)

## 2024-11-04 PROCEDURE — 85027 COMPLETE CBC AUTOMATED: CPT | Performed by: PHYSICIAN ASSISTANT

## 2024-11-04 PROCEDURE — 96376 TX/PRO/DX INJ SAME DRUG ADON: CPT

## 2024-11-04 PROCEDURE — 25810000003 SODIUM CHLORIDE 0.9 % SOLUTION: Performed by: PHYSICIAN ASSISTANT

## 2024-11-04 PROCEDURE — 25010000002 LIDOCAINE 1 % SOLUTION 30 ML VIAL: Performed by: EMERGENCY MEDICINE

## 2024-11-04 PROCEDURE — G0378 HOSPITAL OBSERVATION PER HR: HCPCS

## 2024-11-04 PROCEDURE — 25010000002 HYDROMORPHONE PER 4 MG: Performed by: EMERGENCY MEDICINE

## 2024-11-04 PROCEDURE — 51798 US URINE CAPACITY MEASURE: CPT

## 2024-11-04 PROCEDURE — 96375 TX/PRO/DX INJ NEW DRUG ADDON: CPT

## 2024-11-04 PROCEDURE — 25810000003 SODIUM CHLORIDE 0.9 % SOLUTION 250 ML FLEX CONT: Performed by: EMERGENCY MEDICINE

## 2024-11-04 PROCEDURE — 25010000002 HYDROMORPHONE PER 4 MG: Performed by: PHYSICIAN ASSISTANT

## 2024-11-04 PROCEDURE — 80048 BASIC METABOLIC PNL TOTAL CA: CPT | Performed by: PHYSICIAN ASSISTANT

## 2024-11-04 PROCEDURE — 25010000002 KETOROLAC TROMETHAMINE PER 15 MG: Performed by: EMERGENCY MEDICINE

## 2024-11-04 PROCEDURE — 25010000002 ONDANSETRON PER 1 MG: Performed by: PHYSICIAN ASSISTANT

## 2024-11-04 RX ORDER — SODIUM CHLORIDE 0.9 % (FLUSH) 0.9 %
10 SYRINGE (ML) INJECTION AS NEEDED
Status: DISCONTINUED | OUTPATIENT
Start: 2024-11-04 | End: 2024-11-04 | Stop reason: HOSPADM

## 2024-11-04 RX ORDER — TRAZODONE HYDROCHLORIDE 100 MG/1
100 TABLET ORAL NIGHTLY
Status: DISCONTINUED | OUTPATIENT
Start: 2024-11-04 | End: 2024-11-04 | Stop reason: HOSPADM

## 2024-11-04 RX ORDER — POLYETHYLENE GLYCOL 3350 17 G/17G
17 POWDER, FOR SOLUTION ORAL DAILY
Status: DISCONTINUED | OUTPATIENT
Start: 2024-11-04 | End: 2024-11-04 | Stop reason: HOSPADM

## 2024-11-04 RX ORDER — SODIUM CHLORIDE 0.9 % (FLUSH) 0.9 %
10 SYRINGE (ML) INJECTION EVERY 12 HOURS SCHEDULED
Status: DISCONTINUED | OUTPATIENT
Start: 2024-11-04 | End: 2024-11-04 | Stop reason: HOSPADM

## 2024-11-04 RX ORDER — CELECOXIB 200 MG/1
200 CAPSULE ORAL EVERY 12 HOURS SCHEDULED
Status: DISCONTINUED | OUTPATIENT
Start: 2024-11-04 | End: 2024-11-04 | Stop reason: HOSPADM

## 2024-11-04 RX ORDER — HYDROMORPHONE HYDROCHLORIDE 1 MG/ML
0.5 INJECTION, SOLUTION INTRAMUSCULAR; INTRAVENOUS; SUBCUTANEOUS
Status: DISCONTINUED | OUTPATIENT
Start: 2024-11-04 | End: 2024-11-04 | Stop reason: HOSPADM

## 2024-11-04 RX ORDER — POLYETHYLENE GLYCOL 3350 17 G/17G
17 POWDER, FOR SOLUTION ORAL DAILY PRN
Status: DISCONTINUED | OUTPATIENT
Start: 2024-11-04 | End: 2024-11-04

## 2024-11-04 RX ORDER — SODIUM CHLORIDE 9 MG/ML
40 INJECTION, SOLUTION INTRAVENOUS AS NEEDED
Status: DISCONTINUED | OUTPATIENT
Start: 2024-11-04 | End: 2024-11-04 | Stop reason: HOSPADM

## 2024-11-04 RX ORDER — FAMOTIDINE 20 MG/1
20 TABLET, FILM COATED ORAL NIGHTLY
Status: DISCONTINUED | OUTPATIENT
Start: 2024-11-04 | End: 2024-11-04 | Stop reason: HOSPADM

## 2024-11-04 RX ORDER — ACETAMINOPHEN 325 MG/1
650 TABLET ORAL EVERY 4 HOURS PRN
Status: DISCONTINUED | OUTPATIENT
Start: 2024-11-04 | End: 2024-11-04 | Stop reason: HOSPADM

## 2024-11-04 RX ORDER — PHENAZOPYRIDINE HYDROCHLORIDE 200 MG/1
200 TABLET, FILM COATED ORAL
Status: DISCONTINUED | OUTPATIENT
Start: 2024-11-04 | End: 2024-11-04 | Stop reason: HOSPADM

## 2024-11-04 RX ORDER — THYROID 30 MG/1
30 TABLET ORAL DAILY
Status: DISCONTINUED | OUTPATIENT
Start: 2024-11-04 | End: 2024-11-04 | Stop reason: HOSPADM

## 2024-11-04 RX ORDER — AMOXICILLIN 250 MG
2 CAPSULE ORAL 2 TIMES DAILY PRN
Status: DISCONTINUED | OUTPATIENT
Start: 2024-11-04 | End: 2024-11-04 | Stop reason: HOSPADM

## 2024-11-04 RX ORDER — METOPROLOL SUCCINATE 25 MG/1
25 TABLET, EXTENDED RELEASE ORAL NIGHTLY
Status: DISCONTINUED | OUTPATIENT
Start: 2024-11-04 | End: 2024-11-04 | Stop reason: HOSPADM

## 2024-11-04 RX ORDER — ALBUTEROL SULFATE 0.83 MG/ML
2.5 SOLUTION RESPIRATORY (INHALATION) EVERY 6 HOURS PRN
Status: DISCONTINUED | OUTPATIENT
Start: 2024-11-04 | End: 2024-11-04 | Stop reason: HOSPADM

## 2024-11-04 RX ORDER — BISACODYL 10 MG
10 SUPPOSITORY, RECTAL RECTAL DAILY PRN
Status: DISCONTINUED | OUTPATIENT
Start: 2024-11-04 | End: 2024-11-04 | Stop reason: HOSPADM

## 2024-11-04 RX ORDER — CETIRIZINE HYDROCHLORIDE 10 MG/1
10 TABLET ORAL NIGHTLY PRN
Status: DISCONTINUED | OUTPATIENT
Start: 2024-11-04 | End: 2024-11-04 | Stop reason: HOSPADM

## 2024-11-04 RX ORDER — TOPIRAMATE 100 MG/1
50 TABLET, FILM COATED ORAL NIGHTLY
Status: DISCONTINUED | OUTPATIENT
Start: 2024-11-04 | End: 2024-11-04 | Stop reason: HOSPADM

## 2024-11-04 RX ORDER — PHENAZOPYRIDINE HYDROCHLORIDE 200 MG/1
200 TABLET, FILM COATED ORAL
Qty: 9 TABLET | Refills: 0 | Status: SHIPPED | OUTPATIENT
Start: 2024-11-04 | End: 2024-11-07

## 2024-11-04 RX ORDER — TAMSULOSIN HYDROCHLORIDE 0.4 MG/1
0.4 CAPSULE ORAL DAILY
Status: DISCONTINUED | OUTPATIENT
Start: 2024-11-04 | End: 2024-11-04 | Stop reason: HOSPADM

## 2024-11-04 RX ORDER — ONDANSETRON 2 MG/ML
4 INJECTION INTRAMUSCULAR; INTRAVENOUS EVERY 6 HOURS PRN
Status: DISCONTINUED | OUTPATIENT
Start: 2024-11-04 | End: 2024-11-04 | Stop reason: HOSPADM

## 2024-11-04 RX ORDER — ONDANSETRON 4 MG/1
4 TABLET, ORALLY DISINTEGRATING ORAL EVERY 8 HOURS PRN
Qty: 15 TABLET | Refills: 0 | Status: SHIPPED | OUTPATIENT
Start: 2024-11-04

## 2024-11-04 RX ORDER — BISACODYL 5 MG/1
5 TABLET, DELAYED RELEASE ORAL DAILY PRN
Status: DISCONTINUED | OUTPATIENT
Start: 2024-11-04 | End: 2024-11-04 | Stop reason: HOSPADM

## 2024-11-04 RX ORDER — PANTOPRAZOLE SODIUM 40 MG/1
40 TABLET, DELAYED RELEASE ORAL 2 TIMES DAILY
Status: DISCONTINUED | OUTPATIENT
Start: 2024-11-04 | End: 2024-11-04 | Stop reason: HOSPADM

## 2024-11-04 RX ORDER — POTASSIUM CHLORIDE 750 MG/1
40 TABLET, FILM COATED, EXTENDED RELEASE ORAL EVERY 4 HOURS
Status: COMPLETED | OUTPATIENT
Start: 2024-11-04 | End: 2024-11-04

## 2024-11-04 RX ORDER — CELECOXIB 200 MG/1
200 CAPSULE ORAL EVERY 12 HOURS SCHEDULED
Qty: 12 CAPSULE | Refills: 0 | Status: SHIPPED | OUTPATIENT
Start: 2024-11-04 | End: 2024-11-10

## 2024-11-04 RX ORDER — OXYCODONE AND ACETAMINOPHEN 7.5; 325 MG/1; MG/1
1 TABLET ORAL EVERY 4 HOURS PRN
Status: DISCONTINUED | OUTPATIENT
Start: 2024-11-04 | End: 2024-11-04 | Stop reason: HOSPADM

## 2024-11-04 RX ORDER — DESVENLAFAXINE 50 MG/1
100 TABLET, FILM COATED, EXTENDED RELEASE ORAL DAILY
Status: DISCONTINUED | OUTPATIENT
Start: 2024-11-04 | End: 2024-11-04 | Stop reason: HOSPADM

## 2024-11-04 RX ADMIN — LIDOCAINE HYDROCHLORIDE 150 MG: 10 INJECTION, SOLUTION EPIDURAL; INFILTRATION; INTRACAUDAL; PERINEURAL at 00:29

## 2024-11-04 RX ADMIN — HYDROMORPHONE HYDROCHLORIDE 0.5 MG: 1 INJECTION, SOLUTION INTRAMUSCULAR; INTRAVENOUS; SUBCUTANEOUS at 03:56

## 2024-11-04 RX ADMIN — PANTOPRAZOLE SODIUM 40 MG: 40 TABLET, DELAYED RELEASE ORAL at 09:52

## 2024-11-04 RX ADMIN — ONDANSETRON 4 MG: 2 INJECTION, SOLUTION INTRAMUSCULAR; INTRAVENOUS at 14:41

## 2024-11-04 RX ADMIN — OXYCODONE HYDROCHLORIDE AND ACETAMINOPHEN 1 TABLET: 7.5; 325 TABLET ORAL at 14:41

## 2024-11-04 RX ADMIN — TAMSULOSIN HYDROCHLORIDE 0.4 MG: 0.4 CAPSULE ORAL at 09:52

## 2024-11-04 RX ADMIN — HYDROMORPHONE HYDROCHLORIDE 0.5 MG: 1 INJECTION, SOLUTION INTRAMUSCULAR; INTRAVENOUS; SUBCUTANEOUS at 00:03

## 2024-11-04 RX ADMIN — KETOROLAC TROMETHAMINE 15 MG: 15 INJECTION, SOLUTION INTRAMUSCULAR; INTRAVENOUS at 00:02

## 2024-11-04 RX ADMIN — Medication 10 ML: at 09:57

## 2024-11-04 RX ADMIN — DESVENLAFAXINE SUCCINATE 100 MG: 50 TABLET, EXTENDED RELEASE ORAL at 09:53

## 2024-11-04 RX ADMIN — POTASSIUM CHLORIDE 40 MEQ: 750 TABLET, EXTENDED RELEASE ORAL at 03:58

## 2024-11-04 RX ADMIN — PHENAZOPYRIDINE HYDROCHLORIDE 200 MG: 200 TABLET ORAL at 09:54

## 2024-11-04 RX ADMIN — POTASSIUM CHLORIDE 40 MEQ: 750 TABLET, EXTENDED RELEASE ORAL at 09:55

## 2024-11-04 RX ADMIN — CELECOXIB 200 MG: 200 CAPSULE ORAL at 09:53

## 2024-11-04 RX ADMIN — SODIUM CHLORIDE 500 ML: 9 INJECTION, SOLUTION INTRAVENOUS at 00:29

## 2024-11-04 RX ADMIN — HYDROMORPHONE HYDROCHLORIDE 0.5 MG: 1 INJECTION, SOLUTION INTRAMUSCULAR; INTRAVENOUS; SUBCUTANEOUS at 07:31

## 2024-11-04 RX ADMIN — LEVOTHYROXINE, LIOTHYRONINE 30 MG: 19; 4.5 TABLET ORAL at 09:54

## 2024-11-04 RX ADMIN — OXYCODONE HYDROCHLORIDE AND ACETAMINOPHEN 1 TABLET: 7.5; 325 TABLET ORAL at 09:52

## 2024-11-04 NOTE — CONSULTS
FIRST UROLOGY CONSULT      Patient Identification:  NAME:  Sherry Quevedo  Age:  34 y.o.   Sex:  female   :  1990   MRN:  [id number]     Chief complaint: L flank pain    History of present illness:      34-year-old female with a history of kidney stone status post left ureteroscopy and laser lithotripsy and stent insertion last week.  She pulled her stent yesterday.  Had acute onset of nausea, vomiting, flank pain was uncontrolled with p.o. meds at home so she came to the ED.    In hospital:  -AVSS, good UOP  -WBC -6  -Creat -0.94 < 1.1  -UA -WBC, leuk    -CT -independently interpreted; stranding along the left ureter but without significant hydro or obstructing stone    Patient states that her pain still present.  No more nausea or vomiting.  No fevers.  Most of her pain seems to be suprapubic pain.  Feels she is emptying well now.  Yesterday had some issues emptying.    Asked to see    Past medical history:  Past Medical History:   Diagnosis Date    Abnormal computed tomography angiography (CTA) of neck 2021    Abnormal gluteal crease     ADHD (attention deficit hyperactivity disorder) 2020    Anemia 2019    iron defiency anemia    Anxiety     Benign paroxysmal positional vertigo of right ear 2021    Bipolar disorder 2012    BRBPR (bright red blood per rectum) 2021    Dr. Nancy Santana at [facility]    Breast anomaly     Bruises easily     Cervical adenopathy     Cervical lymphadenopathy     Left side.    Change in bowel habit 2021    Dr. [facility] at [facility]    Cholestasis during pregnancy in third trimester     Chronic allergic rhinitis     Chronic pain disorder     Fibro    Chronic recurrent major depressive disorder     In partial remission.    Cold intolerance     Constipation     CTS (carpal tunnel syndrome) 2019    Decreased sex drive     Depression     History of PPD    Diarrhea     Dizziness 2021    LEONELA, Temi Mcrae, Cardiologist office.     Dysmenorrhea 12/04/2019    Surgery: Laparoscopic assisted vaginal hysterectomy, Surgeon Dr. Hillary Nunn.    Dysphoric mood     Dyspnea on exertion 08/06/2021    APRN, Temi Mcrae, Cardiologist office.    Endometriosis 12/2019    Hysterectomy in 2019 cured    Environmental and seasonal allergies 09/21/2020    Epigastric abdominal pain 11/07/2018    [facility].    Epiploic appendagitis 01/10/2023    Extremity pain 2022    TOS diagnosed in August    Fat necrosis 07/29/2021    [facility].    Fatigue     Fibromyalgia, primary 2021    Fissure, anal     history    Folic acid deficiency     Gestational diabetes [id number]    I had it with my 1st pregnancy    H/O TB skin testing 02/20/2018    Negative result at [facility].    HA (headache)     Headache, tension-type     Hemorrhoid     History of gestational diabetes     with first baby    History of Holter monitoring 11/07/2019    24 hour.    History of kidney stones 2006/2009    History of miscarriage     History of vasectomy 2019    Spouse Vasectomy.    Hypocalcemia 03/2022    Hypoglycemia     Irritable bowel syndrome 1994    IBS with both constipation/diarrhea, followed by GI Dr. Moreland.    Joint pain     Fibro / TOS    Joint stiffness     Lightheadedness 08/06/2021    LEONELA, Temi Mcrae, Cardiologist office.    Low back pain     Lower back/middle    Low serum potassium level     Low serum vitamin B12     Lower extremity myoclonus 02/28/2022    ADMITTED TO [facility]    Malaise and fatigue 11/12/2019    Cardiologist Dr. Benji Ugalde.    Mass of left parotid gland 12/2021    Migraines 10/2019    Multinodular non-toxic goiter     Near syncope 08/06/2021    LEONELA, Temi Mcrae, Cardiologist office.    Neck pain     OCD (obsessive compulsive disorder)     Ovarian cyst 2010    Surgery removed.    Palpitations 10/28/2019    PVC's (premature ventricular contractions)     Rapid heart rate 11/12/2019    Cardiologist Dr. Benji Ugalde.     Rectal bleeding 06/02/2021    Dr. Nancy Santana at [facility]    Renal stones     RLS (restless legs syndrome)     RUQ abdominal mass [id number]    [facility].    Scoliosis 2003    Sleep disturbance     SOB (shortness of breath) 10/28/2019    Tachycardia 10/28/2019    Thrombocytopenia 03/2022    Thyroid nodule 10/28/2019    1 cm Left Submandibular node, [facility], Dr. Kevan HUTCHINSON Forwith.    Thyroid nodule 10/28/2019    0.3 cm Right side of neck, [facility], Dr. Kevan HUTCHINSON Forwith.    Thyromegaly 03/04/2016    Boarderline Thyromegaly, Findings via X-ray at [facility], ordering provider Dr. Nancy Santana.    Transaminitis 11/07/2018    [facility].    Trigger index finger of right hand 01/2022    Vaginal delivery 01/2019    Baby girl.       Past surgical history:  Past Surgical History:   Procedure Laterality Date    ADENOIDECTOMY  2006    CHOLECYSTECTOMY  2019    CHOLECYSTECTOMY WITH INTRAOPERATIVE CHOLANGIOGRAM N/A 11/09/2018    Procedure: Laparoscopic cholecystectomy;  Surgeon: Khanh Lassiter MD;  Location: [facility] OR;  Service: General    COLONOSCOPY N/A 08/2019    CYSTOSCOPY URETEROSCOPY LASER LITHOTRIPSY N/A 10/31/2024    Procedure: CYSTOSCOPY, LEFT URETEROSCOPY, STONE BASKET EXTRACTION, LEFT STENT PLACEMENT;  Surgeon: Williams Ferguson MD;  Location: [facility] OR;  Service: Urology;  Laterality: N/A;    DILATATION AND CURETTAGE N/A 10/2015    MISCARRIAGE.    ENDOSCOPY N/A     HEMORRHOIDECTOMY N/A 06/16/2022    Procedure: HEMORRHOIDECTOMY;  Surgeon: Linda Sanchez MD;  Location: [facility] OR;  Service: General;  Laterality: N/A;    LAPAROSCOPIC ASSISTED VAGINAL HYSTERECTOMY Bilateral 12/04/2019    Procedure: LAPAROSCOPIC ASSISTED VAGINAL HYSTERECTOMY, BILATERAL SALPINGECTOMY;  Surgeon: Hillary Nunn MD;  Location: [facility];  Service: Obstetrics/Gynecology    OVARIAN CYST SURGERY  06/2009    Laparoscopic ovarian cyst resection, no other abdominal surgery.    TONSILLECTOMY AND  ADENOIDECTOMY Bilateral 10/2006    WISDOM TOOTH EXTRACTION Bilateral        Allergies:  Hydrocodone and Tramadol    Home medications:  Medications Prior to Admission   Medication Sig Dispense Refill Last Dose/Taking    albuterol sulfate  (90 Base) MCG/ACT inhaler Inhale 2 puffs Every 4 (Four) Hours As Needed for Wheezing or Shortness of Air (cough). 18 g 0 Past Month    atomoxetine (STRATTERA) 40 MG capsule Take 1 capsule by mouth Daily.   11/3/2024 Morning    cephalexin (KEFLEX) 500 MG capsule Take 1 capsule by mouth 3 (Three) Times a Day for 7 days. 21 capsule 0 11/3/2024 Bedtime    Cetirizine HCl (ZYRTEC ALLERGY PO) Take 10 mg by mouth At Night As Needed (allergies).   Past Month    Coenzyme Q10 (CoQ-10) 100 MG capsule Take 1 capsule by mouth 2 (Two) Times a Day.   11/3/2024 Bedtime    desvenlafaxine (PRISTIQ) 100 MG 24 hr tablet Take 1 tablet by mouth Daily. FOR 30 DAYS   11/3/2024 Morning    Famotidine (PEPCID AC PO) Take 20 mg by mouth Every Night.   11/3/2024 Bedtime    Folate 400 MCG tablet Take 1 tablet by mouth Daily.   11/3/2024 Morning    Magnesium 100 MG tablet Take 200 mg by mouth Every Night.   11/3/2024 Bedtime    metoprolol succinate XL (TOPROL-XL) 25 MG 24 hr tablet Take 1 tablet by mouth once daily (Patient taking differently: Take 1 tablet by mouth Every Night.) 30 tablet 5 11/3/2024 Bedtime    NP Thyroid 30 MG tablet Take 1 tablet by mouth Daily.   11/3/2024 Morning    ondansetron ODT (ZOFRAN-ODT) 4 MG disintegrating tablet Place 1 tablet on the tongue 4 (Four) Times a Day. (Patient taking differently: Place 1 tablet on the tongue 4 (Four) Times a Day As Needed for Nausea or Vomiting.) 12 tablet 0 11/3/2024 Evening    oxyCODONE-acetaminophen (Percocet) 7.5-325 MG per tablet Take 1 tablet by mouth Every 4 (Four) Hours As Needed for Moderate Pain for up to 14 days. 30 tablet 0 11/3/2024 at  7:00 PM    pantoprazole (PROTONIX) 40 MG EC tablet Take 1 tablet by mouth 2 (Two) Times a Day.    11/3/2024 Bedtime    PROGESTERONE PO Take 300 mg by mouth Every Night.   11/3/2024 Bedtime    tamsulosin (FLOMAX) 0.4 MG capsule 24 hr capsule Take 1 capsule by mouth Daily for 14 days. 14 capsule 0 Past Month    Tirzepatide (Mounjaro) 2.5 MG/0.5ML solution auto-injector Inject 2.5 mg under the skin into the appropriate area as directed 1 (One) Time Per Week.   Past Month    topiramate (TOPAMAX) 50 MG tablet TAKE 1 TABLET BY MOUTH ONCE DAILY AT NIGHT 90 tablet 0 11/3/2024 Bedtime    traZODone (DESYREL) 100 MG tablet Take 1 tablet by mouth Every Night.   11/3/2024 Bedtime    ketorolac (TORADOL) 10 MG tablet Take 1 tablet by mouth Every 6 (Six) Hours As Needed for Moderate Pain. (Patient not taking: Reported on 11/4/2024) 15 tablet 0 Not Taking    oxyCODONE-acetaminophen (PERCOCET) 5-325 MG per tablet Take 1 tablet by mouth Every 6 (Six) Hours As Needed for Moderate Pain for up to 10 doses. (Patient not taking: Reported on 11/4/2024) 10 tablet 0 10/26/2024    Rimegepant Sulfate (Nurtec) 75 MG tablet dispersible tablet Take 1 tablet by mouth Daily. (Patient taking differently: Take 1 tablet by mouth Every 2 (Two) Hours As Needed (migraine).) 8 tablet 1         Hospital medications:  cefTRIAXone, 2,000 mg, Intravenous, Q24H  desvenlafaxine, 100 mg, Oral, Daily  famotidine, 20 mg, Oral, Nightly  metoprolol succinate XL, 25 mg, Oral, Nightly  pantoprazole, 40 mg, Oral, BID  potassium chloride ER, 40 mEq, Oral, Q4H  sodium chloride, 10 mL, Intravenous, Q12H  tamsulosin, 0.4 mg, Oral, Daily  Thyroid, 30 mg, Oral, Daily  topiramate, 50 mg, Oral, Nightly  traZODone, 100 mg, Oral, Nightly           acetaminophen    albuterol    senna-docusate sodium **AND** polyethylene glycol **AND** bisacodyl **AND** bisacodyl    Calcium Replacement - Follow Nurse / BPA Driven Protocol    cetirizine    HYDROmorphone    influenza vaccine    Magnesium Standard Dose Replacement - Follow Nurse / BPA Driven Protocol    melatonin    ondansetron     oxyCODONE-acetaminophen    Phosphorus Replacement - Follow Nurse / BPA Driven Protocol    Potassium Replacement - Follow Nurse / BPA Driven Protocol    [COMPLETED] Insert Peripheral IV **AND** sodium chloride    sodium chloride    sodium chloride    Family history:  Family History   Problem Relation Age of Onset    COPD Mother     Depression Mother     Hypertension Mother     Heart disease Mother     Hyperlipidemia Mother     Asthma Father     COPD Father     Heart disease Father     Alcohol abuse Father     Hearing loss Father     Cancer Sister     Miscarriages / Stillbirths Sister         1 miscarriage    Depression Sister     Depression Sister     Cancer Sister     Miscarriages / Stillbirths Sister         Miscarriage & stillbirth    Cancer Maternal Grandmother         Breast    Diabetes Maternal Grandmother     Depression Maternal Grandmother     Breast cancer Maternal Grandmother     Cancer Paternal Grandmother         Skin (ear)    Bleeding Disorder Paternal Grandmother         Factor V    COPD Paternal Grandmother     Asthma Paternal Grandmother     Osteoarthritis Paternal Grandmother     Arthritis Paternal Grandmother     Clotting disorder Paternal Grandmother         Factor V    Osteoporosis Paternal Grandmother     Alcohol abuse Paternal Grandfather     Malig Hyperthermia Neg Hx     Colon cancer Neg Hx        Social history:  Social History     Tobacco Use    Smoking status: Never    Smokeless tobacco: Never    Tobacco comments:     Vape on oçcasion   Vaping Use    Vaping status: Some Days    Substances: Flavoring   Substance Use Topics    Alcohol use: Yes     Alcohol/week: 2.0 - 4.0 standard drinks of alcohol     Types: 1 - 2 Glasses of wine, 1 - 2 Shots of liquor per week     Comment: Weekly    Drug use: Never       Review of systems:      Positive for:  nothing  Negative for:  chest pain, cough, sob, o/w neg    Objective:  TMax 24 hours:   Temp (24hrs), Av.6 °F (37 °C), Min:98.1 °F (36.7 °C),  Max:99.5 °F (37.5 °C)      Vitals Ranges:   Temp:  [98.1 °F (36.7 °C)-99.5 °F (37.5 °C)] 98.2 °F (36.8 °C)  Heart Rate:  [] 60  Resp:  [16-18] 17  BP: (105-124)/(73-89) 105/77    Intake/Output Last 3 shifts:  I/O last 3 completed shifts:  In: 1500 [IV Piggyback:1500]  Out: -      Physical Exam:    General Appearance:    Alert, cooperative, NAD   Back:     No CVA tenderness   Lungs:     Respirations unlabored, no wheezing    Heart:    RRR, intact peripheral pulses   Abdomen:     Soft, NDNT, no masses, no guarding   :    Pelvic not performed, bladder nondistended and nontender   Neuro/Psych:   Orientation intact, mood/affect pleasant       Results review:   I reviewed the patient's new clinical results.    Data review:  Lab Results (last 24 hours)       Procedure Component Value Units Date/Time    Basic Metabolic Panel [[id number]]  (Abnormal) Collected: 11/04/24 0402    Specimen: Blood from Arm, Right Updated: 11/04/24 0523     Glucose 86 mg/dL      BUN 10 mg/dL      Creatinine 0.94 mg/dL      Sodium 145 mmol/L      Potassium 3.2 mmol/L      Chloride 111 mmol/L      CO2 24.0 mmol/L      Calcium 8.2 mg/dL      BUN/Creatinine Ratio 10.6     Anion Gap 10.0 mmol/L      eGFR 81.3 mL/min/1.73     Narrative:      GFR Normal >60  Chronic Kidney Disease <60  Kidney Failure <15      CBC (No Diff) [[id number]]  (Normal) Collected: 11/04/24 0402    Specimen: Blood from Arm, Right Updated: 11/04/24 0502     WBC 6.58 10*3/mm3      RBC 4.00 10*6/mm3      Hemoglobin 12.7 g/dL      Hematocrit 36.6 %      MCV 91.5 fL      MCH 31.8 pg      MCHC 34.7 g/dL      RDW 12.6 %      RDW-SD 41.3 fl      MPV 9.6 fL      Platelets 169 10*3/mm3     Urinalysis With Culture If Indicated - Urine, Clean Catch [[id number]]  (Abnormal) Collected: 11/03/24 2320    Specimen: Urine, Clean Catch Updated: 11/03/24 2335     Color, UA Yellow     Appearance, UA Cloudy     pH, UA 6.0     Specific Gravity, UA 1.008     Glucose, UA Negative     Ketones, UA  "Negative     Bilirubin, UA Negative     Blood, UA Moderate (2+)     Protein, UA 30 mg/dL (1+)     Leuk Esterase, UA Trace     Nitrite, UA Negative     Urobilinogen, UA 0.2 E.U./dL    Narrative:      In absence of clinical symptoms, the presence of pyuria, bacteria, and/or nitrites on the urinalysis result does not correlate with infection.    Urinalysis, Microscopic Only - Urine, Clean Catch [[id number]]  (Abnormal) Collected: 11/03/24 2320    Specimen: Urine, Clean Catch Updated: 11/03/24 2335     RBC, UA 11-20 /HPF      WBC, UA 6-10 /HPF      Bacteria, UA None Seen /HPF      Squamous Epithelial Cells, UA 0-2 /HPF      Hyaline Casts, UA None Seen /LPF      Methodology Automated Microscopy    Urine Culture - Urine, Urine, Clean Catch [[id number]] Collected: 11/03/24 2320    Specimen: Urine, Clean Catch Updated: 11/03/24 2335    Blood Culture - Blood, Arm, Left [[id number]] Collected: 11/03/24 2251    Specimen: Blood from Arm, Left Updated: 11/03/24 2258    Blood Culture - Blood, Arm, Right [[id number]] Collected: 11/03/24 2251    Specimen: Blood from Arm, Right Updated: 11/03/24 2258    Procalcitonin [[id number]]  (Normal) Collected: 11/03/24 2150    Specimen: Blood Updated: 11/03/24 2228     Procalcitonin 0.05 ng/mL     Narrative:      As a Marker for Sepsis (Non-Neonates):    1. <0.5 ng/mL represents a low risk of severe sepsis and/or septic shock.  2. >2 ng/mL represents a high risk of severe sepsis and/or septic shock.    As a Marker for Lower Respiratory Tract Infections that require antibiotic therapy:    PCT on Admission    Antibiotic Therapy       6-12 Hrs later    >0.5                Strongly Recommended  >0.25 - <0.5        Recommended   0.1 - 0.25          Discouraged              Remeasure/reassess PCT  <0.1                Strongly Discouraged     Remeasure/reassess PCT    As 28 day mortality risk marker: \"Change in Procalcitonin Result\" (>80% or <=80%) if Day 0 (or Day 1) and Day 4 values are available. " Refer to http://www.[facility]-pct-calculator.com    Change in PCT <=80%  A decrease of PCT levels below or equal to 80% defines a positive change in PCT test result representing a higher risk for 28-day all-cause mortality of patients diagnosed with severe sepsis for septic shock.    Change in PCT >80%  A decrease of PCT levels of more than 80% defines a negative change in PCT result representing a lower risk for 28-day all-cause mortality of patients diagnosed with severe sepsis or septic shock.       Comprehensive Metabolic Panel [[id number]]  (Abnormal) Collected: 11/03/24 2150    Specimen: Blood Updated: 11/03/24 2221     Glucose 138 mg/dL      BUN 13 mg/dL      Creatinine 1.10 mg/dL      Sodium 142 mmol/L      Potassium 3.2 mmol/L      Chloride 106 mmol/L      CO2 25.6 mmol/L      Calcium 9.3 mg/dL      Total Protein 7.0 g/dL      Albumin 4.4 g/dL      ALT (SGPT) 35 U/L      AST (SGOT) 24 U/L      Alkaline Phosphatase 82 U/L      Total Bilirubin 0.2 mg/dL      Globulin 2.6 gm/dL      A/G Ratio 1.7 g/dL      BUN/Creatinine Ratio 11.8     Anion Gap 10.4 mmol/L      eGFR 67.3 mL/min/1.73     Narrative:      GFR Normal >60  Chronic Kidney Disease <60  Kidney Failure <15      Lactic Acid, Plasma [[id number]]  (Normal) Collected: 11/03/24 2150    Specimen: Blood Updated: 11/03/24 2215     Lactate 1.3 mmol/L     CBC & Differential [[id number]]  (Normal) Collected: 11/03/24 2150    Specimen: Blood Updated: 11/03/24 2200    Narrative:      The following orders were created for panel order CBC & Differential.  Procedure                               Abnormality         Status                     ---------                               -----------         ------                     CBC Auto Differential[[id number]]        Normal              Final result                 Please view results for these tests on the individual orders.    CBC Auto Differential [[id number]]  (Normal) Collected: 11/03/24 2150    Specimen: Blood Updated:  11/03/24 2200     WBC 8.97 10*3/mm3      RBC 4.52 10*6/mm3      Hemoglobin 13.9 g/dL      Hematocrit 41.1 %      MCV 90.9 fL      MCH 30.8 pg      MCHC 33.8 g/dL      RDW 12.8 %      RDW-SD 41.7 fl      MPV 9.5 fL      Platelets 202 10*3/mm3      Neutrophil % 66.5 %      Lymphocyte % 23.7 %      Monocyte % 7.8 %      Eosinophil % 1.4 %      Basophil % 0.4 %      Immature Grans % 0.2 %      Neutrophils, Absolute 5.95 10*3/mm3      Lymphocytes, Absolute 2.13 10*3/mm3      Monocytes, Absolute 0.70 10*3/mm3      Eosinophils, Absolute 0.13 10*3/mm3      Basophils, Absolute 0.04 10*3/mm3      Immature Grans, Absolute 0.02 10*3/mm3      nRBC 0.0 /100 WBC              Imaging:  Imaging Results (Last 24 Hours)       Procedure Component Value Units Date/Time    CT Abdomen Pelvis With Contrast [[id number]] Collected: 11/03/24 2342     Updated: 11/03/24 2342    Narrative:        Patient: DIANE GARCIA  Time Out: 23:41  Exam(s): CT ABDOMEN + PELVIS With Contrast     EXAM:    CT Abdomen and Pelvis With Intravenous Contrast    CLINICAL HISTORY:     Reason for exam: Acute left flank pain and abdominal pain status post   procedure.    TECHNIQUE:    Axial computed tomography images of the abdomen and pelvis with   intravenous contrast.  CTDI is 15.07 mGy and DLP is 787.7 mGy-cm.  This   CT exam was performed according to the principle of ALARA (As Low As   Reasonably Achievable) by using one or more of the following dose   reduction techniques: automated exposure control, adjustment of the mA   and or kV according to patient size, and or use of iterative   reconstruction technique.    COMPARISON:    CT of the abdomen and pelvis from October 25, 2024.    FINDINGS:    Lung bases:  Unremarkable.  No mass.  No consolidation.    Mediastinum:  Small hiatal hernia.     ABDOMEN:    Liver:  Diffuse hepatic steatosis.    Gallbladder and bile ducts:  Status post cholecystectomy with prominent   CBD measuring 12 mm.    Pancreas:   Unremarkable.  No mass.  No ductal dilation.    Spleen:  Unremarkable.  No splenomegaly.    Adrenals:  Unremarkable.  No mass.    Kidneys and ureters:  Previously noted 5 mm calculus at the distal left   ureter has passed.  Mild enhancement of the left ureteropelvic walls and   left ureteral walls with surrounding inflammatory stranding is noted   suggestive of pyelitis ureteritis.  2 punctate nonobstructing calculi at   the lower left kidney are noted.  There are at least 4 nonobstructing   right renal calculi with the largest measuring 4 mm.    Stomach and bowel:  Unremarkable.  No obstruction.  No mucosal   thickening.     PELVIS:    Appendix:  No findings to suggest acute appendicitis.    Bladder:  Unremarkable.  No mass.    Reproductive:  Unremarkable as visualized.     ABDOMEN and PELVIS:    Intraperitoneal space:  Unremarkable.  No free air.  No significant   fluid collection.    Bones joints:  No acute fracture.  No dislocation.    Soft tissues:  See above.    Vasculature:  Unremarkable.  No abdominal aortic aneurysm.    Lymph nodes:  Unremarkable.  No enlarged lymph nodes.    IMPRESSION:       1.  Previously noted 5 mm calculus at the distal left ureter has passed.    Mild enhancement of the left ureteropelvic walls and left ureteral walls   with surrounding inflammatory stranding is noted suggestive of   pyelitis ureteritis.  2.  Additional nonobstructing bilateral calculi.  3.  Hepatic steatosis.      Impression:          Electronically signed by Tiera Phan MD on 11-03-24 at 2341             Assessment:     34-year-old female with    1.  Left ureteritis; new and undiagnosed  2.  History of kidney stones; chronic and stable    Plan:     -No acute urological intervention.  Reassuring vitals, labs, CT scan findings  -Agree with continuing Rocephin and await urine culture  -Adding Pyridium and Celebrex scheduled  -PVR to ensure emptying  -Okay for discharge home on p.o. pain meds, including antibiotics, once  patient is tolerating p.o. and pain is controlled with p.o. pain meds.  Would recommend Celebrex, narcotic, Pyridium  -Will ensure patient has follow-up with Dr. Garret Garrison MD  11/04/24  07:32 EST

## 2024-11-04 NOTE — OUTREACH NOTE
Prep Survey      Flowsheet Row Responses   [facility] facility patient discharged from? [place]   Is LACE score < 7 ? Yes   Eligibility [facility]   Date of Admission 11/03/24   Date of Discharge 11/04/24   Discharge Disposition Home or Self Care   Discharge diagnosis Pyelitis-s/p lithotripsy   Does the patient have one of the following disease processes/diagnoses(primary or secondary)? Other   Does the patient have Home health ordered? No   Is there a DME ordered? No   Prep survey completed? Yes            SOLITARIO A - Registered Nurse

## 2024-11-04 NOTE — PLAN OF CARE
Goal Outcome Evaluation:  Plan of Care Reviewed With: patient, spouse        Progress: improving     Pt voices adequate pain relief with PRN analgesia. Urology to see today. No new complaints.

## 2024-11-04 NOTE — ED PROVIDER NOTES
EMERGENCY DEPARTMENT ENCOUNTER  Room Number:  23/23  PCP: Temi Jones APRN  Independent Historians: Patient and spouse      HPI:  Chief Complaint: had concerns including Flank Pain.     A complete HPI/ROS/PMH/PSH/SH/FH are unobtainable due to: None    Chronic or social conditions impacting patient care (Social Determinants of Health): None      Context: Sherry Quevedo is a 34 y.o. female with a medical history of kidney stones, IBS, and migraines who presents to the ED c/o acute left flank pain radiating to left abdomen with associated nausea, fever and chills.  The patient had lithotripsy and stent placed 4 days ago.  She removed the stent this morning.  Since then she is felt worse and worse with no relief with prescribed medications.  She is on cephalexin and Flomax post procedure.      Review of prior external notes (non-ED) -and- Review of prior external test results outside of this encounter:  H&P from urology 10/31/2024 reviewed: Patient with 5 mm distal left ureteral calculus that failed to pass was having intractable pain.  Underwent left ureteroscopic laser lithotripsy and stent placement      PAST MEDICAL HISTORY  Active Ambulatory Problems     Diagnosis Date Noted    Anemia 01/16/2019    Chronic allergic rhinitis 09/24/2019    History of renal stone 09/24/2019    Chronic migraine with aura 10/03/2019    Low serum vitamin B12 10/03/2019    Generalized anxiety disorder 10/03/2019    Irritable bowel syndrome (IBS) 09/21/2020    Thyroid nodule     Scoliosis 2003    Rectal bleeding     BRBPR (bright red blood per rectum) 06/02/2021    PVC's (premature ventricular contractions)     Thyromegaly 03/04/2016    Benign paroxysmal positional vertigo due to bilateral vestibular disorder 08/06/2021    Near syncope 08/06/2021    Dyspnea on exertion 08/06/2021    Lightheadedness 08/06/2021    Fat necrosis 07/29/2021    Tachycardia 10/28/2019    SOB (shortness of breath) 10/28/2019    Low serum potassium level      Folic acid deficiency     Cervical adenopathy     Endometriosis 12/2019    Abnormal gluteal crease     Decreased sex drive     Fibromyalgia     Breast anomaly     Ovarian cyst 2010    Fatigue     Bruises easily     Cold intolerance     Sleep disturbance     Renal stones     Chronic recurrent major depressive disorder     Calculus of bile duct 11/07/2018    Transaminitis 11/07/2018    RUQ abdominal mass     History of miscarriage     Fissure, anal     History of gestational diabetes     Cholestasis during pregnancy in third trimester 2019    Cholelithiasis 11/2018    External hemorrhoids 12/20/2021    Muscle spasm 02/28/2022    Class 1 obesity with serious comorbidity and body mass index (BMI) of 31.0 to 31.9 in adult 08/29/2022    Treatment-resistant depression 12/23/2022    Neurogenic thoracic outlet syndrome 08/20/2024    Ureteral calculus 10/31/2024     Resolved Ambulatory Problems     Diagnosis Date Noted    Transaminitis 11/07/2018    Calculus of bile duct with cholecystitis without obstruction 11/07/2018    Pregnancy 01/15/2019    Left cervical lymphadenopathy 09/24/2019    Iron deficiency anemia due to chronic blood loss 10/03/2019    Low folic acid 10/03/2019    Moderate episode of recurrent major depressive disorder 10/03/2019    Multinodular non-toxic goiter 10/03/2019    Dysmenorrhea 12/04/2019    Anxiety 09/21/2020    Depression 09/21/2020    Environmental and seasonal allergies 09/21/2020    Migraines     Change in bowel habit 06/02/2021    Neck pain     Palpitations 10/28/2019    History of Holter monitoring 11/07/2019    Rapid heart rate 11/12/2019    Malaise and fatigue 11/12/2019    Dysmenorrhea 12/04/2019    Hypoglycemia     Constipation     Diarrhea     HA (headache)     Intractable migraine with aura without status migrainosus 10/2019    Dysphoric mood     Nervously anxious     Vaginal delivery 01/2019    History of vasectomy 2019    Cervical lymphadenopathy     H/O TB skin testing 02/20/2018     Epigastric abdominal pain 11/07/2018    Abnormal uterine bleeding (AUB)     Allergic 01/2013     Past Medical History:   Diagnosis Date    Abnormal computed tomography angiography (CTA) of neck 12/21/2021    ADHD (attention deficit hyperactivity disorder) 2020    Benign paroxysmal positional vertigo of right ear 12/01/2021    Bipolar disorder 2012    Chronic pain disorder 2022    CTS (carpal tunnel syndrome) 2019    Dizziness 08/06/2021    Epiploic appendagitis 01/10/2023    Extremity pain 2022    Fibromyalgia, primary 2021    Gestational diabetes [id number]    Headache, tension-type     Hemorrhoid     History of kidney stones 2006/2009    Hypocalcemia 03/2022    Irritable bowel syndrome 1994    Joint pain     Joint stiffness     Low back pain     Lower extremity myoclonus 02/28/2022    Mass of left parotid gland 12/2021    OCD (obsessive compulsive disorder)     RLS (restless legs syndrome)     Thrombocytopenia 03/2022    Trigger index finger of right hand 01/2022         PAST SURGICAL HISTORY  Past Surgical History:   Procedure Laterality Date    ADENOIDECTOMY  2006    CHOLECYSTECTOMY  2019    CHOLECYSTECTOMY WITH INTRAOPERATIVE CHOLANGIOGRAM N/A 11/09/2018    Procedure: Laparoscopic cholecystectomy;  Surgeon: Khanh Lassiter MD;  Location: [facility];  Service: General    COLONOSCOPY N/A 08/2019    CYSTOSCOPY URETEROSCOPY LASER LITHOTRIPSY N/A 10/31/2024    Procedure: CYSTOSCOPY, LEFT URETEROSCOPY, STONE BASKET EXTRACTION, LEFT STENT PLACEMENT;  Surgeon: Williams Ferguson MD;  Location: [facility];  Service: Urology;  Laterality: N/A;    DILATATION AND CURETTAGE N/A 10/2015    MISCARRIAGE.    ENDOSCOPY N/A     HEMORRHOIDECTOMY N/A 06/16/2022    Procedure: HEMORRHOIDECTOMY;  Surgeon: Linda Sanchez MD;  Location: [facility];  Service: General;  Laterality: N/A;    LAPAROSCOPIC ASSISTED VAGINAL HYSTERECTOMY Bilateral 12/04/2019    Procedure: LAPAROSCOPIC ASSISTED VAGINAL HYSTERECTOMY,  BILATERAL SALPINGECTOMY;  Surgeon: Hillary Nunn MD;  [place] [facility] [place] [facility] [place]  Service: Obstetrics/Gynecology    OVARIAN CYST SURGERY  06/2009    Laparoscopic ovarian cyst resection, no other abdominal surgery.    TONSILLECTOMY AND ADENOIDECTOMY Bilateral 10/2006    WISDOM TOOTH EXTRACTION Bilateral          FAMILY HISTORY  Family History   Problem Relation Age of Onset    COPD Mother     Depression Mother     Hypertension Mother     Heart disease Mother     Hyperlipidemia Mother     Asthma Father     COPD Father     Heart disease Father     Alcohol abuse Father     Hearing loss Father     Cancer Sister     Miscarriages / Stillbirths Sister         1 miscarriage    Depression Sister     Depression Sister     Cancer Sister     Miscarriages / Stillbirths Sister         Miscarriage & stillbirth    Cancer Maternal Grandmother         Breast    Diabetes Maternal Grandmother     Depression Maternal Grandmother     Breast cancer Maternal Grandmother     Cancer Paternal Grandmother         Skin (ear)    Bleeding Disorder Paternal Grandmother         Factor V    COPD Paternal Grandmother     Asthma Paternal Grandmother     Osteoarthritis Paternal Grandmother     Arthritis Paternal Grandmother     Clotting disorder Paternal Grandmother         Factor V    Osteoporosis Paternal Grandmother     Alcohol abuse Paternal Grandfather     Malig Hyperthermia Neg Hx     Colon cancer Neg Hx          SOCIAL HISTORY  Social History     Socioeconomic History    Marital status:    Tobacco Use    Smoking status: Never    Smokeless tobacco: Never    Tobacco comments:     Vape on oçcasion   Vaping Use    Vaping status: Never Used   Substance and Sexual Activity    Alcohol use: Yes     Alcohol/week: 2.0 - 4.0 standard drinks of alcohol     Types: 1 - 2 Glasses of wine, 1 - 2 Shots of liquor per week     Comment: Weekly    Drug use: Never    Sexual activity: Yes     Partners: Male     Birth control/protection: Hysterectomy      Comment: .         ALLERGIES  Hydrocodone and Tramadol      REVIEW OF SYSTEMS  Review of Systems  Included in HPI  All systems reviewed and negative except for those discussed in HPI.      PHYSICAL EXAM    I have reviewed the triage vital signs and nursing notes.    ED Triage Vitals   Temp Heart Rate Resp BP SpO2   11/03/24 2046 11/03/24 2046 11/03/24 2046 11/03/24 2047 11/03/24 2046   99.5 °F (37.5 °C) (!) 125 18 121/89 99 %      Temp src Heart Rate Source Patient Position BP Location FiO2 (%)   11/03/24 2046 11/03/24 2046 11/03/24 2047 11/03/24 2047 --   Tympanic Monitor Sitting Right arm        Physical Exam    Physical Exam   Constitutional: No distress.  Nontoxic but uncomfortable appearing  HENT:  Head: Normocephalic and atraumatic.   Oropharynx: Mucous membranes are moist.   Eyes: . No scleral icterus. No conjunctival pallor.  Neck: Normal range of motion. Neck supple.   Cardiovascular: Pink warm and well perfused throughout.  Tachycardic but regular  Pulmonary/Chest: No respiratory distress.  No tachypnea or increased work of breathing appreciated.    Abdominal: Soft. There is left lateral abdominal tenderness. There is no rebound and no guarding.  No rash or traumatic findings  Musculoskeletal: Moves all extremities equally.    Neurological: Alert and oriented.  No acute focal deficit appreciated.  Skin: Skin is pink, warm, and dry.   Psychiatric: Mood and affect normal.   Nursing note and vitals reviewed.             LAB RESULTS  Recent Results (from the past 24 hours)   Comprehensive Metabolic Panel    Collection Time: 11/03/24  9:50 PM    Specimen: Blood   Result Value Ref Range    Glucose 138 (H) 65 - 99 mg/dL    BUN 13 6 - 20 mg/dL    Creatinine 1.10 (H) 0.57 - 1.00 mg/dL    Sodium 142 136 - 145 mmol/L    Potassium 3.2 (L) 3.5 - 5.2 mmol/L    Chloride 106 98 - 107 mmol/L    CO2 25.6 22.0 - 29.0 mmol/L    Calcium 9.3 8.6 - 10.5 mg/dL    Total Protein 7.0 6.0 - 8.5 g/dL    Albumin 4.4 3.5 - 5.2  g/dL    ALT (SGPT) 35 (H) 1 - 33 U/L    AST (SGOT) 24 1 - 32 U/L    Alkaline Phosphatase 82 39 - 117 U/L    Total Bilirubin 0.2 0.0 - 1.2 mg/dL    Globulin 2.6 gm/dL    A/G Ratio 1.7 g/dL    BUN/Creatinine Ratio 11.8 7.0 - 25.0    Anion Gap 10.4 5.0 - 15.0 mmol/L    eGFR 67.3 >60.0 mL/min/1.73   Lactic Acid, Plasma    Collection Time: 11/03/24  9:50 PM    Specimen: Blood   Result Value Ref Range    Lactate 1.3 0.5 - 2.0 mmol/L   Procalcitonin    Collection Time: 11/03/24  9:50 PM    Specimen: Blood   Result Value Ref Range    Procalcitonin 0.05 0.00 - 0.25 ng/mL   CBC Auto Differential    Collection Time: 11/03/24  9:50 PM    Specimen: Blood   Result Value Ref Range    WBC 8.97 3.40 - 10.80 10*3/mm3    RBC 4.52 3.77 - 5.28 10*6/mm3    Hemoglobin 13.9 12.0 - 15.9 g/dL    Hematocrit 41.1 34.0 - 46.6 %    MCV 90.9 79.0 - 97.0 fL    MCH 30.8 26.6 - 33.0 pg    MCHC 33.8 31.5 - 35.7 g/dL    RDW 12.8 12.3 - 15.4 %    RDW-SD 41.7 37.0 - 54.0 fl    MPV 9.5 6.0 - 12.0 fL    Platelets 202 140 - 450 10*3/mm3    Neutrophil % 66.5 42.7 - 76.0 %    Lymphocyte % 23.7 19.6 - 45.3 %    Monocyte % 7.8 5.0 - 12.0 %    Eosinophil % 1.4 0.3 - 6.2 %    Basophil % 0.4 0.0 - 1.5 %    Immature Grans % 0.2 0.0 - 0.5 %    Neutrophils, Absolute 5.95 1.70 - 7.00 10*3/mm3    Lymphocytes, Absolute 2.13 0.70 - 3.10 10*3/mm3    Monocytes, Absolute 0.70 0.10 - 0.90 10*3/mm3    Eosinophils, Absolute 0.13 0.00 - 0.40 10*3/mm3    Basophils, Absolute 0.04 0.00 - 0.20 10*3/mm3    Immature Grans, Absolute 0.02 0.00 - 0.05 10*3/mm3    nRBC 0.0 0.0 - 0.2 /100 WBC   Urinalysis With Culture If Indicated - Urine, Clean Catch    Collection Time: 11/03/24 11:20 PM    Specimen: Urine, Clean Catch   Result Value Ref Range    Color, UA Yellow Yellow, Straw    Appearance, UA Cloudy (A) Clear    pH, UA 6.0 5.0 - 8.0    Specific Gravity, UA 1.008 1.005 - 1.030    Glucose, UA Negative Negative    Ketones, UA Negative Negative    Bilirubin, UA Negative Negative    Blood,  UA Moderate (2+) (A) Negative    Protein, UA 30 mg/dL (1+) (A) Negative    Leuk Esterase, UA Trace (A) Negative    Nitrite, UA Negative Negative    Urobilinogen, UA 0.2 E.U./dL 0.2 - 1.0 E.U./dL   Urinalysis, Microscopic Only - Urine, Clean Catch    Collection Time: 11/03/24 11:20 PM    Specimen: Urine, Clean Catch   Result Value Ref Range    RBC, UA 11-20 (A) None Seen, 0-2 /HPF    WBC, UA 6-10 (A) None Seen, 0-2 /HPF    Bacteria, UA None Seen None Seen /HPF    Squamous Epithelial Cells, UA 0-2 None Seen, 0-2 /HPF    Hyaline Casts, UA None Seen None Seen /LPF    Methodology Automated Microscopy          RADIOLOGY  CT Abdomen Pelvis With Contrast    Result Date: 11/3/2024  Patient: DIANE GARCIA  Time Out: 23:41 Exam(s): CT ABDOMEN + PELVIS With Contrast EXAM:   CT Abdomen and Pelvis With Intravenous Contrast CLINICAL HISTORY:    Reason for exam: Acute left flank pain and abdominal pain status post procedure. TECHNIQUE:   Axial computed tomography images of the abdomen and pelvis with intravenous contrast.  CTDI is 15.07 mGy and DLP is 787.7 mGy-cm.  This CT exam was performed according to the principle of ALARA (As Low As Reasonably Achievable) by using one or more of the following dose reduction techniques: automated exposure control, adjustment of the mA and or kV according to patient size, and or use of iterative reconstruction technique. COMPARISON:   CT of the abdomen and pelvis from October 25, 2024. FINDINGS:   Lung bases:  Unremarkable.  No mass.  No consolidation.   Mediastinum:  Small hiatal hernia.  ABDOMEN:   Liver:  Diffuse hepatic steatosis.   Gallbladder and bile ducts:  Status post cholecystectomy with prominent CBD measuring 12 mm.   Pancreas:  Unremarkable.  No mass.  No ductal dilation.   Spleen:  Unremarkable.  No splenomegaly.   Adrenals:  Unremarkable.  No mass.   Kidneys and ureters:  Previously noted 5 mm calculus at the distal left ureter has passed.  Mild enhancement of the left  ureteropelvic walls and left ureteral walls with surrounding inflammatory stranding is noted suggestive of pyelitis ureteritis.  2 punctate nonobstructing calculi at the lower left kidney are noted.  There are at least 4 nonobstructing right renal calculi with the largest measuring 4 mm.   Stomach and bowel:  Unremarkable.  No obstruction.  No mucosal thickening.  PELVIS:   Appendix:  No findings to suggest acute appendicitis.   Bladder:  Unremarkable.  No mass.   Reproductive:  Unremarkable as visualized.  ABDOMEN and PELVIS:   Intraperitoneal space:  Unremarkable.  No free air.  No significant fluid collection.   Bones joints:  No acute fracture.  No dislocation.   Soft tissues:  See above.   Vasculature:  Unremarkable.  No abdominal aortic aneurysm.   Lymph nodes:  Unremarkable.  No enlarged lymph nodes. IMPRESSION:     1.  Previously noted 5 mm calculus at the distal left ureter has passed.  Mild enhancement of the left ureteropelvic walls and left ureteral walls with surrounding inflammatory stranding is noted suggestive of pyelitis ureteritis. 2.  Additional nonobstructing bilateral calculi. 3.  Hepatic steatosis.     Electronically signed by Tiera Phan MD on 11-03-24 at 2341       MEDICATIONS GIVEN IN ER  Medications   sodium chloride 0.9 % flush 10 mL (has no administration in time range)   HYDROmorphone (DILAUDID) injection 0.5 mg (has no administration in time range)   lidocaine (XYLOCAINE) 1 % 150 mg in sodium chloride 0.9 % 250 mL IVPB (has no administration in time range)   ketorolac (TORADOL) injection 15 mg (has no administration in time range)   sodium chloride 0.9 % bolus 1,000 mL (0 mL Intravenous Stopped 11/3/24 2320)   HYDROmorphone (DILAUDID) injection 0.5 mg (0.5 mg Intravenous Given 11/3/24 2155)   ondansetron (ZOFRAN) injection 4 mg (4 mg Intravenous Given 11/3/24 2155)   cefTRIAXone (ROCEPHIN) 2,000 mg in sodium chloride 0.9 % 100 mL MBP (0 mg Intravenous Stopped 11/3/24 2359)   iopamidol  (ISOVUE-300) 61 % injection 100 mL (85 mL Intravenous Given by Other 11/3/24 2099)         ORDERS PLACED DURING THIS VISIT:  Orders Placed This Encounter   Procedures    Blood Culture - Blood,    Blood Culture - Blood,    Urine Culture - Urine,    CT Abdomen Pelvis With Contrast    Comprehensive Metabolic Panel    Urinalysis With Culture If Indicated - Urine, Clean Catch    Lactic Acid, Plasma    Procalcitonin    CBC Auto Differential    Urinalysis, Microscopic Only - Urine, Clean Catch    Monitor Blood Pressure    Continuous Pulse Oximetry    Urology (on-call MD unless specified)    Insert Peripheral IV    CBC & Differential         OUTPATIENT MEDICATION MANAGEMENT:  Current Facility-Administered Medications Ordered in Epic   Medication Dose Route Frequency Provider Last Rate Last Admin    HYDROmorphone (DILAUDID) injection 0.5 mg  0.5 mg Intravenous Once Jarrod Ferguson MD        ketorolac (TORADOL) injection 15 mg  15 mg Intravenous Once Jarrod Ferguson MD        lidocaine (XYLOCAINE) 1 % 150 mg in sodium chloride 0.9 % 250 mL IVPB  1.5 mg/kg Intravenous Once Jarrod Ferguson MD        sodium chloride 0.9 % flush 10 mL  10 mL Intravenous PRN Jarrod Ferguson MD         Current Outpatient Medications Ordered in Epic   Medication Sig Dispense Refill    albuterol sulfate  (90 Base) MCG/ACT inhaler Inhale 2 puffs Every 4 (Four) Hours As Needed for Wheezing or Shortness of Air (cough). 18 g 0    atomoxetine (STRATTERA) 40 MG capsule       cephalexin (KEFLEX) 500 MG capsule Take 1 capsule by mouth 3 (Three) Times a Day for 7 days. 21 capsule 0    cetirizine (ZyrTEC) 10 MG chewable tablet tablet      Cetirizine HCl (ZYRTEC ALLERGY PO)       Coenzyme Q10 (CoQ-10) 100 MG capsule       desvenlafaxine (PRISTIQ) 100 MG 24 hr tablet Take 1 tablet by mouth Daily. FOR 30 DAYS      Famotidine (PEPCID AC PO)       Folate 400 MCG tablet       ketorolac (TORADOL) 10 MG tablet Take 1 tablet by mouth Every 6 (Six) Hours As Needed  for Moderate Pain. 15 tablet 0    Magnesium 100 MG tablet       Magnesium Bisglycinate (Mag Glycinate) 100 MG tablet tablet      metoprolol succinate XL (TOPROL-XL) 25 MG 24 hr tablet Take 1 tablet by mouth once daily 30 tablet 5    NP Thyroid 30 MG tablet Take 1 tablet by mouth Daily.      ondansetron ODT (ZOFRAN-ODT) 4 MG disintegrating tablet Place 1 tablet on the tongue 4 (Four) Times a Day. 12 tablet 0    oxyCODONE-acetaminophen (PERCOCET) 5-325 MG per tablet Take 1 tablet by mouth Every 6 (Six) Hours As Needed for Moderate Pain for up to 10 doses. 10 tablet 0    oxyCODONE-acetaminophen (Percocet) 7.5-325 MG per tablet Take 1 tablet by mouth Every 4 (Four) Hours As Needed for Moderate Pain for up to 14 days. 30 tablet 0    pantoprazole (PROTONIX) 40 MG EC tablet Take 1 tablet by mouth Every Morning.      PROGESTERONE  mg.      Rimegepant Sulfate (Nurtec) 75 MG tablet dispersible tablet Take 1 tablet by mouth Daily. 8 tablet 1    tamsulosin (FLOMAX) 0.4 MG capsule 24 hr capsule Take 1 capsule by mouth Daily for 14 days. 14 capsule 0    Tirzepatide (Mounjaro) 2.5 MG/0.5ML solution auto-injector Inject 2.5 mg under the skin into the appropriate area as directed 1 (One) Time Per Week.      topiramate (TOPAMAX) 50 MG tablet TAKE 1 TABLET BY MOUTH ONCE DAILY AT NIGHT 90 tablet 0    traZODone (DESYREL) 100 MG tablet Take 1 tablet by mouth Every Night.           PROCEDURES  Procedures            PROGRESS, DATA ANALYSIS, CONSULTS, AND MEDICAL DECISION MAKING  All labs have been independently interpreted by me.  All radiology studies have been reviewed by me. All EKG's have been independently viewed and interpreted by me.  Discussion below represents my analysis of pertinent findings related to patient's condition, differential diagnosis, treatment plan and final disposition.    Differential diagnosis:   My differential diagnosis for abdominal pain includes but is not limited to:  Gastritis, gastroenteritis, peptic  ulcer disease, GERD, esophageal perforation, acute appendicitis, mesenteric adenitis, Meckel’s diverticulum, epiploic appendagitis, diverticulitis, colon cancer, ulcerative colitis, Crohn’s disease, intussusception, small bowel obstruction, adhesions, ischemic bowel, perforated viscus, ileus, obstipation, biliary colic, cholecystitis, cholelithiasis, Arthur-Kelton Benjamin, hepatitis, pancreatitis, common bile duct obstruction, cholangitis, bile leak, splenic trauma, splenic rupture, splenic infarction, splenic abscess, abdominal abscess, ascites, spontaneous bacterial peritonitis, hernia, UTI, cystitis,ureterolithiasis, urinary obstruction, ovarian cyst, torsion, pregnancy, ectopic pregnancy, PID, pelvic abscess, mittelschmerz, endometriosis, AAA, myocardial infarction, pneumonia, cancer, porphyria, DKA, medications, sickle cell, viral syndrome, zoster      Clinical Scores:                  ED Course as of 11/04/24 0001   Sun Nov 03, 2024   2304 Lactate: 1.3 [RS]   2304 BUN: 13 [RS]   2304 Creatinine(!): 1.10 [RS]   2304 Sodium: 142 [RS]   2304 Potassium(!): 3.2 [RS]   2304 ALT (SGPT)(!): 35 [RS]   2304 AST (SGOT): 24 [RS]   2304 Total Bilirubin: 0.2 [RS]   2304 Lactate: 1.3 [RS]   2304 Procalcitonin: 0.05 [RS]   2304 WBC: 8.97 [RS]   2304 Hemoglobin: 13.9 [RS]   2349 RADIOLOGY      Study: Noncontrast CT abdomen pelvis  Findings: Moderate persistent left hydroureter  I independently viewed and interpreted these images contemporaneously with treatment.    [RS]   2358 CONSULT        Provider: Dr. Solomon - Fahad    Discussion: Reviewed patient history, ED presentation and evaluation.  Comfortable the idea that the patient could be placed in the ED observation unit overnight and they will consult the morning.    Agreeable c treatment and planned disposition.         [RS]   Mon Nov 04, 2024   0000 CONSULT        Provider: ANNEMARIE Simms MA    Discussion: Reviewed patient history, ED presentation evaluation as well as neurology  consultation and plan.  Agreeable to accept patient to the ED observation unit    Agreeable c treatment and planned disposition.         [RS]      ED Course User Index  [RS] Jarrod Ferguson MD         Prescription drug monitoring program review:     AS OF 00:01 EST VITALS:    BP - 124/76  HR - 119  TEMP - 99.5 °F (37.5 °C) (Tympanic)  O2 SATS - 99%    COMPLEXITY OF CARE  The patient requires admission.      DIAGNOSIS  Final diagnoses:   Acute left flank pain   Renal colic on left side         DISPOSITION  ED Disposition       ED Disposition   Decision to Admit    Condition   --    Comment   --                  ADMISSION    Discussed treatment plan and reason for admission with pt/family and admitting physician.  Pt/family voiced understanding of the plan for admission for further testing/treatment as needed.       Please note that portions of this document were completed with a voice recognition program.    Note Disclaimer: At [facility], we believe that sharing information builds trust and better relationships. You are receiving this note because you recently visited [facility]. It is possible you will see health information before a provider has talked with you about it. This kind of information can be easy to misunderstand. To help you fully understand what it means for your health, we urge you to discuss this note with your provider.         Jarrod Ferguson MD  11/04/24 0001

## 2024-11-04 NOTE — CASE MANAGEMENT/SOCIAL WORK
Discharge Planning Assessment  [facility]     Patient Name: Sherry Quevedo  MRN: [id number]  Today's Date: 11/4/2024    Admit Date: 11/3/2024    Plan: Home with family support   Discharge Needs Assessment       Row Name 11/04/24 1303       Living Environment    People in Home spouse    Name(s) of People in Home Ozzy Quevedo  [phone]    Current Living Arrangements home    Potentially Unsafe Housing Conditions none    In the past 12 months has the electric, gas, oil, or water company threatened to shut off services in your home? No    Primary Care Provided by self    Provides Primary Care For no one    Family Caregiver if Needed spouse    Family Caregiver Names Ozzy Quevedo  [phone]    Quality of Family Relationships supportive    Able to Return to Prior Arrangements yes       Resource/Environmental Concerns    Resource/Environmental Concerns none    Transportation Concerns none       Transportation Needs    In the past 12 months, has lack of transportation kept you from medical appointments or from getting medications? no    In the past 12 months, has lack of transportation kept you from meetings, work, or from getting things needed for daily living? No       Food Insecurity    Within the past 12 months, you worried that your food would run out before you got the money to buy more. Never true    Within the past 12 months, the food you bought just didn't last and you didn't have money to get more. Never true       Transition Planning    Patient/Family Anticipates Transition to home with family    Patient/Family Anticipated Services at Transition none    Transportation Anticipated family or friend will provide       Discharge Needs Assessment    Readmission Within the Last 30 Days no previous admission in last 30 days    Equipment Currently Used at Home none    Concerns to be Addressed no discharge needs identified;denies needs/concerns at this time    Do you want help finding or keeping work  or a job? I do not need or want help    Do you want help with school or training? For example, starting or completing job training or getting a high school diploma, GED or equivalent No    Anticipated Changes Related to Illness none    Equipment Needed After Discharge none    Provided Post Acute Provider List? N/A    N/A Provider List Comment No needs identified.    Offered/Gave Vendor List no                   Discharge Plan       Row Name 11/04/24 1512       Plan    Final Discharge Disposition Code 01 - home or self-care    Final Note Discharge to home. Beatris MELGAR RN CCP.      Row Name 11/04/24 1307       Plan    Plan Home with family support    Patient/Family in Agreement with Plan yes    Plan Comments Met with patient and Ozzy Quevedo  [phone] at bedside. Patient granted permission for me to speak to her while  remained in the room. Face sheet verified. Prior to admission patient was independent with all aspects of her ADL’s. She does not use any special or adaptive equipment. Patient uses the [facility] in [facility], denies any issues affording or remembering to take her medications. Patient is agreeable to using [facility]. Patient states Ozzy Quevedo  [phone] is her POA/health care surrogate. Discharge plans are to return home with family support. Family will provide transportation home. Will continue to monitor for new or changing discharge needs Beatris MELGAR RN CCP                  Continued Care and Services - Admitted Since 11/3/2024    No active coordination exists for this encounter.       Expected Discharge Date and Time       Expected Discharge Date Expected Discharge Time    Nov 4, 2024            Demographic Summary       Row Name 11/04/24 1302       General Information    Admission Type observation    Arrived From home    Referral Source admission list    Reason for Consult discharge planning    Preferred Language English       Contact Information    Permission Granted to Share Info With  family/designee  Ozzy Quevedo  [phone]                   Functional Status       Row Name 11/04/24 1302       Functional Status    Usual Activity Tolerance excellent    Current Activity Tolerance good       Functional Status, IADL    Medications independent    Meal Preparation independent    Housekeeping independent    Laundry independent    Shopping independent    If for any reason you need help with day-to-day activities such as bathing, preparing meals, shopping, managing finances, etc., do you get the help you need? I don't need any help       Mental Status Summary    Recent Changes in Mental Status/Cognitive Functioning no changes       Employment/    Employment Status employed full-time    Current or Previous Occupation education                   Psychosocial    No documentation.                  Abuse/Neglect    No documentation.                  Legal    No documentation.                  Substance Abuse    No documentation.                  Patient Forms    No documentation.                     Beatris Montoya RN

## 2024-11-04 NOTE — H&P
[facility]   HISTORY AND PHYSICAL    Patient Name: Sherry Quevedo  : 1990  MRN: [id number]  Primary Care Physician:  Temi Jones APRN  Date of admission: 11/3/2024    Subjective   Subjective     Chief Complaint:   Chief Complaint   Patient presents with    Flank Pain         HPI:    Sherry Quevedo is a 34 y.o. female who comes in complaining of left-sided flank pain.  Patient states pain has been ongoing for the past 4 days ever since she had a lithotripsy and stent placed and was found to have a 5 mm kidney stone in the left ureter.  Patient states that she has had some associated nausea and chills and reports she had a fever at home that was 100.7 prior to coming to the ER earlier today.  Patient is currently on Keflex and Flomax.  Patient had stent removed yesterday and patient had significantly worsening pain in which her home pain medicine was not helping.  Patient states she had 1 episode of vomiting yesterday.    In the ED, potassium of 3.2, serum creatinine slightly bumped at 1.10. Otherwise largely unremarkable CBC and CMP for acute process.  Procalcitonin and lactic normal.  UA shows 2+ blood, trace leukocyte esterase, 6-10 WBCs.  Blood cultures x 2 pending.  Urine culture pending.  CT abdomen and pelvis shows previously noted 5 mm calculus at the distal left ureter has passed.  Mild enhancement of the left ureter pelvic walls and left ureteral walls with surrounding inflammatory stranding is noted suggestive of pyelitis ureteritis.  Additional nonobstructing bilateral calculi.  Hepatic steatosis.  Patient is afebrile, pulse 119, on room air oxygen and 99% SpO2 and blood pressure normotensive.  Patient was given Rocephin in ED, Toradol, Dilaudid and lidocaine infusion as well as Zofran and 1 L normal saline bolus.  Urology was consulted in ED, .     Review of Systems   All systems were reviewed and negative except for: As per HPI    Personal History     Past Medical  History:   Diagnosis Date    Abnormal computed tomography angiography (CTA) of neck 12/21/2021    Abnormal gluteal crease     ADHD (attention deficit hyperactivity disorder) 2020    Anemia 2019    iron defiency anemia    Anxiety     Benign paroxysmal positional vertigo of right ear 12/01/2021    Bipolar disorder 2012    BRBPR (bright red blood per rectum) 06/02/2021    Dr. Nancy Santana at [facility]    Breast anomaly     Bruises easily     Cervical adenopathy     Cervical lymphadenopathy     Left side.    Change in bowel habit 06/02/2021    Dr. Nancy Santana at [facility]    Cholestasis during pregnancy in third trimester 2019    Chronic allergic rhinitis     Chronic pain disorder 2022    Fibro    Chronic recurrent major depressive disorder     In partial remission.    Cold intolerance     Constipation     CTS (carpal tunnel syndrome) 2019    Decreased sex drive     Depression     History of PPD    Diarrhea     Dizziness 08/06/2021    LEONELA, Temi Mcrae, Cardiologist office.    Dysmenorrhea 12/04/2019    Surgery: Laparoscopic assisted vaginal hysterectomy, Surgeon Dr. Hillary Nunn.    Dysphoric mood     Dyspnea on exertion 08/06/2021    LEONELA, Temi Mcare, Cardiologist office.    Endometriosis 12/2019    Hysterectomy in 2019 cured    Environmental and seasonal allergies 09/21/2020    Epigastric abdominal pain 11/07/2018    [facility].    Epiploic appendagitis 01/10/2023    Extremity pain 2022    TOS diagnosed in August    Fat necrosis 07/29/2021    [facility].    Fatigue     Fibromyalgia, primary 2021    Fissure, anal     history    Folic acid deficiency     Gestational diabetes [id number]    I had it with my 1st pregnancy    H/O TB skin testing 02/20/2018    Negative result at [facility].    HA (headache)     Headache, tension-type     Hemorrhoid     History of gestational diabetes     with first baby    History of Holter monitoring 11/07/2019    24 hour.    History of kidney stones 2006/2009     History of miscarriage     History of vasectomy 2019    Spouse Vasectomy.    Hypocalcemia 03/2022    Hypoglycemia     Irritable bowel syndrome 1994    IBS with both constipation/diarrhea, followed by GI Dr. Moreland.    Joint pain     Fibro / TOS    Joint stiffness     Lightheadedness 08/06/2021    APRN, Temi Mcrae, Cardiologist office.    Low back pain     Lower back/middle    Low serum potassium level     Low serum vitamin B12     Lower extremity myoclonus 02/28/2022    ADMITTED TO [facility]    Malaise and fatigue 11/12/2019    Cardiologist Dr. Benji Ugalde.    Mass of left parotid gland 12/2021    Migraines 10/2019    Multinodular non-toxic goiter     Near syncope 08/06/2021    APRN, Temi Mcrae, Cardiologist office.    Neck pain     OCD (obsessive compulsive disorder)     Ovarian cyst 2010    Surgery removed.    Palpitations 10/28/2019    PVC's (premature ventricular contractions)     Rapid heart rate 11/12/2019    Cardiologist Dr. Benji Ugalde.    Rectal bleeding 06/02/2021    Dr. Nancy Santana at ..    Renal stones     RLS (restless legs syndrome)     RUQ abdominal mass [id number]    [facility].    Scoliosis 2003    Sleep disturbance     SOB (shortness of breath) 10/28/2019    Tachycardia 10/28/2019    Thrombocytopenia 03/2022    Thyroid nodule 10/28/2019    1 cm Left Submandibular node, [facility], Dr. Kevan Hoffman.    Thyroid nodule 10/28/2019    0.3 cm Right side of neck, [facility], Dr. Kevan Hoffman.    Thyromegaly 03/04/2016    Boarderline Thyromegaly, Findings via X-ray at [facility], ordering provider Dr. Nancy Santana.    Transaminitis 11/07/2018    [facility].    Trigger index finger of right hand 01/2022    Vaginal delivery 01/2019    Baby girl.       Past Surgical History:   Procedure Laterality Date    ADENOIDECTOMY  2006    CHOLECYSTECTOMY  2019    CHOLECYSTECTOMY WITH INTRAOPERATIVE CHOLANGIOGRAM N/A 11/09/2018    Procedure: Laparoscopic  cholecystectomy;  Surgeon: Khanh Lassiter MD;  Location: [facility] OR;  Service: General    COLONOSCOPY N/A 08/2019    CYSTOSCOPY URETEROSCOPY LASER LITHOTRIPSY N/A 10/31/2024    Procedure: CYSTOSCOPY, LEFT URETEROSCOPY, STONE BASKET EXTRACTION, LEFT STENT PLACEMENT;  Surgeon: Williams Ferguson MD;  Location: [facility] MAIN OR;  Service: Urology;  Laterality: N/A;    DILATATION AND CURETTAGE N/A 10/2015    MISCARRIAGE.    ENDOSCOPY N/A     HEMORRHOIDECTOMY N/A 06/16/2022    Procedure: HEMORRHOIDECTOMY;  Surgeon: Linda Sanchez MD;  Location: [facility] MAIN OR;  Service: General;  Laterality: N/A;    LAPAROSCOPIC ASSISTED VAGINAL HYSTERECTOMY Bilateral 12/04/2019    Procedure: LAPAROSCOPIC ASSISTED VAGINAL HYSTERECTOMY, BILATERAL SALPINGECTOMY;  Surgeon: Hillary Nunn MD;  Location: [facility] O[facility];  Service: Obstetrics/Gynecology    OVARIAN CYST SURGERY  06/2009    Laparoscopic ovarian cyst resection, no other abdominal surgery.    TONSILLECTOMY AND ADENOIDECTOMY Bilateral 10/2006    WISDOM TOOTH EXTRACTION Bilateral        Family History: family history includes Alcohol abuse in her father and paternal grandfather; Arthritis in her paternal grandmother; Asthma in her father and paternal grandmother; Bleeding Disorder in her paternal grandmother; Breast cancer in her maternal grandmother; COPD in her father, mother, and paternal grandmother; Cancer in her maternal grandmother, paternal grandmother, sister, and sister; Clotting disorder in her paternal grandmother; Depression in her maternal grandmother, mother, sister, and sister; Diabetes in her maternal grandmother; Hearing loss in her father; Heart disease in her father and mother; Hyperlipidemia in her mother; Hypertension in her mother; Miscarriages / Stillbirths in her sister and sister; Osteoarthritis in her paternal grandmother; Osteoporosis in her paternal grandmother. Otherwise pertinent FHx was reviewed and not pertinent to current issue.    Social  History:  reports that she has never smoked. She has never used smokeless tobacco. She reports current alcohol use of about 2.0 - 4.0 standard drinks of alcohol per week. She reports that she does not use drugs.    Home Medications:  Cetirizine HCl, CoQ-10, Famotidine, Mag Glycinate, Magnesium, Progesterone, Rimegepant Sulfate, Thyroid, Tirzepatide, albuterol sulfate HFA, atomoxetine, cephalexin, desvenlafaxine, folic acid, ketorolac, metoprolol succinate XL, ondansetron ODT, oxyCODONE-acetaminophen, pantoprazole, tamsulosin, topiramate, and traZODone    Allergies:  Allergies   Allergen Reactions    Hydrocodone Itching and Rash    Tramadol Nausea And Vomiting       Objective   Objective     Vitals:   Temp:  [98.1 °F (36.7 °C)-99.5 °F (37.5 °C)] 98.1 °F (36.7 °C)  Heart Rate:  [] 74  Resp:  [16-18] 16  BP: (115-124)/(73-89) 115/73  Physical Exam    Constitutional: Awake, alert   Eyes: PERRLA, sclerae anicteric, no conjunctival injection   HENT: NCAT, mucous membranes moist   Neck: Supple, no thyromegaly, no lymphadenopathy, trachea midline   Respiratory: Clear to auscultation bilaterally, nonlabored respirations    Cardiovascular: RRR, no murmurs, rubs, or gallops, palpable pedal pulses bilaterally   Gastrointestinal: CVA tenderness on the left.  Positive bowel sounds, soft, nontender, nondistended   Musculoskeletal: No bilateral ankle edema, no clubbing or cyanosis to extremities   Psychiatric: Appropriate affect, cooperative   Neurologic: Oriented x 3, strength symmetric in all extremities, Cranial Nerves grossly intact to confrontation, speech clear   Skin: No rashes     Result Review    Result Review:  I have personally reviewed the results from the time of this admission to 11/4/2024 01:32 EST and agree with these findings:  [x]  Laboratory list / accordion  []  Microbiology  [x]  Radiology  []  EKG/Telemetry   []  Cardiology/Vascular   []  Pathology  []  Old records  []  Other:  Most notable findings  include: see above      Assessment & Plan   Assessment / Plan     Brief Patient Summary:  Sherry Quevedo is a 34 y.o. female who comes in complaining of flank pain    Active Hospital Problems:  Active Hospital Problems    Diagnosis     **Pyelitis      Plan:     Pyelitis/ureteritis/UTI  -Status post lithotripsy for 5mm left distal ureteral calculus and stent placed 4 days ago, stent removed yesterday currently on Flomax and Keflex postprocedure  -serum creatinine slightly bumped at 1.10. Otherwise largely unremarkable CBC and CMP for acute process.    -Procalcitonin and lactic normal.    -UA shows 2+ blood, trace leukocyte esterase, 6-10 WBCs.    -Blood cultures x 2 pending.    -Urine culture pending.   - CT abdomen and pelvis shows previously noted 5 mm calculus at the distal left ureter has passed.  Mild enhancement of the left ureter pelvic walls and left ureteral walls with surrounding inflammatory stranding is noted suggestive of pyelitis ureteritis.  Additional nonobstructing bilateral calculi.  Hepatic steatosis.   - Patient is afebrile, pulse 119, on room air oxygen and 99% SpO2 and blood pressure normotensive.    -Patient was given Rocephin in ED, Toradol, Dilaudid and lidocaine infusion as well as Zofran and 1 L normal saline bolus.    -Urology was consulted in ED, Dr. Solomon.   -Urology consult  -IV fluids 500 normal saline bolus  -Continue Rocephin  -Pain and nausea control  -N.p.o.    Hypokalemia  -potassium of 3.2, replacement protocol ordered    Anxiety/depression  -Continue home Pristiq, Topamax, trazodone    Essential hypertension, chronic, controlled  -Continue home metoprolol    GERD  -Continue home PPI    Obesity, BMI 38, encourage lifestyle modifications        VTE Prophylaxis:SCDs  Mechanical VTE prophylaxis orders are present.        CODE STATUS:    Code Status (Patient has no pulse and is not breathing): CPR (Attempt to Resuscitate)  Medical Interventions (Patient has pulse or is  breathing): Full Support    Admission Status:  I believe this patient meets observation status.    77 minutes have been spent by [facility] providers in the care of this patient while under observation status.      Appropriate PPE worn during patient encounter.  Hand hygeine performed before and after seeing the patient.      Electronically signed by AMALIA Boston, 11/04/24, 12:05 AM EST.

## 2024-11-04 NOTE — ED NOTES
"Nursing report ED to floor  Sherry Quevedo  34 y.o.  female    HPI :  HPI  Stated Reason for Visit: patient reports cystoscopy for kidney stone removal with stent placement last thursday, patient removed stent at home today and has had increased left flank pain since removal. fever tmax 100.7 at home prior to coming to ED.  History Obtained From: patient    Chief Complaint  Chief Complaint   Patient presents with    Flank Pain       Admitting doctor:   Macario Cabrera MD    Admitting diagnosis:   The primary encounter diagnosis was Acute left flank pain. A diagnosis of Renal colic on left side was also pertinent to this visit.    Code status:   Current Code Status       Date Active Code Status Order ID Comments User Context       11/4/2024 0017 CPR (Attempt to Resuscitate) [id number]  Jarrod Lyon PA ED        Question Answer    Code Status (Patient has no pulse and is not breathing) CPR (Attempt to Resuscitate)    Medical Interventions (Patient has pulse or is breathing) Full Support                    Allergies:   Hydrocodone and Tramadol    Isolation:   No active isolations    Intake and Output    Intake/Output Summary (Last 24 hours) at 11/4/2024 0028  Last data filed at 11/3/2024 2320  Gross per 24 hour   Intake 1000 ml   Output --   Net 1000 ml       Weight:       11/03/24 2046   Weight: 97.5 kg (215 lb)       Most recent vitals:   Vitals:    11/03/24 2046 11/03/24 2047 11/03/24 2152   BP:  121/89 124/76   BP Location:  Right arm    Patient Position:  Sitting    Pulse: (!) 125 119    Resp: 18     Temp: 99.5 °F (37.5 °C)     TempSrc: Tympanic     SpO2: 99%     Weight: 97.5 kg (215 lb)     Height: 160 cm (63\")         Active LDAs/IV Access:   Lines, Drains & Airways       Active LDAs       Name Placement date Placement time Site Days    Peripheral IV 11/03/24 2150 Right Antecubital 11/03/24 2150  Antecubital  less than 1                    Labs (abnormal labs have a star):   Labs Reviewed   COMPREHENSIVE " "METABOLIC PANEL - Abnormal; Notable for the following components:       Result Value    Glucose 138 (*)     Creatinine 1.10 (*)     Potassium 3.2 (*)     ALT (SGPT) 35 (*)     All other components within normal limits    Narrative:     GFR Normal >60  Chronic Kidney Disease <60  Kidney Failure <15     URINALYSIS W/ CULTURE IF INDICATED - Abnormal; Notable for the following components:    Appearance, UA Cloudy (*)     Blood, UA Moderate (2+) (*)     Protein, UA 30 mg/dL (1+) (*)     Leuk Esterase, UA Trace (*)     All other components within normal limits    Narrative:     In absence of clinical symptoms, the presence of pyuria, bacteria, and/or nitrites on the urinalysis result does not correlate with infection.   URINALYSIS, MICROSCOPIC ONLY - Abnormal; Notable for the following components:    RBC, UA 11-20 (*)     WBC, UA 6-10 (*)     All other components within normal limits   LACTIC ACID, PLASMA - Normal   PROCALCITONIN - Normal    Narrative:     As a Marker for Sepsis (Non-Neonates):    1. <0.5 ng/mL represents a low risk of severe sepsis and/or septic shock.  2. >2 ng/mL represents a high risk of severe sepsis and/or septic shock.    As a Marker for Lower Respiratory Tract Infections that require antibiotic therapy:    PCT on Admission    Antibiotic Therapy       6-12 Hrs later    >0.5                Strongly Recommended  >0.25 - <0.5        Recommended   0.1 - 0.25          Discouraged              Remeasure/reassess PCT  <0.1                Strongly Discouraged     Remeasure/reassess PCT    As 28 day mortality risk marker: \"Change in Procalcitonin Result\" (>80% or <=80%) if Day 0 (or Day 1) and Day 4 values are available. Refer to http://www.[employer]pr[employer]-pct-calculator.com    Change in PCT <=80%  A decrease of PCT levels below or equal to 80% defines a positive change in PCT test result representing a higher risk for 28-day all-cause mortality of patients diagnosed with severe sepsis for septic shock.    Change in " PCT >80%  A decrease of PCT levels of more than 80% defines a negative change in PCT result representing a lower risk for 28-day all-cause mortality of patients diagnosed with severe sepsis or septic shock.      CBC WITH AUTO DIFFERENTIAL - Normal   BLOOD CULTURE   BLOOD CULTURE   URINE CULTURE   BASIC METABOLIC PANEL   CBC (NO DIFF)   CBC AND DIFFERENTIAL    Narrative:     The following orders were created for panel order CBC & Differential.  Procedure                               Abnormality         Status                     ---------                               -----------         ------                     CBC Auto Differential[[id number]]        Normal              Final result                 Please view results for these tests on the individual orders.       EKG:   No orders to display       Meds given in ED:   Medications   sodium chloride 0.9 % flush 10 mL (has no administration in time range)   lidocaine (XYLOCAINE) 1 % 150 mg in sodium chloride 0.9 % 250 mL IVPB (has no administration in time range)   sodium chloride 0.9 % flush 10 mL (has no administration in time range)   sodium chloride 0.9 % flush 10 mL (has no administration in time range)   sodium chloride 0.9 % infusion 40 mL (has no administration in time range)   acetaminophen (TYLENOL) tablet 650 mg (has no administration in time range)   sennosides-docusate (PERICOLACE) 8.6-50 MG per tablet 2 tablet (has no administration in time range)     And   polyethylene glycol (MIRALAX) packet 17 g (has no administration in time range)     And   bisacodyl (DULCOLAX) EC tablet 5 mg (has no administration in time range)     And   bisacodyl (DULCOLAX) suppository 10 mg (has no administration in time range)   Potassium Replacement - Follow Nurse / BPA Driven Protocol (has no administration in time range)   Magnesium Standard Dose Replacement - Follow Nurse / BPA Driven Protocol (has no administration in time range)   Phosphorus Replacement - Follow Nurse /  BPA Driven Protocol (has no administration in time range)   Calcium Replacement - Follow Nurse / BPA Driven Protocol (has no administration in time range)   ondansetron (ZOFRAN) injection 4 mg (has no administration in time range)   melatonin tablet 5 mg (has no administration in time range)   HYDROmorphone (DILAUDID) injection 0.5 mg (has no administration in time range)   sodium chloride 0.9 % bolus 500 mL (has no administration in time range)   cefTRIAXone (ROCEPHIN) 2,000 mg in sodium chloride 0.9 % 100 mL MBP (has no administration in time range)   sodium chloride 0.9 % bolus 1,000 mL (0 mL Intravenous Stopped 11/3/24 2320)   HYDROmorphone (DILAUDID) injection 0.5 mg (0.5 mg Intravenous Given 11/3/24 2155)   ondansetron (ZOFRAN) injection 4 mg (4 mg Intravenous Given 11/3/24 2155)   cefTRIAXone (ROCEPHIN) 2,000 mg in sodium chloride 0.9 % 100 mL MBP (0 mg Intravenous Stopped 11/3/24 2359)   iopamidol (ISOVUE-300) 61 % injection 100 mL (85 mL Intravenous Given by Other 11/3/24 2306)   HYDROmorphone (DILAUDID) injection 0.5 mg (0.5 mg Intravenous Given 11/4/24 0003)   ketorolac (TORADOL) injection 15 mg (15 mg Intravenous Given 11/4/24 0002)       Imaging results:  CT Abdomen Pelvis With Contrast    Result Date: 11/3/2024  Electronically signed by Tiera Phan MD on 11-03-24 at 2341     Ambulatory status:   - up ad beth     Social issues:   Social History     Socioeconomic History    Marital status:    Tobacco Use    Smoking status: Never    Smokeless tobacco: Never    Tobacco comments:     Vape on oçcasion   Vaping Use    Vaping status: Never Used   Substance and Sexual Activity    Alcohol use: Yes     Alcohol/week: 2.0 - 4.0 standard drinks of alcohol     Types: 1 - 2 Glasses of wine, 1 - 2 Shots of liquor per week     Comment: Weekly    Drug use: Never    Sexual activity: Yes     Partners: Male     Birth control/protection: Hysterectomy     Comment: .       Peripheral Neurovascular  Peripheral  "Neurovascular (Adult)  Peripheral Neurovascular WDL: WDL    Neuro Cognitive  Neuro Cognitive (Adult)  Cognitive/Neuro/Behavioral WDL: WDL  Sedation Group  POSS (Pasero Opioid-Induced Sed Scale): 1 - Awake and alert    Learning  Learning Assessment  Learning Readiness and Ability: no barriers identified  Education Provided  Person Taught: patient    Respiratory  Respiratory WDL  Respiratory WDL: WDL    Abdominal Pain       Pain Assessments  Pain (Adult)  (0-10) Pain Rating: Rest: 6  Patient requested Medication Prescribed for Lower Pain Scale: No  Pain Location: flank  Pain Side/Orientation: left  Response to Pain Interventions: other (see comments) (\"pain is coming back again\")    NIH Stroke Scale       Cathie Hinson RN  11/04/24 00:28 EST   "

## 2024-11-04 NOTE — PROGRESS NOTES
AROLDO GUILLAUME Attestation Note    I supervised care provided by the midlevel provider.    The SANDEE and I have discussed this patient's history, physical exam, and treatment plan. I have reviewed the documentation and personally had a face to face interaction with the patient  I affirm the documentation and agree with the treatment and plan. I provided a substantive portion of the care of this patient.  I personally performed the physical exam, in its entirety.  My personal findings are documented in below:    History:  34-year-old female admitted to the observation unit for further evaluation and management of nausea, vomiting, flank pain.  Patient recently had ureteroscopy, lithotripsy and stent insertion last week.  Stent was removed 2 days ago.  Patient had uncontrolled symptoms at home and so came to the ER.  Workup in the emergency department notable for mild hypokalemia, mild elevation of creatinine, urinalysis with hematuria and pyuria.  Blood cultures and urine cultures obtained.  Radiological evaluation demonstrated passage of previously noted 5 mm calculus.  Patient started on Rocephin, Toradol and Dilaudid as well as lidocaine infusion.    Physical Exam:  General: No acute distress.  HENT: NCAT, PERRL, Nares patent.  Eyes: no scleral icterus.  Neck: trachea midline, no ROM limitations.  CV: Pink warm and well-perfused throughout  Respiratory: No distress or increased work of breathing  Abdomen: soft, no focal tenderness or rigidity  Musculoskeletal: no deformity.  Neuro: alert, moves all extremities, follows commands.  Skin: warm, dry.    Assessment and Plan:  34-year-old female admitted to the observation unit for further evaluation and management of nausea, vomiting, flank pain  - Urology consulted  - Pain is well-controlled with IV medications at this time  - Will transition to oral pain control and see how patient tolerates  - Advance diet  - Home later today if patient if appropriate symptomatic control

## 2024-11-04 NOTE — CASE MANAGEMENT/SOCIAL WORK
Discharge Planning Assessment  Psychiatric     Patient Name: Sherry Quevedo  MRN: [id number]  Today's Date: 11/4/2024    Admit Date: 11/3/2024    Plan: Home with family support   Discharge Needs Assessment       Row Name 11/04/24 1303       Living Environment    People in Home spouse    Name(s) of People in Home Ozzy Quevedo  [phone]    Current Living Arrangements home    Potentially Unsafe Housing Conditions none    In the past 12 months has the electric, gas, oil, or water company threatened to shut off services in your home? No    Primary Care Provided by self    Provides Primary Care For no one    Family Caregiver if Needed spouse    Family Caregiver Names Ozzy Quevedo  [phone]    Quality of Family Relationships supportive    Able to Return to Prior Arrangements yes       Resource/Environmental Concerns    Resource/Environmental Concerns none    Transportation Concerns none       Transportation Needs    In the past 12 months, has lack of transportation kept you from medical appointments or from getting medications? no    In the past 12 months, has lack of transportation kept you from meetings, work, or from getting things needed for daily living? No       Food Insecurity    Within the past 12 months, you worried that your food would run out before you got the money to buy more. Never true    Within the past 12 months, the food you bought just didn't last and you didn't have money to get more. Never true       Transition Planning    Patient/Family Anticipates Transition to home with family    Patient/Family Anticipated Services at Transition none    Transportation Anticipated family or friend will provide       Discharge Needs Assessment    Readmission Within the Last 30 Days no previous admission in last 30 days    Equipment Currently Used at Home none    Concerns to be Addressed no discharge needs identified;denies needs/concerns at this time    Do you want help finding or keeping work  or a job? I do not need or want help    Do you want help with school or training? For example, starting or completing job training or getting a high school diploma, GED or equivalent No    Anticipated Changes Related to Illness none    Equipment Needed After Discharge none    Provided Post Acute Provider List? N/A    N/A Provider List Comment No needs identified.    Offered/Gave Vendor List no                   Discharge Plan       Row Name 11/04/24 1307       Plan    Plan Home with family support    Patient/Family in Agreement with Plan yes    Plan Comments Met with patient and Ozzy Quevedo  [phone] at bedside. Patient granted permission for me to speak to her while  remained in the room. Face sheet verified. Prior to admission patient was independent with all aspects of her ADL’s. She does not use any special or adaptive equipment. Patient uses the [facility] in [place], denies any issues affording or remembering to take her medications. Patient is agreeable to using [facility]. Patient states Ozzy Quevedo  [phone] is her POA/health care surrogate. Discharge plans are to return home with family support. Family will provide transportation home. Will continue to monitor for new or changing discharge needs Beatris MELGAR RN CCP                  Continued Care and Services - Admitted Since 11/3/2024    No active coordination exists for this encounter.       Expected Discharge Date and Time       Expected Discharge Date Expected Discharge Time    Nov 5, 2024            Demographic Summary       Row Name 11/04/24 1302       General Information    Admission Type observation    Arrived From home    Referral Source admission list    Reason for Consult discharge planning    Preferred Language English       Contact Information    Permission Granted to Share Info With family/designee  Ozzy Quevedo  [phone]                   Functional Status       Row Name 11/04/24 1302       Functional Status    Usual  Activity Tolerance excellent    Current Activity Tolerance good       Functional Status, IADL    Medications independent    Meal Preparation independent    Housekeeping independent    Laundry independent    Shopping independent    If for any reason you need help with day-to-day activities such as bathing, preparing meals, shopping, managing finances, etc., do you get the help you need? I don't need any help       Mental Status Summary    Recent Changes in Mental Status/Cognitive Functioning no changes       Employment/    Employment Status employed full-time    Current or Previous Occupation education                   Psychosocial    No documentation.                  Abuse/Neglect    No documentation.                  Legal    No documentation.                  Substance Abuse    No documentation.                  Patient Forms    No documentation.                     Beatris Montoya RN

## 2024-11-04 NOTE — PLAN OF CARE
Goal Outcome Evaluation:      Patients pain has been well controlled since this AM. She has also had two bowel movements and did not want the ordered miralax. IV removed, Tip intact. Reviewed discharge instructions, no further questions at end of visit.

## 2024-11-04 NOTE — DISCHARGE SUMMARY
ED OBSERVATION PROGRESS/DISCHARGE SUMMARY    Date of Admission: 11/3/2024   LOS: 0 days   PCP: Temi Jones APRN    Final Diagnosis left flank pain       Subjective     Hospital Outcome:   Sherry Quevedo is a 34 y.o. female who comes in complaining of left-sided flank pain.  Patient states pain has been ongoing for the past 4 days ever since she had a lithotripsy and stent placed and was found to have a 5 mm kidney stone in the left ureter.  Patient states that she has had some associated nausea and chills and reports she had a fever at home that was 100.7 prior to coming to the ER earlier today.  Patient is currently on Keflex and Flomax.  Patient had stent removed yesterday and patient had significantly worsening pain in which her home pain medicine was not helping.  Patient states she had 1 episode of vomiting yesterday.     In the ED, potassium of 3.2, serum creatinine slightly bumped at 1.10. Otherwise largely unremarkable CBC and CMP for acute process.  Procalcitonin and lactic normal.  UA shows 2+ blood, trace leukocyte esterase, 6-10 WBCs.  Blood cultures x 2 pending.  Urine culture pending.  CT abdomen and pelvis shows previously noted 5 mm calculus at the distal left ureter has passed.  Mild enhancement of the left ureter pelvic walls and left ureteral walls with surrounding inflammatory stranding is noted suggestive of pyelitis ureteritis.  Additional nonobstructing bilateral calculi.  Hepatic steatosis.  Patient is afebrile, pulse 119, on room air oxygen and 99% SpO2 and blood pressure normotensive.  Patient was given Rocephin in ED, Toradol, Dilaudid and lidocaine infusion as well as Zofran and 1 L normal saline bolus.  Urology was consulted in ED, .     Urology saw and evaluated the patient this morning and suspects this is left ureteritis. They added pyridium and celebrex along with her opioid pain regimen and she has had improvement of pain. Patient was given IV rocephin during  admission but the preliminary culture is showing no growth at this time. Urology is okay with patient discharging home once controlled with a PO regimen. Patient has improved on a PO regimen and is requesting to go home. Patient has opioid prescription at home so no new prescription needed; will send prescription for celebrex, pyridium and zofran. Patient will need close follow up with Urology and she will call and schedule this. Urology did also want to continue her antibiotics outpatient as well.     ROS:  General: no fevers, chills  Respiratory: no cough, dyspnea  Cardiovascular: no chest pain, palpitations  Abdomen: No abdominal pain, nausea, vomiting, or diarrhea  Neurologic: No focal weakness    Objective   Physical Exam:  I have reviewed the vital signs.  Temp:  [97.7 °F (36.5 °C)-99.5 °F (37.5 °C)] 97.7 °F (36.5 °C)  Heart Rate:  [] 79  Resp:  [16-18] 16  BP: ()/(62-89) 99/62  General Appearance:    Alert, cooperative, no distress  Head:    Normocephalic, atraumatic; normal hearing   Eyes:    Sclerae anicteric, EOMI  Neck:   Supple, nontender  Lungs: Clear to auscultation bilaterally, respirations unlabored on RA  Heart: Regular rate and rhythm, S1 and S2 normal, no murmur  Abdomen:  Soft, nontender, bowel sounds active, nondistended  Extremities: No clubbing, cyanosis, or edema to lower extremities  Pulses:  2+ and symmetric in distal lower extremities  Skin: No rashes   Neurologic: Oriented x3, Normal strength to extremities    Results Review:    I have reviewed the labs, radiology results and diagnostic studies.    Results from last 7 days   Lab Units 11/04/24  0402   WBC 10*3/mm3 6.58   HEMOGLOBIN g/dL 12.7   HEMATOCRIT % 36.6   PLATELETS 10*3/mm3 169     Results from last 7 days   Lab Units 11/04/24  0402 11/03/24  2150   SODIUM mmol/L 145 142   POTASSIUM mmol/L 3.2* 3.2*   CHLORIDE mmol/L 111* 106   CO2 mmol/L 24.0 25.6   BUN mg/dL 10 13   CREATININE mg/dL 0.94 1.10*   CALCIUM mg/dL 8.2*  9.3   BILIRUBIN mg/dL  --  0.2   ALK PHOS U/L  --  82   ALT (SGPT) U/L  --  35*   AST (SGOT) U/L  --  24   GLUCOSE mg/dL 86 138*     Imaging Results (Last 24 Hours)       Procedure Component Value Units Date/Time    CT Abdomen Pelvis With Contrast [[id number]] Collected: 11/03/24 2342     Updated: 11/03/24 2342    Narrative:        Patient: DIANE GARCIA  Time Out: 23:41  Exam(s): CT ABDOMEN + PELVIS With Contrast     EXAM:    CT Abdomen and Pelvis With Intravenous Contrast    CLINICAL HISTORY:     Reason for exam: Acute left flank pain and abdominal pain status post   procedure.    TECHNIQUE:    Axial computed tomography images of the abdomen and pelvis with   intravenous contrast.  CTDI is 15.07 mGy and DLP is 787.7 mGy-cm.  This   CT exam was performed according to the principle of ALARA (As Low As   Reasonably Achievable) by using one or more of the following dose   reduction techniques: automated exposure control, adjustment of the mA   and or kV according to patient size, and or use of iterative   reconstruction technique.    COMPARISON:    CT of the abdomen and pelvis from October 25, 2024.    FINDINGS:    Lung bases:  Unremarkable.  No mass.  No consolidation.    Mediastinum:  Small hiatal hernia.     ABDOMEN:    Liver:  Diffuse hepatic steatosis.    Gallbladder and bile ducts:  Status post cholecystectomy with prominent   CBD measuring 12 mm.    Pancreas:  Unremarkable.  No mass.  No ductal dilation.    Spleen:  Unremarkable.  No splenomegaly.    Adrenals:  Unremarkable.  No mass.    Kidneys and ureters:  Previously noted 5 mm calculus at the distal left   ureter has passed.  Mild enhancement of the left ureteropelvic walls and   left ureteral walls with surrounding inflammatory stranding is noted   suggestive of pyelitis ureteritis.  2 punctate nonobstructing calculi at   the lower left kidney are noted.  There are at least 4 nonobstructing   right renal calculi with the largest measuring 4 mm.     Stomach and bowel:  Unremarkable.  No obstruction.  No mucosal   thickening.     PELVIS:    Appendix:  No findings to suggest acute appendicitis.    Bladder:  Unremarkable.  No mass.    Reproductive:  Unremarkable as visualized.     ABDOMEN and PELVIS:    Intraperitoneal space:  Unremarkable.  No free air.  No significant   fluid collection.    Bones joints:  No acute fracture.  No dislocation.    Soft tissues:  See above.    Vasculature:  Unremarkable.  No abdominal aortic aneurysm.    Lymph nodes:  Unremarkable.  No enlarged lymph nodes.    IMPRESSION:       1.  Previously noted 5 mm calculus at the distal left ureter has passed.    Mild enhancement of the left ureteropelvic walls and left ureteral walls   with surrounding inflammatory stranding is noted suggestive of   pyelitis ureteritis.  2.  Additional nonobstructing bilateral calculi.  3.  Hepatic steatosis.      Impression:          Electronically signed by Tiera Phan MD on 11-03-24 at 2341            I have reviewed the medications.     Discharge Medications        ASK your doctor about these medications        Instructions Start Date   albuterol sulfate  (90 Base) MCG/ACT inhaler  Commonly known as: PROVENTIL HFA;VENTOLIN HFA;PROAIR HFA   2 puffs, Inhalation, Every 4 Hours PRN      atomoxetine 40 MG capsule  Commonly known as: STRATTERA   Take 1 capsule by mouth Daily.      cephalexin 500 MG capsule  Commonly known as: KEFLEX   500 mg, Oral, 3 Times Daily      CoQ-10 100 MG capsule   100 mg, 2 Times Daily      desvenlafaxine 100 MG 24 hr tablet  Commonly known as: PRISTIQ   100 mg, Daily      Folate 400 MCG tablet  Generic drug: folic acid   Take 1 tablet by mouth Daily.      ketorolac 10 MG tablet  Commonly known as: TORADOL   10 mg, Oral, Every 6 Hours PRN      Magnesium 100 MG tablet   200 mg, Nightly      metoprolol succinate XL 25 MG 24 hr tablet  Commonly known as: TOPROL-XL   25 mg, Oral, Daily      Mounjaro 2.5 MG/0.5ML solution  auto-injector  Generic drug: Tirzepatide   2.5 mg, Weekly      NP Thyroid 30 MG tablet  Generic drug: Thyroid   30 mg, Daily      Nurtec 75 MG dispersible tablet  Generic drug: Rimegepant Sulfate   75 mg, Oral, Daily      ondansetron ODT 4 MG disintegrating tablet  Commonly known as: ZOFRAN-ODT   4 mg, Translingual, 4 Times Daily      oxyCODONE-acetaminophen 5-325 MG per tablet  Commonly known as: PERCOCET   1 tablet, Oral, Every 6 Hours PRN      oxyCODONE-acetaminophen 7.5-325 MG per tablet  Commonly known as: Percocet   1 tablet, Oral, Every 4 Hours PRN      pantoprazole 40 MG EC tablet  Commonly known as: PROTONIX   40 mg, 2 Times Daily      PEPCID AC PO   20 mg, Nightly      PROGESTERONE PO   300 mg, Nightly      tamsulosin 0.4 MG capsule 24 hr capsule  Commonly known as: FLOMAX   0.4 mg, Oral, Daily      topiramate 50 MG tablet  Commonly known as: TOPAMAX   50 mg, Oral, Nightly      traZODone 100 MG tablet  Commonly known as: DESYREL   100 mg, Nightly      ZYRTEC ALLERGY PO  Ask about: Which instructions should I use?   10 mg, Nightly PRN              ---------------------------------------------------------------------------------------------  Assessment & Plan   Assessment/Problem List    Pyelitis      Plan:  Pyelitis/ureteritis/UTI  -Status post lithotripsy for 5mm left distal ureteral calculus and stent placed 4 days ago, stent removed yesterday currently on Flomax and Keflex postprocedure  -serum creatinine slightly bumped at 1.10. Otherwise largely unremarkable CBC and CMP for acute process.    -Procalcitonin and lactic normal.    -UA shows 2+ blood, trace leukocyte esterase, 6-10 WBCs.    -Blood cultures x 2 pending.    -Urine culture pending.   - CT abdomen and pelvis shows previously noted 5 mm calculus at the distal left ureter has passed.  Mild enhancement of the left ureter pelvic walls and left ureteral walls with surrounding inflammatory stranding is noted suggestive of pyelitis ureteritis.   Additional nonobstructing bilateral calculi.  Hepatic steatosis.   -Urology saw and evaluated the patient this morning and suspects this is left ureteritis. They added pyridium and celebrex along with her opioid pain regimen and she has had improvement of pain. Patient was given IV rocephin during admission but the preliminary culture is showing no growth at this time. Urology is okay with patient discharging home once controlled with a PO regimen. Patient has improved on a PO regimen and is requesting to go home. Patient has opioid prescription at home so no new prescription needed; will send prescription for celebrex, pyridium and zofran. Patient will need close follow up with Urology and she will call and schedule this. Urology did also want to continue her ABX outpatient.      Hypokalemia  -potassium of 3.2, replacement protocol ordered     Anxiety/depression  -Continue home Pristiq, Topamax, trazodone     Essential hypertension, chronic, controlled  -Continue home metoprolol     GERD  -Continue home PPI     Obesity, BMI 38, encourage lifestyle modifications    Disposition: Discharge to home     Follow-up after Discharge: PCP in 1-2 weeks; Urology in 1 week    This note will serve as a discharge summary    Yulia Welch PA-C 11/04/24 14:48 EST    I have worn appropriate PPE during this patient encounter, sanitized my hands both with entering and exiting patient's room.      39 minutes has been spent by [facility] providers in the care of this patient while under observation status

## 2024-11-05 ENCOUNTER — TRANSITIONAL CARE MANAGEMENT TELEPHONE ENCOUNTER (OUTPATIENT)
Dept: CALL CENTER | Facility: HOSPITAL | Age: 34
End: 2024-11-05
Payer: COMMERCIAL

## 2024-11-05 LAB — BACTERIA SPEC AEROBE CULT: NO GROWTH

## 2024-11-05 NOTE — OUTREACH NOTE
Call Center TCM Note      Flowsheet Row Responses   [facility], [place] patient discharged from? [place]   Does the patient have one of the following disease processes/diagnoses(primary or secondary)? Other   TCM attempt successful? Yes   Call start time 1322   Call end time 1324   Discharge diagnosis Pyelitis-s/p lithotripsy   Meds reviewed with patient/caregiver? Yes   Is the patient having any side effects they believe may be caused by any medication additions or changes? No   Does the patient have all medications ordered at discharge? Yes   Is the patient taking all medications as directed (includes completed medication regime)? Yes   Does the patient have an appointment with their PCP within 7-14 days of discharge? No   Nursing Interventions Patient desires to follow up with specialty only, Patient declined scheduling/rescheduling appointment at this time   Has home health visited the patient within 72 hours of discharge? N/A   Psychosocial issues? No   Did the patient receive a copy of their discharge instructions? Yes   Nursing interventions Reviewed instructions with patient   What is the patient's perception of their health status since discharge? Improving   Is the patient/caregiver able to teach back signs and symptoms related to disease process for when to call PCP? Yes   Is the patient/caregiver able to teach back signs and symptoms related to disease process for when to call 911? Yes   Is the patient/caregiver able to teach back the hierarchy of who to call/visit for symptoms/problems? PCP, Specialist, Home health nurse, Urgent Care, ED, 911 Yes   If the patient is a current smoker, are they able to teach back resources for cessation? Not a smoker   TCM call completed? Yes   Call end time 1324   Would this patient benefit from a Referral to Amb Social Work? No   Is the patient interested in additional calls from an ambulatory ? No            Shakira White LPN    11/5/2024, 13:25  EST

## 2024-11-06 LAB
CALCIUM OXALATE DIHYDRATE MFR STONE IR: 40 %
COLOR STONE: NORMAL
COM MFR STONE: 20 %
COMPN STONE: NORMAL
HYDROXYAPATITE: 40 %
LABORATORY COMMENT REPORT: NORMAL
Lab: NORMAL
Lab: NORMAL
PHOTO: NORMAL
SIZE STONE: NORMAL MM
SPEC SOURCE SUBJ: NORMAL
STONE ANALYSIS-IMP: NORMAL
WT STONE: 26 MG

## 2024-11-08 LAB
BACTERIA SPEC AEROBE CULT: NORMAL
BACTERIA SPEC AEROBE CULT: NORMAL

## 2024-11-17 DIAGNOSIS — R00.0 SINUS TACHYCARDIA: ICD-10-CM

## 2024-11-18 RX ORDER — METOPROLOL SUCCINATE 25 MG/1
25 TABLET, EXTENDED RELEASE ORAL DAILY
Qty: 30 TABLET | Refills: 5 | Status: SHIPPED | OUTPATIENT
Start: 2024-11-18

## 2024-11-20 ENCOUNTER — TELEPHONE (OUTPATIENT)
Dept: ORTHOPEDIC SURGERY | Facility: CLINIC | Age: 34
End: 2024-11-20
Payer: COMMERCIAL

## 2024-11-21 DIAGNOSIS — S43.431D TEAR OF RIGHT GLENOID LABRUM, SUBSEQUENT ENCOUNTER: Primary | ICD-10-CM

## 2024-12-03 NOTE — PROGRESS NOTES
New Shoulder      Patient: Sherry Quevedo        YOB: 1990    Medical Record Number: [id number]        Chief Complaints: Right shoulder pain      History of Present Illness: This is a very nice 34-year-old female who presents complaining of right shoulder pain about 6 months ago she started noticing pain in the shoulders numbness and tingling down the arm she was felt to have an element of thoracic outlet syndrome she went to physical therapy and that really has resolved particularly the numbness and tingling the left shoulder resolved she has been left with persistent right shoulder pain she has significant night 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  Past medical history is remarkable for those issues listed below and reviewed by me      Allergies:   Allergies   Allergen Reactions    Hydrocodone Itching and Rash    Tramadol Nausea And Vomiting       Medications:   Home Medications:  Current Outpatient Medications on File Prior to Visit   Medication Sig    albuterol sulfate  (90 Base) MCG/ACT inhaler Inhale 2 puffs Every 4 (Four) Hours As Needed for Wheezing or Shortness of Air (cough).    atomoxetine (STRATTERA) 40 MG capsule Take 1 capsule by mouth Daily.    Cetirizine HCl (ZYRTEC ALLERGY PO) Take 10 mg by mouth At Night As Needed (allergies).    Coenzyme Q10 (CoQ-10) 100 MG capsule Take 1 capsule by mouth 2 (Two) Times a Day.    desvenlafaxine (PRISTIQ) 100 MG 24 hr tablet Take 1 tablet by mouth Daily. FOR 30 DAYS    Famotidine (PEPCID AC PO) Take 20 mg by mouth Every Night.    Folate 400 MCG tablet Take 1 tablet by mouth Daily.    Magnesium 100 MG tablet Take 200 mg by mouth Every Night.    metoprolol succinate XL (TOPROL-XL) 25 MG 24 hr tablet Take 1 tablet by mouth once daily    NP Thyroid 30 MG tablet Take 1 tablet by mouth Daily.     ondansetron ODT (ZOFRAN-ODT) 4 MG disintegrating tablet Place 1 tablet on the tongue Every 8 (Eight) Hours As Needed for Nausea or Vomiting.    pantoprazole (PROTONIX) 40 MG EC tablet Take 1 tablet by mouth 2 (Two) Times a Day.    PROGESTERONE PO Take 300 mg by mouth Every Night.    Rimegepant Sulfate (Nurtec) 75 MG tablet dispersible tablet Take 1 tablet by mouth Daily. (Patient taking differently: Take 1 tablet by mouth Every 2 (Two) Hours As Needed (migraine).)    Tirzepatide (Mounjaro) 2.5 MG/0.5ML solution auto-injector Inject 2.5 mg under the skin into the appropriate area as directed 1 (One) Time Per Week.    topiramate (TOPAMAX) 50 MG tablet TAKE 1 TABLET BY MOUTH ONCE DAILY AT NIGHT    traZODone (DESYREL) 100 MG tablet Take 1 tablet by mouth Every Night.     No current facility-administered medications on file prior to visit.     Current Medications:  Scheduled Meds:  Continuous Infusions:No current facility-administered medications for this visit.    PRN Meds:.    Past Medical History:   Diagnosis Date    Abnormal computed tomography angiography (CTA) of neck 12/21/2021    Abnormal gluteal crease     ADHD (attention deficit hyperactivity disorder) 2020    Anemia 2019    iron defiency anemia    Anxiety     Benign paroxysmal positional vertigo of right ear 12/01/2021    Bipolar disorder 2012    BRBPR (bright red blood per rectum) 06/02/2021    Dr. Nancy Santana at [facility]    Breast anomaly     Bruises easily     Cervical adenopathy     Cervical lymphadenopathy     Left side.    Change in bowel habit 06/02/2021    Dr. Nancy Santana at [facility]    Cholestasis during pregnancy in third trimester 2019    Chronic allergic rhinitis     Chronic pain disorder 2022    Fibro    Chronic recurrent major depressive disorder     In partial remission.    Cold intolerance     Constipation     CTS (carpal tunnel syndrome) 2019    Decreased sex drive     Depression     History of PPD    Diarrhea     Dizziness 08/06/2021    APRN,  Temi Mcrae, Cardiologist office.    Dysmenorrhea 12/04/2019    Surgery: Laparoscopic assisted vaginal hysterectomy, Surgeon Dr. Hillary Nunn.    Dysphoric mood     Dyspnea on exertion 08/06/2021    LEONELA, Temi Mcrae, Cardiologist office.    Endometriosis 12/2019    Hysterectomy in 2019 cured    Environmental and seasonal allergies 09/21/2020    Epigastric abdominal pain 11/07/2018    [facility].    Epiploic appendagitis 01/10/2023    Extremity pain 2022    TOS diagnosed in August    Fat necrosis 07/29/2021    [facility].    Fatigue     Fibromyalgia, primary 2021    Fissure, anal     history    Folic acid deficiency     Gestational diabetes [id number]    I had it with my 1st pregnancy    H/O TB skin testing 02/20/2018    Negative result at [facility].    HA (headache)     Headache, tension-type     Hemorrhoid     History of gestational diabetes     with first baby    History of Holter monitoring 11/07/2019    24 hour.    History of kidney stones 2006/2009    History of miscarriage     History of vasectomy 2019    Spouse Vasectomy.    Hypocalcemia 03/2022    Hypoglycemia     Irritable bowel syndrome 1994    IBS with both constipation/diarrhea, followed by GI Dr. Moreland.    Joint pain     Fibro / TOS    Joint stiffness     Lightheadedness 08/06/2021    APRPINA, Temi Mcrae, Cardiologist office.    Low back pain     Lower back/middle    Low serum potassium level     Low serum vitamin B12     Lower extremity myoclonus 02/28/2022    ADMITTED TO [facility]    Malaise and fatigue 11/12/2019    Cardiologist Dr. Benji Ugalde.    Mass of left parotid gland 12/2021    Migraines 10/2019    Multinodular non-toxic goiter     Near syncope 08/06/2021    APRPINA, Temi Mcrae, Cardiologist office.    Neck pain     OCD (obsessive compulsive disorder)     Ovarian cyst 2010    Surgery removed.    Palpitations 10/28/2019    PVC's (premature ventricular contractions)     Rapid heart rate  11/12/2019    Cardiologist Dr. Benji Ugalde.    Rectal bleeding 06/02/2021    Dr. Nancy Santana at ..    Renal stones     RLS (restless legs syndrome)     RUQ abdominal mass [id number]    [facility].    Scoliosis 2003    Sleep disturbance     SOB (shortness of breath) 10/28/2019    Tachycardia 10/28/2019    Thrombocytopenia 03/2022    Thyroid nodule 10/28/2019    1 cm Left Submandibular node, [facility], Dr. Kevan HUTCHINSON [place].    Thyroid nodule 10/28/2019    0.3 cm Right side of neck, [facility], Dr. Kevan HUTCHINSON [place]ll.    Thyromegaly 03/04/2016    [place]line Thyromegaly, Findings via X-ray at [facility], ordering provider Dr. Nancy Santana.    Transaminitis 11/07/2018    [facility].    Trigger index finger of right hand 01/2022    Vaginal delivery 01/2019    Baby girl.        Past Surgical History:   Procedure Laterality Date    ADENOIDECTOMY  2006    CHOLECYSTECTOMY  2019    CHOLECYSTECTOMY WITH INTRAOPERATIVE CHOLANGIOGRAM N/A 11/09/2018    Procedure: Laparoscopic cholecystectomy;  Surgeon: Khanh Lassiter MD;  Location: [facility];  Service: General    COLONOSCOPY N/A 08/2019    CYSTOSCOPY URETEROSCOPY LASER LITHOTRIPSY N/A 10/31/2024    Procedure: CYSTOSCOPY, LEFT URETEROSCOPY, STONE BASKET EXTRACTION, LEFT STENT PLACEMENT;  Surgeon: Williams Ferguson MD;  Location: [facility] OR;  Service: Urology;  Laterality: N/A;    DILATATION AND CURETTAGE N/A 10/2015    MISCARRIAGE.    ENDOSCOPY N/A     HEMORRHOIDECTOMY N/A 06/16/2022    Procedure: HEMORRHOIDECTOMY;  Surgeon: Linda Sanchez MD;  Location: [facility] OR;  Service: General;  Laterality: N/A;    LAPAROSCOPIC ASSISTED VAGINAL HYSTERECTOMY Bilateral 12/04/2019    Procedure: LAPAROSCOPIC ASSISTED VAGINAL HYSTERECTOMY, BILATERAL SALPINGECTOMY;  Surgeon: Hillary Nunn MD;  Location: [facility];  Service: Obstetrics/Gynecology    OVARIAN CYST SURGERY  06/2009    Laparoscopic ovarian cyst resection, no other abdominal  surgery.    TONSILLECTOMY AND ADENOIDECTOMY Bilateral 10/2006    WISDOM TOOTH EXTRACTION Bilateral         Social History     Occupational History    Occupation: teacher     Employer: [employer]   Tobacco Use    Smoking status: Never    Smokeless tobacco: Never    Tobacco comments:     Vape on oçcasion   Vaping Use    Vaping status: Some Days    Substances: Flavoring   Substance and Sexual Activity    Alcohol use: Yes     Alcohol/week: 2.0 - 4.0 standard drinks of alcohol     Types: 1 - 2 Glasses of wine, 1 - 2 Shots of liquor per week     Comment: Weekly    Drug use: Never    Sexual activity: Yes     Partners: Male     Birth control/protection: Hysterectomy     Comment: .      Social History     Social History Narrative    Not on file        Family History   Problem Relation Age of Onset    COPD Mother     Depression Mother     Hypertension Mother     Heart disease Mother     Hyperlipidemia Mother     Asthma Father     COPD Father     Heart disease Father     Alcohol abuse Father     Hearing loss Father     Cancer Sister     Miscarriages / Stillbirths Sister         1 miscarriage    Depression Sister     Depression Sister     Cancer Sister     Miscarriages / Stillbirths Sister         Miscarriage & stillbirth    Cancer Maternal Grandmother         Breast    Diabetes Maternal Grandmother     Depression Maternal Grandmother     Breast cancer Maternal Grandmother     Cancer Paternal Grandmother         Skin (ear)    Bleeding Disorder Paternal Grandmother         Factor V    COPD Paternal Grandmother     Asthma Paternal Grandmother     Osteoarthritis Paternal Grandmother     Arthritis Paternal Grandmother     Clotting disorder Paternal Grandmother         Factor V    Osteoporosis Paternal Grandmother     Alcohol abuse Paternal Grandfather     Malig Hyperthermia Neg Hx     Colon cancer Neg Hx              Review of Systems:     Review of Systems      Physical Exam: 34 y.o. female  General  Appearance:    Alert, cooperative, in no acute distress                 There were no vitals filed for this visit.   Patient is alert and read ×3 no acute distress appears her above-listed at height weight and age.  Affect is normal respiratory rate is normal unlabored. Heart rate regular rate rhythm, sclera, dentition and hearing are normal for the purpose of this exam.    Ortho Exam  Physical exam of the right shoulder reveals no overlying skin changes no lymphedema no lymphadenopathy.  Patient has active flexion 180 with mild symptoms abduction is similar external rotation is to 50 and internal rotation to the upper lumbar spine with mild symptoms.  Patient has good rotator cuff strength 4+ over 5 with isometric strength testing with pain.  Patient has a positive impingement and a positive Archibald sign.  Patient has good cervical range of motion which is full and asymptomatic no radicular symptoms.  Patient has a normal elbow exam.  Good distal pulses are present  Patient has pain with overhead activity and a positive Neer sign and a positive empty can sign , a positive drop arm and a definitive painful arc   Procedures          Radiology:   AP, Scapular Y and Axillary Lateral of the right shoulder were /reviewed to evauate shoulder pain.  I have no comparative films these are normal also reviewed her MRI which shows a complex superior labral tear that does extend to the biceps tendon have reviewed and agree  Imaging Results (Most Recent)       None          Assessment/Plan: Right shoulder SLAP tear had a long discussion with she and her  regarding the anatomy of a SLAP tear ways to treat this.  I think she is exhausted conservative measures of injections physical therapy activity modification the neck step would be arthroscopy.  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  We talked about the surgery itself talked about how we address is whether that is debridement repair  or tenodesis.  She is going to think about it and give me a call.  If is towards the end of the summer that might be a time right hand her to Dr. Verdin and she is aware of this     Answers submitted by the patient for this visit:  Primary Reason for Visit (Submitted on 11/28/2024)  What is the primary reason for your visit?: Problem Not Listed  Problem not listed (Submitted on 11/28/2024)  Chief Complaint: Other medical problem  Reason for appointment: Tear in shoulder, MRI artho. Results  anorexia: No  joint pain: Yes  change in stool: No  joint swelling: No  swollen glands: No  vertigo: No  visual change: No  Onset: 1 to 6 months  Chronicity: new  Frequency: daily  Medications tried: PT, Dry needling, steriod injection, ibprophen/tylonal  Additional information: Diagnosed with TOS- doppler ultra sound, xray of shoulder

## 2024-12-05 ENCOUNTER — OFFICE VISIT (OUTPATIENT)
Dept: ORTHOPEDIC SURGERY | Facility: CLINIC | Age: 34
End: 2024-12-05
Payer: COMMERCIAL

## 2024-12-05 VITALS — WEIGHT: 207 LBS | TEMPERATURE: 98.7 F | BODY MASS INDEX: 35.34 KG/M2 | HEIGHT: 64 IN

## 2024-12-05 DIAGNOSIS — S43.431A SUPERIOR GLENOID LABRUM LESION OF RIGHT SHOULDER, INITIAL ENCOUNTER: Primary | ICD-10-CM

## 2024-12-16 ENCOUNTER — TELEPHONE (OUTPATIENT)
Dept: ORTHOPEDIC SURGERY | Facility: CLINIC | Age: 34
End: 2024-12-16

## 2024-12-16 NOTE — TELEPHONE ENCOUNTER
Caller: Sherry Quevedo    Relationship: Self    Best call back number: [phone]    Who is your current provider: ALESSIO    Is your current provider offboarding? NO    Who would you like your new provider to be: SHERRELL    What are your reasons for transferring care: PATIENT STATES SHE IS LOOKING AT HAVING HER SHOULDER SURGERY SOMETIME IN MAY, AND THAT DR. MCCALLUM TOLD HER SHE WOULD NEED TO SEE DR. WYNNE IF SHE WAITS UNTIL THEN. SHE WOULD LIKE TO MAKE AN APPT WITH DR. WYNNE ASAP.

## 2024-12-28 ENCOUNTER — HOSPITAL ENCOUNTER (EMERGENCY)
Facility: HOSPITAL | Age: 34
Discharge: HOME | End: 2024-12-28
Attending: EMERGENCY MEDICINE
Payer: COMMERCIAL

## 2024-12-28 ENCOUNTER — APPOINTMENT (OUTPATIENT)
Dept: GENERAL RADIOLOGY | Facility: HOSPITAL | Age: 34
End: 2024-12-28
Payer: COMMERCIAL

## 2024-12-28 VITALS
DIASTOLIC BLOOD PRESSURE: 74 MMHG | BODY MASS INDEX: 33.8 KG/M2 | TEMPERATURE: 98.1 F | HEART RATE: 81 BPM | HEIGHT: 64 IN | SYSTOLIC BLOOD PRESSURE: 114 MMHG | OXYGEN SATURATION: 99 % | RESPIRATION RATE: 18 BRPM | WEIGHT: 198 LBS

## 2024-12-28 DIAGNOSIS — R07.9 RIGHT-SIDED CHEST PAIN: Primary | ICD-10-CM

## 2024-12-28 DIAGNOSIS — R07.81 PLEURODYNIA: ICD-10-CM

## 2024-12-28 LAB
ALBUMIN SERPL-MCNC: 4.7 G/DL (ref 3.5–5.2)
ALBUMIN/GLOB SERPL: 1.4 G/DL
ALP SERPL-CCNC: 90 U/L (ref 39–117)
ALT SERPL W P-5'-P-CCNC: 38 U/L (ref 1–33)
ANION GAP SERPL CALCULATED.3IONS-SCNC: 8.6 MMOL/L (ref 5–15)
AST SERPL-CCNC: 26 U/L (ref 1–32)
BASOPHILS # BLD AUTO: 0.02 10*3/MM3 (ref 0–0.2)
BASOPHILS NFR BLD AUTO: 0.3 % (ref 0–1.5)
BILIRUB SERPL-MCNC: 0.4 MG/DL (ref 0–1.2)
BILIRUB UR QL STRIP: NEGATIVE
BUN SERPL-MCNC: 11 MG/DL (ref 6–20)
BUN/CREAT SERPL: 11.6 (ref 7–25)
CALCIUM SPEC-SCNC: 9.3 MG/DL (ref 8.6–10.5)
CHLORIDE SERPL-SCNC: 105 MMOL/L (ref 98–107)
CLARITY UR: ABNORMAL
CO2 SERPL-SCNC: 25.4 MMOL/L (ref 22–29)
COLOR UR: YELLOW
CREAT SERPL-MCNC: 0.95 MG/DL (ref 0.57–1)
DEPRECATED RDW RBC AUTO: 39.8 FL (ref 37–54)
EGFRCR SERPLBLD CKD-EPI 2021: 80.3 ML/MIN/1.73
EOSINOPHIL # BLD AUTO: 0.1 10*3/MM3 (ref 0–0.4)
EOSINOPHIL NFR BLD AUTO: 1.6 % (ref 0.3–6.2)
ERYTHROCYTE [DISTWIDTH] IN BLOOD BY AUTOMATED COUNT: 12 % (ref 12.3–15.4)
GLOBULIN UR ELPH-MCNC: 3.3 GM/DL
GLUCOSE SERPL-MCNC: 85 MG/DL (ref 65–99)
GLUCOSE UR STRIP-MCNC: NEGATIVE MG/DL
HCT VFR BLD AUTO: 42.3 % (ref 34–46.6)
HGB BLD-MCNC: 14.1 G/DL (ref 12–15.9)
HGB UR QL STRIP.AUTO: NEGATIVE
IMM GRANULOCYTES # BLD AUTO: 0 10*3/MM3 (ref 0–0.05)
IMM GRANULOCYTES NFR BLD AUTO: 0 % (ref 0–0.5)
KETONES UR QL STRIP: NEGATIVE
LEUKOCYTE ESTERASE UR QL STRIP.AUTO: NEGATIVE
LYMPHOCYTES # BLD AUTO: 1.95 10*3/MM3 (ref 0.7–3.1)
LYMPHOCYTES NFR BLD AUTO: 30.2 % (ref 19.6–45.3)
MCH RBC QN AUTO: 29.9 PG (ref 26.6–33)
MCHC RBC AUTO-ENTMCNC: 33.3 G/DL (ref 31.5–35.7)
MCV RBC AUTO: 89.6 FL (ref 79–97)
MONOCYTES # BLD AUTO: 0.23 10*3/MM3 (ref 0.1–0.9)
MONOCYTES NFR BLD AUTO: 3.6 % (ref 5–12)
NEUTROPHILS NFR BLD AUTO: 4.15 10*3/MM3 (ref 1.7–7)
NEUTROPHILS NFR BLD AUTO: 64.3 % (ref 42.7–76)
NITRITE UR QL STRIP: NEGATIVE
PH UR STRIP.AUTO: 6 [PH] (ref 5–8)
PLATELET # BLD AUTO: 171 10*3/MM3 (ref 140–450)
PMV BLD AUTO: 9.6 FL (ref 6–12)
POTASSIUM SERPL-SCNC: 3.4 MMOL/L (ref 3.5–5.2)
PROT SERPL-MCNC: 8 G/DL (ref 6–8.5)
PROT UR QL STRIP: NEGATIVE
QT INTERVAL: 366 MS
QTC INTERVAL: 441 MS
RBC # BLD AUTO: 4.72 10*6/MM3 (ref 3.77–5.28)
SODIUM SERPL-SCNC: 139 MMOL/L (ref 136–145)
SP GR UR STRIP: 1.02 (ref 1–1.03)
TROPONIN T SERPL HS-MCNC: <6 NG/L
UROBILINOGEN UR QL STRIP: ABNORMAL
WBC NRBC COR # BLD AUTO: 6.45 10*3/MM3 (ref 3.4–10.8)

## 2024-12-28 PROCEDURE — 84484 ASSAY OF TROPONIN QUANT: CPT | Performed by: PHYSICIAN ASSISTANT

## 2024-12-28 PROCEDURE — 36415 COLL VENOUS BLD VENIPUNCTURE: CPT

## 2024-12-28 PROCEDURE — 99284 EMERGENCY DEPT VISIT MOD MDM: CPT

## 2024-12-28 PROCEDURE — 93005 ELECTROCARDIOGRAM TRACING: CPT | Performed by: EMERGENCY MEDICINE

## 2024-12-28 PROCEDURE — 85025 COMPLETE CBC W/AUTO DIFF WBC: CPT | Performed by: PHYSICIAN ASSISTANT

## 2024-12-28 PROCEDURE — 81003 URINALYSIS AUTO W/O SCOPE: CPT | Performed by: EMERGENCY MEDICINE

## 2024-12-28 PROCEDURE — 80053 COMPREHEN METABOLIC PANEL: CPT | Performed by: PHYSICIAN ASSISTANT

## 2024-12-28 PROCEDURE — 71046 X-RAY EXAM CHEST 2 VIEWS: CPT

## 2024-12-28 RX ORDER — METHYLPREDNISOLONE 4 MG/1
TABLET ORAL
Qty: 21 TABLET | Refills: 0 | Status: SHIPPED | OUTPATIENT
Start: 2024-12-28

## 2024-12-28 RX ORDER — METHOCARBAMOL 750 MG/1
750 TABLET, FILM COATED ORAL 3 TIMES DAILY
Qty: 21 TABLET | Refills: 0 | Status: SHIPPED | OUTPATIENT
Start: 2024-12-28

## 2024-12-28 NOTE — Clinical Note
[facility]  [address]  [place] [id number]    Sherry Quevedo was seen and treated in our emergency department on 12/28/2024.  She may return to work on 12/31/2024.         Thank you for choosing [facility].    Theo Muniz III, PA

## 2024-12-28 NOTE — [facility] PROVIDER NOTE
EMERGENCY DEPARTMENT ENCOUNTER    Room Number:  06/06  Date seen:  12/30/2024  Time seen: 18:05 EST  PCP: Temi Jones APRN  Historian: Patient    Discussed/obtained information from independent historians: Nothing    HPI:  Chief complaint: Chest pain  A complete HPI/ROS/PMH/PSH/SH/FH are unobtainable due to: Nothing  Context:Sherry Quevedo is a 34 y.o. female who presents to the ED with c/o chest pain that is been ongoing for the past 24 to 36 hours.  Patient reports pain is on the right side of the chest.  The pain is sharp in nature and seems to radiate anteriorly to posteriorly no cough fever chills.  No nausea vomiting diarrhea.  No diaphoresis.  Pain is not positional.  There are no are no specific alleviating or precipitating factors.  No recent injury.  No unilateral leg swelling or pedal edema.  Patient is here for further evaluation.    External (non-ED) record review: Patient seen for a left ureteral stone on October 31, 2024.  Patient was taken to the OR by Garret Ferguson and treated with laser lithotripsy, stone basket extraction.      Chronic or social conditions impacting care:    ALLERGIES  Hydrocodone and Tramadol    PAST MEDICAL HISTORY  Active Ambulatory Problems     Diagnosis Date Noted    Anemia 01/16/2019    Chronic allergic rhinitis 09/24/2019    History of renal stone 09/24/2019    Chronic migraine with aura 10/03/2019    Low serum vitamin B12 10/03/2019    Generalized anxiety disorder 10/03/2019    Irritable bowel syndrome (IBS) 09/21/2020    Thyroid nodule     Scoliosis 2003    Rectal bleeding     BRBPR (bright red blood per rectum) 06/02/2021    PVC's (premature ventricular contractions)     Thyromegaly 03/04/2016    Benign paroxysmal positional vertigo due to bilateral vestibular disorder 08/06/2021    Near syncope 08/06/2021    Dyspnea on exertion 08/06/2021    Lightheadedness 08/06/2021    Fat necrosis 07/29/2021    Tachycardia 10/28/2019    SOB (shortness of breath)  10/28/2019    Low serum potassium level     Folic acid deficiency     Cervical adenopathy     Endometriosis 12/2019    Abnormal gluteal crease     Decreased sex drive     Fibromyalgia     Breast anomaly     Ovarian cyst 2010    Fatigue     Bruises easily     Cold intolerance     Sleep disturbance     Renal stones     Chronic recurrent major depressive disorder     Calculus of bile duct 11/07/2018    Transaminitis 11/07/2018    RUQ abdominal mass     History of miscarriage     Fissure, anal     History of gestational diabetes     Cholestasis during pregnancy in third trimester 2019    Cholelithiasis 11/2018    External hemorrhoids 12/20/2021    Muscle spasm 02/28/2022    Class 1 obesity with serious comorbidity and body mass index (BMI) of 31.0 to 31.9 in adult 08/29/2022    Treatment-resistant depression 12/23/2022    Neurogenic thoracic outlet syndrome 08/20/2024    Ureteral calculus 10/31/2024    Pyelitis 11/04/2024     Resolved Ambulatory Problems     Diagnosis Date Noted    Transaminitis 11/07/2018    Calculus of bile duct with cholecystitis without obstruction 11/07/2018    Pregnancy 01/15/2019    Left cervical lymphadenopathy 09/24/2019    Iron deficiency anemia due to chronic blood loss 10/03/2019    Low folic acid 10/03/2019    Moderate episode of recurrent major depressive disorder 10/03/2019    Multinodular non-toxic goiter 10/03/2019    Dysmenorrhea 12/04/2019    Anxiety 09/21/2020    Depression 09/21/2020    Environmental and seasonal allergies 09/21/2020    Migraines     Change in bowel habit 06/02/2021    Neck pain     Palpitations 10/28/2019    History of Holter monitoring 11/07/2019    Rapid heart rate 11/12/2019    Malaise and fatigue 11/12/2019    Dysmenorrhea 12/04/2019    Hypoglycemia     Constipation     Diarrhea     HA (headache)     Intractable migraine with aura without status migrainosus 10/2019    Dysphoric mood     Nervously anxious     Vaginal delivery 01/2019    History of vasectomy 2019     Cervical lymphadenopathy     H/O TB skin testing 02/20/2018    Epigastric abdominal pain 11/07/2018    Abnormal uterine bleeding (AUB)     Allergic 01/2013     Past Medical History:   Diagnosis Date    Abnormal computed tomography angiography (CTA) of neck 12/21/2021    ADHD (attention deficit hyperactivity disorder) 2020    Benign paroxysmal positional vertigo of right ear 12/01/2021    Bipolar disorder 2012    Chronic pain disorder 2022    CTS (carpal tunnel syndrome) 2019    Dizziness 08/06/2021    Epiploic appendagitis 01/10/2023    Extremity pain 2022    Fibromyalgia, primary 2021    Gestational diabetes [id number]    Headache, tension-type     Hemorrhoid     History of kidney stones 2006/2009    Hypocalcemia 03/2022    Irritable bowel syndrome 1994    Joint pain     Joint stiffness     Low back pain     Lower extremity myoclonus 02/28/2022    Mass of left parotid gland 12/2021    OCD (obsessive compulsive disorder)     RLS (restless legs syndrome)     Thrombocytopenia 03/2022    Trigger index finger of right hand 01/2022       PAST SURGICAL HISTORY  Past Surgical History:   Procedure Laterality Date    ADENOIDECTOMY  2006    CHOLECYSTECTOMY  2019    CHOLECYSTECTOMY WITH INTRAOPERATIVE CHOLANGIOGRAM N/A 11/09/2018    Procedure: Laparoscopic cholecystectomy;  Surgeon: Khanh Lassiter MD;  Location: [facility] OR;  Service: General    COLONOSCOPY N/A 08/2019    CYSTOSCOPY URETEROSCOPY LASER LITHOTRIPSY N/A 10/31/2024    Procedure: CYSTOSCOPY, LEFT URETEROSCOPY, STONE BASKET EXTRACTION, LEFT STENT PLACEMENT;  Surgeon: Williams Ferguson MD;  Location: [facility] OR;  Service: Urology;  Laterality: N/A;    DILATATION AND CURETTAGE N/A 10/2015    MISCARRIAGE.    ENDOSCOPY N/A     HEMORRHOIDECTOMY N/A 06/16/2022    Procedure: HEMORRHOIDECTOMY;  Surgeon: Linda Sanchez MD;  Location: [facility] OR;  Service: General;  Laterality: N/A;    LAPAROSCOPIC ASSISTED VAGINAL HYSTERECTOMY Bilateral 12/04/2019     Procedure: LAPAROSCOPIC ASSISTED VAGINAL HYSTERECTOMY, BILATERAL SALPINGECTOMY;  Surgeon: Hillary Nunn MD;  Location: [facility] OR [facility];  Service: Obstetrics/Gynecology    OVARIAN CYST SURGERY  06/2009    Laparoscopic ovarian cyst resection, no other abdominal surgery.    TONSILLECTOMY AND ADENOIDECTOMY Bilateral 10/2006    WISDOM TOOTH EXTRACTION Bilateral        FAMILY HISTORY  Family History   Problem Relation Age of Onset    COPD Mother     Depression Mother     Hypertension Mother     Heart disease Mother     Hyperlipidemia Mother     Asthma Father     COPD Father     Heart disease Father     Alcohol abuse Father     Hearing loss Father     Cancer Sister     Miscarriages / Stillbirths Sister         1 miscarriage    Depression Sister     Depression Sister     Cancer Sister     Miscarriages / Stillbirths Sister         Miscarriage & stillbirth    Cancer Maternal Grandmother         Breast    Diabetes Maternal Grandmother     Depression Maternal Grandmother     Breast cancer Maternal Grandmother     Cancer Paternal Grandmother         Skin (ear)    Bleeding Disorder Paternal Grandmother         Factor V    COPD Paternal Grandmother     Asthma Paternal Grandmother     Osteoarthritis Paternal Grandmother     Arthritis Paternal Grandmother     Clotting disorder Paternal Grandmother         Factor V    Osteoporosis Paternal Grandmother     Alcohol abuse Paternal Grandfather     Malig Hyperthermia Neg Hx     Colon cancer Neg Hx        SOCIAL HISTORY  Social History     Socioeconomic History    Marital status:    Tobacco Use    Smoking status: Never    Smokeless tobacco: Never    Tobacco comments:     Vape on oçcasion   Vaping Use    Vaping status: Some Days    Substances: Flavoring   Substance and Sexual Activity    Alcohol use: Yes     Alcohol/week: 2.0 - 4.0 standard drinks of alcohol     Types: 1 - 2 Glasses of wine, 1 - 2 Shots of liquor per week     Comment: Weekly    Drug use: Never    Sexual activity:  Yes     Partners: Male     Birth control/protection: Hysterectomy     Comment: .       REVIEW OF SYSTEMS  Review of Systems    All systems reviewed and negative except for those discussed in HPI.     PHYSICAL EXAM    I have reviewed the triage vital signs and nursing notes.  Vitals:    12/28/24 1524   BP: 114/74   Pulse: 81   Resp: 18   Temp: 98.1 °F (36.7 °C)   SpO2: 99%     Physical Exam    GENERAL: WDWN female, not distressed  HENT: nares patent  EYES: no scleral icterus  NECK: no ROM limitations  CV: regular rhythm, regular rate normal S1-S2, no unilateral leg swelling or pedal edema  RESPIRATORY: normal effort, lungs CTAB  ABDOMEN: soft  : deferred  MUSCULOSKELETAL: no deformity  NEURO: alert, moves all extremities, follows commands  SKIN: warm, dry    LAB RESULTS  No results found for this or any previous visit (from the past 24 hours).      Ordered the above labs and independently interpreted results.  My findings will be discussed in the ED course or medical decision making section below    RADIOLOGY RESULTS  No Radiology Exams Resulted Within Past 24 Hours     Ordered the above noted radiological studies.  Independently interpreted by me.  My findings will be discussed in the medical decision section below.     PROGRESS, DATA ANALYSIS, CONSULTS AND MEDICAL DECISION MAKING    Please note that this section constitutes my independent interpretation of clinical data including lab results, radiology, EKG's.  This constitutes my independent professional opinion regarding differential diagnosis and management of this patient.  It may include any factors such as history from outside sources, review of external records, social determinants of health, management of medications, response to those treatments, and discussions with other providers.    ED Course as of 12/30/24 2134   Sat Dec 28, 2024   1814 Blood, UA: Negative [RC]   1816 EKG          EKG time: 1357  Rhythm/Rate: 87/sinus  P waves and MA: Normal  UT interval and normal P wave morphology  QRS, axis: Normal QRS normal axis  ST and T waves: No acute ST or T wave changes    Interpreted Contemporaneously by me, independently viewed  -EKG unchanged from 4/20/2023. [RC]   1817 Chest x-ray shows no acute process. [RC]   1911 HS Troponin T: <6 [RC]   1911 WBC: 6.45 [RC]   1911 Hemoglobin: 14.1 [RC]   1911 Hematocrit: 42.3 [RC]   1911 Platelets: 171 [RC]   1911 Glucose: 85 [RC]   1911 BUN: 11 [RC]   1911 Creatinine: 0.95 [RC]   1911 Sodium: 139 [RC]   1911 Potassium(!): 3.4 [RC]   1911 CO2: 25.4 [RC]   1911 Anion Gap: 8.6 [RC]   1911 HEART SCORE    History Slightly or non-suspicious (0)  ECG Normal (0)  Age < or = 45 (0)  Risk factors 1 or 2 (1)  Troponin < or = Normal limit (0)    This patient's HEART score is 1    HEART Score Key:  Scores 0-3: 0.9-1.7% risk of adverse cardiac event. In the HEART Score study, these patients were discharged (0.99% in the retrospective study, 1.7% in the prospective study)  Scores 4-6: 12-16.6% risk of adverse cardiac event. In the HEART Score study, these patients were admitted to the hospital. (11.6% retrospective, 16.6% prospective)  Scores ?7: 50-65% risk of adverse cardiac event. In the HEART Score study, these patients were candidates for early invasive measures. (65.2% retrospective, 50.1% prospective)    [RC]   1913 Patient's pain is right-sided and more in line with a musculoskeletal or pleurisy type picture.  Chest x-ray negative.  EKG unchanged.  Heart score 1.  Troponin less than 6.  CBC and CMP normal.  Plan to treat with Medrol, muscle relaxer, reassurance, primary care follow-up. [RC]      ED Course User Index  [RC] Theo Muniz III, PA     Orders placed during this visit:  Orders Placed This Encounter   Procedures    XR Chest 2 View    Urinalysis With Culture If Indicated - Urine, Clean Catch    Comprehensive Metabolic Panel    High Sensitivity Troponin T    CBC Auto Differential    ECG 12 Lead Chest Pain     CBC & Differential    ED Acknowledgement Form Needed;            Medical Decision Making    Musculoskeletal pain, pleurisy, pericarditis, carditis, PTX, PNA, PE, ureteral stone, gallstone.  Patient states gallbladder has been removed so doubt this is a problem with the biliary tree.  She does have past medical history of ureteral stone.  UA done in triage shows no blood.  Pain seems more in the chest than in the flank.  Patient is PERC negative for PE and not any sort of exogenous estrogen.  Suspect this is more of a pleurisy or musculoskeletal issue.  However going to obtain a CBC, CMP, troponin, chest x-ray, UA, ECG to further evaluate.      DIAGNOSIS  Final diagnoses:   Right-sided chest pain   Pleurodynia          Medication List        New Prescriptions      methocarbamol 750 MG tablet  Commonly known as: ROBAXIN  Take 1 tablet by mouth 3 (Three) Times a Day.     methylPREDNISolone 4 MG dose pack  Commonly known as: MEDROL  Take as directed on package instructions.            Changed      Nurtec 75 MG dispersible tablet  Generic drug: rimegepant sulfate  Take 1 tablet by mouth Daily.  What changed:   when to take this  reasons to take this               Where to Get Your Medications        These medications were sent to [place] [address] [facility] - [phone]  - [phone]   [address] [address]      Phone: [phone]   methocarbamol 750 MG tablet  methylPREDNISolone 4 MG dose pack         FOLLOW-UP  Temi Jones, APRN  [address]  [place] [id number] [phone]      For further evaluation and treatment, As needed        Latest Documented Vital Signs:  As of 21:34 EST  BP- 114/74 HR- 81 Temp- 98.1 °F (36.7 °C) (Tympanic) O2 sat- 99%    Appropriate PPE utilized throughout this patient encounter to include mask, if indicated, per current protocol. Hand hygiene was performed before donning PPE and  after removal when leaving the room.    Please note that portions of this were completed with a voice recognition program.     Note Disclaimer: At [facility], we believe that sharing information builds trust and better relationships. You are receiving this note because you are receiving care at [facility] or recently visited. It is possible you will see health information before a provider has talked with you about it. This kind of information can be easy to misunderstand. To help you fully understand what it means for your health, we urge you to discuss this note with your provider.

## 2024-12-29 NOTE — DISCHARGE INSTRUCTIONS
Take the medication prescribed.  If no improvement follow-up with the primary care in a week's time for repeat evaluation.    Return to the ER with any further concerns, should your condition change/worsen, or should you develop a fever.

## 2024-12-31 ENCOUNTER — OFFICE VISIT (OUTPATIENT)
Dept: INTERNAL MEDICINE | Facility: CLINIC | Age: 34
End: 2024-12-31
Payer: COMMERCIAL

## 2024-12-31 VITALS
HEIGHT: 64 IN | HEART RATE: 107 BPM | DIASTOLIC BLOOD PRESSURE: 80 MMHG | OXYGEN SATURATION: 99 % | SYSTOLIC BLOOD PRESSURE: 110 MMHG | BODY MASS INDEX: 33.97 KG/M2

## 2024-12-31 DIAGNOSIS — N63.11 MASS OF UPPER OUTER QUADRANT OF RIGHT BREAST: ICD-10-CM

## 2024-12-31 DIAGNOSIS — N64.4 BREAST PAIN, RIGHT: Primary | ICD-10-CM

## 2024-12-31 PROCEDURE — 99214 OFFICE O/P EST MOD 30 MIN: CPT | Performed by: NURSE PRACTITIONER

## 2024-12-31 RX ORDER — PROGESTERONE 200 MG/1
CAPSULE ORAL
COMMUNITY
Start: 2024-12-16

## 2024-12-31 RX ORDER — CEFDINIR 300 MG/1
CAPSULE ORAL
COMMUNITY
Start: 2024-12-27

## 2024-12-31 NOTE — PROGRESS NOTES
Subjective   Sherry Quevedo is a 34 y.o. female. Patient is here today for   Chief Complaint   Patient presents with    Breast Pain     Patient found lump in right breast after going to ER for chest pain   .    History of Present Illness   Went to ER 3 days ago with c/o chest pain. Chest x-ray and EKG was normal.   2 days ago she felt 2 lumps in her right breast. Had a normal mammogram 2 months ago at [facility]. She is on hormone replacement therapy for about 6 months - progesterone, thyroid, folate.     The following portions of the patient's history were reviewed and updated as appropriate: allergies, current medications, past family history, past medical history, past social history, past surgical history and problem list.    Review of Systems    Objective   Vitals:    12/31/24 0910   BP: 110/80   Pulse: 107   SpO2: 99%     Body mass index is 33.97 kg/m².    Reviewed chest x-ray and EKG from ER visit on 12/28/2024  Physical Exam  Vitals and nursing note reviewed.   Constitutional:       Appearance: Normal appearance. She is well-developed.   Cardiovascular:      Rate and Rhythm: Normal rate and regular rhythm.      Heart sounds: Normal heart sounds.   Pulmonary:      Effort: Pulmonary effort is normal.      Breath sounds: Normal breath sounds.   Chest:       Skin:     General: Skin is warm and dry.   Neurological:      Mental Status: She is alert and oriented to person, place, and time.   Psychiatric:         Speech: Speech normal.         Behavior: Behavior normal.         Thought Content: Thought content normal.         Assessment & Plan   Diagnoses and all orders for this visit:    1. Breast pain, right (Primary)  -     Mammo Diagnostic Digital Tomosynthesis Right With CAD; Future  -     US breast right limited; Future    2. Mass of upper outer quadrant of right breast  -     Mammo Diagnostic Digital Tomosynthesis Right With CAD; Future  -     US breast right limited; Future      Will order a  diagnostic mammogram and ultrasound if needed of her right breast.  Will schedule this at [facility] since she had her previous mammogram there

## 2025-01-02 ENCOUNTER — PATIENT ROUNDING (BHMG ONLY) (OUTPATIENT)
Dept: INTERNAL MEDICINE | Facility: CLINIC | Age: 35
End: 2025-01-02
Payer: COMMERCIAL

## 2025-01-09 DIAGNOSIS — N64.4 BREAST PAIN, RIGHT: ICD-10-CM

## 2025-01-09 DIAGNOSIS — N63.11 MASS OF UPPER OUTER QUADRANT OF RIGHT BREAST: ICD-10-CM

## 2025-01-20 ENCOUNTER — OFFICE VISIT (OUTPATIENT)
Dept: ORTHOPEDIC SURGERY | Facility: CLINIC | Age: 35
End: 2025-01-20
Payer: COMMERCIAL

## 2025-01-20 ENCOUNTER — PREP FOR SURGERY (OUTPATIENT)
Dept: OTHER | Facility: HOSPITAL | Age: 35
End: 2025-01-20
Payer: COMMERCIAL

## 2025-01-20 VITALS — TEMPERATURE: 98.6 F | HEIGHT: 64 IN | BODY MASS INDEX: 33.43 KG/M2 | WEIGHT: 195.8 LBS

## 2025-01-20 DIAGNOSIS — G89.29 CHRONIC RIGHT SHOULDER PAIN: Primary | ICD-10-CM

## 2025-01-20 DIAGNOSIS — M25.511 CHRONIC RIGHT SHOULDER PAIN: Primary | ICD-10-CM

## 2025-01-20 PROCEDURE — 99214 OFFICE O/P EST MOD 30 MIN: CPT | Performed by: ORTHOPAEDIC SURGERY

## 2025-01-20 RX ORDER — ATOMOXETINE 60 MG/1
1 CAPSULE ORAL DAILY
COMMUNITY
Start: 2024-12-30

## 2025-01-20 NOTE — PROGRESS NOTES
Patient:Sherry Quevedo    YOB: 1990    Medical Record Number:[id number]    Chief Complaints:  Referral for right shoulder pain    History of Present Illness:     34 y.o. female patient who presents for her right shoulder.  She was referred by Dr. Marie.  She reports a 7-month history of right shoulder pain.  She does not recall any specific injury although she was being treated for thoracic outlet syndrome around the time that her symptoms started.  Localizes her pain mostly to the front of the shoulder.  She describes it as deep.  The symptoms are worse with certain reaching and lifting movements such as washing her hair and throwing a ball.  She had 1 injection which did not help appreciably.  She did some limited physical therapy which actually seem to make her symptoms worse.  Unfortunately the shoulder has continued to bother her.  She had an MRI which is available for review.    Allergies:  Allergies   Allergen Reactions    Hydrocodone Itching and Rash    Tramadol Nausea And Vomiting       Home Medications:    Current Outpatient Medications:     atomoxetine (STRATTERA) 60 MG capsule, Take 1 capsule by mouth Daily., Disp: , Rfl:     Cetirizine HCl (ZYRTEC ALLERGY PO), Take 10 mg by mouth At Night As Needed (allergies)., Disp: , Rfl:     Coenzyme Q10 (CoQ-10) 100 MG capsule, Take 1 capsule by mouth 2 (Two) Times a Day., Disp: , Rfl:     desvenlafaxine (PRISTIQ) 100 MG 24 hr tablet, Take 1 tablet by mouth Daily. FOR 30 DAYS, Disp: , Rfl:     Famotidine (PEPCID AC PO), Take 20 mg by mouth Every Night., Disp: , Rfl:     Folate 400 MCG tablet, Take 1 tablet by mouth Daily., Disp: , Rfl:     Magnesium 100 MG tablet, Take 200 mg by mouth Every Night., Disp: , Rfl:     metoprolol succinate XL (TOPROL-XL) 25 MG 24 hr tablet, Take 1 tablet by mouth once daily, Disp: 30 tablet, Rfl: 5    NP Thyroid 30 MG tablet, Take 1 tablet by mouth Daily., Disp: , Rfl:     pantoprazole (PROTONIX) 40 MG EC tablet,  Take 1 tablet by mouth 2 (Two) Times a Day., Disp: , Rfl:     Progesterone (PROMETRIUM) 200 MG capsule, Take TWO capsules BY MOUTH EVERY NIGHT AT BEDTIME FOR 30 DAYS, Disp: , Rfl:     Rimegepant Sulfate (Nurtec) 75 MG tablet dispersible tablet, Take 1 tablet by mouth Daily. (Patient taking differently: Take 1 tablet by mouth Every 2 (Two) Hours As Needed (migraine).), Disp: 8 tablet, Rfl: 1    Tirzepatide (Mounjaro) 2.5 MG/0.5ML solution auto-injector, Inject 2.5 mg under the skin into the appropriate area as directed 1 (One) Time Per Week., Disp: , Rfl:     topiramate (TOPAMAX) 50 MG tablet, TAKE 1 TABLET BY MOUTH ONCE DAILY AT NIGHT, Disp: 90 tablet, Rfl: 0    traZODone (DESYREL) 100 MG tablet, Take 1 tablet by mouth Every Night., Disp: , Rfl:     Past Medical History:   Diagnosis Date    Abnormal computed tomography angiography (CTA) of neck 12/21/2021    Abnormal gluteal crease     ADHD (attention deficit hyperactivity disorder) 2020    Anemia 2019    iron defiency anemia    Anxiety     Benign paroxysmal positional vertigo of right ear 12/01/2021    Bipolar disorder 2012    BRBPR (bright red blood per rectum) 06/02/2021    Dr. [facility] Santana at [facility]    Breast anomaly     Bruises easily     Cervical adenopathy     Cervical lymphadenopathy     Left side.    Change in bowel habit 06/02/2021    Dr. Nancy Santana at [facility]    Cholestasis during pregnancy in third trimester 2019    Chronic allergic rhinitis     Chronic pain disorder 2022    Fibro    Chronic recurrent major depressive disorder     In partial remission.    Cold intolerance     Constipation     CTS (carpal tunnel syndrome) 2019    Decreased sex drive     Depression     History of PPD    Diarrhea     Dizziness 08/06/2021    LEONELA, Temi Mcrae, Cardiologist office.    Dysmenorrhea 12/04/2019    Surgery: Laparoscopic assisted vaginal hysterectomy, Surgeon Dr. Hillary Nunn.    Dysphoric mood     Dyspnea on exertion 08/06/2021    LEONELA, Temi Mcrae,  Cardiologist office.    Endometriosis 12/2019    Hysterectomy in 2019 cured    Environmental and seasonal allergies 09/21/2020    Epigastric abdominal pain 11/07/2018    [facility].    Epiploic appendagitis 01/10/2023    Extremity pain 2022    TOS diagnosed in August    Fat necrosis 07/29/2021    [facility].    Fatigue     Fibromyalgia, primary 2021    Fissure, anal     history    Folic acid deficiency     Gestational diabetes [id number]    I had it with my 1st pregnancy    H/O TB skin testing 02/20/2018    Negative result at [facility].    HA (headache)     Headache, tension-type     Hemorrhoid     History of gestational diabetes     with first baby    History of Holter monitoring 11/07/2019    24 hour.    History of kidney stones 2006/2009    History of miscarriage     History of vasectomy 2019    Spouse Vasectomy.    Hypocalcemia 03/2022    Hypoglycemia     Irritable bowel syndrome 1994    IBS with both constipation/diarrhea, followed by GI Dr. Moreland.    Joint pain     Fibro / TOS    Joint stiffness     Lightheadedness 08/06/2021    LEONELA, Temi Mcrae, Cardiologist office.    Low back pain     Lower back/middle    Low serum potassium level     Low serum vitamin B12     Lower extremity myoclonus 02/28/2022    ADMITTED TO [facility]    Malaise and fatigue 11/12/2019    Cardiologist Dr. Benji Ugalde.    Mass of left parotid gland 12/2021    Migraines 10/2019    Multinodular non-toxic goiter     Near syncope 08/06/2021    LEONELA, Temi Mcrae, Cardiologist office.    Neck pain     OCD (obsessive compulsive disorder)     Ovarian cyst 2010    Surgery removed.    Palpitations 10/28/2019    PVC's (premature ventricular contractions)     Rapid heart rate 11/12/2019    Cardiologist Dr. Benji Ugalde.    Rectal bleeding 06/02/2021    Dr. Nancy Santana at [facility]    Renal stones     RLS (restless legs syndrome)     RUQ abdominal mass [id number]    [facility].    Scoliosis 2003    Sleep disturbance      SOB (shortness of breath) 10/28/2019    Tachycardia 10/28/2019    Thrombocytopenia 03/2022    Thyroid nodule 10/28/2019    1 cm Left Submandibular node, [facility], Dr. Kevan HUTCHINSON Forwith.    Thyroid nodule 10/28/2019    0.3 cm Right side of neck, [facility], Dr. Kevan HUTCHINSON Forlatrell.    Thyromegaly 03/04/2016    Boarderline Thyromegaly, Findings via X-ray at [facility], ordering provider Dr. Nancy Santana.    Transaminitis 11/07/2018    [facility].    Trigger index finger of right hand 01/2022    Vaginal delivery 01/2019    Baby girl.       Past Surgical History:   Procedure Laterality Date    ADENOIDECTOMY  2006    CHOLECYSTECTOMY  2019    CHOLECYSTECTOMY WITH INTRAOPERATIVE CHOLANGIOGRAM N/A 11/09/2018    Procedure: Laparoscopic cholecystectomy;  Surgeon: Khanh Lassiter MD;  Location: [facility];  Service: General    COLONOSCOPY N/A 08/2019    CYSTOSCOPY URETEROSCOPY LASER LITHOTRIPSY N/A 10/31/2024    Procedure: CYSTOSCOPY, LEFT URETEROSCOPY, STONE BASKET EXTRACTION, LEFT STENT PLACEMENT;  Surgeon: Williams Ferguson MD;  Location: [facility] OR;  Service: Urology;  Laterality: N/A;    DILATATION AND CURETTAGE N/A 10/2015    MISCARRIAGE.    ENDOSCOPY N/A     HEMORRHOIDECTOMY N/A 06/16/2022    Procedure: HEMORRHOIDECTOMY;  Surgeon: Linda Sanchez MD;  Location: [facility] OR;  Service: General;  Laterality: N/A;    LAPAROSCOPIC ASSISTED VAGINAL HYSTERECTOMY Bilateral 12/04/2019    Procedure: LAPAROSCOPIC ASSISTED VAGINAL HYSTERECTOMY, BILATERAL SALPINGECTOMY;  Surgeon: Hillary Nunn MD;  Location: [facility];  Service: Obstetrics/Gynecology    OVARIAN CYST SURGERY  06/2009    Laparoscopic ovarian cyst resection, no other abdominal surgery.    TONSILLECTOMY AND ADENOIDECTOMY Bilateral 10/2006    WISDOM TOOTH EXTRACTION Bilateral        Social History     Occupational History    Occupation: teacher     Employer: [employer]   Tobacco Use    Smoking status:  Never    Smokeless tobacco: Never    Tobacco comments:     Vape on oçcasion   Vaping Use    Vaping status: Some Days    Substances: Flavoring   Substance and Sexual Activity    Alcohol use: Yes     Alcohol/week: 2.0 - 4.0 standard drinks of alcohol     Types: 1 - 2 Glasses of wine, 1 - 2 Shots of liquor per week     Comment: Weekly    Drug use: Never    Sexual activity: Yes     Partners: Male     Birth control/protection: Hysterectomy     Comment: .      Social History     Social History Narrative    Not on file       Family History   Problem Relation Age of Onset    COPD Mother     Depression Mother     Hypertension Mother     Heart disease Mother     Hyperlipidemia Mother     Asthma Father     COPD Father     Heart disease Father     Alcohol abuse Father     Hearing loss Father     Cancer Sister     Miscarriages / Stillbirths Sister         1 miscarriage    Depression Sister     Depression Sister     Cancer Sister     Miscarriages / Stillbirths Sister         Miscarriage & stillbirth    Cancer Maternal Grandmother         Breast    Diabetes Maternal Grandmother     Depression Maternal Grandmother     Breast cancer Maternal Grandmother     Cancer Paternal Grandmother         Skin (ear)    Bleeding Disorder Paternal Grandmother         Factor V    COPD Paternal Grandmother     Asthma Paternal Grandmother     Osteoarthritis Paternal Grandmother     Arthritis Paternal Grandmother     Clotting disorder Paternal Grandmother         Factor V    Osteoporosis Paternal Grandmother     Alcohol abuse Paternal Grandfather     Malig Hyperthermia Neg Hx     Colon cancer Neg Hx        Review of Systems:      Constitutional: Denies fever, shaking or chills   Eyes: Denies change in visual acuity   HEENT: Denies nasal congestion or sore throat   Respiratory: Denies cough or shortness of breath   Cardiovascular: Denies chest pain or edema  Endocrine: Denies tremors, palpitations, intolerance of heat or cold, polyuria,  "polydipsia.  GI: Denies abdominal pain, nausea, vomiting, bloody stools or diarrhea  : Denies frequency, urgency, incontinence, retention, or nocturia.  Musculoskeletal: Denies numbness, tingling or loss of motor function except as above  Integument: Denies rash, lesion or ulceration   Neurologic: Denies headache or focal weakness, deficits  Heme: Denies spontaneous or excessive bleeding, epistaxis, hematuria, melena, fatigue, enlarged or tender lymph nodes.      All other pertinent positives and negatives as noted above in HPI.    Physical Exam:34 y.o. female  Vitals:    01/20/25 0930   Temp: 98.6 °F (37 °C)   Weight: 88.8 kg (195 lb 12.8 oz)   Height: 162.6 cm (64\")       General:  Patient is awake and alert.  Appears in no acute distress or discomfort.    Psych:  Affect and demeanor are appropriate.    Extremities:  Right shoulder is examined.  Skin is benign.  No gross abnormalities on inspection including any atrophy, swellings, or masses.  No palpable masses or adenopathy.  Focal tenderness noted over biceps groove.  Full shoulder motion.  No evident instability or apprehension.  Positive Speeds and Yergasons maneuver which reproduce the patient's typical pain.  Good strength in the rotator cuff, deltoid, biceps, triceps, and .  Intact sensation throughout the arm.  Brisk cap refill.  Palpable radial pulse.     Imaging: Her previous x-rays were reviewed.  No obvious abnormalities noted.  MRI right shoulder reviewed along with the report.  I have independently interpreted the images.  There is a superior labral tear with detachment of the biceps anchor.  The tear appears to extend into the long of the biceps itself.    Assessment/Plan:  Right glenoid labral tear with biceps involvement    We discussed the natural history of this condition and her options in detail.  We thoroughly discussed conservative versus surgical treatment.  She feels like the conservative treatments have failed.  She wants to move " forward with surgical intervention.      We had a thorough discussion regarding the risks, benefits and alternatives to an arthroscopic biceps tenodesis versus tenotomy, labral repair versus debridement.  I explained that surgical risks include infection, hematoma, anchor related complications including failure of fixation, loosening, cutout of the anchors, chondrolysis,  re-tear necessitating revision surgery, persistent pain and/or loss of motion, iatrogenic nerve and/or blood vessel injury resulting in permanent weakness, numbness or dysfunction, RSD, DVT, PE, positioning related neuropraxia, and anesthesia related complications resulting in death.  We further discussed the need to address the biceps with a tenotomy versus tenodesis.  We discussed the fact that if the biceps demonstrates significant pathology in the groove then I would plan to perform a tenotomy which could result in wes deformity or persistent pain, weakness or cramping.  We also discussed possible risks with a tenodesis as well including screw related complications including cutout, chondrolysis, failure of fixation with re-tear of the biceps, fracture and possible iatrogenic nerve injury.  She voiced understanding of the risks, benefits, and alternative forms of treatment that were discussed and the patient consents to proceed.      Agustín Verdin MD    01/20/2025

## 2025-01-22 ENCOUNTER — TELEPHONE (OUTPATIENT)
Dept: ORTHOPEDIC SURGERY | Facility: CLINIC | Age: 35
End: 2025-01-22
Payer: COMMERCIAL

## 2025-01-31 ENCOUNTER — TELEMEDICINE (OUTPATIENT)
Dept: FAMILY MEDICINE CLINIC | Facility: TELEHEALTH | Age: 35
End: 2025-01-31
Payer: COMMERCIAL

## 2025-01-31 DIAGNOSIS — J01.40 ACUTE PANSINUSITIS, RECURRENCE NOT SPECIFIED: Primary | ICD-10-CM

## 2025-01-31 RX ORDER — PREDNISONE 20 MG/1
20 TABLET ORAL 2 TIMES DAILY
Qty: 14 TABLET | Refills: 0 | Status: SHIPPED | OUTPATIENT
Start: 2025-01-31 | End: 2025-02-07

## 2025-01-31 RX ORDER — BROMPHENIRAMINE MALEATE, PSEUDOEPHEDRINE HYDROCHLORIDE, AND DEXTROMETHORPHAN HYDROBROMIDE 2; 30; 10 MG/5ML; MG/5ML; MG/5ML
5 SYRUP ORAL 4 TIMES DAILY PRN
Qty: 118 ML | Refills: 0 | Status: SHIPPED | OUTPATIENT
Start: 2025-01-31

## 2025-01-31 NOTE — PROGRESS NOTES
Subjective   Sherry Quevedo is a 34 y.o. female.     History of Present Illness  Presents with c/o chest congestion, deep cough, sinus pain, and thick yellow drainage for 5-6 days.   She has been taking dayquil, nyquil, mucinex. She has a history of sinus and ear problems, gets frequent infections.        The following portions of the patient's history were reviewed and updated as appropriate: allergies, current medications, past family history, past medical history, past social history, past surgical history, and problem list.    Review of Systems   Constitutional:  Positive for fever.   HENT:  Positive for congestion and sinus pressure (headache).    Respiratory:  Positive for cough, chest tightness and shortness of breath. Negative for wheezing.        Objective   Physical Exam  Constitutional:       General: She is not in acute distress.     Appearance: She is well-developed. She is not diaphoretic.   HENT:      Nose:      Right Sinus: Maxillary sinus tenderness and frontal sinus tenderness present.      Left Sinus: Maxillary sinus tenderness and frontal sinus tenderness present.   Pulmonary:      Effort: Pulmonary effort is normal.   Neurological:      Mental Status: She is alert and oriented to person, place, and time.   Psychiatric:         Behavior: Behavior normal.           Assessment & Plan   Diagnoses and all orders for this visit:    1. Acute pansinusitis, recurrence not specified (Primary)  -     amoxicillin-clavulanate (AUGMENTIN) 875-125 MG per tablet; Take 1 tablet by mouth 2 (Two) Times a Day for 10 days.  Dispense: 20 tablet; Refill: 0  -     predniSONE (DELTASONE) 20 MG tablet; Take 1 tablet by mouth 2 (Two) Times a Day for 7 days.  Dispense: 14 tablet; Refill: 0  -     brompheniramine-pseudoephedrine-DM 30-2-10 MG/5ML syrup; Take 5 mL by mouth 4 (Four) Times a Day As Needed for Allergies, Cough or Congestion.  Dispense: 118 mL; Refill: 0                 The use of a video visit has been  reviewed with the patient and verbal informed consent has been obtained. Myself and Sherry Quevedo participated in this visit. The patient is located in [place], [place]. I am located in [place], [place]. [employer] and [employer] were utilized. I spent 20 minutes in the patient's chart for this visit.

## 2025-01-31 NOTE — PATIENT INSTRUCTIONS
-Take all meds as prescribed even if you start feeling better  May use tylenol or warm moist compress on the face for pain. Avoid ibuprofen and sudafed while taking prednisone.  -May use saline nasal spray, netipot or flonase as desired  -If symptoms worsen or do not improve in 1 week follow up with urgent care or your primary care provider

## 2025-02-11 ENCOUNTER — APPOINTMENT (OUTPATIENT)
Dept: CT IMAGING | Facility: HOSPITAL | Age: 35
End: 2025-02-11
Payer: COMMERCIAL

## 2025-02-11 ENCOUNTER — HOSPITAL ENCOUNTER (EMERGENCY)
Facility: HOSPITAL | Age: 35
Discharge: HOME | End: 2025-02-11
Attending: STUDENT IN AN ORGANIZED HEALTH CARE EDUCATION/TRAINING PROGRAM
Payer: COMMERCIAL

## 2025-02-11 VITALS
TEMPERATURE: 98 F | HEART RATE: 86 BPM | RESPIRATION RATE: 18 BRPM | SYSTOLIC BLOOD PRESSURE: 97 MMHG | DIASTOLIC BLOOD PRESSURE: 70 MMHG | OXYGEN SATURATION: 100 %

## 2025-02-11 DIAGNOSIS — N23 RENAL COLIC: ICD-10-CM

## 2025-02-11 DIAGNOSIS — R10.9 ACUTE RIGHT FLANK PAIN: Primary | ICD-10-CM

## 2025-02-11 DIAGNOSIS — N20.1 URETEROLITHIASIS: ICD-10-CM

## 2025-02-11 LAB
ALBUMIN SERPL-MCNC: 4.4 G/DL (ref 3.5–5.2)
ALBUMIN/GLOB SERPL: 1.6 G/DL
ALP SERPL-CCNC: 85 U/L (ref 39–117)
ALT SERPL W P-5'-P-CCNC: 73 U/L (ref 1–33)
AMORPH URATE CRY URNS QL MICRO: ABNORMAL /HPF
ANION GAP SERPL CALCULATED.3IONS-SCNC: 14 MMOL/L (ref 5–15)
AST SERPL-CCNC: 30 U/L (ref 1–32)
BACTERIA UR QL AUTO: ABNORMAL /HPF
BASOPHILS # BLD AUTO: 0.04 10*3/MM3 (ref 0–0.2)
BASOPHILS NFR BLD AUTO: 0.5 % (ref 0–1.5)
BILIRUB SERPL-MCNC: 0.4 MG/DL (ref 0–1.2)
BILIRUB UR QL STRIP: NEGATIVE
BUN SERPL-MCNC: 11 MG/DL (ref 6–20)
BUN/CREAT SERPL: 12.9 (ref 7–25)
CALCIUM SPEC-SCNC: 10.1 MG/DL (ref 8.6–10.5)
CHLORIDE SERPL-SCNC: 109 MMOL/L (ref 98–107)
CLARITY UR: ABNORMAL
CO2 SERPL-SCNC: 20 MMOL/L (ref 22–29)
COLOR UR: YELLOW
CREAT SERPL-MCNC: 0.85 MG/DL (ref 0.57–1)
DEPRECATED RDW RBC AUTO: 43.8 FL (ref 37–54)
EGFRCR SERPLBLD CKD-EPI 2021: 91.8 ML/MIN/1.73
EOSINOPHIL # BLD AUTO: 0.09 10*3/MM3 (ref 0–0.4)
EOSINOPHIL NFR BLD AUTO: 1.1 % (ref 0.3–6.2)
ERYTHROCYTE [DISTWIDTH] IN BLOOD BY AUTOMATED COUNT: 13.4 % (ref 12.3–15.4)
GLOBULIN UR ELPH-MCNC: 2.7 GM/DL
GLUCOSE SERPL-MCNC: 123 MG/DL (ref 65–99)
GLUCOSE UR STRIP-MCNC: NEGATIVE MG/DL
HCT VFR BLD AUTO: 42.9 % (ref 34–46.6)
HGB BLD-MCNC: 14.5 G/DL (ref 12–15.9)
HGB UR QL STRIP.AUTO: ABNORMAL
HOLD SPECIMEN: NORMAL
HOLD SPECIMEN: NORMAL
HYALINE CASTS UR QL AUTO: ABNORMAL /LPF
IMM GRANULOCYTES # BLD AUTO: 0.02 10*3/MM3 (ref 0–0.05)
IMM GRANULOCYTES NFR BLD AUTO: 0.2 % (ref 0–0.5)
KETONES UR QL STRIP: ABNORMAL
LEUKOCYTE ESTERASE UR QL STRIP.AUTO: ABNORMAL
LIPASE SERPL-CCNC: 81 U/L (ref 13–60)
LYMPHOCYTES # BLD AUTO: 2.85 10*3/MM3 (ref 0.7–3.1)
LYMPHOCYTES NFR BLD AUTO: 34.3 % (ref 19.6–45.3)
MCH RBC QN AUTO: 30.5 PG (ref 26.6–33)
MCHC RBC AUTO-ENTMCNC: 33.8 G/DL (ref 31.5–35.7)
MCV RBC AUTO: 90.1 FL (ref 79–97)
MONOCYTES # BLD AUTO: 0.52 10*3/MM3 (ref 0.1–0.9)
MONOCYTES NFR BLD AUTO: 6.3 % (ref 5–12)
NEUTROPHILS NFR BLD AUTO: 4.79 10*3/MM3 (ref 1.7–7)
NEUTROPHILS NFR BLD AUTO: 57.6 % (ref 42.7–76)
NITRITE UR QL STRIP: NEGATIVE
NRBC BLD AUTO-RTO: 0 /100 WBC (ref 0–0.2)
PH UR STRIP.AUTO: 8.5 [PH] (ref 5–8)
PLATELET # BLD AUTO: 192 10*3/MM3 (ref 140–450)
PMV BLD AUTO: 9.6 FL (ref 6–12)
POTASSIUM SERPL-SCNC: 3.4 MMOL/L (ref 3.5–5.2)
PROT SERPL-MCNC: 7.1 G/DL (ref 6–8.5)
PROT UR QL STRIP: ABNORMAL
RBC # BLD AUTO: 4.76 10*6/MM3 (ref 3.77–5.28)
RBC # UR STRIP: ABNORMAL /HPF
REF LAB TEST METHOD: ABNORMAL
SODIUM SERPL-SCNC: 143 MMOL/L (ref 136–145)
SP GR UR STRIP: 1.02 (ref 1–1.03)
SQUAMOUS #/AREA URNS HPF: ABNORMAL /HPF
UROBILINOGEN UR QL STRIP: ABNORMAL
WBC # UR STRIP: ABNORMAL /HPF
WBC NRBC COR # BLD AUTO: 8.31 10*3/MM3 (ref 3.4–10.8)
WHOLE BLOOD HOLD COAG: NORMAL
WHOLE BLOOD HOLD SPECIMEN: NORMAL

## 2025-02-11 PROCEDURE — 25810000003 LACTATED RINGERS SOLUTION: Performed by: STUDENT IN AN ORGANIZED HEALTH CARE EDUCATION/TRAINING PROGRAM

## 2025-02-11 PROCEDURE — 74177 CT ABD & PELVIS W/CONTRAST: CPT

## 2025-02-11 PROCEDURE — 25810000003 SODIUM CHLORIDE 0.9 % SOLUTION 250 ML FLEX CONT: Performed by: EMERGENCY MEDICINE

## 2025-02-11 PROCEDURE — 25010000002 ONDANSETRON PER 1 MG: Performed by: STUDENT IN AN ORGANIZED HEALTH CARE EDUCATION/TRAINING PROGRAM

## 2025-02-11 PROCEDURE — 96375 TX/PRO/DX INJ NEW DRUG ADDON: CPT

## 2025-02-11 PROCEDURE — 25010000002 LIDOCAINE 1 % SOLUTION 50 ML VIAL: Performed by: EMERGENCY MEDICINE

## 2025-02-11 PROCEDURE — 25510000001 IOPAMIDOL 61 % SOLUTION: Performed by: STUDENT IN AN ORGANIZED HEALTH CARE EDUCATION/TRAINING PROGRAM

## 2025-02-11 PROCEDURE — 85025 COMPLETE CBC W/AUTO DIFF WBC: CPT | Performed by: STUDENT IN AN ORGANIZED HEALTH CARE EDUCATION/TRAINING PROGRAM

## 2025-02-11 PROCEDURE — 99285 EMERGENCY DEPT VISIT HI MDM: CPT

## 2025-02-11 PROCEDURE — 36415 COLL VENOUS BLD VENIPUNCTURE: CPT

## 2025-02-11 PROCEDURE — 81001 URINALYSIS AUTO W/SCOPE: CPT | Performed by: STUDENT IN AN ORGANIZED HEALTH CARE EDUCATION/TRAINING PROGRAM

## 2025-02-11 PROCEDURE — 80053 COMPREHEN METABOLIC PANEL: CPT | Performed by: STUDENT IN AN ORGANIZED HEALTH CARE EDUCATION/TRAINING PROGRAM

## 2025-02-11 PROCEDURE — 25010000002 MORPHINE PER 10 MG: Performed by: STUDENT IN AN ORGANIZED HEALTH CARE EDUCATION/TRAINING PROGRAM

## 2025-02-11 PROCEDURE — 96374 THER/PROPH/DIAG INJ IV PUSH: CPT

## 2025-02-11 PROCEDURE — 25010000002 HYDROMORPHONE 1 MG/ML SOLUTION: Performed by: STUDENT IN AN ORGANIZED HEALTH CARE EDUCATION/TRAINING PROGRAM

## 2025-02-11 PROCEDURE — 83690 ASSAY OF LIPASE: CPT | Performed by: STUDENT IN AN ORGANIZED HEALTH CARE EDUCATION/TRAINING PROGRAM

## 2025-02-11 RX ORDER — IOPAMIDOL 612 MG/ML
100 INJECTION, SOLUTION INTRAVASCULAR
Status: COMPLETED | OUTPATIENT
Start: 2025-02-11 | End: 2025-02-11

## 2025-02-11 RX ORDER — ONDANSETRON 4 MG/1
TABLET, ORALLY DISINTEGRATING ORAL
Qty: 20 TABLET | Refills: 0 | Status: SHIPPED | OUTPATIENT
Start: 2025-02-11

## 2025-02-11 RX ORDER — ONDANSETRON 2 MG/ML
4 INJECTION INTRAMUSCULAR; INTRAVENOUS ONCE
Status: COMPLETED | OUTPATIENT
Start: 2025-02-11 | End: 2025-02-11

## 2025-02-11 RX ORDER — TAMSULOSIN HYDROCHLORIDE 0.4 MG/1
0.4 CAPSULE ORAL ONCE
Status: COMPLETED | OUTPATIENT
Start: 2025-02-11 | End: 2025-02-11

## 2025-02-11 RX ORDER — SODIUM CHLORIDE 0.9 % (FLUSH) 0.9 %
10 SYRINGE (ML) INJECTION AS NEEDED
Status: DISCONTINUED | OUTPATIENT
Start: 2025-02-11 | End: 2025-02-11 | Stop reason: HOSPADM

## 2025-02-11 RX ORDER — TAMSULOSIN HYDROCHLORIDE 0.4 MG/1
CAPSULE ORAL
Qty: 7 CAPSULE | Refills: 0 | Status: SHIPPED | OUTPATIENT
Start: 2025-02-11

## 2025-02-11 RX ORDER — MORPHINE SULFATE 2 MG/ML
4 INJECTION, SOLUTION INTRAMUSCULAR; INTRAVENOUS ONCE
Status: COMPLETED | OUTPATIENT
Start: 2025-02-11 | End: 2025-02-11

## 2025-02-11 RX ADMIN — SODIUM CHLORIDE, POTASSIUM CHLORIDE, SODIUM LACTATE AND CALCIUM CHLORIDE 1000 ML: 600; 310; 30; 20 INJECTION, SOLUTION INTRAVENOUS at 17:18

## 2025-02-11 RX ADMIN — ONDANSETRON 4 MG: 2 INJECTION, SOLUTION INTRAMUSCULAR; INTRAVENOUS at 17:08

## 2025-02-11 RX ADMIN — TAMSULOSIN HYDROCHLORIDE 0.4 MG: 0.4 CAPSULE ORAL at 19:52

## 2025-02-11 RX ADMIN — MORPHINE SULFATE 4 MG: 2 INJECTION, SOLUTION INTRAMUSCULAR; INTRAVENOUS at 17:09

## 2025-02-11 RX ADMIN — IOPAMIDOL 85 ML: 612 INJECTION, SOLUTION INTRAVENOUS at 18:18

## 2025-02-11 RX ADMIN — HYDROMORPHONE HYDROCHLORIDE 1 MG: 1 INJECTION, SOLUTION INTRAMUSCULAR; INTRAVENOUS; SUBCUTANEOUS at 17:40

## 2025-02-11 RX ADMIN — SODIUM CHLORIDE 125 MG: 9 INJECTION, SOLUTION INTRAVENOUS at 19:20

## 2025-02-11 NOTE — ED PROVIDER NOTES
" EMERGENCY DEPARTMENT ENCOUNTER  Room Number:  18/18  PCP: Temi Jones APRN  Independent Historians: Patient      HPI:  Chief Complaint: had concerns including Abdominal Pain, Vomiting, and Diarrhea.     Context: Sherry Quevedo is a 34 y.o. female with a medical history of IBS, anemia, anxiety, fibromyalgia who presents to the ED c/o acute right sided abdominal pain.  Pain began this morning.  Patient states pain is sharp in nature and severe.  Patient has associated nausea and vomiting.  Patient has diarrhea but she states she almost always has diarrhea.  Patient has a history of cholecystectomy and hysterectomy.      Review of prior external notes (non-ED) -and- Review of prior external test results outside of this encounter: Discharge summary from 11//24 reviewed and notable for UTI with pyelitis.  Patient seen by urology.  Patient started on antibiotics and discharged to follow-up outpatient.    Prescription drug monitoring program review:     {EM_Alvin (Optional):25777::\"N/A\"}    PAST MEDICAL HISTORY  Active Ambulatory Problems     Diagnosis Date Noted    Anemia 01/16/2019    Chronic allergic rhinitis 09/24/2019    History of renal stone 09/24/2019    Chronic migraine with aura 10/03/2019    Low serum vitamin B12 10/03/2019    Generalized anxiety disorder 10/03/2019    Irritable bowel syndrome (IBS) 09/21/2020    Thyroid nodule     Scoliosis 2003    Rectal bleeding     BRBPR (bright red blood per rectum) 06/02/2021    PVC's (premature ventricular contractions)     Thyromegaly 03/04/2016    Benign paroxysmal positional vertigo due to bilateral vestibular disorder 08/06/2021    Near syncope 08/06/2021    Dyspnea on exertion 08/06/2021    Lightheadedness 08/06/2021    Fat necrosis 07/29/2021    Tachycardia 10/28/2019    SOB (shortness of breath) 10/28/2019    Low serum potassium level     Folic acid deficiency     Cervical adenopathy     Endometriosis 12/2019    Abnormal gluteal crease     Decreased sex " drive     Fibromyalgia     Breast anomaly     Ovarian cyst 2010    Fatigue     Bruises easily     Cold intolerance     Sleep disturbance     Renal stones     Chronic recurrent major depressive disorder     Calculus of bile duct 11/07/2018    Transaminitis 11/07/2018    RUQ abdominal mass     History of miscarriage     Fissure, anal     History of gestational diabetes     Cholestasis during pregnancy in third trimester 2019    Cholelithiasis 11/2018    External hemorrhoids 12/20/2021    Muscle spasm 02/28/2022    Class 1 obesity with serious comorbidity and body mass index (BMI) of 31.0 to 31.9 in adult 08/29/2022    Treatment-resistant depression 12/23/2022    Neurogenic thoracic outlet syndrome 08/20/2024    Ureteral calculus 10/31/2024    Pyelitis 11/04/2024    Chronic right shoulder pain 01/20/2025     Resolved Ambulatory Problems     Diagnosis Date Noted    Transaminitis 11/07/2018    Calculus of bile duct with cholecystitis without obstruction 11/07/2018    Pregnancy 01/15/2019    Left cervical lymphadenopathy 09/24/2019    Iron deficiency anemia due to chronic blood loss 10/03/2019    Low folic acid 10/03/2019    Moderate episode of recurrent major depressive disorder 10/03/2019    Multinodular non-toxic goiter 10/03/2019    Dysmenorrhea 12/04/2019    Anxiety 09/21/2020    Depression 09/21/2020    Environmental and seasonal allergies 09/21/2020    Migraines     Change in bowel habit 06/02/2021    Neck pain     Palpitations 10/28/2019    History of Holter monitoring 11/07/2019    Rapid heart rate 11/12/2019    Malaise and fatigue 11/12/2019    Dysmenorrhea 12/04/2019    Hypoglycemia     Constipation     Diarrhea     HA (headache)     Intractable migraine with aura without status migrainosus 10/2019    Dysphoric mood     Nervously anxious     Vaginal delivery 01/2019    History of vasectomy 2019    Cervical lymphadenopathy     H/O TB skin testing 02/20/2018    Epigastric abdominal pain 11/07/2018    Abnormal  uterine bleeding (AUB)     Allergic 01/2013     Past Medical History:   Diagnosis Date    Abnormal computed tomography angiography (CTA) of neck 12/21/2021    ADHD (attention deficit hyperactivity disorder) 2020    Benign paroxysmal positional vertigo of right ear 12/01/2021    Bipolar disorder 2012    Chronic pain disorder 2022    CTS (carpal tunnel syndrome) 2019    Dizziness 08/06/2021    Epiploic appendagitis 01/10/2023    Extremity pain 2022    Fibromyalgia, primary 2021    Gestational diabetes [id number]    Headache, tension-type     Hemorrhoid     History of kidney stones 2006/2009    Hypocalcemia 03/2022    Irritable bowel syndrome 1994    Joint pain     Joint stiffness     Low back pain     Lower extremity myoclonus 02/28/2022    Mass of left parotid gland 12/2021    OCD (obsessive compulsive disorder)     RLS (restless legs syndrome)     Thrombocytopenia 03/2022    Trigger index finger of right hand 01/2022         PAST SURGICAL HISTORY  Past Surgical History:   Procedure Laterality Date    ADENOIDECTOMY  2006    CHOLECYSTECTOMY  2019    CHOLECYSTECTOMY WITH INTRAOPERATIVE CHOLANGIOGRAM N/A 11/09/2018    Procedure: Laparoscopic cholecystectomy;  Surgeon: Khanh Lassiter MD;  Location: [facility];  Service: General    COLONOSCOPY N/A 08/2019    CYSTOSCOPY URETEROSCOPY LASER LITHOTRIPSY N/A 10/31/2024    Procedure: CYSTOSCOPY, LEFT URETEROSCOPY, STONE BASKET EXTRACTION, LEFT STENT PLACEMENT;  Surgeon: Williams Ferguson MD;  Location: [facility] OR;  Service: Urology;  Laterality: N/A;    DILATATION AND CURETTAGE N/A 10/2015    MISCARRIAGE.    ENDOSCOPY N/A     HEMORRHOIDECTOMY N/A 06/16/2022    Procedure: HEMORRHOIDECTOMY;  Surgeon: Linda Sanchez MD;  Location: [facility] OR;  Service: General;  Laterality: N/A;    LAPAROSCOPIC ASSISTED VAGINAL HYSTERECTOMY Bilateral 12/04/2019    Procedure: LAPAROSCOPIC ASSISTED VAGINAL HYSTERECTOMY, BILATERAL SALPINGECTOMY;  Surgeon: Hillary Nunn MD;   Location: [facility];  Service: Obstetrics/Gynecology    OVARIAN CYST SURGERY  06/2009    Laparoscopic ovarian cyst resection, no other abdominal surgery.    TONSILLECTOMY AND ADENOIDECTOMY Bilateral 10/2006    WISDOM TOOTH EXTRACTION Bilateral          FAMILY HISTORY  Family History   Problem Relation Age of Onset    COPD Mother     Depression Mother     Hypertension Mother     Heart disease Mother     Hyperlipidemia Mother     Asthma Father     COPD Father     Heart disease Father     Alcohol abuse Father     Hearing loss Father     Cancer Sister     Miscarriages / Stillbirths Sister         1 miscarriage    Depression Sister     Depression Sister     Cancer Sister     Miscarriages / Stillbirths Sister         Miscarriage & stillbirth    Cancer Maternal Grandmother         Breast    Diabetes Maternal Grandmother     Depression Maternal Grandmother     Breast cancer Maternal Grandmother     Cancer Paternal Grandmother         Skin (ear)    Bleeding Disorder Paternal Grandmother         Factor V    COPD Paternal Grandmother     Asthma Paternal Grandmother     Osteoarthritis Paternal Grandmother     Arthritis Paternal Grandmother     Clotting disorder Paternal Grandmother         Factor V    Osteoporosis Paternal Grandmother     Alcohol abuse Paternal Grandfather     Malig Hyperthermia Neg Hx     Colon cancer Neg Hx          SOCIAL HISTORY  Social History     Socioeconomic History    Marital status:    Tobacco Use    Smoking status: Never    Smokeless tobacco: Never    Tobacco comments:     Vape on oçcasion   Vaping Use    Vaping status: Some Days    Substances: Flavoring   Substance and Sexual Activity    Alcohol use: Yes     Alcohol/week: 2.0 - 4.0 standard drinks of alcohol     Types: 1 - 2 Glasses of wine, 1 - 2 Shots of liquor per week     Comment: Weekly    Drug use: Never    Sexual activity: Yes     Partners: Male     Birth control/protection: Hysterectomy     Comment: .          ALLERGIES  Hydrocodone and Tramadol      REVIEW OF SYSTEMS  Review of Systems  Included in HPI  All systems reviewed and negative except for those discussed in HPI.      PHYSICAL EXAM    I have reviewed the triage vital signs and nursing notes.    ED Triage Vitals   Temp Heart Rate Resp BP SpO2   02/11/25 1618 02/11/25 1618 02/11/25 1618 02/11/25 1620 02/11/25 1618   98 °F (36.7 °C) (!) 130 20 139/95 97 %      Temp src Heart Rate Source Patient Position BP Location FiO2 (%)   -- -- 02/11/25 1620 02/11/25 1620 --     Sitting Right arm        Physical Exam  GENERAL: alert, no acute distress  SKIN: Warm, dry  HENT: Normocephalic, atraumatic  EYES: no scleral icterus  CV: regular rhythm, regular rate  RESPIRATORY: normal effort, lungs clear  ABDOMEN: soft, mild right-sided abdominal tenderness to palpation  MUSCULOSKELETAL: no deformity  NEURO: alert, moves all extremities, follows commands        {EM_PPE (Optional):74851}    LAB RESULTS  No results found for this or any previous visit (from the past 24 hours).      RADIOLOGY  No Radiology Exams Resulted Within Past 24 Hours      MEDICATIONS GIVEN IN ER  Medications   sodium chloride 0.9 % flush 10 mL (has no administration in time range)         ORDERS PLACED DURING THIS VISIT:  Orders Placed This Encounter   Procedures    [place] Draw    Comprehensive Metabolic Panel    Lipase    Urinalysis With Microscopic If Indicated (No Culture) - Urine, Clean Catch    CBC Auto Differential    NPO Diet NPO Type: Strict NPO    Undress & Gown    Insert Peripheral IV    CBC & Differential    Green Top (Gel)    Lavender Top    Gold Top - SST    Light Blue Top         OUTPATIENT MEDICATION MANAGEMENT:  Current Facility-Administered Medications Ordered in Epic   Medication Dose Route Frequency Provider Last Rate Last Admin    sodium chloride 0.9 % flush 10 mL  10 mL Intravenous PRN Emergency, Triage Protocol, MD         Current Outpatient Medications Ordered in Epic   Medication  Sig Dispense Refill    atomoxetine (STRATTERA) 60 MG capsule Take 1 capsule by mouth Daily.      brompheniramine-pseudoephedrine-DM 30-2-10 MG/5ML syrup Take 5 mL by mouth 4 (Four) Times a Day As Needed for Allergies, Cough or Congestion. 118 mL 0    Cetirizine HCl (ZYRTEC ALLERGY PO) Take 10 mg by mouth At Night As Needed (allergies).      Coenzyme Q10 (CoQ-10) 100 MG capsule Take 1 capsule by mouth 2 (Two) Times a Day.      desvenlafaxine (PRISTIQ) 100 MG 24 hr tablet Take 1 tablet by mouth Daily. FOR 30 DAYS      Famotidine (PEPCID AC PO) Take 20 mg by mouth Every Night.      Folate 400 MCG tablet Take 1 tablet by mouth Daily.      Magnesium 100 MG tablet Take 200 mg by mouth Every Night.      metoprolol succinate XL (TOPROL-XL) 25 MG 24 hr tablet Take 1 tablet by mouth once daily 30 tablet 5    NP Thyroid 30 MG tablet Take 1 tablet by mouth Daily.      pantoprazole (PROTONIX) 40 MG EC tablet Take 1 tablet by mouth 2 (Two) Times a Day.      Progesterone (PROMETRIUM) 200 MG capsule Take TWO capsules BY MOUTH EVERY NIGHT AT BEDTIME FOR 30 DAYS      Rimegepant Sulfate (Nurtec) 75 MG tablet dispersible tablet Take 1 tablet by mouth Daily. (Patient taking differently: Take 1 tablet by mouth Every 2 (Two) Hours As Needed (migraine).) 8 tablet 1    Tirzepatide (Mounjaro) 2.5 MG/0.5ML solution auto-injector Inject 2.5 mg under the skin into the appropriate area as directed 1 (One) Time Per Week.      topiramate (TOPAMAX) 50 MG tablet TAKE 1 TABLET BY MOUTH ONCE DAILY AT NIGHT 90 tablet 0    traZODone (DESYREL) 100 MG tablet Take 1 tablet by mouth Every Night.           PROCEDURES  Procedures      {EM_CC (Optional):89972}      PROGRESS, DATA ANALYSIS, CONSULTS, AND MEDICAL DECISION MAKING  All labs have been independently interpreted by me.  All radiology studies have been reviewed by me. All EKG's have been independently viewed and interpreted by me.  Discussion below represents my analysis of pertinent findings related  to patient's condition, differential diagnosis, treatment plan and final disposition.    Differential diagnosis includes but is not limited to retained stone, pancreatitis, diverticulitis, colitis, volvulus, gastroenteritis, gastritis.    Clinical Scores:                                                  AS OF 16:34 EST VITALS:    BP - 139/95  HR - (!) 130  TEMP - 98 °F (36.7 °C)  O2 SATS - 97%    COMPLEXITY OF CARE  {Admission Statement:46515}      DIAGNOSIS  Final diagnoses:   None         DISPOSITION  ED Disposition       None             Please note that portions of this document were completed with a voice recognition program.    Note Disclaimer: At [facility], we believe that sharing information builds trust and better relationships. You are receiving this note because you recently visited [facility]. It is possible you will see health information before a provider has talked with you about it. This kind of information can be easy to misunderstand. To help you fully understand what it means for your health, we urge you to discuss this note with your provider.         Acute right flank pain   Renal colic   Ureterolithiasis         DISPOSITION  ED Disposition       ED Disposition   Discharge    Condition   Stable    Comment   --                Please note that portions of this document were completed with a voice recognition program.    Note Disclaimer: At [facility], we believe that sharing information builds trust and better relationships. You are receiving this note because you recently visited [facility]. It is possible you will see health information before a provider has talked with you about it. This kind of information can be easy to misunderstand. To help you fully understand what it means for your health, we urge you to discuss this note with your provider.         Macario Cabrera MD  02/19/25 0714

## 2025-02-11 NOTE — TELEPHONE ENCOUNTER
Left a voicemail for follow up to see if patient had any further questions regarding disability of FMLA as I don't believe she returned a call to me previously.

## 2025-02-12 ENCOUNTER — OFFICE VISIT (OUTPATIENT)
Dept: INTERNAL MEDICINE | Facility: CLINIC | Age: 35
End: 2025-02-12
Payer: COMMERCIAL

## 2025-02-12 VITALS
WEIGHT: 186.1 LBS | TEMPERATURE: 98.1 F | OXYGEN SATURATION: 100 % | HEIGHT: 64 IN | BODY MASS INDEX: 31.77 KG/M2 | HEART RATE: 85 BPM | DIASTOLIC BLOOD PRESSURE: 66 MMHG | SYSTOLIC BLOOD PRESSURE: 102 MMHG

## 2025-02-12 DIAGNOSIS — Z09 HOSPITAL DISCHARGE FOLLOW-UP: Primary | ICD-10-CM

## 2025-02-12 DIAGNOSIS — N20.0 NEPHROLITHIASIS: ICD-10-CM

## 2025-02-12 DIAGNOSIS — R74.8 ELEVATED LIPASE: ICD-10-CM

## 2025-02-12 DIAGNOSIS — E87.6 HYPOKALEMIA: ICD-10-CM

## 2025-02-12 DIAGNOSIS — E66.811 CLASS 1 OBESITY WITH SERIOUS COMORBIDITY AND BODY MASS INDEX (BMI) OF 31.0 TO 31.9 IN ADULT, UNSPECIFIED OBESITY TYPE: ICD-10-CM

## 2025-02-12 DIAGNOSIS — G43.109 MIGRAINE WITH AURA AND WITHOUT STATUS MIGRAINOSUS, NOT INTRACTABLE: ICD-10-CM

## 2025-02-12 DIAGNOSIS — R74.8 ELEVATED LIVER ENZYMES: ICD-10-CM

## 2025-02-12 DIAGNOSIS — Z00.00 HEALTHCARE MAINTENANCE: ICD-10-CM

## 2025-02-12 PROCEDURE — 99214 OFFICE O/P EST MOD 30 MIN: CPT | Performed by: NURSE PRACTITIONER

## 2025-02-12 RX ORDER — ATOGEPANT 60 MG/1
60 TABLET ORAL DAILY
Qty: 12 TABLET | Refills: 0 | COMMUNITY
Start: 2025-02-12

## 2025-02-12 RX ORDER — ATOGEPANT 60 MG/1
60 TABLET ORAL DAILY
Qty: 90 TABLET | Refills: 1 | Status: SHIPPED | OUTPATIENT
Start: 2025-02-12

## 2025-02-12 NOTE — PROGRESS NOTES
Subjective   Sherry Quevedo is a 34 y.o. female.     Chief Complaint   Patient presents with    Abdominal Pain     Pt is here as a ER follow up for kidney stone.        History of Present Illness   She is here today for hospital follow-up for bilateral nephrolithiasis.  She presented to [facility] ER yesterday with right sided abdominal and flank pain that began this morning.  Pain is described as sharp and severe associated with nausea and vomiting.  Lab work showed hypokalemia at 3.4, elevated ALT at 73 and elevated lipase 81.  CT abdomen pelvis showed bilateral nephrolithiasis with mild right hydronephrosis and right hydroureter and a 3 to 4 mm calculus in the base of the urinary bladder suggesting recent stone passage. She was prescribed Flomax, has not started this yet. She was given pain medication with improvement in symptoms.  Was discharged from the ER to follow-up with PCP and urology.  She presents today for follow-up. She denies any abdominal or flank pain now.  She denies any nausea or vomiting. She is currently on Topamax 50 mg nightly for migraine headaches, currently with at least 10 migraine headaches a month. She has been having to use her Nurtec more frequently abortive therapy. She has previously tried and failed Elavil, imitrex and now topamax.   She has a hx of frequent kidney stones. She is scheduled to see urology on Monday.  She has been making healthy dietary changes and hydrating well with water.    The following portions of the patient's history were reviewed and updated as appropriate: allergies, current medications, past family history, past medical history, past social history, past surgical history, and problem list.    Review of Systems   Constitutional:  Positive for fatigue. Negative for chills and fever.   Respiratory: Negative.     Cardiovascular: Negative.    Gastrointestinal:  Negative for abdominal pain, nausea and vomiting.   Genitourinary:  Negative for difficulty  urinating, dysuria, flank pain, frequency, hematuria, pelvic pain and pelvic pressure.   Neurological:  Positive for headache.       Objective   Physical Exam  Constitutional:       Appearance: She is well-developed.   Neck:      Thyroid: No thyroid mass, thyromegaly or thyroid tenderness.      Vascular: No carotid bruit.      Trachea: Trachea normal.   Cardiovascular:      Rate and Rhythm: Normal rate and regular rhythm.      Chest Wall: PMI is not displaced.      Pulses:           Radial pulses are 2+ on the right side and 2+ on the left side.        Dorsalis pedis pulses are 2+ on the right side and 2+ on the left side.        Posterior tibial pulses are 2+ on the right side and 2+ on the left side.      Heart sounds: S1 normal and S2 normal.   Pulmonary:      Effort: Pulmonary effort is normal.      Breath sounds: Normal breath sounds.   Abdominal:      General: Bowel sounds are normal. There is no distension or abdominal bruit. There are no signs of injury.      Palpations: Abdomen is soft. There is no hepatomegaly or splenomegaly.      Tenderness: There is no abdominal tenderness. There is no right CVA tenderness or left CVA tenderness.   Musculoskeletal:      Right lower leg: No edema.      Left lower leg: No edema.   Lymphadenopathy:      Head:      Right side of head: No submental, submandibular, tonsillar or occipital adenopathy.      Left side of head: No submental, submandibular, tonsillar or occipital adenopathy.      Cervical: No cervical adenopathy.   Skin:     General: Skin is warm and dry.      Capillary Refill: Capillary refill takes less than 2 seconds.      Nails: There is no clubbing.   Neurological:      Mental Status: She is alert and oriented to person, place, and time.   [facility]:         Attention and Perception: Attention normal.         Mood and Affect: Mood and affect normal.         Speech: Speech normal.         Behavior: Behavior normal.         Thought Content: Thought content  normal.         Cognition and Memory: Cognition normal.         Vitals:    02/12/25 1537   BP: 102/66   Pulse: 85   Temp: 98.1 °F (36.7 °C)   SpO2: 100%      Body mass index is 31.94 kg/m².    Assessment & Plan   Problems Addressed this Visit       Class 1 obesity with serious comorbidity and body mass index (BMI) of 31.0 to 31.9 in adult    Relevant Orders    Comprehensive Metabolic Panel    Hemoglobin A1c    Lipid Panel With LDL / HDL Ratio    TSH Rfx On Abnormal To Free T4    Lipase     Other Visit Diagnoses       Hospital discharge follow-up    -  Primary    Relevant Orders    Comprehensive Metabolic Panel    Lipase    Migraine with aura and without status migrainosus, not intractable        Relevant Medications    Atogepant (Qulipta) 60 MG tablet    Other Relevant Orders    Comprehensive Metabolic Panel    Nephrolithiasis        Relevant Orders    Comprehensive Metabolic Panel    Elevated liver enzymes        Relevant Orders    Comprehensive Metabolic Panel    Elevated lipase        Relevant Orders    Lipase    Hypokalemia        Relevant Orders    Comprehensive Metabolic Panel    Healthcare maintenance        Relevant Orders    Comprehensive Metabolic Panel    Hemoglobin A1c    Lipid Panel With LDL / HDL Ratio    TSH Rfx On Abnormal To Free T4    Lipase          Diagnoses         Codes Comments    Hospital discharge follow-up    -  Primary ICD-10-CM: Z09  ICD-9-CM: V67.59     Migraine with aura and without status migrainosus, not intractable     ICD-10-CM: G43.109  ICD-9-CM: 346.00     Nephrolithiasis     ICD-10-CM: N20.0  ICD-9-CM: 592.0     Elevated liver enzymes     ICD-10-CM: R74.8  ICD-9-CM: 790.5     Elevated lipase     ICD-10-CM: R74.8  ICD-9-CM: 790.5     Hypokalemia     ICD-10-CM: E87.6  ICD-9-CM: 276.8     Class 1 obesity with serious comorbidity and body mass index (BMI) of 31.0 to 31.9 in adult, unspecified obesity type     ICD-10-CM: E66.811, Z68.31  ICD-9-CM: 278.00, V85.31     Healthcare  maintenance     ICD-10-CM: Z00.00  ICD-9-CM: V70.0           1.  Hospital discharge follow-up for nephrolithiasis-symptoms have resolved.  Encouraged her to follow-up with urology as scheduled on Monday.  Will wean off Topamax over the next few days with a half a tablet nightly as this is likely contributing to frequent kidney stones.  Continue to hydrate well with fluids and avoid high calcium oxalate foods.  Will recheck lab work in 4 weeks.  2.  Migraine with aura-not controlled on Topamax.  She is also previously tried and failed amitriptyline and Imitrex.  Will start Qulipta 60 mg daily.  Samples provided to patient today along with coupon card.  Will plan to follow-up in 4 to 6 weeks for migraine headaches.  Okay to continue using Nurtec as needed for abortive therapy.

## 2025-03-05 DIAGNOSIS — G43.109 MIGRAINE WITH AURA AND WITHOUT STATUS MIGRAINOSUS, NOT INTRACTABLE: ICD-10-CM

## 2025-03-06 RX ORDER — RIZATRIPTAN BENZOATE 10 MG/1
10 TABLET, ORALLY DISINTEGRATING ORAL ONCE AS NEEDED
Qty: 9 TABLET | Refills: 1 | Status: SHIPPED | OUTPATIENT
Start: 2025-03-06

## 2025-03-07 ENCOUNTER — TRANSCRIBE ORDERS (OUTPATIENT)
Dept: CARDIOLOGY | Facility: HOSPITAL | Age: 35
End: 2025-03-07
Payer: COMMERCIAL

## 2025-03-07 ENCOUNTER — HOSPITAL ENCOUNTER (OUTPATIENT)
Dept: CARDIOLOGY | Facility: HOSPITAL | Age: 35
Discharge: HOME | End: 2025-03-07
Payer: COMMERCIAL

## 2025-03-07 ENCOUNTER — TELEPHONE (OUTPATIENT)
Dept: INTERNAL MEDICINE | Facility: CLINIC | Age: 35
End: 2025-03-07
Payer: COMMERCIAL

## 2025-03-07 DIAGNOSIS — F90.0 ADHD, PREDOMINANTLY INATTENTIVE TYPE: ICD-10-CM

## 2025-03-07 DIAGNOSIS — F33.2 SEVERE RECURRENT MAJOR DEPRESSION WITHOUT PSYCHOTIC FEATURES: ICD-10-CM

## 2025-03-07 DIAGNOSIS — F41.9 ANXIETY DISORDER, UNSPECIFIED TYPE: ICD-10-CM

## 2025-03-07 DIAGNOSIS — G43.109 MIGRAINE WITH AURA AND WITHOUT STATUS MIGRAINOSUS, NOT INTRACTABLE: Primary | ICD-10-CM

## 2025-03-07 DIAGNOSIS — F51.05 INSOMNIA RELATED TO ANOTHER MENTAL DISORDER: ICD-10-CM

## 2025-03-07 DIAGNOSIS — F41.1 OVERANXIOUS DISORDER OF CHILDHOOD: ICD-10-CM

## 2025-03-07 DIAGNOSIS — F41.9 ANXIETY DISORDER, UNSPECIFIED TYPE: Primary | ICD-10-CM

## 2025-03-07 LAB
QT INTERVAL: 380 MS
QTC INTERVAL: 444 MS

## 2025-03-07 PROCEDURE — 93005 ELECTROCARDIOGRAM TRACING: CPT

## 2025-03-07 NOTE — TELEPHONE ENCOUNTER
----- Message from Temi Jones sent at 3/7/2025 10:40 AM EST -----  I am not going to be able to addend her chart note from February. Lets give her some samples of the Nurtec, resend the Nurtec prescription for abortive therapy #8, no refills, and see if she can schedule a follow up this month so I can chart that she has now also tried and failed the Qulipta. Then insurance will hopefully approve the every other day dosing of Nurtec for migraine prevention. I have Nurtec samples with 8 pills waiting for her up front. She can just start taking it every other day and  the new Nurtec Rx.  ----- Message -----  From: Brigette Self MA  Sent: 3/7/2025   9:20 AM EST  To: LEONELA Loera    Please addend her chart note for Nurtec to be use for preventative for 16 tabs

## 2025-03-11 ENCOUNTER — TELEPHONE (OUTPATIENT)
Dept: ORTHOPEDIC SURGERY | Facility: CLINIC | Age: 35
End: 2025-03-11
Payer: COMMERCIAL

## 2025-03-12 ENCOUNTER — OFFICE VISIT (OUTPATIENT)
Dept: SLEEP MEDICINE | Facility: HOSPITAL | Age: 35
End: 2025-03-12
Payer: COMMERCIAL

## 2025-03-12 VITALS — BODY MASS INDEX: 30.56 KG/M2 | HEIGHT: 64 IN | WEIGHT: 179 LBS | HEART RATE: 97 BPM | OXYGEN SATURATION: 96 %

## 2025-03-12 DIAGNOSIS — R06.83 SNORING: ICD-10-CM

## 2025-03-12 DIAGNOSIS — G25.81 RESTLESS LEGS: ICD-10-CM

## 2025-03-12 DIAGNOSIS — G47.30 SLEEP APNEA, UNSPECIFIED TYPE: Primary | ICD-10-CM

## 2025-03-12 DIAGNOSIS — G47.00 INSOMNIA, UNSPECIFIED TYPE: ICD-10-CM

## 2025-03-12 DIAGNOSIS — G47.8 NON-RESTORATIVE SLEEP: ICD-10-CM

## 2025-03-12 DIAGNOSIS — G47.10 HYPERSOMNIA: ICD-10-CM

## 2025-03-12 PROCEDURE — 99204 OFFICE O/P NEW MOD 45 MIN: CPT | Performed by: FAMILY MEDICINE

## 2025-03-12 PROCEDURE — G0463 HOSPITAL OUTPT CLINIC VISIT: HCPCS

## 2025-03-12 NOTE — PROGRESS NOTES
[facility] New Patient/Consultation       Reason for Consultation: Insomnia      Patient Care Team:  Temi Jones APRN as PCP - General (Nurse Practitioner)  Delano Guzman MD as Consulting Physician (Gastroenterology)  Khanh Lassiter MD as Consulting Physician (General Surgery)  Justo Moreland MD as Consulting Physician (Gastroenterology)  Joe Estrada MD as Consulting Physician (Allergy and Immunology)  Kevan Hoffman MD as Consulting Physician (Otolaryngology)  Preston Warren Jr., MD (Psychiatry)  Linda Sanchez MD as Consulting Physician (Colon and Rectal Surgery)  Lobo Parisi MD as Surgeon (Thoracic Surgery)  Esperanza Barkley MD as Consulting Physician (Sleep Medicine)      History of present illness:  Thank you for asking me to see your patient.  The patient is a 34 y.o. female presents with concern for 2 disorder.  Follows with psychiatry for anxiety and depression.  Concerned that sleep issues could be related to sleep disorders.  No history of prior sleep study tonsillectomy 2006.  Sleep latency about an hour for the past 5 years sleep around 6 hours however very interrupted sleep.  1 nap on the weekend.  Reports hypersomnia nonrestorative sleep [place] driving due to sleepiness weight changes over the past 5 years; with weight loss snoring has decreased although still happens.  Patient reports morning migraines waking with dry mouth over the past 1 to 2 years.  Does have fibromyalgia.  Reports urge sensations which are worse at night where movement helps every night.  Does have a history of anemia as of last year most recently.  3/7/2024 ferritin level was 64.  Also reports nocturia 2-4 times a night more sleepy when she increase her sleep time.    Medical Conditions (PMH):   Anxiety  Depression  Insomnia  ADHD  Acid reflux  Migraines  Fibromyalgia  IBS  Kidney stones    Social history:  Do you drive a commercial vehicle:  No   Shift work:  No   Tobacco use:  Yes vapes on  occasion  Alcohol use: 1-3 per week  Caffeinated drinks: 1 per day  Occupation: Teacher    Family History (parents and siblings) (pertaining to sleep medicine):  Insomnia    Allergies:  Hydrocodone and Tramadol       Current Outpatient Medications:     atomoxetine (STRATTERA) 60 MG capsule, Take 1 capsule by mouth Daily., Disp: , Rfl:     Cetirizine HCl (ZYRTEC ALLERGY PO), Take 10 mg by mouth At Night As Needed (allergies)., Disp: , Rfl:     Coenzyme Q10 (CoQ-10) 100 MG capsule, Take 1 capsule by mouth 2 (Two) Times a Day., Disp: , Rfl:     desvenlafaxine (PRISTIQ) 100 MG 24 hr tablet, Take 1 tablet by mouth Daily. FOR 30 DAYS, Disp: , Rfl:     Famotidine (PEPCID AC PO), Take 20 mg by mouth Every Night., Disp: , Rfl:     Folate 400 MCG tablet, Take 1 tablet by mouth Daily., Disp: , Rfl:     Magnesium 100 MG tablet, Take 200 mg by mouth Every Night., Disp: , Rfl:     metoprolol succinate XL (TOPROL-XL) 25 MG 24 hr tablet, Take 1 tablet by mouth once daily, Disp: 30 tablet, Rfl: 5    NP Thyroid 30 MG tablet, Take 1 tablet by mouth Daily., Disp: , Rfl:     ondansetron ODT (ZOFRAN-ODT) 4 MG disintegrating tablet, Take one tablet by mouth every 6 hours as needed for nausea and vomiting, Disp: 20 tablet, Rfl: 0    pantoprazole (PROTONIX) 40 MG EC tablet, Take 1 tablet by mouth 2 (Two) Times a Day., Disp: , Rfl:     Progesterone (PROMETRIUM) 200 MG capsule, Take TWO capsules BY MOUTH EVERY NIGHT AT BEDTIME FOR 30 DAYS, Disp: , Rfl:     rimegepant sulfate ODT (Nurtec) 75 MG disintegrating tablet, Place 1 tablet under the tongue Every Other Day., Disp: 16 tablet, Rfl: 3    rimegepant sulfate ODT (Nurtec) 75 MG disintegrating tablet, Place 1 tablet under the tongue Daily As Needed (migraine headache)., Disp: 8 tablet, Rfl: 0    rizatriptan MLT (MAXALT-MLT) 10 MG disintegrating tablet, Place 1 tablet on the tongue 1 (One) Time As Needed for Migraine (once daily as needed for acute migraine headache, may repeat dose in 2 hours  "for total of 2 doses in 24 hours.) for up to 1 dose. May repeat in 2 hours if needed, Disp: 9 tablet, Rfl: 1    tamsulosin (FLOMAX) 0.4 MG capsule 24 hr capsule, One tablet by mouth daily at bedtime, Disp: 7 capsule, Rfl: 0    Tirzepatide (Mounjaro) 2.5 MG/0.5ML solution auto-injector, Inject 2.5 mg under the skin into the appropriate area as directed 1 (One) Time Per Week., Disp: , Rfl:     traZODone (DESYREL) 100 MG tablet, Take 1 tablet by mouth Every Night., Disp: , Rfl:     Vital Signs:    Vitals:    03/12/25 0704   Pulse: 97   SpO2: 96%   Weight: 81.2 kg (179 lb)   Height: 162.6 cm (64\")      Body mass index is 30.73 kg/m².  Neck Circumference: 14.5 inches      REVIEW OF SYSTEMS:  Pertinent positive symptoms are:  Snoring  [place] Sleepiness Scale of Total score: 8   Fatigue  Anxiety  Depression  Frequent heartburn      Physical exam:  Vitals:    03/12/25 0704   Pulse: 97   SpO2: 96%   Weight: 81.2 kg (179 lb)   Height: 162.6 cm (64\")    Body mass index is 30.73 kg/m². Neck Circumference: 14.5 inches  HEENT: Head is atraumatic, normocephalic  Eyes: pupils are round equal and reacting to light and accommodation, conjunctiva normal  Throat: tongue normal  NECK:Neck Circumference: 14.5 inches  RESPIRATORY SYSTEM: Regular respirations  CARDIOVASULAR SYSTEM: Regular rate  EXTREMITES: No cyanosis, clubbing  NEUROLOGICAL SYSTEM: Oriented x 3, no gross motor defects, gait normal      Impression:  1. Sleep apnea, unspecified type    2. Hypersomnia    3. Non-restorative sleep    4. Insomnia, unspecified type    5. Snoring    6. Restless legs        Plan:    Office note(s) from care team reviewed. Office note(s) reviewed: N/A    Labs/ Test Results Reviewed:      N/A          ASSESSMENT AND PLAN:   At risk for sleep apnea: patient's symptoms and physical examination are concerning for possible sleep apnea.   I discussed the signs, symptoms, and pathophysiology of sleep apnea with this patient.  I also discussed the " potential complications of untreated sleep apnea including but not limited to resistant hypertension, insulin resistance, pulmonary hypertension, atrial fibrillation, heart attack, stroke, nonrestorative sleep with hypersomnia which can increase risk for motor vehicle accidents, etc.   Different testing methods including home-based and lab based sleep studies were discussed with this patient.   Based on patient history and physical examination, will proceed with HST.  The order for the sleep study is placed in [facility].  The test will be scheduled after prior authorization has been obtained through patient's insurance.  Treatment and management will be discussed in more detail with this patient after the test is completed.  All questions were answered to patient's satisfaction.   Snoring: snoring is the sound created by turbulent airflow vibrating upper airway soft tissue.  I have also discussed factors affecting snoring including sleep deprivation, sleeping on the back and alcohol ingestion. To minimize snoring, patient is advised to have adequate sleep, sleep on their side, and avoid alcohol and sedative medications around bedtime.   Excessive daytime sleepiness:  [place] Sleepiness Scale of Total score: 8.  There are many causes of excessive daytime sleepiness.  Rule out sleep apnea as a contributing factor, as above.  Do not drive, operate heavy machinery, or do activities that require high concentration if feeling tired/drowsy.  Obesity: Body mass index is 30.73 kg/m².. Patients who are overweight or obese are at increased risk of sleep apnea/ sleep disordered breathing. Weight reduction and healthy lifestyle are encouraged in overweight/ obese patients as part of a comprehensive approach to sleep apnea treatment.    Insomnia: If negative HST refer for CBT-I  If positive HST return to clinic to discuss results and treatment options  Restless legs: Follow-up ferritin level; if less than 75 consider iron  supplementation x 3 months.  Can consider gabapentin in the future if needed.    I have also discussed with the patient the following  Sleep hygiene: try to maintain a regular bed time and wake time, avoid watching TV/ using electronic devices in bed (including cell phones), limit caffeinated and alcoholic beverages before bed, try to maintain a cool and quiet sleep environment, avoid daytime naps  Adequate amount of sleep: most people need around 7 to 8 hours of sleep each night       Patient's questions were answered.      Thank you for allowing me to participate in your patient's care.    Esperanza Barkley MD  Sleep Medicine  03/12/25  07:40 EDT

## 2025-03-13 ENCOUNTER — LAB (OUTPATIENT)
Dept: LAB | Facility: HOSPITAL | Age: 35
End: 2025-03-13
Payer: COMMERCIAL

## 2025-03-13 LAB — FERRITIN SERPL-MCNC: 63.3 NG/ML (ref 13–150)

## 2025-03-13 PROCEDURE — 84439 ASSAY OF FREE THYROXINE: CPT | Performed by: NURSE PRACTITIONER

## 2025-03-13 PROCEDURE — 83036 HEMOGLOBIN GLYCOSYLATED A1C: CPT | Performed by: NURSE PRACTITIONER

## 2025-03-13 PROCEDURE — 82728 ASSAY OF FERRITIN: CPT | Performed by: FAMILY MEDICINE

## 2025-03-13 PROCEDURE — 80061 LIPID PANEL: CPT | Performed by: NURSE PRACTITIONER

## 2025-03-13 PROCEDURE — 80053 COMPREHEN METABOLIC PANEL: CPT | Performed by: NURSE PRACTITIONER

## 2025-03-13 PROCEDURE — 83690 ASSAY OF LIPASE: CPT | Performed by: NURSE PRACTITIONER

## 2025-03-13 PROCEDURE — 84443 ASSAY THYROID STIM HORMONE: CPT | Performed by: NURSE PRACTITIONER

## 2025-03-17 NOTE — TELEPHONE ENCOUNTER
"Provider: DR WYNNE     Caller: Sherry Quevedo    Relationship to Patient: Self    Phone Number: [phone]    Reason for Call: PATIENT WANTED TO MAKE LUZMA AWARE THAT SHE IS FAXING OVER A PHYSICIANS STATEMENT TO BE COMPLETED/SIGNED BY THE DOCTOR FOR HER \"SICK BANK\" AT WORK. SHE IS HAVING SURGERY 5-20-25      "

## 2025-03-18 ENCOUNTER — TELEPHONE (OUTPATIENT)
Dept: ORTHOPEDIC SURGERY | Facility: CLINIC | Age: 35
End: 2025-03-18

## 2025-03-18 NOTE — TELEPHONE ENCOUNTER
Caller: Sherry Quevedo    Relationship: Self    Best call back number:     What form or medical record are you requesting: PHYSICIANS STATEMENT    Who is requesting this form or medical record from you: PATIENT NEEDS IT TO TURN INTO CENTRAL OFFICE    How would you like to receive the form or medical records (pick-up, mail, fax): FAX  If fax, what is the fax number: [phone] (ATTENTION SHERRY QUEVEDO)    Timeframe paperwork needed: ASAP    Additional notes: PATIENT NEEDS PHYSICIANS STATEMENT SIGNED AND SENT BACK TO HER SO SHE CAN SUBMIT IT TO THE CENTRAL OFFICE.

## 2025-03-19 ENCOUNTER — HOSPITAL ENCOUNTER (OUTPATIENT)
Dept: SLEEP MEDICINE | Facility: HOSPITAL | Age: 35
Discharge: HOME | End: 2025-03-19
Admitting: FAMILY MEDICINE
Payer: COMMERCIAL

## 2025-03-19 DIAGNOSIS — G25.81 RESTLESS LEGS: ICD-10-CM

## 2025-03-19 DIAGNOSIS — G47.30 SLEEP APNEA, UNSPECIFIED TYPE: ICD-10-CM

## 2025-03-19 DIAGNOSIS — G47.00 INSOMNIA, UNSPECIFIED TYPE: ICD-10-CM

## 2025-03-19 DIAGNOSIS — G47.8 NON-RESTORATIVE SLEEP: ICD-10-CM

## 2025-03-19 DIAGNOSIS — G47.10 HYPERSOMNIA: ICD-10-CM

## 2025-03-19 DIAGNOSIS — R06.83 SNORING: ICD-10-CM

## 2025-03-19 PROCEDURE — G0399 HOME SLEEP TEST/TYPE 3 PORTA: HCPCS

## 2025-03-21 ENCOUNTER — TELEPHONE (OUTPATIENT)
Dept: SLEEP MEDICINE | Facility: HOSPITAL | Age: 35
End: 2025-03-21
Payer: COMMERCIAL

## 2025-03-21 NOTE — TELEPHONE ENCOUNTER
I called and lvm for Pt to call us back to schedule f/u with Dr Barkley to go over sleep report and tx options

## 2025-03-28 ENCOUNTER — TELEPHONE (OUTPATIENT)
Dept: SLEEP MEDICINE | Facility: HOSPITAL | Age: 35
End: 2025-03-28
Payer: COMMERCIAL

## 2025-03-28 ENCOUNTER — OFFICE VISIT (OUTPATIENT)
Dept: SLEEP MEDICINE | Facility: HOSPITAL | Age: 35
End: 2025-03-28
Payer: COMMERCIAL

## 2025-03-28 VITALS — BODY MASS INDEX: 30.05 KG/M2 | HEART RATE: 90 BPM | OXYGEN SATURATION: 98 % | WEIGHT: 176 LBS | HEIGHT: 64 IN

## 2025-03-28 DIAGNOSIS — E66.9 OBESITY (BMI 30-39.9): ICD-10-CM

## 2025-03-28 DIAGNOSIS — G47.33 MILD OBSTRUCTIVE SLEEP APNEA: Primary | ICD-10-CM

## 2025-03-28 DIAGNOSIS — G25.81 RLS (RESTLESS LEGS SYNDROME): ICD-10-CM

## 2025-03-28 PROCEDURE — 99214 OFFICE O/P EST MOD 30 MIN: CPT | Performed by: FAMILY MEDICINE

## 2025-03-28 PROCEDURE — G0463 HOSPITAL OUTPT CLINIC VISIT: HCPCS

## 2025-03-28 NOTE — PROGRESS NOTES
"Follow Up [facility] Disorders Center Note     Chief Complaint:  AMANUEL     Primary Care Physician: Temi Jones APRN    Interval History:   The patient is a 34 y.o. female  who was last seen in the sleep lab: Presents today to discuss results of HST which showed AHI 6.5.  Significant hypersomnia nonrestorative sleep.  Ferritin is also at 63.  Has a history of anemia in the past.  Has tolerated iron pills in the past..    Review of Systems:    A complete review of systems was done and all were negative with the exception of anxiety depression    Social History:    Social History     Socioeconomic History    Marital status:    Tobacco Use    Smoking status: Never    Smokeless tobacco: Never    Tobacco comments:     Vape on oçcasion   Vaping Use    Vaping status: Some Days    Substances: Flavoring   Substance and Sexual Activity    Alcohol use: Yes     Alcohol/week: 2.0 - 4.0 standard drinks of alcohol     Types: 1 - 2 Glasses of wine, 1 - 2 Shots of liquor per week     Comment: Weekly    Drug use: Never    Sexual activity: Yes     Partners: Male     Birth control/protection: Hysterectomy     Comment: .       Allergies:  Hydrocodone and Tramadol     Medication Review:  Reviewed.      Vital Signs:    Vitals:    03/28/25 0719   Pulse: 90   SpO2: 98%   Weight: 79.8 kg (176 lb)   Height: 162.6 cm (64\")     Body mass index is 30.21 kg/m².    Physical Exam:    Constitutional:  Well developed 34 y.o. female that appears in no apparent distress.  Awake & oriented times 3.  Normal mood with normal recent and remote memory and normal judgement.  Eyes:  Conjunctivae normal.  Oropharynx: Previously, moist mucous membranes.    Self-administered [place] Sleepiness Scale test results:  9  0-5 Lower normal daytime sleepiness  6-10 Higher normal daytime sleepiness  11-12 Mild, 13-15 Moderate, & 16-24 Severe excessive daytime sleepiness    Impression:   1. Mild obstructive sleep apnea    2. Obesity (BMI 30-39.9)    3. RLS " (restless legs syndrome)        Patient amenable to starting auto CPAP 8-16 cm H2O; order placed today; discussed mask fitting and conditioning techniques in great detail.  Start ferrous sulfate 324 mg a day x 3 months.  Goal is to get ferritin above 75 to help with restless leg symptoms.  If this does not resolve symptoms can consider gabapentin or other medications in the future.  Repeat lab orders placed to be done the week of appointment.    Plan:  Good sleep hygiene measures should be maintained.  Weight loss would be beneficial in this patient who is obese by Body mass index is 30.21 kg/m²..      Caution during activities that require prolonged concentration is strongly advised if sleepiness returns. Changing of PAP supplies regularly is important for effective use. Patient needs to change cushion on the mask or plugs on nasal pillows along with disposable filters once every month and change mask frame, tubing, headgear and Velcro straps every 6 months at the minimum.    I answered all of the patient's questions.  The patient will call for any problems and will follow up in 2.5 months.      Esperanza Barkley MD  Sleep Medicine  03/28/25  07:30 EDT

## 2025-03-31 ENCOUNTER — TELEPHONE (OUTPATIENT)
Dept: SLEEP MEDICINE | Facility: HOSPITAL | Age: 35
End: 2025-03-31
Payer: COMMERCIAL

## 2025-03-31 NOTE — TELEPHONE ENCOUNTER
I called Pt back and lvm letting he know that [facility] was accepted by [facility]

## 2025-04-07 DIAGNOSIS — E53.8 FOLIC ACID DEFICIENCY: ICD-10-CM

## 2025-04-07 DIAGNOSIS — Z00.00 HEALTHCARE MAINTENANCE: Primary | ICD-10-CM

## 2025-04-07 DIAGNOSIS — E53.8 LOW SERUM VITAMIN B12: ICD-10-CM

## 2025-04-07 DIAGNOSIS — R79.89 ABNORMAL TSH: ICD-10-CM

## 2025-04-07 DIAGNOSIS — R74.8 ELEVATED LIPASE: ICD-10-CM

## 2025-04-14 ENCOUNTER — TRANSCRIBE ORDERS (OUTPATIENT)
Dept: ADMINISTRATIVE | Facility: HOSPITAL | Age: 35
End: 2025-04-14
Payer: COMMERCIAL

## 2025-04-14 ENCOUNTER — OFFICE VISIT (OUTPATIENT)
Dept: INTERNAL MEDICINE | Facility: CLINIC | Age: 35
End: 2025-04-14
Payer: COMMERCIAL

## 2025-04-14 ENCOUNTER — HOSPITAL ENCOUNTER (OUTPATIENT)
Dept: GENERAL RADIOLOGY | Facility: HOSPITAL | Age: 35
Discharge: HOME | End: 2025-04-14
Admitting: UROLOGY
Payer: COMMERCIAL

## 2025-04-14 VITALS
SYSTOLIC BLOOD PRESSURE: 100 MMHG | TEMPERATURE: 98.3 F | HEIGHT: 64 IN | OXYGEN SATURATION: 100 % | DIASTOLIC BLOOD PRESSURE: 64 MMHG | HEART RATE: 82 BPM | WEIGHT: 174.1 LBS | BODY MASS INDEX: 29.72 KG/M2

## 2025-04-14 DIAGNOSIS — K21.9 GASTROESOPHAGEAL REFLUX DISEASE, UNSPECIFIED WHETHER ESOPHAGITIS PRESENT: ICD-10-CM

## 2025-04-14 DIAGNOSIS — N20.1 CALCULUS OF URETER: Primary | ICD-10-CM

## 2025-04-14 DIAGNOSIS — E53.8 FOLIC ACID DEFICIENCY: ICD-10-CM

## 2025-04-14 DIAGNOSIS — N20.1 CALCULUS OF URETER: ICD-10-CM

## 2025-04-14 DIAGNOSIS — F41.1 GENERALIZED ANXIETY DISORDER: ICD-10-CM

## 2025-04-14 DIAGNOSIS — Z00.00 HEALTHCARE MAINTENANCE: Primary | ICD-10-CM

## 2025-04-14 DIAGNOSIS — E66.811 CLASS 1 OBESITY WITH SERIOUS COMORBIDITY AND BODY MASS INDEX (BMI) OF 31.0 TO 31.9 IN ADULT, UNSPECIFIED OBESITY TYPE: ICD-10-CM

## 2025-04-14 DIAGNOSIS — R00.0 TACHYCARDIA: Chronic | ICD-10-CM

## 2025-04-14 DIAGNOSIS — E53.8 LOW SERUM VITAMIN B12: ICD-10-CM

## 2025-04-14 DIAGNOSIS — G43.109 MIGRAINE WITH AURA AND WITHOUT STATUS MIGRAINOSUS, NOT INTRACTABLE: ICD-10-CM

## 2025-04-14 DIAGNOSIS — F33.9 CHRONIC RECURRENT MAJOR DEPRESSIVE DISORDER: ICD-10-CM

## 2025-04-14 PROCEDURE — 99395 PREV VISIT EST AGE 18-39: CPT | Performed by: NURSE PRACTITIONER

## 2025-04-14 PROCEDURE — 74018 RADEX ABDOMEN 1 VIEW: CPT

## 2025-04-14 RX ORDER — ATOMOXETINE 40 MG/1
1 CAPSULE ORAL DAILY
COMMUNITY
Start: 2025-02-17 | End: 2025-04-14

## 2025-04-14 RX ORDER — PROPRANOLOL HYDROCHLORIDE 10 MG/1
1 TABLET ORAL EVERY 12 HOURS SCHEDULED
COMMUNITY
Start: 2025-04-08

## 2025-04-14 RX ORDER — PNV NO.95/FERROUS FUM/FOLIC AC 28MG-0.8MG
TABLET ORAL
COMMUNITY
Start: 2025-03-29

## 2025-04-14 RX ORDER — GUANFACINE 1 MG/1
1 TABLET, EXTENDED RELEASE ORAL DAILY
COMMUNITY
Start: 2025-04-08

## 2025-04-14 NOTE — PROGRESS NOTES
Subjective   Sherry Quevedo is a 34 y.o. female.     Chief Complaint   Patient presents with    Annual Exam    Migraine        History of Present Illness   She is here today for CPE and lab follow-up.  She is feeling okay today.  She recently returned from a spring break trip to [place].  She is scheduled for orthopedic surgery in May with Dr. Verdin.     Migraine with aura- she notes migraine headaches have not been well controlled over the past few months. She is having over 15 migraine headache days a month. The every other day Nurtec is no longer helping. She has been using rizitriptan without improvement. She has previously tried and failed topamax, Elavil, is currently on a beta blocker, and Qulipta without improvement.     Tachycardia- she is doing well on metoprolol 25 mg daily.     BI/MDD- she is UTD with behavioral health, just completed [facility] testing and discussed possible Sprovato for treatment resistant depression. She uses propranolol 10 mg nightly.  She notes some increases in anxiety recently with her upcoming surgery.    Obesity- she is currently down over 50 lbs doing tirzepatide 2.5 mg weekly. She notes improvement in cravings, portion control and food noise. She has been working on increasing strength training exercises.     GERD-she is currently taking pantoprazole 40 mg twice daily with as needed famotidine 20 mg.  She still notes frequent reflux symptoms having to use Tums.  She does not always eat within 30 to 45 minutes after taking the pantoprazole.    GYN- she is UTD with GYN.     The following portions of the patient's history were reviewed and updated as appropriate: allergies, current medications, past family history, past medical history, past social history, past surgical history, and problem list.    Review of Systems   Constitutional: Negative.    HENT: Negative.     Eyes: Negative.    Respiratory: Negative.     Cardiovascular: Negative.    Gastrointestinal:  Positive  for GERD.   Endocrine: Negative.    Genitourinary: Negative.    Musculoskeletal:  Positive for arthralgias (right shoulder).   Skin: Negative.    Allergic/Immunologic: Positive for environmental allergies. Negative for food allergies.   Neurological:  Positive for headache. Negative for dizziness, tremors, seizures, syncope, facial asymmetry, speech difficulty, weakness, light-headedness, numbness, memory problem and confusion.   Hematological: Negative.    Psychiatric/Behavioral:  Positive for depressed mood and stress. Negative for sleep disturbance and suicidal ideas. The patient is nervous/anxious.        Objective   Physical Exam  Constitutional:       Appearance: Normal appearance. She is well-developed.   HENT:      Head: Normocephalic and atraumatic.      Right Ear: Hearing, tympanic membrane, ear canal and external ear normal.      Left Ear: Hearing, tympanic membrane, ear canal and external ear normal.      Nose: Nose normal.      Right Sinus: No maxillary sinus tenderness or frontal sinus tenderness.      Left Sinus: No maxillary sinus tenderness or frontal sinus tenderness.      Mouth/Throat:      Lips: Pink.      Mouth: Mucous membranes are moist.      Dentition: Normal dentition.      Tongue: No lesions.      Pharynx: Oropharynx is clear. Uvula midline.      Tonsils: No tonsillar exudate.   Eyes:      General: Lids are normal.      Extraocular Movements: Extraocular movements intact.      Conjunctiva/sclera: Conjunctivae normal.      Pupils: Pupils are equal, round, and reactive to light.   Neck:      Thyroid: No thyroid mass, thyromegaly or thyroid tenderness.      Vascular: No carotid bruit.      Trachea: Trachea normal.   Cardiovascular:      Rate and Rhythm: Normal rate and regular rhythm.      Pulses: Normal pulses.           Radial pulses are 2+ on the right side and 2+ on the left side.        Popliteal pulses are 2+ on the right side and 2+ on the left side.        Dorsalis pedis pulses are 2+  on the right side and 2+ on the left side.        Posterior tibial pulses are 2+ on the right side and 2+ on the left side.      Heart sounds: S1 normal and S2 normal.   Pulmonary:      Effort: Pulmonary effort is normal.      Breath sounds: Normal breath sounds.   Abdominal:      General: Bowel sounds are normal. There is no distension or abdominal bruit.      Palpations: Abdomen is soft. There is no hepatomegaly or splenomegaly.      Tenderness: There is no abdominal tenderness.      Hernia: No hernia is present.   Musculoskeletal:      Cervical back: Normal range of motion and neck supple.      Right lower leg: No edema.      Left lower leg: No edema.   Lymphadenopathy:      Head:      Right side of head: No submental, submandibular, tonsillar or occipital adenopathy.      Left side of head: No submental, submandibular, tonsillar or occipital adenopathy.      Cervical: No cervical adenopathy.      Upper Body:      Right upper body: No supraclavicular adenopathy.      Left upper body: No supraclavicular adenopathy.      Lower Body: No right inguinal adenopathy. No left inguinal adenopathy.   Skin:     General: Skin is warm and dry.      Findings: No rash.      Nails: There is no clubbing.   Neurological:      General: No focal deficit present.      Mental Status: She is alert and oriented to person, place, and time.      Cranial Nerves: Cranial nerves 2-12 are intact.      Sensory: Sensation is intact.      Motor: Motor function is intact.      Coordination: Coordination is intact.      Gait: Gait is intact.      Deep Tendon Reflexes:      Reflex Scores:       Patellar reflexes are 2+ on the right side and 2+ on the left side.  Psychiatric:         Attention and Perception: Attention and perception normal.         Mood and Affect: Mood and affect normal.         Speech: Speech normal.         Behavior: Behavior normal. Behavior is cooperative.         Thought Content: Thought content normal.         Cognition and  Memory: Cognition and memory normal.         Judgment: Judgment normal.         Vitals:    04/14/25 1432   BP: 100/64   Pulse: 82   Temp: 98.3 °F (36.8 °C)   SpO2: 100%      Body mass index is 29.88 kg/m².    Assessment & Plan   Problems Addressed this Visit       Migraine with aura and without status migrainosus, not intractable    Relevant Medications    propranolol (INDERAL) 10 MG tablet    galcanezumab-gnlm (EMGALITY) 120 MG/ML auto-injector pen    rimegepant sulfate ODT (Nurtec) 75 MG disintegrating tablet    Low serum vitamin B12    Generalized anxiety disorder    Relevant Medications    guanFACINE HCl ER (INTUNIV) 1 MG tablet sustained-release 24 hour tablet    Tachycardia (Chronic)    Folic acid deficiency    Chronic recurrent major depressive disorder    Relevant Medications    guanFACINE HCl ER (INTUNIV) 1 MG tablet sustained-release 24 hour tablet    Class 1 obesity with serious comorbidity and body mass index (BMI) of 31.0 to 31.9 in adult    GERD (gastroesophageal reflux disease)     Other Visit Diagnoses         Healthcare maintenance    -  Primary          Diagnoses         Codes Comments      Healthcare maintenance    -  Primary ICD-10-CM: Z00.00  ICD-9-CM: V70.0       Migraine with aura and without status migrainosus, not intractable     ICD-10-CM: G43.109  ICD-9-CM: 346.00       Generalized anxiety disorder     ICD-10-CM: F41.1  ICD-9-CM: 300.02       Chronic recurrent major depressive disorder     ICD-10-CM: F33.9  ICD-9-CM: 296.30       Class 1 obesity with serious comorbidity and body mass index (BMI) of 31.0 to 31.9 in adult, unspecified obesity type     ICD-10-CM: E66.811, Z68.31  ICD-9-CM: 278.00, V85.31       Tachycardia     ICD-10-CM: R00.0  ICD-9-CM: 785.0       Folic acid deficiency     ICD-10-CM: E53.8  ICD-9-CM: 266.2       Low serum vitamin B12     ICD-10-CM: E53.8  ICD-9-CM: 266.2       Gastroesophageal reflux disease, unspecified whether esophagitis present     ICD-10-CM:  K21.9  ICD-9-CM: 530.81           Lab results discussed with patient in office today.    1.  Preventative counseling-encouraged healthy, balanced eating and continuing to work on regular exercise with walking and strength training.  2.  Tachycardia-well-controlled with metoprolol XL 25 mg daily.  Monitor heart rate notify persistently elevated above 100.  3.  Migraine with aura-not controlled currently with 15 migraine headache days a month.  She has previously tried and failed Topamax, amitriptyline, is currently on a beta-blocker, every other day Nurtec, Qulipta, sumatriptan hand and now Maxalt is not effective.  Will start Emgality monthly.  Discussed with her initial loading dose will be 240 mg followed by 120 mg monthly.  Discussed appropriate use and adverse effects.  Will return to taking Nurtec 75 mg once daily as needed for abortive therapy.  Sample provided to patient today of Nurtec and prescription refilled.  Follow-up in 12 weeks.  4.  BI/MDD-encouraged her to stay up-to-date with behavioral health.  5.  Obesity-improving with treatment and lifestyle.  She is continuing to benefit from compounded tirzepatide 2.5 mg weekly.  Discussed with her to follow-up with her weight loss specialist regularly and notify for any medication side effects.  Continue to work on healthy eating, optimizing protein intake, focusing on portion control and continue regular exercise with walking and strength training.  6.  GERD-not controlled.  Discussed with her the importance of eating 30 to 45 minutes after taking the pantoprazole twice daily.  Also discussed reflux precautions.  Lipase is improving.  Encouraged her to follow-up with GI if reflux is still not controlled.  7.  Folic acid deficiency/low serum B12-much improved.    Encouraged routine dental and eye exam.  Encouraged sunscreen use outside.  GYN up-to-date    Follow-up in 12 weeks for migraine headaches.

## 2025-04-16 ENCOUNTER — TELEPHONE (OUTPATIENT)
Dept: INTERNAL MEDICINE | Facility: CLINIC | Age: 35
End: 2025-04-16
Payer: COMMERCIAL

## 2025-04-16 NOTE — TELEPHONE ENCOUNTER
Pharmacy Name: [facility] - [facility] [address] [phone] [facility] [phone]      Reference Number (if applicable): N/A    Pharmacy representative name: FIONA    Pharmacy representative phone number:[phone]    What medication are you calling in regards to: Emgality     What question does the pharmacy have: FIONA WITH THE [facility] DEPARTMENT STATED SHE IS NEEDING TO ASK QUESTIONS ABOUT THE QUANTITY OF THE MEDICATION AND IF THE PATIENT NEED A LOADING DOSE  MG. REQUESTING CALLBACK. PLEASE ADVISE.    Who is the provider that prescribed the medication: LEONELA MIRANDA    Additional notes:

## 2025-04-18 ENCOUNTER — TELEPHONE (OUTPATIENT)
Dept: SLEEP MEDICINE | Facility: HOSPITAL | Age: 35
End: 2025-04-18
Payer: COMMERCIAL

## 2025-04-18 DIAGNOSIS — G47.33 MILD OBSTRUCTIVE SLEEP APNEA: Primary | ICD-10-CM

## 2025-04-18 DIAGNOSIS — Z78.9 DIFFICULTY USING CONTINUOUS POSITIVE AIRWAY PRESSURE (CPAP) DEVICE: ICD-10-CM

## 2025-04-18 NOTE — TELEPHONE ENCOUNTER
Pt called stating she just started PAP but is waking with ear pressure and headache each day.  Pulled download for doctor to review and pressure was changed to a fixed setting at 8cm per Dr. Barkley.

## 2025-05-02 ENCOUNTER — TELEPHONE (OUTPATIENT)
Dept: SLEEP MEDICINE | Facility: HOSPITAL | Age: 35
End: 2025-05-02
Payer: COMMERCIAL

## 2025-05-02 DIAGNOSIS — Z78.9 DIFFICULTY USING CONTINUOUS POSITIVE AIRWAY PRESSURE (CPAP) DEVICE: ICD-10-CM

## 2025-05-02 DIAGNOSIS — G47.33 MILD OBSTRUCTIVE SLEEP APNEA: Primary | ICD-10-CM

## 2025-05-05 DIAGNOSIS — G43.109 MIGRAINE WITH AURA AND WITHOUT STATUS MIGRAINOSUS, NOT INTRACTABLE: ICD-10-CM

## 2025-05-06 ENCOUNTER — PRE-ADMISSION TESTING (OUTPATIENT)
Dept: PREADMISSION TESTING | Facility: HOSPITAL | Age: 35
End: 2025-05-06
Payer: COMMERCIAL

## 2025-05-06 VITALS
OXYGEN SATURATION: 100 % | WEIGHT: 166 LBS | HEIGHT: 65 IN | DIASTOLIC BLOOD PRESSURE: 73 MMHG | RESPIRATION RATE: 18 BRPM | SYSTOLIC BLOOD PRESSURE: 101 MMHG | BODY MASS INDEX: 27.66 KG/M2 | HEART RATE: 102 BPM | TEMPERATURE: 98.2 F

## 2025-05-06 LAB
ANION GAP SERPL CALCULATED.3IONS-SCNC: 11.2 MMOL/L (ref 5–15)
BUN SERPL-MCNC: 11 MG/DL (ref 6–20)
BUN/CREAT SERPL: 12.6 (ref 7–25)
CALCIUM SPEC-SCNC: 9.5 MG/DL (ref 8.6–10.5)
CHLORIDE SERPL-SCNC: 103 MMOL/L (ref 98–107)
CO2 SERPL-SCNC: 25.8 MMOL/L (ref 22–29)
CREAT SERPL-MCNC: 0.87 MG/DL (ref 0.57–1)
DEPRECATED RDW RBC AUTO: 39.1 FL (ref 37–54)
EGFRCR SERPLBLD CKD-EPI 2021: 89.2 ML/MIN/1.73
ERYTHROCYTE [DISTWIDTH] IN BLOOD BY AUTOMATED COUNT: 11.8 % (ref 12.3–15.4)
GLUCOSE SERPL-MCNC: 90 MG/DL (ref 65–99)
HCT VFR BLD AUTO: 41.2 % (ref 34–46.6)
HGB BLD-MCNC: 14.1 G/DL (ref 12–15.9)
MCH RBC QN AUTO: 31 PG (ref 26.6–33)
MCHC RBC AUTO-ENTMCNC: 34.2 G/DL (ref 31.5–35.7)
MCV RBC AUTO: 90.5 FL (ref 79–97)
PLATELET # BLD AUTO: 169 10*3/MM3 (ref 140–450)
PMV BLD AUTO: 9.8 FL (ref 6–12)
POTASSIUM SERPL-SCNC: 3.6 MMOL/L (ref 3.5–5.2)
RBC # BLD AUTO: 4.55 10*6/MM3 (ref 3.77–5.28)
SODIUM SERPL-SCNC: 140 MMOL/L (ref 136–145)
WBC NRBC COR # BLD AUTO: 7.2 10*3/MM3 (ref 3.4–10.8)

## 2025-05-06 PROCEDURE — 80048 BASIC METABOLIC PNL TOTAL CA: CPT

## 2025-05-06 PROCEDURE — 36415 COLL VENOUS BLD VENIPUNCTURE: CPT

## 2025-05-06 PROCEDURE — 85027 COMPLETE CBC AUTOMATED: CPT

## 2025-05-06 RX ORDER — GALCANEZUMAB 120 MG/ML
120 INJECTION, SOLUTION SUBCUTANEOUS
COMMUNITY
Start: 2025-04-16 | End: 2025-05-06 | Stop reason: SDUPTHER

## 2025-05-06 RX ORDER — VILOXAZINE HYDROCHLORIDE 200 MG/1
400 CAPSULE, EXTENDED RELEASE ORAL EVERY MORNING
COMMUNITY
Start: 2025-04-15

## 2025-05-06 RX ORDER — LEVOTHYROXINE, LIOTHYRONINE 38; 9 UG/1; UG/1
60 TABLET ORAL DAILY
COMMUNITY
Start: 2025-04-23

## 2025-05-06 RX ORDER — ALLOPURINOL 300 MG/1
300 TABLET ORAL NIGHTLY
COMMUNITY
Start: 2025-03-12

## 2025-05-06 RX ORDER — GALCANEZUMAB 120 MG/ML
240 INJECTION, SOLUTION SUBCUTANEOUS
Qty: 2 ML | Refills: 0 | Status: SHIPPED | OUTPATIENT
Start: 2025-05-06 | End: 2025-05-06

## 2025-05-06 RX ORDER — UBIDECARENONE 75 MG
100 CAPSULE ORAL DAILY
COMMUNITY
Start: 2025-01-01

## 2025-05-06 RX ORDER — GALCANEZUMAB 120 MG/ML
120 INJECTION, SOLUTION SUBCUTANEOUS
Qty: 3 ML | Refills: 0 | Status: SHIPPED | OUTPATIENT
Start: 2025-05-06 | End: 2025-05-07

## 2025-05-06 NOTE — DISCHARGE INSTRUCTIONS
Take the following medications the morning of surgery:      QELBREE, PRISTIQ,  PANTOPRAZOLE, METOPROLOL,  NP THYROID    If you are on prescription narcotic pain medication to control your pain you may also take that medication the morning of surgery.      General Instructions:     Do not eat solid food after midnight the night before surgery.  Clear liquids day of surgery are allowed but must be stopped at least two hours before your hospital arrival time.       Allowed clear liquids      Water, sodas, and tea or coffee with no cream or milk added.       12 to 20 ounces of a clear liquid that contains carbohydrates is recommended.  If non-diabetic, have Gatorade or Powerade.  If diabetic, have G2 or Powerade Zero.     Do not have liquids red in color.  Do not consume chicken, beef, pork or vegetable broth or bouillon cubes of any variety as they are not considered clear liquids and are not allowed.      Infants may have breast milk up to four hours before surgery.  Infants drinking formula may drink formula up to six hours before surgery.   Patients who avoid smoking, chewing tobacco and alcohol for 4 weeks prior to surgery have a reduced risk of post-operative complications.  Quit smoking as many days before surgery as you can.  Do not smoke, use chewing tobacco or drink alcohol the day of surgery.   If applicable bring your C-PAP/ BI-PAP machine in with you to preop day of surgery.  Bring any papers given to you in the doctor’s office.  Wear clean comfortable clothes.  Do not wear contact lenses, false eyelashes or make-up.  Bring a case for your glasses.   Bring crutches or walker if applicable.  Remove all piercings.  Leave jewelry and any other valuables at home.  Hair extensions with metal clips must be removed prior to surgery.  The Pre-Admission Testing nurse will instruct you to bring medications if unable to obtain an accurate list in Pre-Admission Testing.    Day of surgery you will need to let the  preoperative nurse know the last time you took each of your medications.  To ensure a safe environment for patients and staff, we kindly ask that children under the age of 16 not accompany patients.  If you must bring a dependent child or dependent adult please ensure a responsible adult, other than yourself, is present to supervise them.          Preventing a Surgical Site Infection:  For 2 to 3 days before surgery, avoid shaving with a razor because the razor can irritate skin and make it easier to develop an infection.    Any areas of open skin can increase the risk of a post-operative wound infection by allowing bacteria to enter and travel throughout the body.  Notify your surgeon if you have any skin wounds / rashes even if it is not near the expected surgical site.  The area will need assessed to determine if surgery should be delayed until it is healed.  The night prior to surgery shower using a fresh bar of anti-bacterial soap (such as Dial) and clean washcloth.  Sleep in a clean bed with clean clothing.  Do not allow pets to sleep with you.  Shower on the morning of surgery using a fresh bar of anti-bacterial soap (such as Dial) and clean washcloth.  Dry with a clean towel and dress in clean clothing.  Ask your surgeon if you will be receiving antibiotics prior to surgery.  Make sure you, your family, and all healthcare providers clean their hands with soap and water or an alcohol based hand  before caring for you or your wound.    Day of surgery:  Your arrival time is approximately two hours before your scheduled surgery time.  Please note if you have an early arrival time the surgery doors do not open before 5:00 AM.  Upon arrival, a Pre-op nurse and Anesthesiologist will review your health history, obtain vital signs, and answer questions you may have.  The only belongings needed at this time will be a list of your home medications and if applicable your C-PAP/BI-PAP machine.  A Pre-op nurse  will start an IV and you may receive medication in preparation for surgery, including something to help you relax.     Please be aware that surgery does come with discomfort.  We want to make every effort to control your discomfort so please discuss any uncontrolled symptoms with your nurse.   Your doctor will most likely have prescribed pain medications.      If you are going home after surgery you will receive individualized written care instructions before being discharged.  A responsible adult must drive you to and from the hospital on the day of your surgery and ideally stay with you through the night.   .  Discharge prescriptions can be filled by the hospital pharmacy during regular pharmacy hours.  If you are having surgery late in the day/evening your prescription may be e-prescribed to your pharmacy.  Please verify your pharmacy hours or chose a 24 hour pharmacy to avoid not having access to your prescription because your pharmacy has closed for the day.    If you are staying overnight following surgery, you will be transported to your hospital room following the recovery period.  [facility] has all private rooms.    If you have any questions please call Pre-Admission Testing at [phone].  Deductibles and co-payments are collected on the day of service. Please be prepared to pay the required co-pay, deductible or deposit on the day of service as defined by your plan.    Call your surgeon immediately if you experience any of the following symptoms:  Sore Throat  Shortness of Breath or difficulty breathing  Cough  Chills  Body soreness or muscle pain  Headache  Fever  New loss of taste or smell  Do not arrive for your surgery ill.  Your procedure will need to be rescheduled to another time.  You will need to call your physician before the day of surgery to avoid any unnecessary exposure to hospital staff as well as other patients.

## 2025-05-07 RX ORDER — GALCANEZUMAB 120 MG/ML
120 INJECTION, SOLUTION SUBCUTANEOUS
Qty: 3 ML | Refills: 2 | Status: SHIPPED | OUTPATIENT
Start: 2025-05-07

## 2025-05-07 NOTE — ADDENDUM NOTE
Addended by: CHINMAY MIRANDA on: 5/7/2025 07:33 AM     Modules accepted: Orders    
Addended by: SALEEM BARBOZA on: 5/6/2025 03:44 PM     Modules accepted: Orders    
Detail Level: Zone
Otc Regimen: BPO 5% wash once daily

## 2025-05-12 ENCOUNTER — ANESTHESIA EVENT (OUTPATIENT)
Dept: PERIOP | Facility: HOSPITAL | Age: 35
End: 2025-05-12
Payer: COMMERCIAL

## 2025-05-13 ENCOUNTER — HOSPITAL ENCOUNTER (OUTPATIENT)
Facility: HOSPITAL | Age: 35
Setting detail: HOSPITAL OUTPATIENT SURGERY
Discharge: HOME | End: 2025-05-13
Attending: ORTHOPAEDIC SURGERY | Admitting: ORTHOPAEDIC SURGERY
Payer: COMMERCIAL

## 2025-05-13 ENCOUNTER — ANESTHESIA (OUTPATIENT)
Dept: PERIOP | Facility: HOSPITAL | Age: 35
End: 2025-05-13
Payer: COMMERCIAL

## 2025-05-13 VITALS
DIASTOLIC BLOOD PRESSURE: 92 MMHG | BODY MASS INDEX: 27.62 KG/M2 | RESPIRATION RATE: 15 BRPM | TEMPERATURE: 97.5 F | WEIGHT: 166.01 LBS | OXYGEN SATURATION: 100 % | HEART RATE: 61 BPM | SYSTOLIC BLOOD PRESSURE: 123 MMHG

## 2025-05-13 DIAGNOSIS — M25.511 CHRONIC RIGHT SHOULDER PAIN: ICD-10-CM

## 2025-05-13 DIAGNOSIS — G89.29 CHRONIC RIGHT SHOULDER PAIN: ICD-10-CM

## 2025-05-13 PROCEDURE — 25010000002 MIDAZOLAM PER 1 MG: Performed by: STUDENT IN AN ORGANIZED HEALTH CARE EDUCATION/TRAINING PROGRAM

## 2025-05-13 PROCEDURE — 25010000002 EPINEPHRINE PER 0.1 MG: Performed by: ORTHOPAEDIC SURGERY

## 2025-05-13 PROCEDURE — 25010000002 LIDOCAINE PF 2% 2 % SOLUTION: Performed by: STUDENT IN AN ORGANIZED HEALTH CARE EDUCATION/TRAINING PROGRAM

## 2025-05-13 PROCEDURE — 29828 SHO ARTHRS SRG BICP TENODSIS: CPT | Performed by: ORTHOPAEDIC SURGERY

## 2025-05-13 PROCEDURE — 25810000003 LACTATED RINGERS PER 1000 ML: Performed by: ORTHOPAEDIC SURGERY

## 2025-05-13 PROCEDURE — C1713 ANCHOR/SCREW BN/BN,TIS/BN: HCPCS | Performed by: ORTHOPAEDIC SURGERY

## 2025-05-13 PROCEDURE — L3670 SO ACRO/CLAV CAN WEB PRE OTS: HCPCS | Performed by: ORTHOPAEDIC SURGERY

## 2025-05-13 PROCEDURE — 25010000002 BUPIVACAINE (PF) 0.5 % SOLUTION: Performed by: STUDENT IN AN ORGANIZED HEALTH CARE EDUCATION/TRAINING PROGRAM

## 2025-05-13 PROCEDURE — 25010000002 ONDANSETRON PER 1 MG: Performed by: STUDENT IN AN ORGANIZED HEALTH CARE EDUCATION/TRAINING PROGRAM

## 2025-05-13 PROCEDURE — 29828 SHO ARTHRS SRG BICP TENODSIS: CPT | Performed by: SPECIALIST/TECHNOLOGIST, OTHER

## 2025-05-13 PROCEDURE — 25010000002 CEFAZOLIN PER 500 MG: Performed by: ORTHOPAEDIC SURGERY

## 2025-05-13 PROCEDURE — 25010000002 SUGAMMADEX 200 MG/2ML SOLUTION: Performed by: STUDENT IN AN ORGANIZED HEALTH CARE EDUCATION/TRAINING PROGRAM

## 2025-05-13 PROCEDURE — 25010000002 PROPOFOL 10 MG/ML EMULSION: Performed by: STUDENT IN AN ORGANIZED HEALTH CARE EDUCATION/TRAINING PROGRAM

## 2025-05-13 PROCEDURE — 25010000002 DEXAMETHASONE SODIUM PHOSPHATE 20 MG/5ML SOLUTION: Performed by: STUDENT IN AN ORGANIZED HEALTH CARE EDUCATION/TRAINING PROGRAM

## 2025-05-13 PROCEDURE — 25010000002 BUPIVACAINE LIPOSOME 1.3 % SUSPENSION: Performed by: STUDENT IN AN ORGANIZED HEALTH CARE EDUCATION/TRAINING PROGRAM

## 2025-05-13 PROCEDURE — 25810000003 LACTATED RINGERS PER 1000 ML: Performed by: STUDENT IN AN ORGANIZED HEALTH CARE EDUCATION/TRAINING PROGRAM

## 2025-05-13 DEVICE — SWVLK TENO BIO-COMP 7X 19.1MM
Type: IMPLANTABLE DEVICE | Site: SHOULDER | Status: FUNCTIONAL
Brand: ARTHREX®

## 2025-05-13 DEVICE — SUTURETAPE FIBERLOOP W/ NDL WH/BL
Type: IMPLANTABLE DEVICE | Site: SHOULDER | Status: FUNCTIONAL
Brand: ARTHREX®

## 2025-05-13 RX ORDER — SODIUM CHLORIDE, SODIUM LACTATE, POTASSIUM CHLORIDE, AND CALCIUM CHLORIDE .6; .31; .03; .02 G/100ML; G/100ML; G/100ML; G/100ML
IRRIGANT IRRIGATION AS NEEDED
Status: DISCONTINUED | OUTPATIENT
Start: 2025-05-13 | End: 2025-05-13 | Stop reason: HOSPADM

## 2025-05-13 RX ORDER — FLUMAZENIL 0.1 MG/ML
0.2 INJECTION INTRAVENOUS AS NEEDED
Status: DISCONTINUED | OUTPATIENT
Start: 2025-05-13 | End: 2025-05-13 | Stop reason: HOSPADM

## 2025-05-13 RX ORDER — ONDANSETRON 2 MG/ML
INJECTION INTRAMUSCULAR; INTRAVENOUS AS NEEDED
Status: DISCONTINUED | OUTPATIENT
Start: 2025-05-13 | End: 2025-05-13 | Stop reason: SURG

## 2025-05-13 RX ORDER — SODIUM CHLORIDE, SODIUM LACTATE, POTASSIUM CHLORIDE, CALCIUM CHLORIDE 600; 310; 30; 20 MG/100ML; MG/100ML; MG/100ML; MG/100ML
9 INJECTION, SOLUTION INTRAVENOUS CONTINUOUS
Status: DISCONTINUED | OUTPATIENT
Start: 2025-05-13 | End: 2025-05-13 | Stop reason: HOSPADM

## 2025-05-13 RX ORDER — DEXAMETHASONE SODIUM PHOSPHATE 4 MG/ML
INJECTION, SOLUTION INTRA-ARTICULAR; INTRALESIONAL; INTRAMUSCULAR; INTRAVENOUS; SOFT TISSUE AS NEEDED
Status: DISCONTINUED | OUTPATIENT
Start: 2025-05-13 | End: 2025-05-13 | Stop reason: SURG

## 2025-05-13 RX ORDER — IPRATROPIUM BROMIDE AND ALBUTEROL SULFATE 2.5; .5 MG/3ML; MG/3ML
3 SOLUTION RESPIRATORY (INHALATION) ONCE AS NEEDED
Status: DISCONTINUED | OUTPATIENT
Start: 2025-05-13 | End: 2025-05-13 | Stop reason: HOSPADM

## 2025-05-13 RX ORDER — HYDROMORPHONE HYDROCHLORIDE 1 MG/ML
0.5 INJECTION, SOLUTION INTRAMUSCULAR; INTRAVENOUS; SUBCUTANEOUS
Status: DISCONTINUED | OUTPATIENT
Start: 2025-05-13 | End: 2025-05-13 | Stop reason: HOSPADM

## 2025-05-13 RX ORDER — ONDANSETRON 2 MG/ML
4 INJECTION INTRAMUSCULAR; INTRAVENOUS ONCE AS NEEDED
Status: DISCONTINUED | OUTPATIENT
Start: 2025-05-13 | End: 2025-05-13 | Stop reason: HOSPADM

## 2025-05-13 RX ORDER — SODIUM CHLORIDE 0.9 % (FLUSH) 0.9 %
3-10 SYRINGE (ML) INJECTION AS NEEDED
Status: DISCONTINUED | OUTPATIENT
Start: 2025-05-13 | End: 2025-05-13 | Stop reason: HOSPADM

## 2025-05-13 RX ORDER — EPHEDRINE SULFATE 50 MG/ML
5 INJECTION, SOLUTION INTRAVENOUS ONCE AS NEEDED
Status: DISCONTINUED | OUTPATIENT
Start: 2025-05-13 | End: 2025-05-13 | Stop reason: HOSPADM

## 2025-05-13 RX ORDER — MIDAZOLAM HYDROCHLORIDE 1 MG/ML
1 INJECTION, SOLUTION INTRAMUSCULAR; INTRAVENOUS
Status: DISCONTINUED | OUTPATIENT
Start: 2025-05-13 | End: 2025-05-13 | Stop reason: HOSPADM

## 2025-05-13 RX ORDER — PROMETHAZINE HYDROCHLORIDE 25 MG/1
25 TABLET ORAL ONCE AS NEEDED
Status: DISCONTINUED | OUTPATIENT
Start: 2025-05-13 | End: 2025-05-13 | Stop reason: HOSPADM

## 2025-05-13 RX ORDER — HYDRALAZINE HYDROCHLORIDE 20 MG/ML
5 INJECTION INTRAMUSCULAR; INTRAVENOUS
Status: DISCONTINUED | OUTPATIENT
Start: 2025-05-13 | End: 2025-05-13 | Stop reason: HOSPADM

## 2025-05-13 RX ORDER — OXYCODONE AND ACETAMINOPHEN 5; 325 MG/1; MG/1
1 TABLET ORAL EVERY 6 HOURS PRN
Qty: 30 TABLET | Refills: 0 | Status: SHIPPED | OUTPATIENT
Start: 2025-05-13

## 2025-05-13 RX ORDER — SODIUM CHLORIDE 0.9 % (FLUSH) 0.9 %
3 SYRINGE (ML) INJECTION EVERY 12 HOURS SCHEDULED
Status: DISCONTINUED | OUTPATIENT
Start: 2025-05-13 | End: 2025-05-13 | Stop reason: HOSPADM

## 2025-05-13 RX ORDER — ATROPINE SULFATE 0.4 MG/ML
0.4 INJECTION, SOLUTION INTRAMUSCULAR; INTRAVENOUS; SUBCUTANEOUS ONCE AS NEEDED
Status: DISCONTINUED | OUTPATIENT
Start: 2025-05-13 | End: 2025-05-13 | Stop reason: HOSPADM

## 2025-05-13 RX ORDER — ONDANSETRON 4 MG/1
4 TABLET, FILM COATED ORAL EVERY 8 HOURS PRN
Qty: 12 TABLET | Refills: 0 | Status: SHIPPED | OUTPATIENT
Start: 2025-05-13

## 2025-05-13 RX ORDER — PROMETHAZINE HYDROCHLORIDE 25 MG/1
25 SUPPOSITORY RECTAL ONCE AS NEEDED
Status: DISCONTINUED | OUTPATIENT
Start: 2025-05-13 | End: 2025-05-13 | Stop reason: HOSPADM

## 2025-05-13 RX ORDER — OXYCODONE AND ACETAMINOPHEN 7.5; 325 MG/1; MG/1
1 TABLET ORAL EVERY 4 HOURS PRN
Status: DISCONTINUED | OUTPATIENT
Start: 2025-05-13 | End: 2025-05-13 | Stop reason: HOSPADM

## 2025-05-13 RX ORDER — NALOXONE HCL 0.4 MG/ML
0.2 VIAL (ML) INJECTION AS NEEDED
Status: DISCONTINUED | OUTPATIENT
Start: 2025-05-13 | End: 2025-05-13 | Stop reason: HOSPADM

## 2025-05-13 RX ORDER — LIDOCAINE HYDROCHLORIDE 20 MG/ML
INJECTION, SOLUTION EPIDURAL; INFILTRATION; INTRACAUDAL; PERINEURAL AS NEEDED
Status: DISCONTINUED | OUTPATIENT
Start: 2025-05-13 | End: 2025-05-13 | Stop reason: SURG

## 2025-05-13 RX ORDER — DIPHENHYDRAMINE HYDROCHLORIDE 50 MG/ML
12.5 INJECTION, SOLUTION INTRAMUSCULAR; INTRAVENOUS
Status: DISCONTINUED | OUTPATIENT
Start: 2025-05-13 | End: 2025-05-13 | Stop reason: HOSPADM

## 2025-05-13 RX ORDER — FENTANYL CITRATE 50 UG/ML
50 INJECTION, SOLUTION INTRAMUSCULAR; INTRAVENOUS
Status: DISCONTINUED | OUTPATIENT
Start: 2025-05-13 | End: 2025-05-13 | Stop reason: HOSPADM

## 2025-05-13 RX ORDER — LIDOCAINE HYDROCHLORIDE 10 MG/ML
0.5 INJECTION, SOLUTION INFILTRATION; PERINEURAL ONCE AS NEEDED
Status: DISCONTINUED | OUTPATIENT
Start: 2025-05-13 | End: 2025-05-13 | Stop reason: HOSPADM

## 2025-05-13 RX ORDER — PROPOFOL 10 MG/ML
VIAL (ML) INTRAVENOUS AS NEEDED
Status: DISCONTINUED | OUTPATIENT
Start: 2025-05-13 | End: 2025-05-13 | Stop reason: SURG

## 2025-05-13 RX ORDER — DROPERIDOL 2.5 MG/ML
0.62 INJECTION, SOLUTION INTRAMUSCULAR; INTRAVENOUS
Status: DISCONTINUED | OUTPATIENT
Start: 2025-05-13 | End: 2025-05-13 | Stop reason: HOSPADM

## 2025-05-13 RX ORDER — LABETALOL HYDROCHLORIDE 5 MG/ML
5 INJECTION, SOLUTION INTRAVENOUS
Status: DISCONTINUED | OUTPATIENT
Start: 2025-05-13 | End: 2025-05-13 | Stop reason: HOSPADM

## 2025-05-13 RX ORDER — ROCURONIUM BROMIDE 10 MG/ML
INJECTION, SOLUTION INTRAVENOUS AS NEEDED
Status: DISCONTINUED | OUTPATIENT
Start: 2025-05-13 | End: 2025-05-13 | Stop reason: SURG

## 2025-05-13 RX ORDER — BUPIVACAINE HYDROCHLORIDE 5 MG/ML
INJECTION, SOLUTION EPIDURAL; INTRACAUDAL; PERINEURAL
Status: COMPLETED | OUTPATIENT
Start: 2025-05-13 | End: 2025-05-13

## 2025-05-13 RX ORDER — DOCUSATE SODIUM 100 MG/1
100 CAPSULE, LIQUID FILLED ORAL 2 TIMES DAILY
Qty: 60 CAPSULE | Refills: 0 | Status: SHIPPED | OUTPATIENT
Start: 2025-05-13

## 2025-05-13 RX ADMIN — ONDANSETRON 4 MG: 2 INJECTION, SOLUTION INTRAMUSCULAR; INTRAVENOUS at 13:14

## 2025-05-13 RX ADMIN — LIDOCAINE HYDROCHLORIDE 80 MG: 20 INJECTION, SOLUTION EPIDURAL; INFILTRATION; INTRACAUDAL; PERINEURAL at 13:14

## 2025-05-13 RX ADMIN — ROCURONIUM BROMIDE 50 MG: 10 INJECTION, SOLUTION INTRAVENOUS at 12:33

## 2025-05-13 RX ADMIN — DEXAMETHASONE SODIUM PHOSPHATE 8 MG: 4 INJECTION, SOLUTION INTRAMUSCULAR; INTRAVENOUS at 12:45

## 2025-05-13 RX ADMIN — CEFAZOLIN 2 G: 2 INJECTION, POWDER, FOR SOLUTION INTRAMUSCULAR; INTRAVENOUS at 12:23

## 2025-05-13 RX ADMIN — PROPOFOL 200 MG: 10 INJECTION, EMULSION INTRAVENOUS at 12:32

## 2025-05-13 RX ADMIN — MIDAZOLAM 2 MG: 1 INJECTION INTRAMUSCULAR; INTRAVENOUS at 11:33

## 2025-05-13 RX ADMIN — LIDOCAINE HYDROCHLORIDE 100 MG: 20 INJECTION, SOLUTION EPIDURAL; INFILTRATION; INTRACAUDAL; PERINEURAL at 12:32

## 2025-05-13 RX ADMIN — SODIUM CHLORIDE, POTASSIUM CHLORIDE, SODIUM LACTATE AND CALCIUM CHLORIDE 9 ML/HR: 600; 310; 30; 20 INJECTION, SOLUTION INTRAVENOUS at 11:12

## 2025-05-13 RX ADMIN — BUPIVACAINE 10 ML: 13.3 INJECTION, SUSPENSION, LIPOSOMAL INFILTRATION at 11:40

## 2025-05-13 RX ADMIN — SUGAMMADEX 200 MG: 100 INJECTION, SOLUTION INTRAVENOUS at 13:23

## 2025-05-13 RX ADMIN — BUPIVACAINE HYDROCHLORIDE 15 ML: 5 INJECTION, SOLUTION EPIDURAL; INTRACAUDAL; PERINEURAL at 11:40

## 2025-05-13 NOTE — ANESTHESIA PROCEDURE NOTES
Airway  Reason: elective    Date/Time: 5/13/2025 12:36 PM  Airway not difficult    General Information and Staff    Patient location during procedure: OR  Anesthesiologist: Alonzo Roe DO  CRNA/CAA: Donavon Hare CRNA    Indications and Patient Condition  Indications for airway management: airway protection    Preoxygenated: yes  MILS not maintained throughout    Mask difficulty assessment: 1 - vent by mask    Final Airway Details    Final airway type: endotracheal airway      Successful airway: ETT  Cuffed: yes   Successful intubation technique: direct laryngoscopy  Endotracheal tube insertion site: oral  Blade: Humaira  Blade size: 3  ETT size (mm): 7.0  Cormack-Lehane Classification: grade IIb - view of arytenoids or posterior of glottis only  Placement verified by: capnometry   Cuff volume (mL): 8  Measured from: lips  ETT/EBT  to lips (cm): 22  Number of attempts at approach: 1  Assessment: lips, teeth, and gum same as pre-op and atraumatic intubation

## 2025-05-13 NOTE — ANESTHESIA PROCEDURE NOTES
Peripheral Block      Patient reassessed immediately prior to procedure    Patient location during procedure: pre-op  Start time: 5/13/2025 11:40 AM  Reason for block: at surgeon's request and post-op pain management  Performed by  Anesthesiologist: Joe Oliva MD  Preanesthetic Checklist  Completed: patient identified, IV checked, site marked, risks and benefits discussed, surgical consent, monitors and equipment checked, pre-op evaluation and timeout performed  Prep:  Pt Position: supine  Sterile barriers:cap, gloves, sterile barriers, mask and washed/disinfected hands  Prep: ChloraPrep  Patient monitoring: blood pressure monitoring, continuous pulse oximetry and EKG  Procedure    Sedation: yes  Performed under: local infiltration  Guidance:ultrasound guided    ULTRASOUND INTERPRETATION.  Using ultrasound guidance a 21 G gauge needle was placed in close proximity to the brachial plexus nerve, at which point, under ultrasound guidance anesthetic was injected in the area of the nerve and spread of the anesthesia was seen on ultrasound in close proximity thereto.  There were no abnormalities seen on ultrasound; a digital image was taken; and the patient tolerated the procedure with no complications. Images:still images obtained, printed/placed on chart    Laterality:right  Block Type:interscalene  Injection Technique:single-shot  Needle Type:echogenic  Needle Gauge:21 G  Resistance on Injection: none    Medications Used: bupivacaine liposome (EXPAREL) 1.3 % injection - Infiltration   10 mL - 5/13/2025 11:40:00 AM  bupivacaine (PF) (MARCAINE) 0.5 % injection - Injection   15 mL - 5/13/2025 11:40:00 AM      Post Assessment  Injection Assessment: negative aspiration for heme, no paresthesia on injection and incremental injection  Patient Tolerance:comfortable throughout block  Complications:no  Performed by: Joe Oliva MD

## 2025-05-13 NOTE — ANESTHESIA PREPROCEDURE EVALUATION
Anesthesia Evaluation     Patient summary reviewed and Nursing notes reviewed   NPO Solid Status: > 8 hours  NPO Liquid Status: > 2 hours           Airway   Mallampati: II  TM distance: >3 FB  Neck ROM: full  Dental      Pulmonary    (+) ,sleep apnea on CPAP  Cardiovascular     ECG reviewed    (-) past MI, dysrhythmias, angina, CHF, cardiac stents, CABG    ROS comment: H/o PVC's , well controlled    Neuro/Psych  (+) headaches, numbness, psychiatric history Depression, Anxiety and ADHD    ROS Comment: Restless leg syndrome    GI/Hepatic/Renal/Endo    (+) obesity, morbid obesity, GERD well controlled, thyroid problem thyroid nodules    Musculoskeletal     (+) myalgias, neck pain  Abdominal    Substance History   (+) alcohol use     OB/GYN          Other - negative ROS                     Anesthesia Plan    ASA 2     general with block     (Previous grade 1 view with mac 3    Mounjaro last taken > 1 week ago. Denies N/V/bloating)  intravenous induction     Anesthetic plan, risks, benefits, and alternatives have been provided, discussed and informed consent has been obtained with: patient.    CODE STATUS:

## 2025-05-13 NOTE — H&P
History & Physical       Patient: Sherry Quevedo    YOB: 1990    Medical Record Number: [id number]    Chief Complaints: Preop    History of Present Illness: 34 y.o. female presents today in anticipation of upcoming surgery.  Denies any changes to medical history.  Denies any changes to her symptomatology.    Allergies:   Allergies   Allergen Reactions    Hydrocodone Itching and Rash    Tramadol Nausea And Vomiting       Home Medications:    Current Facility-Administered Medications:     bupivacaine liposome (EXPAREL) 1.3 % injection 266 mg, 20 mL, Injection, Once, Agustín Verdin MD    ceFAZolin 2000 mg IVPB in 100 mL NS (MBP), 2 g, Intravenous, Once, Agustín Verdin MD    lactated ringers infusion, 9 mL/hr, Intravenous, Continuous, Joe Oliva MD, Last Rate: 9 mL/hr at 05/13/25 1112, 9 mL/hr at 05/13/25 1112    lidocaine (XYLOCAINE) 1 % injection 0.5 mL, 0.5 mL, Intradermal, Once PRN, Joe Oliva MD    midazolam (VERSED) injection 1 mg, 1 mg, Intravenous, Q5 Min PRN, Joe Oliva MD    sodium chloride 0.9 % flush 3 mL, 3 mL, Intravenous, Q12H, Joe Oliva MD    sodium chloride 0.9 % flush 3-10 mL, 3-10 mL, Intravenous, PRN, Joe Oliva MD    Past Medical History:   Diagnosis Date    Abnormal computed tomography angiography (CTA) of neck 12/21/2021    Abnormal gluteal crease     ADHD (attention deficit hyperactivity disorder) 2020    Anemia 2019    iron defiency anemia    Anxiety     Benign paroxysmal positional vertigo of right ear 12/01/2021    BRBPR (bright red blood per rectum) 06/02/2021    Dr[facility] Santana at [facility]    Breast anomaly     Bruises easily     RESOLVED    Cervical adenopathy     Cervical lymphadenopathy     Left side.    Change in bowel habit 06/02/2021    Dr. Nancy Santana at [facility]    Cholestasis during pregnancy in third trimester 2019    Chronic allergic rhinitis     Chronic pain disorder 2022    Fibro    Chronic recurrent major  depressive disorder     In partial remission.    Cold intolerance     Constipation     CTS (carpal tunnel syndrome) 2019    ROULA    Decreased sex drive     Depression     History of PPD    Diarrhea     Dizziness 08/06/2021    APRN, Temi Mcrae, Cardiologist office.    Dysmenorrhea 12/04/2019    Surgery: Laparoscopic assisted vaginal hysterectomy, Surgeon Dr. Hillary Nunn.    Dysphoric mood     Dyspnea on exertion 08/06/2021    APRN, Temi Mcrae, Cardiologist office.    Endometriosis 12/2019    Hysterectomy in 2019 cured    Environmental and seasonal allergies 09/21/2020    Epigastric abdominal pain 11/07/2018    [facility].    Epiploic appendagitis 01/10/2023    Extremity pain 2022    TOS diagnosed in August    Fat necrosis 07/29/2021    [facility].    Fatigue     Fibromyalgia, primary 2021    Fissure, anal     history    Folic acid deficiency     GERD (gastroesophageal reflux disease)     Gestational diabetes [id number]    I had it with my 1st pregnancy    H/O TB skin testing 02/20/2018    Negative result at [facility].    HA (headache)     Headache, tension-type     Hearing loss     WEARS HEARING AIDS    Hemorrhoid     History of gestational diabetes     with first baby    History of Holter monitoring 11/07/2019    24 hour.    History of kidney stones 2006/2009    History of miscarriage     History of vasectomy 2019    Spouse Vasectomy.    Hypocalcemia 03/2022    Hypoglycemia     Irritable bowel syndrome 1994    IBS with both constipation/diarrhea, followed by GI Dr. Moreland.    Joint pain     Fibro / TOS    Joint stiffness     Labral tear of shoulder     RIGHT    Lightheadedness 08/06/2021    APRN, Temi Mcrae, Cardiologist office.    Low back pain     Lower back/middle    Low serum potassium level     Low serum vitamin B12     Lower extremity myoclonus 02/28/2022    ADMITTED TO [facility]    Malaise and fatigue 11/12/2019    Cardiologist Dr. Benji Ugalde.    Mass of left  parotid gland 12/2021    Migraines 10/2019    Multinodular non-toxic goiter     Near syncope 08/06/2021    APRN, Temi Mcrae, Cardiologist office.    Neck pain     OCD (obsessive compulsive disorder)     Ovarian cyst 2010    Surgery removed.    Palpitations 10/28/2019    PVC's (premature ventricular contractions)     Rapid heart rate 11/12/2019    Cardiologist Dr. Benji Ugalde.    Rectal bleeding 06/02/2021    Dr. Nancy Santana at [facility].    Renal stones     RLS (restless legs syndrome)     RUQ abdominal mass [id number]    [facility].    Scoliosis 2003    Sleep apnea     CPAP    Sleep disturbance     SOB (shortness of breath) 10/28/2019    Tachycardia 10/28/2019    Thrombocytopenia 03/2022    Thyroid nodule 10/28/2019    1 cm Left Submandibular node, [facility], Dr. Kevan Hoffman.    Thyroid nodule 10/28/2019    0.3 cm Right side of neck, [facility], Dr. Kevan Hoffman.    Thyromegaly 03/04/2016    Boarderline Thyromegaly, Findings via X-ray at [facility], ordering provider Dr. Nancy Santana.    Transaminitis 11/07/2018    [facility].    Trigger index finger of right hand 01/2022    Vaginal delivery 01/2019    Baby girl.          Past Surgical History:   Procedure Laterality Date    CHOLECYSTECTOMY WITH INTRAOPERATIVE CHOLANGIOGRAM N/A 11/09/2018    Procedure: Laparoscopic cholecystectomy;  Surgeon: Khanh Lassiter MD;  Location: [facility] OR;  Service: General    COLONOSCOPY N/A 08/2019    CYSTOSCOPY URETEROSCOPY LASER LITHOTRIPSY N/A 10/31/2024    Procedure: CYSTOSCOPY, LEFT URETEROSCOPY, STONE BASKET EXTRACTION, LEFT STENT PLACEMENT;  Surgeon: Williams Ferguson MD;  Location: [facility] OR;  Service: Urology;  Laterality: N/A;    DILATATION AND CURETTAGE N/A 10/2015    MISCARRIAGE.    ENDOSCOPY N/A     HEMORRHOIDECTOMY N/A 06/16/2022    Procedure: HEMORRHOIDECTOMY;  Surgeon: Linda Sanchez MD;  Location: [facility] OR;  Service: General;  Laterality: N/A;     LAPAROSCOPIC ASSISTED VAGINAL HYSTERECTOMY Bilateral 12/04/2019    Procedure: LAPAROSCOPIC ASSISTED VAGINAL HYSTERECTOMY, BILATERAL SALPINGECTOMY;  Surgeon: Hillary Nunn MD;  Location: [facility];  Service: Obstetrics/Gynecology    OVARIAN CYST SURGERY  06/2009    Laparoscopic ovarian cyst resection, no other abdominal surgery.    TONSILLECTOMY AND ADENOIDECTOMY Bilateral 10/2006    WISDOM TOOTH EXTRACTION Bilateral           Social History     Occupational History    Occupation: teacher     Employer: [employer]   Tobacco Use    Smoking status: Never    Smokeless tobacco: Never    Tobacco comments:     Vape on oçcasion   Vaping Use    Vaping status: Former    Substances: Flavoring   Substance and Sexual Activity    Alcohol use: Yes     Alcohol/week: 2.0 - 4.0 standard drinks of alcohol     Types: 1 - 2 Glasses of wine, 1 - 2 Shots of liquor per week     Comment: Weekly    Drug use: Never    Sexual activity: Yes     Partners: Male     Birth control/protection: Hysterectomy     Comment: .      Social History     Social History Narrative    Not on file          Family History   Problem Relation Age of Onset    COPD Mother     Depression Mother     Hypertension Mother     Heart disease Mother     Hyperlipidemia Mother     Asthma Father     COPD Father     Heart disease Father     Alcohol abuse Father     Hearing loss Father     Cancer Sister     Miscarriages / Stillbirths Sister         1 miscarriage    Depression Sister     Depression Sister     Cancer Sister     Miscarriages / Stillbirths Sister         Miscarriage & stillbirth    Cancer Maternal Grandmother         Breast    Diabetes Maternal Grandmother     Depression Maternal Grandmother     Breast cancer Maternal Grandmother     Cancer Paternal Grandmother         Skin (ear)    Bleeding Disorder Paternal Grandmother         Factor V    COPD Paternal Grandmother     Asthma Paternal Grandmother     Osteoarthritis Paternal Grandmother   "   Arthritis Paternal Grandmother     Clotting disorder Paternal Grandmother         Factor V    Osteoporosis Paternal Grandmother     Alcohol abuse Paternal Grandfather     Malig Hyperthermia Neg Hx     Colon cancer Neg Hx        Review of Systems:  A 14 point review of systems is reviewed with the patient.  Pertinent positives are listed above.  All others are negative.    Physical Exam: 34 y.o. female    Vitals:    05/13/25 1101   BP: 109/82   BP Location: Left arm   Patient Position: Lying   Pulse: 80   Resp: 16   Temp: 98.5 °F (36.9 °C)   TempSrc: Oral   SpO2: 100%   Weight: 75.3 kg (166 lb 0.1 oz)       General:  Patient is awake and alert.  Appears in no acute distress or discomfort.    Psych:  Affect and demeanor are appropriate.    Eyes:  Conjunctiva and sclera appear grossly normal.  Eyes track well and EOM seem to be intact.    Ears:  No gross abnormalities.  Hearing adequate for the exam.    Cardiovascular:  Regular rate and rhythm.    Lungs:  Good chest expansion.  Breathing unlabored.    Lymph:  No palpable adenopathy about neck or axilla.    Extremity:  Skin benign and intact without evidence for swelling, masses or atrophy.  Exam otherwise deferred at this time.    Diagnostic Tests:  Lab Results   Component Value Date    GLUCOSE 90 05/06/2025    CALCIUM 9.5 05/06/2025     05/06/2025    K 3.6 05/06/2025    CO2 25.8 05/06/2025     05/06/2025    BUN 11 05/06/2025    CREATININE 0.87 05/06/2025    EGFRIFAFRI 99 11/29/2021    EGFRIFNONA 86 11/29/2021    BCR 12.6 05/06/2025    ANIONGAP 11.2 05/06/2025     Lab Results   Component Value Date    WBC 7.20 05/06/2025    HGB 14.1 05/06/2025    HCT 41.2 05/06/2025    MCV 90.5 05/06/2025     05/06/2025     No results found for: \"INR\", \"PROTIME\"    Assessment:  Right glenoid labral tear with biceps involvement    Plan: The patient denies any changes to their symptomatology.  Will proceed with surgery as planned.  I again offered her a chance to " ask any questions about the upcoming procedure. She stated that she had a good understanding of everything and had no questions.    Agustín Verdin MD  05/13/25

## 2025-05-13 NOTE — BRIEF OP NOTE
SHOULDER ARTHROSCOPY SUPERIOR LABRAL ANTERIOR POSTERIOR TEAR REPAIR  Progress Note    Sherry Quevedo  5/13/2025    Pre-op Diagnosis:   Chronic right shoulder pain [M25.511, G89.29]       Post-Op Diagnosis Codes:     * Chronic right shoulder pain [M25.511, G89.29]    Procedure(s):      Procedure(s):  RIGHT SHOULDER ARTHROSCOPY BICEPS TENODESIS, SUBACROMIAL BURSECTOMY              Surgeon(s):  Agustín Verdin MD    Anesthesia: General with Block    Staff:   Circulator: Margaret Mohan RN  Scrub Person: Figueroa Villareal CSFA  Vendor Representative: Micky Preston; Oziel Sanchez  Assistant: Michelle Terrell CSA  Assistant: Michelle Terrell CSA    Estimated Blood Loss: minimal    Urine Voided: * No values recorded between 5/13/2025 12:25 PM and 5/13/2025  1:12 PM *    Specimens:                None      Drains: * No LDAs found *    Findings: See dictation      Complications: None    Assistant: Michelle Terrell CSA  was responsible for performing the following activities: Retraction and their skilled assistance was necessary for the success of this case.    Agustín Verdin MD     Date: 5/13/2025  Time: 13:15 EDT

## 2025-05-13 NOTE — OP NOTE
Orthopaedic Operative Note    Facility: [facility]    Patient: Sherry Quevedo    Medical Record Number: [id number]    YOB: 1990    Dictating Surgeon: Agustín Verdin M.D.*    Primary Care Physician: Temi Jones APRN    Date of Operation: 5/13/2025    Pre-Operative Diagnosis: Right glenoid labral tear with biceps tendon involvement    Post-Operative Diagnosis: Right biceps sling injury with biceps instability    Procedure Performed:    1.  Right shoulder arthroscopic biceps tenodesis    2.  Subacromial bursectomy    Surgeon: Agustín Verdin MD     Assistant: Ximena Terrell CSA, whose assistance was critical for help with patient positioning, suctioning and irrigation, retraction, manipulation of the extremity for insertion of the implants, wound closure and application of the bandages.  Her assistance was critical to the success of this case.      Anesthesia: Regional followed by general    Complications: None.     Estimated Blood Loss: Less than 50 mL.     Implants: Arthrex 7 mm biceps tenodesis SwiveLock anchor for arthroscopic biceps tenodesis    Specimens: * No orders in the log *    Brief Operative Indication: Ms. Sanchez had a long history of right shoulder pain which had been nonresponsive to prolonged conservative treatment.  Her MRI showed what appeared to be a superior labral tear with biceps tendon involvement.  We had a thorough discussion regarding the risks, benefits and alternatives to surgical intervention.  I explained that surgical risks include infection, hematoma, anchor related complications including failure of fixation, loosening, cutout of the anchors, chondrolysis, rotator cuff re-tear necessitating revision surgery, persistent pain and/or loss of motion, iatrogenic nerve and/or blood vessel injury resulting in permanent weakness, numbness or dysfunction, RSD, DVT, PE, positioning related neuropraxia, and anesthesia related complications resulting in  death.  We further discussed the possible need to address the biceps with a possible tenotomy versus tenodesis.  We discussed the fact that if the biceps demonstrates significant pathology in the groove that I would plan to perform a tenotomy which could result in wes deformity or persistent pain, weakness or cramping.  We also discussed possible risks with a tenodesis as well including screw related complications including cutout, chondrolysis, failure of fixation with re-tear of the biceps, fracture and possible iatrogenic nerve injury.  The patient voiced understanding of the risks, benefits, and alternative forms of treatment that were discussed and consented to proceed.    Description of the procedure in detail:  The patient and operative site were identified in the preoperative holding area and the surgical site was marked.  Adequate regional anesthesia of the right upper extremity was administered.  Following this, the patient was taken to the operating room and placed in the supine position.  Adequate general anesthesia was then administered and the patient was repositioned on the operating table.  All bony prominences were carefully padded and protected while the patient was positioned in the lateral decubitus position.  The arm was prepped and draped in the standard, sterile fashion.  I cleaned the extremity with an alcohol solution.  A Hibiclens scrub was performed and then the extremity was prepped with 2 ChloraPrep preps.  I allowed those to dry for 3 minutes before the draping procedure was carried out and the arm placed into 10 pounds of lateral traction.  A timeout was taken and preoperative antibiotics administered prior to surgical incision.    I began the procedure by fashioning a standard posterior portal.  The scope was inserted into the joint and directed anteriorly to the rotator interval where a standard anterior portal was then established using needle localization technique.  A 7 mm  cannula was inserted into the joint and then a diagnostic arthroscopy performed.    The rotator cuff was intact including the visible portions of the subscapularis, supraspinatus, infraspinatus and teres minor.  The articular cartilage was well preserved.  No significant chondral pathology was noted.      Her MRI had suggested a superior labral tear with biceps involvement.  This was actually not the case.  Her superior labrum and the biceps anchor were fine.  The long head of the biceps actually looked okay as well but when probing the long head of the biceps, I could easily subluxate this out of the groove and over the top of her subscapularis.  She had a tear of the biceps sling which had rendered the biceps unstable.  I did consider this was an indication for a tenodesis.  The long head of the biceps was released with arthroscopic scissors in anticipation of a later tenodesis of this structure.  The biceps stump was then debrided with a shaver.  The remainder of the labrum was probed and confirmed to be intact and stable.  No further pathology was noted in the joint.  Final images were taken and saved from the articular side.    I directed my attention to the subacromial space.  A standard lateral portal was established and the scope was inserted into the subacromial space.  There was extensive subacromial/subdeltoid bursitis which was debrided with a shaver.  She did not have a significant subacromial spur and her coracoacromial ligament looked totally normal.  I did not consider that a subacromial decompression was necessary.  The rotator cuff was examined at this point.  This was confirmed to be completely intact from the bursal side.    Finally, I directed my attention to the tenodesis.  A combination of a shaver and Arthrex Vermillion device were used to remove the bursal tissue extending down along the lateral edge of the proximal humerus.  I very carefully dissected out the biceps in the groove at the level  of the falciform ligament.  The long head of the biceps was carefully delivered into the subacromial space.  An accessory portal was established over the suprapectoral region, just above the falciform ligament.  The long head of the biceps was delivered out of this accessory portal.  I measured the tendon diameter.  The tendon was then whip stitched with a #2 FiberWire.  The diseased portion of the biceps was debrided and removed.  An appropriate diameter bullet-tip reamer was used to ream a  hole in the suprapectoral region within the groove.  This was carried down to a depth of 20 mm.  The aperture of the hole was carefully debrided of bony and soft tissue debris.  The long head of the biceps was then delivered into this tunnel and secured with the Arthrex biceps tenodesis SwiveLock.  The screw got excellent fixation in the bone and seemed to secure the tendon very well.  The sutures from the SwiveLock were then tied to those from the whipstitch of the tendon to further supplement the fixation.  The tendon seemed to be well fixed and secure.  Final images were taken.    The instruments were withdrawn.  The wounds were copiously irrigated out with sterile saline.  The wounds were closed in a layered fashion.  Sterile dressings were applied.  The drapes were withdrawn.  The arm was placed in an immobilizer.  The patient was awakened and taken to the recovery room in good condition.    Agustín Verdin MD  05/13/25

## 2025-05-13 NOTE — DISCHARGE INSTRUCTIONS
Pendulum Exercises for Post Op Shoulder Surgery      Lean over with your good arm supported on a table or chair.  Relax the injured arm and allow it to hang straight down at your side.  Slowly begin to swing the relaxed arm back and forth.  Then swing it side to side.  Next, move it in a [place]. Now,  reverse the direction.        Generally, you should spend about 5 minutes doing this exercise.  It should be done 2-3 times daily or as directed by your physician.           What to expect after a Nerve Block    Nerve blocks administered to block pain affect many types of nerves, including those nerves that control movement, pain, and normal sensation. Following a nerve block, you may notice some bruising at the site where the block was given. You may experience sensations such as: numbness of the affected area or limb, tingling, heaviness (that is the limb feels heavy to you), weakness or inability to move the affected arm or leg, or a feeling as if your arm or leg has “fallen asleep.”     A nerve block can last from 2 to 36 hours depending on the medications used.  Usually the weakness wears off first followed by the tingling and heaviness. As the block wears off, you may begin to notice pain; however, this sequence of events may occur in any order. Typically, you will be able to move your limb before you will feel it. Once a nerve block begins to wear off, the effects are usually completely gone within 60 minutes.  If you experience continued side effects that you believe are block related for longer than 48 hours, please call your healthcare provider. Please see block-specific instructions below.    Instructions for any block involving the shoulder or arm  If you have had any kind of shoulder/arm block, you will go home with your arm in a sling. Wear the sling until the block has completely worn off. You may be required to wear it for a longer period of time per your surgeon’s recommendations.  If you have had a  shoulder/arm block, it is a good idea to sleep on a recliner with pillows under your arm.    You may experience symptoms such as:  Shortness of breath  Hoarseness   Blurry vision  Unequal pupils  Drooping of your face on the same side as the block was performed    These are side effects associated with this kind of block and should go away within 12 hours.    Note: If you have severe or prolonged shortness of breath, please seek medical assistance as soon as possible.     Protection of a “blocked” arm or leg (limb)  After a nerve block, you cannot feel pain, pressure, or extremes of temperature in the affected limb. And because of this, your blocked limb is at more risk for injury. For example, it is possible to burn your limb on an extremely hot surface without feeling it.     When resting, it is important to reposition your limb periodically to avoid prolonged pressure on it. This may require the use of pillows and padding.    While sleeping, you should avoid rolling onto the affected limb or putting too much pressure on it.     If you have a cast or tight dressing, check the color of your fingers or toes of the affected limb. Call your surgeon if they look discolored (that is, dusky, dark colored).    Use caution in cold weather. Cover your limb appropriately to protect it from the cold.      Pain Management:    Your surgeon will give you a prescription for pain medication. Begin taking this before the nerve block wears off. Bear in mind that sometimes the block can wear off in the middle of the night.

## 2025-05-13 NOTE — ANESTHESIA POSTPROCEDURE EVALUATION
Patient: hSerry Quevedo    Procedure Summary       Date: 05/13/25 Room / [facility]:  [facility]    Anesthesia Start: 1226 Anesthesia Stop: 1333    Procedure: RIGHT SHOULDER ARTHROSCOPY BICEPS TENODESIS, SUBACROMIAL BURSECTOMY (Right: Shoulder) Diagnosis:       Chronic right shoulder pain      (Chronic right shoulder pain [M25.511, G89.29])    Surgeons: Agustín Verdin MD Provider: Alonzo Roe DO    Anesthesia Type: general with block ASA Status: 2            Anesthesia Type: general with block    Vitals  Vitals Value Taken Time   /84 05/13/25 14:30   Temp 36.4 °C (97.5 °F) 05/13/25 14:15   Pulse 68 05/13/25 14:35   Resp 15 05/13/25 14:15   SpO2 100 % 05/13/25 14:35   Vitals shown include unfiled device data.        Post Anesthesia Care and Evaluation    Patient location during evaluation: PACU  Patient participation: complete - patient participated  Level of consciousness: awake and alert  Pain management: adequate    Airway patency: patent  Anesthetic complications: No anesthetic complications  PONV Status: controlled  Cardiovascular status: acceptable and hemodynamically stable  Respiratory status: acceptable  Hydration status: acceptable    Comments: /92 (BP Location: Left arm, Patient Position: Sitting)   Pulse 61   Temp 36.4 °C (97.5 °F) (Oral)   Resp 15   Wt 75.3 kg (166 lb 0.1 oz)   LMP  (LMP Unknown)   SpO2 100%   BMI 27.62 kg/m²

## 2025-05-21 ENCOUNTER — OFFICE VISIT (OUTPATIENT)
Dept: ORTHOPEDIC SURGERY | Facility: CLINIC | Age: 35
End: 2025-05-21
Payer: COMMERCIAL

## 2025-05-21 VITALS — WEIGHT: 166.8 LBS | BODY MASS INDEX: 28.48 KG/M2 | HEIGHT: 64 IN | TEMPERATURE: 98.3 F

## 2025-05-21 DIAGNOSIS — Z98.890 S/P ARTHROSCOPY OF RIGHT SHOULDER: Primary | ICD-10-CM

## 2025-05-21 NOTE — PROGRESS NOTES
Sherry Quevedo : 1990 MRN: [id number] DATE: 2025    CC: 1 week s/p right shoulder arthroscopy    HPI: Patient returns to clinic today for follow up.  Reports pain is well controlled.  Denies fevers, drainage, redness or other concerning symptoms.      Vitals:    25 0943   Temp: 98.3 °F (36.8 °C)       Exam:  Wounds appear well-approximated.  Arm and forearm soft.  Shoulder moves fluidly with pendulums.  Good motor and sensory function in the hand and wrist.  Palpable radial pulse with brisk capillary refill     Impression:  1 week s/p right shoulder arthroscopic biceps tenodesis, bursectomy    Plan:    1.  Begin PT per protocol--encouraged patient to progress motion as tolerated and demonstrated home exercise program.  2.  Follow up in 3 weeks.  3.  Counseled the patient about appropriate activity modifications and restrictions.    Agustín Verdin MD

## 2025-05-22 DIAGNOSIS — R00.0 SINUS TACHYCARDIA: ICD-10-CM

## 2025-05-28 ENCOUNTER — OFFICE VISIT (OUTPATIENT)
Dept: SLEEP MEDICINE | Facility: HOSPITAL | Age: 35
End: 2025-05-28
Payer: COMMERCIAL

## 2025-05-28 VITALS — OXYGEN SATURATION: 98 % | BODY MASS INDEX: 28.34 KG/M2 | HEART RATE: 111 BPM | WEIGHT: 166 LBS | HEIGHT: 64 IN

## 2025-05-28 DIAGNOSIS — E66.3 OVERWEIGHT WITH BODY MASS INDEX (BMI) 25.0-29.9: ICD-10-CM

## 2025-05-28 DIAGNOSIS — R68.2 DRY MOUTH: ICD-10-CM

## 2025-05-28 DIAGNOSIS — G47.33 MILD OBSTRUCTIVE SLEEP APNEA: Primary | ICD-10-CM

## 2025-05-28 DIAGNOSIS — Z78.9 DIFFICULTY USING CONTINUOUS POSITIVE AIRWAY PRESSURE (CPAP) DEVICE: ICD-10-CM

## 2025-05-28 DIAGNOSIS — G25.81 RESTLESS LEGS: ICD-10-CM

## 2025-05-28 PROCEDURE — G0463 HOSPITAL OUTPT CLINIC VISIT: HCPCS

## 2025-05-28 NOTE — PROGRESS NOTES
Follow Up [facility] Note     Chief Complaint:  AMANUEL     Primary Care Physician: Temi Jones APRN    Interval History:   The patient is a 35 y.o. female  who was last seen in the sleep lab: 3/28/2025.  Baseline AHI 6.5.  Ferritin also low at 63.  At last visit patient was amenable to starting auto CPAP 8-16 cm H2O also advised to start ferrous sulfate 324 mg a day for 3 months with plan to increase ferritin above 75.  Also discussed considering gabapentin if needed in the future.  Patient contacted the office around April 2025 noting waking with ear pressure and headache each day.  Then had complaints of increased congestion.  Pressure was then changed to a fixed pressure of 8 cm H2O.  Pressure was then changed to set pressure of 6 cm H2O on May 2, 2025.    Today reports unchanged; difficulty breathing and congestion.  Had shoulder surgery since last visit.  Fullface mask which fits well does not leak air however some dry mouth.  Ferritin levels ordered to be repeated the week of visit however not completed yet.  Has taken ferrous sulfate daily for the past 3 months however still having restless leg symptoms.  Needs a new mask fitting however called DME twice and did not get an answer back.  May do better with fullface mask.    Downloaded PAP Data Reviewed For Compliance:  DME is [facility].  Downloads between 4/18/2025 - 5/26/2025.  Average usage is 5 hours 23 minutes.  Average AHI is 3.4.  Average auto CPAP pressure is 6 cm H2O    I have reviewed the above results and compared them with the patient's last downloads and reviewed with the patient.    Review of Systems:    A complete review of systems was done and all were negative with the exception of nasal congestion shortness of breath anxiety depression    Social History:    Social History     Socioeconomic History    Marital status:    Tobacco Use    Smoking status: Never    Smokeless tobacco: Never    Tobacco comments:     Vape  "on oçcasion   Vaping Use    Vaping status: Former    Substances: Flavoring   Substance and Sexual Activity    Alcohol use: Yes     Alcohol/week: 2.0 - 4.0 standard drinks of alcohol     Types: 1 - 2 Glasses of wine, 1 - 2 Shots of liquor per week     Comment: Weekly    Drug use: Never    Sexual activity: Yes     Partners: Male     Birth control/protection: Hysterectomy     Comment: .       Allergies:  Hydrocodone and Tramadol     Medication Review:  Reviewed.      Vital Signs:    Vitals:    05/28/25 1506   Pulse: 111   SpO2: 98%   Weight: 75.3 kg (166 lb)   Height: 162.6 cm (64\")     Body mass index is 28.49 kg/m².    Physical Exam:    Constitutional:  Well developed 35 y.o. female that appears in no apparent distress.  Awake & oriented times 3.  Normal mood with normal recent and remote memory and normal judgement.  Eyes:  Conjunctivae normal.  Oropharynx: Previously, moist mucous membranes.    Self-administered Dana Sleepiness Scale test results:  6  0-5 Lower normal daytime sleepiness  6-10 Higher normal daytime sleepiness  11-12 Mild, 13-15 Moderate, & 16-24 Severe excessive daytime sleepiness    Impression:   1. Mild obstructive sleep apnea    2. Difficulty using continuous positive airway pressure (CPAP) device    3. Overweight with body mass index (BMI) 25.0-29.9    4. Restless legs    5. Dry mouth        Obstructive sleep apnea adequately treated with CPAP 6 cm H2O. The patient appears to be at goal with good compliance and usage.  We will contact DME about setting up a mask fitting for patient she may do better with fullface mask.  She will also adjust timidity settings to help with dry mouth.  We will follow-up ferritin levels if less than 75 continue iron for another 2 months however if above 75 I will prescribe gabapentin to help with restless leg symptoms.  Pressure change has helped with ear pressure issues and abdominal bloating.    Plan:  Good sleep hygiene measures should be maintained.  " Weight loss would be beneficial in this patient who is overweight by Body mass index is 28.49 kg/m²..      After evaluating the patient and assessing results available, the patient is benefiting from the treatment being provided.     The patient will continue CPAP.  After clinical evaluation and review of downloads, I recommend no changes to the patient's pressures.  A new prescription will be sent to the patient's DME.    Caution during activities that require prolonged concentration is strongly advised if sleepiness returns. Changing of PAP supplies regularly is important for effective use. Patient needs to change cushion on the mask or plugs on nasal pillows along with disposable filters once every month and change mask frame, tubing, headgear and Velcro straps every 6 months at the minimum.    I answered all of the patient's questions.  The patient will call for any problems and will follow up in 3 months.      Esperanza Barkley MD  Sleep Medicine  05/28/25  15:47 EDT

## 2025-05-29 DIAGNOSIS — R00.0 SINUS TACHYCARDIA: ICD-10-CM

## 2025-05-29 RX ORDER — METOPROLOL SUCCINATE 25 MG/1
25 TABLET, EXTENDED RELEASE ORAL DAILY
Qty: 30 TABLET | Refills: 5 | Status: SHIPPED | OUTPATIENT
Start: 2025-05-29

## 2025-05-29 RX ORDER — METOPROLOL SUCCINATE 25 MG/1
25 TABLET, EXTENDED RELEASE ORAL DAILY
Qty: 30 TABLET | Refills: 0 | OUTPATIENT
Start: 2025-05-29

## 2025-06-11 ENCOUNTER — OFFICE VISIT (OUTPATIENT)
Dept: ORTHOPEDIC SURGERY | Facility: CLINIC | Age: 35
End: 2025-06-11
Payer: COMMERCIAL

## 2025-06-11 VITALS — TEMPERATURE: 98.6 F | WEIGHT: 164.8 LBS | BODY MASS INDEX: 28.13 KG/M2 | HEIGHT: 64 IN

## 2025-06-11 DIAGNOSIS — Z98.890 S/P ARTHROSCOPY OF RIGHT SHOULDER: Primary | ICD-10-CM

## 2025-06-11 NOTE — PROGRESS NOTES
Sherry Quevedo : 1990 MRN: [id number] DATE: 2025    CC: 1 month s/p right shoulder arthroscopy    HPI: Patient returns to clinic today for follow up.  Reports pain is much better.  Reports good progress with therapy.      Vitals:    25 1536   Temp: 98.6 °F (37 °C)       Exam:  Wounds appear healed.  Shoulder moves fluidly and her motion is on track.  Good motor and sensory function in the hand and wrist.  Palpable radial pulse with brisk capillary refill.    Impression:  1 month s/p right shoulder arthroscopic biceps tenodesis, bursectomy    Plan:    1.  Continue PT per protocol--encouraged patient to progress motion as tolerated and demonstrated home exercise program.  2.  OK to D/C sling.  OK to begin driving.  3.  Follow up in 6 weeks with Dr. Verdin  4.  Counseled the patient about appropriate activity modifications and restrictions.    LEONELA Gilmore    2025    Answers submitted by the patient for this visit:  Post Operative Visit (Submitted on 2025)  Chief Complaint: Follow-up  Pain Control: not requiring pain medication  Fever: no fever  Diet: adequate intake  Activity: moderately limited  Operative Site Issues: No

## 2025-06-17 ENCOUNTER — HOSPITAL ENCOUNTER (EMERGENCY)
Facility: HOSPITAL | Age: 35
Discharge: HOME | End: 2025-06-17
Attending: EMERGENCY MEDICINE | Admitting: EMERGENCY MEDICINE
Payer: COMMERCIAL

## 2025-06-17 ENCOUNTER — APPOINTMENT (OUTPATIENT)
Dept: CT IMAGING | Facility: HOSPITAL | Age: 35
End: 2025-06-17
Payer: COMMERCIAL

## 2025-06-17 VITALS
WEIGHT: 163 LBS | DIASTOLIC BLOOD PRESSURE: 72 MMHG | RESPIRATION RATE: 18 BRPM | HEART RATE: 80 BPM | TEMPERATURE: 98.1 F | OXYGEN SATURATION: 100 % | HEIGHT: 64 IN | SYSTOLIC BLOOD PRESSURE: 103 MMHG | BODY MASS INDEX: 27.83 KG/M2

## 2025-06-17 DIAGNOSIS — K52.9 COLITIS: ICD-10-CM

## 2025-06-17 DIAGNOSIS — I95.9 HYPOTENSION, UNSPECIFIED HYPOTENSION TYPE: ICD-10-CM

## 2025-06-17 DIAGNOSIS — R10.9 LEFT SIDED ABDOMINAL PAIN: Primary | ICD-10-CM

## 2025-06-17 LAB
ALBUMIN SERPL-MCNC: 4.1 G/DL (ref 3.5–5.2)
ALBUMIN/GLOB SERPL: 1.7 G/DL
ALP SERPL-CCNC: 87 U/L (ref 39–117)
ALT SERPL W P-5'-P-CCNC: 12 U/L (ref 1–33)
ANION GAP SERPL CALCULATED.3IONS-SCNC: 12.1 MMOL/L (ref 5–15)
AST SERPL-CCNC: 13 U/L (ref 1–32)
BASOPHILS # BLD AUTO: 0.02 10*3/MM3 (ref 0–0.2)
BASOPHILS NFR BLD AUTO: 0.3 % (ref 0–1.5)
BILIRUB SERPL-MCNC: 0.2 MG/DL (ref 0–1.2)
BILIRUB UR QL STRIP: NEGATIVE
BUN SERPL-MCNC: 12.4 MG/DL (ref 6–20)
BUN/CREAT SERPL: 15.9 (ref 7–25)
CALCIUM SPEC-SCNC: 9.1 MG/DL (ref 8.6–10.5)
CHLORIDE SERPL-SCNC: 103 MMOL/L (ref 98–107)
CLARITY UR: CLEAR
CO2 SERPL-SCNC: 23.9 MMOL/L (ref 22–29)
COLOR UR: YELLOW
CREAT SERPL-MCNC: 0.78 MG/DL (ref 0.57–1)
DEPRECATED RDW RBC AUTO: 44.5 FL (ref 37–54)
EGFRCR SERPLBLD CKD-EPI 2021: 101.1 ML/MIN/1.73
EOSINOPHIL # BLD AUTO: 0.16 10*3/MM3 (ref 0–0.4)
EOSINOPHIL NFR BLD AUTO: 2.1 % (ref 0.3–6.2)
ERYTHROCYTE [DISTWIDTH] IN BLOOD BY AUTOMATED COUNT: 12.9 % (ref 12.3–15.4)
GLOBULIN UR ELPH-MCNC: 2.4 GM/DL
GLUCOSE SERPL-MCNC: 83 MG/DL (ref 65–99)
GLUCOSE UR STRIP-MCNC: NEGATIVE MG/DL
HCT VFR BLD AUTO: 38.7 % (ref 34–46.6)
HGB BLD-MCNC: 13.1 G/DL (ref 12–15.9)
HGB UR QL STRIP.AUTO: ABNORMAL
IMM GRANULOCYTES # BLD AUTO: 0.01 10*3/MM3 (ref 0–0.05)
IMM GRANULOCYTES NFR BLD AUTO: 0.1 % (ref 0–0.5)
KETONES UR QL STRIP: NEGATIVE
LEUKOCYTE ESTERASE UR QL STRIP.AUTO: NEGATIVE
LIPASE SERPL-CCNC: 51 U/L (ref 13–60)
LYMPHOCYTES # BLD AUTO: 2.4 10*3/MM3 (ref 0.7–3.1)
LYMPHOCYTES NFR BLD AUTO: 31.6 % (ref 19.6–45.3)
MCH RBC QN AUTO: 31.2 PG (ref 26.6–33)
MCHC RBC AUTO-ENTMCNC: 33.9 G/DL (ref 31.5–35.7)
MCV RBC AUTO: 92.1 FL (ref 79–97)
MONOCYTES # BLD AUTO: 0.54 10*3/MM3 (ref 0.1–0.9)
MONOCYTES NFR BLD AUTO: 7.1 % (ref 5–12)
NEUTROPHILS NFR BLD AUTO: 4.47 10*3/MM3 (ref 1.7–7)
NEUTROPHILS NFR BLD AUTO: 58.8 % (ref 42.7–76)
NITRITE UR QL STRIP: NEGATIVE
PH UR STRIP.AUTO: 6 [PH] (ref 5–8)
PLATELET # BLD AUTO: 164 10*3/MM3 (ref 140–450)
PMV BLD AUTO: 9.2 FL (ref 6–12)
POTASSIUM SERPL-SCNC: 3.9 MMOL/L (ref 3.5–5.2)
PROT SERPL-MCNC: 6.5 G/DL (ref 6–8.5)
PROT UR QL STRIP: NEGATIVE
RBC # BLD AUTO: 4.2 10*6/MM3 (ref 3.77–5.28)
SODIUM SERPL-SCNC: 139 MMOL/L (ref 136–145)
SP GR UR STRIP: 1.02 (ref 1–1.03)
UROBILINOGEN UR QL STRIP: ABNORMAL
WBC NRBC COR # BLD AUTO: 7.6 10*3/MM3 (ref 3.4–10.8)

## 2025-06-17 PROCEDURE — 25010000002 KETOROLAC TROMETHAMINE PER 15 MG: Performed by: EMERGENCY MEDICINE

## 2025-06-17 PROCEDURE — 81003 URINALYSIS AUTO W/O SCOPE: CPT | Performed by: EMERGENCY MEDICINE

## 2025-06-17 PROCEDURE — 25010000002 ONDANSETRON PER 1 MG: Performed by: EMERGENCY MEDICINE

## 2025-06-17 PROCEDURE — 74176 CT ABD & PELVIS W/O CONTRAST: CPT

## 2025-06-17 PROCEDURE — 25810000003 SODIUM CHLORIDE 0.9 % SOLUTION: Performed by: EMERGENCY MEDICINE

## 2025-06-17 PROCEDURE — 80053 COMPREHEN METABOLIC PANEL: CPT | Performed by: EMERGENCY MEDICINE

## 2025-06-17 PROCEDURE — 96361 HYDRATE IV INFUSION ADD-ON: CPT

## 2025-06-17 PROCEDURE — 85025 COMPLETE CBC W/AUTO DIFF WBC: CPT | Performed by: EMERGENCY MEDICINE

## 2025-06-17 PROCEDURE — 96375 TX/PRO/DX INJ NEW DRUG ADDON: CPT

## 2025-06-17 PROCEDURE — 99284 EMERGENCY DEPT VISIT MOD MDM: CPT

## 2025-06-17 PROCEDURE — 96374 THER/PROPH/DIAG INJ IV PUSH: CPT

## 2025-06-17 PROCEDURE — 83690 ASSAY OF LIPASE: CPT | Performed by: EMERGENCY MEDICINE

## 2025-06-17 RX ORDER — ONDANSETRON 4 MG/1
4 TABLET, ORALLY DISINTEGRATING ORAL EVERY 6 HOURS PRN
Qty: 12 TABLET | Refills: 0 | Status: SHIPPED | OUTPATIENT
Start: 2025-06-17

## 2025-06-17 RX ORDER — ONDANSETRON 2 MG/ML
4 INJECTION INTRAMUSCULAR; INTRAVENOUS ONCE
Status: COMPLETED | OUTPATIENT
Start: 2025-06-17 | End: 2025-06-17

## 2025-06-17 RX ORDER — KETOROLAC TROMETHAMINE 30 MG/ML
30 INJECTION, SOLUTION INTRAMUSCULAR; INTRAVENOUS ONCE
Status: COMPLETED | OUTPATIENT
Start: 2025-06-17 | End: 2025-06-17

## 2025-06-17 RX ORDER — OXYCODONE HYDROCHLORIDE 5 MG/1
5 TABLET ORAL ONCE
Refills: 0 | Status: COMPLETED | OUTPATIENT
Start: 2025-06-17 | End: 2025-06-17

## 2025-06-17 RX ORDER — OXYCODONE AND ACETAMINOPHEN 5; 325 MG/1; MG/1
1 TABLET ORAL EVERY 6 HOURS PRN
Qty: 12 TABLET | Refills: 0 | Status: SHIPPED | OUTPATIENT
Start: 2025-06-17

## 2025-06-17 RX ADMIN — AMOXICILLIN AND CLAVULANATE POTASSIUM 1 TABLET: 875; 125 TABLET, COATED ORAL at 23:24

## 2025-06-17 RX ADMIN — KETOROLAC TROMETHAMINE 30 MG: 30 INJECTION INTRAMUSCULAR; INTRAVENOUS at 21:30

## 2025-06-17 RX ADMIN — ONDANSETRON 4 MG: 2 INJECTION, SOLUTION INTRAMUSCULAR; INTRAVENOUS at 21:30

## 2025-06-17 RX ADMIN — OXYCODONE HYDROCHLORIDE 5 MG: 5 TABLET ORAL at 22:37

## 2025-06-17 RX ADMIN — SODIUM CHLORIDE 1000 ML: 9 INJECTION, SOLUTION INTRAVENOUS at 21:26

## 2025-06-18 NOTE — ED NOTES
Pt presents to ER with c/o low bp especially when standing from a sitting position for one week, and Left Abd pain since last night. Pt reports no change in bp med (Metoprolol) but does endorse a significant weight loss this year.

## 2025-06-18 NOTE — FSED PROVIDER NOTE
Subjective   History of Present Illness  Patient is a 35-year-old  female who presents emergency room with complaints of low blood pressure and left-sided abdominal pain.  Patient states that she does not feel right.  She recently had dental surgery.  Patient states that her blood pressure has been running low and she has also had left-sided abdominal pain.  She has a history of kidney stones, but this feels different.  The pain is not so much in the back.  She denies any hematuria or dysuria.  She has had nausea and decreased appetite.        Review of Systems   Constitutional:  Positive for appetite change. Negative for chills, fatigue and fever.   Eyes: Negative.    Respiratory:  Negative for cough, chest tightness and shortness of breath.    Cardiovascular:  Negative for chest pain and palpitations.   Gastrointestinal:  Positive for abdominal pain and nausea. Negative for diarrhea and vomiting.   Genitourinary: Negative.    Musculoskeletal: Negative.    Skin: Negative.  Negative for rash.   Neurological:  Positive for dizziness, weakness and light-headedness. Negative for syncope, numbness and headaches.   Psychiatric/Behavioral: Negative.     All other systems reviewed and are negative.      Past Medical History:   Diagnosis Date    Abnormal computed tomography angiography (CTA) of neck 12/21/2021    Abnormal gluteal crease     ADHD (attention deficit hyperactivity disorder) 2020    Allergic 01/2013    Anemia 2019    iron defiency anemia    Anxiety 2007    Benign paroxysmal positional vertigo of right ear 12/01/2021    BRBPR (bright red blood per rectum) 06/02/2021    Dr. Nancy Santana at [facility]    Breast anomaly     Bronchitis, chronic     Bruises easily     RESOLVED    Cervical adenopathy     Cervical lymphadenopathy     Left side.    Change in bowel habit 06/02/2021    Dr. [facility] Santana at [facility]    Cholestasis during pregnancy in third trimester 2019    Chronic allergic rhinitis     Chronic pain  disorder 2022    Fibro    Chronic recurrent major depressive disorder     In partial remission.    Cold intolerance     Constipation     CTS (carpal tunnel syndrome) 2019    ROULA    Decreased sex drive     Depression     History of PPD    Diarrhea     Dizziness 08/06/2021    APRPINA, Temi Mcrae, Cardiologist office.    Dysmenorrhea 12/04/2019    Surgery: Laparoscopic assisted vaginal hysterectomy, Surgeon Dr. Hillary Nunn.    Dysphoric mood     Dyspnea on exertion 08/06/2021    APRN, Temi Mcrae, Cardiologist office.    Endometriosis 12/2019    Hysterectomy in 2019 cured    Environmental and seasonal allergies 09/21/2020    Epigastric abdominal pain 11/07/2018    [facility].    Epiploic appendagitis 01/10/2023    Extremity pain 2022    TOS diagnosed in August    Fat necrosis 07/29/2021    [facility].    Fatigue     Fibromyalgia, primary 2021    Fissure, anal     history    Folic acid deficiency     GERD (gastroesophageal reflux disease) 2023    Gestational diabetes [id number]    I had it with my 1st pregnancy    H/O TB skin testing 02/20/2018    Negative result at [facility].    HA (headache)     Headache, tension-type     Hearing loss     WEARS HEARING AIDS    Hemorrhoid     History of gestational diabetes     with first baby    History of Holter monitoring 11/07/2019    24 hour.    History of kidney stones 2006/2009    History of miscarriage     History of vasectomy 2019    Spouse Vasectomy.    Hypocalcemia 03/2022    Hypoglycemia     Irritable bowel syndrome 1994    IBS with both constipation/diarrhea, followed by GI Dr. Moreland.    Joint pain     Fibro / TOS    Joint stiffness     Labral tear of shoulder     RIGHT    Lightheadedness 08/06/2021    LEONELA, Temi Mcrae, Cardiologist office.    Low back pain     Lower back/middle    Low serum potassium level     Low serum vitamin B12     Lower extremity myoclonus 02/28/2022    ADMITTED TO [facility]    Malaise and fatigue 11/12/2019     Cardiologist Dr. Benji Ugalde.    Mass of left parotid gland 12/2021    Migraines 10/2019    Multinodular non-toxic goiter     Near syncope 08/06/2021    LEONELA, Temi Mcrae, Cardiologist office.    Neck pain     OCD (obsessive compulsive disorder)     Ovarian cyst 2010    Surgery removed.    Palpitations 10/28/2019    Pneumonia 2023    PVC's (premature ventricular contractions)     Rapid heart rate 11/12/2019    Cardiologist Dr. Benji Ugalde.    Rectal bleeding 06/02/2021    Dr. Nancy Santana at ..    Renal stones     RLS (restless legs syndrome)     RUQ abdominal mass [id number]    [facility].    Scoliosis 2003    Sleep apnea     CPAP    Sleep disturbance     SOB (shortness of breath) 10/28/2019    Tachycardia 10/28/2019    Thrombocytopenia 03/2022    Thyroid nodule 10/28/2019    1 cm Left Submandibular node, [facility], Dr. Kevan Hoffman.    Thyroid nodule 10/28/2019    0.3 cm Right side of neck, [facility], Dr. Kevan Hoffman.    Thyromegaly 03/04/2016    Boarderline Thyromegaly, Findings via X-ray at [facility], ordering provider Dr. Nancy Santana.    Transaminitis 11/07/2018    [facility].    Trigger index finger of right hand 01/2022    Vaginal delivery 01/2019    Baby girl.       Allergies   Allergen Reactions    Hydrocodone Itching and Rash    Tramadol Nausea And Vomiting       Past Surgical History:   Procedure Laterality Date    ADENOIDECTOMY  2006    CHOLECYSTECTOMY  2019    CHOLECYSTECTOMY WITH INTRAOPERATIVE CHOLANGIOGRAM N/A 11/09/2018    Procedure: Laparoscopic cholecystectomy;  Surgeon: Khanh Lassiter MD;  Location: [facility] OR;  Service: General    COLONOSCOPY N/A 08/2019    CYSTOSCOPY URETEROSCOPY LASER LITHOTRIPSY N/A 10/31/2024    Procedure: CYSTOSCOPY, LEFT URETEROSCOPY, STONE BASKET EXTRACTION, LEFT STENT PLACEMENT;  Surgeon: Williams Ferguson MD;  Location: [facility] OR;  Service: Urology;  Laterality: N/A;    DILATATION AND CURETTAGE N/A  10/2015    MISCARRIAGE.    ENDOSCOPY N/A     HEMORRHOIDECTOMY N/A 06/16/2022    Procedure: HEMORRHOIDECTOMY;  Surgeon: Linda Sanchez MD;  Location: [facility] MAIN OR;  Service: General;  Laterality: N/A;    LAPAROSCOPIC ASSISTED VAGINAL HYSTERECTOMY Bilateral 12/04/2019    Procedure: LAPAROSCOPIC ASSISTED VAGINAL HYSTERECTOMY, BILATERAL SALPINGECTOMY;  Surgeon: Hillary Nunn MD;  [place] [facility] [place] [facility] [place]  Service: Obstetrics/Gynecology    OVARIAN CYST SURGERY  06/2009    Laparoscopic ovarian cyst resection, no other abdominal surgery.    SHOULDER ARTHROSCOPY W/ SUPERIOR LABRAL ANTERIOR POSTERIOR REPAIR Right 05/13/2025    Procedure: RIGHT SHOULDER ARTHROSCOPY BICEPS TENODESIS, SUBACROMIAL BURSECTOMY;  Surgeon: Agustín Verdin MD;  [place] [facility] [place] [facility] [place]  Service: Orthopedics;  Laterality: Right;    SHOULDER SURGERY  5/13/25    Bicep anchored    TONSILLECTOMY AND ADENOIDECTOMY Bilateral 10/2006    WISDOM TOOTH EXTRACTION Bilateral        Family History   Problem Relation Age of Onset    COPD Mother     Depression Mother     Hypertension Mother     Heart disease Mother     Hyperlipidemia Mother     Other Mother         Diverticulitis    Sleep apnea Mother     Asthma Father     COPD Father     Heart disease Father     Alcohol abuse Father     Hearing loss Father     Cancer Sister     Miscarriages / Stillbirths Sister         1 miscarriage    Depression Sister     Obesity Sister     Depression Sister     Cancer Sister     Miscarriages / Stillbirths Sister         Miscarriage & stillbirth    Obesity Sister     Cancer Maternal Grandmother         Breast    Diabetes Maternal Grandmother     Depression Maternal Grandmother     Breast cancer Maternal Grandmother     Cancer Paternal Grandmother         Skin (ear)    Bleeding Disorder Paternal Grandmother         Factor V    COPD Paternal Grandmother     Asthma Paternal Grandmother     Osteoarthritis Paternal Grandmother     Arthritis Paternal Grandmother     Clotting  disorder Paternal Grandmother         Factor V    Osteoporosis Paternal Grandmother     Kidney disease Paternal Grandmother     Other Paternal Grandmother         Stage 5 kidney failure    Alcohol abuse Paternal Grandfather     Anxiety disorder Sister     Depression Sister     Miscarriages / Stillbirths Sister         Miscarriage & stillbirth    Obesity Sister     Malig Hyperthermia Neg Hx     Colon cancer Neg Hx        Social History     Socioeconomic History    Marital status:    Tobacco Use    Smoking status: Never    Smokeless tobacco: Never    Tobacco comments:     Vape on oçcasion   Vaping Use    Vaping status: Former    Substances: Flavoring   Substance and Sexual Activity    Alcohol use: Yes     Alcohol/week: 2.0 - 4.0 standard drinks of alcohol     Types: 1 - 2 Glasses of wine, 1 - 2 Shots of liquor per week     Comment: 1-2 drinks per week    Drug use: Never    Sexual activity: Yes     Partners: Male     Birth control/protection: Hysterectomy     Comment: .           Objective   Physical Exam  Vitals and nursing note reviewed.   Constitutional:       General: She is not in acute distress.     Appearance: Normal appearance. She is normal weight.   HENT:      Head: Normocephalic and atraumatic.      Right Ear: External ear normal.      Left Ear: External ear normal.      Nose: Nose normal.      Mouth/Throat:      Mouth: Mucous membranes are moist.   Eyes:      Extraocular Movements: Extraocular movements intact.      Conjunctiva/sclera: Conjunctivae normal.      Pupils: Pupils are equal, round, and reactive to light.   Cardiovascular:      Rate and Rhythm: Normal rate and regular rhythm.      Pulses: Normal pulses.      Heart sounds: Normal heart sounds. No murmur heard.     No friction rub. No gallop.   Pulmonary:      Effort: Pulmonary effort is normal. No respiratory distress.      Breath sounds: Normal breath sounds.   Abdominal:      General: Abdomen is flat. There is no distension.       Tenderness: There is abdominal tenderness in the left upper quadrant and left lower quadrant. There is no guarding or rebound. Negative signs include McBurney's sign and psoas sign.   Musculoskeletal:         General: No deformity or signs of injury. Normal range of motion.      Cervical back: Normal range of motion and neck supple. No tenderness.   Skin:     General: Skin is warm.      Capillary Refill: Capillary refill takes less than 2 seconds.   Neurological:      General: No focal deficit present.      Mental Status: She is alert and oriented to person, place, and time. Mental status is at baseline.   Psychiatric:         Mood and Affect: Mood normal.         Procedures           ED Course  ED Course as of 06/17/25 2323   Tue Jun 17, 2025 2134 CBC normal.  Urinalysis shows trace blood. [KZ]   2151 Labs are all completely normal. [KZ]   2154 Patient still with continued pain.  Minimal relief with Toradol.  CT scan requested. [KZ]      ED Course User Index  [KZ] Delano Cameron MD                                   [facility] reviewed by Delano Cameron MD       Medical Decision Making  The patient presents to the emergency room with an acute emergent condition-abdominal pain and low blood pressure readings.  Abdominal exam without peritoneal signs. No evidence of acute abdomen at this time. Well appearing. Given work up, low suspicion for acute hepatobiliary disease (including acute cholecystitis or cholangitis), acute pancreatitis (neg lipase), PUD (including gastric perforation), acute appendicitis, vascular catastrophe, bowel obstruction, viscus perforation, or testicular torsion, diverticulitis.  CT scan shows colitis.  Blood work was unremarkable.  Patient treated appropriately.      Her blood pressure was a little low during this presentation.  She is on a beta-blocker and reports that she has lost 70 pounds this year.  I wonder if she is no longer hypertensive because of her weight loss.       Problems  Addressed:  Colitis: complicated acute illness or injury  Hypotension, unspecified hypotension type: complicated acute illness or injury  Left sided abdominal pain: complicated acute illness or injury    Amount and/or Complexity of Data Reviewed  Labs: ordered. Decision-making details documented in ED Course.  Radiology: ordered and independent interpretation performed. Decision-making details documented in ED Course.    Risk  Prescription drug management.        Final diagnoses:   Left sided abdominal pain   Colitis   Hypotension, unspecified hypotension type       ED Disposition  ED Disposition       ED Disposition   Discharge    Condition   Stable    Comment   --               Temi Jones, APRN  [address]  [address]  [place] [id number] [phone]    Schedule an appointment as soon as possible for a visit in 1 week  For repeat evaluation         Medication List        New Prescriptions      amoxicillin-clavulanate 875-125 MG per tablet  Commonly known as: AUGMENTIN  Take 1 tablet by mouth 2 (Two) Times a Day for 10 days.     ondansetron ODT 4 MG disintegrating tablet  Commonly known as: ZOFRAN-ODT  Take 1 tablet by mouth Every 6 (Six) Hours As Needed for Nausea or Vomiting.     oxyCODONE-acetaminophen 5-325 MG per tablet  Commonly known as: PERCOCET  Take 1 tablet by mouth Every 6 (Six) Hours As Needed for Severe Pain or Moderate Pain.               Where to Get Your Medications        These medications were sent to [address] - [address] [facility] - [phone]  - [phone]   [address] [facility] [address]      Phone: [phone]   amoxicillin-clavulanate 875-125 MG per tablet  ondansetron ODT 4 MG disintegrating tablet  oxyCODONE-acetaminophen 5-325 MG per tablet

## 2025-06-18 NOTE — DISCHARGE INSTRUCTIONS
Take antibiotics as prescribed, complete the entire course.  Pain and nausea medication as needed.  Discussed with your primary care physician use of blood pressure medication as your blood pressure has been running low.  Clear liquid diet, and advance as tolerated.  Return to ER for any concerns.

## 2025-07-07 ENCOUNTER — OFFICE VISIT (OUTPATIENT)
Dept: INTERNAL MEDICINE | Facility: CLINIC | Age: 35
End: 2025-07-07
Payer: COMMERCIAL

## 2025-07-07 VITALS
DIASTOLIC BLOOD PRESSURE: 62 MMHG | OXYGEN SATURATION: 98 % | WEIGHT: 164.2 LBS | TEMPERATURE: 98.8 F | BODY MASS INDEX: 28.03 KG/M2 | SYSTOLIC BLOOD PRESSURE: 98 MMHG | HEIGHT: 64 IN | HEART RATE: 96 BPM

## 2025-07-07 DIAGNOSIS — K52.9 COLITIS: ICD-10-CM

## 2025-07-07 DIAGNOSIS — K57.92 DIVERTICULITIS: ICD-10-CM

## 2025-07-07 DIAGNOSIS — E66.811 CLASS 1 OBESITY WITH SERIOUS COMORBIDITY AND BODY MASS INDEX (BMI) OF 31.0 TO 31.9 IN ADULT, UNSPECIFIED OBESITY TYPE: ICD-10-CM

## 2025-07-07 DIAGNOSIS — G43.109 MIGRAINE WITH AURA AND WITHOUT STATUS MIGRAINOSUS, NOT INTRACTABLE: Primary | ICD-10-CM

## 2025-07-07 PROCEDURE — 99214 OFFICE O/P EST MOD 30 MIN: CPT | Performed by: NURSE PRACTITIONER

## 2025-07-07 RX ORDER — GUANFACINE 2 MG/1
TABLET, EXTENDED RELEASE ORAL
COMMUNITY
Start: 2025-06-02

## 2025-07-07 NOTE — PROGRESS NOTES
Subjective   Sherry Quevedo is a 35 y.o. female.     Chief Complaint   Patient presents with    Migraine        History of Present Illness   She is here today for follow-up on migraine headaches.  At last office visit started Emgality 120 mg injection every 30 days.  She was transitioned to Nurtec 75 mg daily as needed for abortive therapy.  She notes significant improvement in migraine headaches over the past 2 months.  She initially had a week of more frequent migraine headaches 3 weeks after her initial injection of Emgality but has since not had any migraine headaches in the past 2 months.  She denies any medication side effects.  Nurtec is still working for abortive therapy.    She would also like to discuss me taking over her compounded tirzepatide for weight loss.  She is currently on tirzepatide compounded with B12 7.5 mg weekly.  She is doing well overall.  She notes improvement in hunger, cravings, portion control and food noise with the tirzepatide.  She has had successful weight loss of over 50 pounds through healthy lifestyle measures and tirzepatide.    She was also recently seen in the ER for abdominal pain.  She was diagnosed with acute colitis likely related to diverticulitis and treated with Augmentin.  She has not seen a gastroenterologist in some time.  She notes resolution of symptoms.    The following portions of the patient's history were reviewed and updated as appropriate: allergies, current medications, past family history, past medical history, past social history, past surgical history, and problem list.    Review of Systems   Constitutional: Negative.    Respiratory: Negative.     Cardiovascular: Negative.    Gastrointestinal:  Positive for constipation. Negative for abdominal distention, abdominal pain, anal bleeding, blood in stool, nausea, vomiting and GERD.   Neurological: Negative.    Psychiatric/Behavioral: Negative.         Objective   Physical Exam  Constitutional:        Appearance: She is well-developed.   Neck:      Thyroid: No thyroid mass, thyromegaly or thyroid tenderness.      Vascular: No carotid bruit.      Trachea: Trachea normal.   Cardiovascular:      Rate and Rhythm: Normal rate and regular rhythm.      Chest Wall: PMI is not displaced.      Pulses:           Radial pulses are 2+ on the right side and 2+ on the left side.        Dorsalis pedis pulses are 2+ on the right side and 2+ on the left side.        Posterior tibial pulses are 2+ on the right side and 2+ on the left side.      Heart sounds: S1 normal and S2 normal.   Pulmonary:      Effort: Pulmonary effort is normal.      Breath sounds: Normal breath sounds.   Musculoskeletal:      Right lower leg: No edema.      Left lower leg: No edema.   Lymphadenopathy:      Head:      Right side of head: No submental, submandibular, tonsillar or occipital adenopathy.      Left side of head: No submental, submandibular, tonsillar or occipital adenopathy.      Cervical: No cervical adenopathy.   Skin:     General: Skin is warm and dry.      Capillary Refill: Capillary refill takes less than 2 seconds.      Nails: There is no clubbing.   Neurological:      General: No focal deficit present.      Mental Status: She is alert and oriented to person, place, and time. Mental status is at baseline.   Psychiatric:         Attention and Perception: Attention normal.         Mood and Affect: Mood and affect normal.         Speech: Speech normal.         Behavior: Behavior normal.         Thought Content: Thought content normal.         Cognition and Memory: Cognition normal.         Vitals:    07/07/25 1529   BP: 98/62   Pulse: 96   Temp: 98.8 °F (37.1 °C)   SpO2: 98%      Body mass index is 28.18 kg/m².    Assessment & Plan   Problems Addressed this Visit       Migraine with aura and without status migrainosus, not intractable - Primary    Class 1 obesity with serious comorbidity and body mass index (BMI) of 31.0 to 31.9 in adult      Other Visit Diagnoses         Colitis        Relevant Orders    Ambulatory Referral to Gastroenterology      Diverticulitis        Relevant Orders    Ambulatory Referral to Gastroenterology          Diagnoses         Codes Comments      Migraine with aura and without status migrainosus, not intractable    -  Primary ICD-10-CM: G43.109  ICD-9-CM: 346.00       Class 1 obesity with serious comorbidity and body mass index (BMI) of 31.0 to 31.9 in adult, unspecified obesity type     ICD-10-CM: E66.811, Z68.31  ICD-9-CM: 278.00, V85.31       Colitis     ICD-10-CM: K52.9  ICD-9-CM: 558.9       Diverticulitis     ICD-10-CM: K57.92  ICD-9-CM: 562.11           1.  Migraine with aura-much improved.  Recommend continuing Emgality 120 mg every 30 days with as needed Nurtec 75 mg daily as needed for abortive therapy.  She has previously tried and failed Topamax, amitriptyline, propranolol, Qulipta, sumatriptan, Maxalt.  Encouraged migraine trigger avoidance.  Make sure to hydrate well with fluids and electrolytes along with regular exercise.  Follow-up in 3 months with labs.  2.  Obesity-significantly improving with healthy lifestyle and weight loss medication.  Will take over management of compounded tirzepatide with B12.  She is currently on 7.5 mg weekly.  Discussed with her to start at the 5 mg dose (62 units) for the first 2 weeks to see how she is tolerating this and then okay to increase to 7.5 mg weekly dose.  Compound injectable consent form signed today.  No personal or family history medullary thyroid cancer.  Discussed appropriate use and adverse effects.  Continue to optimize protein intake with goal of 90 g protein a day, limit sugars, carbs, hydrate well with fluids and ensure adequate dietary fiber intake.  Continue to work on regular exercise with walking and strength training at least 2 to 3 days a week.  Follow-up with labs in 3 months.  3.  Colitis/diverticulitis-symptoms have resolved after completing  Augmentin.  Referral placed to GI for follow-up.    Follow-up with labs in 3 months.

## 2025-07-16 LAB — FERRITIN SERPL-MCNC: 98.4 NG/ML (ref 13–150)

## 2025-07-18 ENCOUNTER — TELEPHONE (OUTPATIENT)
Dept: ORTHOPEDIC SURGERY | Facility: CLINIC | Age: 35
End: 2025-07-18

## 2025-07-29 ENCOUNTER — HOSPITAL ENCOUNTER (EMERGENCY)
Facility: HOSPITAL | Age: 35
Discharge: HOME | End: 2025-07-29
Attending: STUDENT IN AN ORGANIZED HEALTH CARE EDUCATION/TRAINING PROGRAM | Admitting: STUDENT IN AN ORGANIZED HEALTH CARE EDUCATION/TRAINING PROGRAM
Payer: COMMERCIAL

## 2025-07-29 ENCOUNTER — APPOINTMENT (OUTPATIENT)
Dept: CT IMAGING | Facility: HOSPITAL | Age: 35
End: 2025-07-29
Payer: COMMERCIAL

## 2025-07-29 VITALS
HEIGHT: 64 IN | OXYGEN SATURATION: 99 % | SYSTOLIC BLOOD PRESSURE: 100 MMHG | TEMPERATURE: 97.9 F | WEIGHT: 160 LBS | HEART RATE: 69 BPM | DIASTOLIC BLOOD PRESSURE: 76 MMHG | BODY MASS INDEX: 27.31 KG/M2 | RESPIRATION RATE: 18 BRPM

## 2025-07-29 DIAGNOSIS — R10.9 FLANK PAIN: Primary | ICD-10-CM

## 2025-07-29 LAB
ALBUMIN SERPL-MCNC: 4.3 G/DL (ref 3.5–5.2)
ALBUMIN/GLOB SERPL: 2 G/DL
ALP SERPL-CCNC: 79 U/L (ref 39–117)
ALT SERPL W P-5'-P-CCNC: 9 U/L (ref 1–33)
ANION GAP SERPL CALCULATED.3IONS-SCNC: 9.1 MMOL/L (ref 5–15)
AST SERPL-CCNC: 15 U/L (ref 1–32)
BASOPHILS # BLD AUTO: 0.01 10*3/MM3 (ref 0–0.2)
BASOPHILS NFR BLD AUTO: 0.2 % (ref 0–1.5)
BILIRUB SERPL-MCNC: 0.4 MG/DL (ref 0–1.2)
BILIRUB UR QL STRIP: NEGATIVE
BUN SERPL-MCNC: 12.5 MG/DL (ref 6–20)
BUN/CREAT SERPL: 14.5 (ref 7–25)
CALCIUM SPEC-SCNC: 9.6 MG/DL (ref 8.6–10.5)
CHLORIDE SERPL-SCNC: 103 MMOL/L (ref 98–107)
CLARITY UR: ABNORMAL
CO2 SERPL-SCNC: 25.9 MMOL/L (ref 22–29)
COLOR UR: YELLOW
CREAT SERPL-MCNC: 0.86 MG/DL (ref 0.57–1)
DEPRECATED RDW RBC AUTO: 42.1 FL (ref 37–54)
EGFRCR SERPLBLD CKD-EPI 2021: 89.9 ML/MIN/1.73
EOSINOPHIL # BLD AUTO: 0.1 10*3/MM3 (ref 0–0.4)
EOSINOPHIL NFR BLD AUTO: 1.6 % (ref 0.3–6.2)
ERYTHROCYTE [DISTWIDTH] IN BLOOD BY AUTOMATED COUNT: 12.3 % (ref 12.3–15.4)
GLOBULIN UR ELPH-MCNC: 2.2 GM/DL
GLUCOSE SERPL-MCNC: 86 MG/DL (ref 65–99)
GLUCOSE UR STRIP-MCNC: NEGATIVE MG/DL
HCT VFR BLD AUTO: 37.6 % (ref 34–46.6)
HGB BLD-MCNC: 12.8 G/DL (ref 12–15.9)
HGB UR QL STRIP.AUTO: NEGATIVE
IMM GRANULOCYTES # BLD AUTO: 0.01 10*3/MM3 (ref 0–0.05)
IMM GRANULOCYTES NFR BLD AUTO: 0.2 % (ref 0–0.5)
KETONES UR QL STRIP: NEGATIVE
LEUKOCYTE ESTERASE UR QL STRIP.AUTO: NEGATIVE
LIPASE SERPL-CCNC: 48 U/L (ref 13–60)
LYMPHOCYTES # BLD AUTO: 2.48 10*3/MM3 (ref 0.7–3.1)
LYMPHOCYTES NFR BLD AUTO: 39.1 % (ref 19.6–45.3)
MCH RBC QN AUTO: 31.3 PG (ref 26.6–33)
MCHC RBC AUTO-ENTMCNC: 34 G/DL (ref 31.5–35.7)
MCV RBC AUTO: 91.9 FL (ref 79–97)
MONOCYTES # BLD AUTO: 0.3 10*3/MM3 (ref 0.1–0.9)
MONOCYTES NFR BLD AUTO: 4.7 % (ref 5–12)
NEUTROPHILS NFR BLD AUTO: 3.45 10*3/MM3 (ref 1.7–7)
NEUTROPHILS NFR BLD AUTO: 54.2 % (ref 42.7–76)
NITRITE UR QL STRIP: NEGATIVE
PH UR STRIP.AUTO: 5.5 [PH] (ref 5–8)
PLATELET # BLD AUTO: 189 10*3/MM3 (ref 140–450)
PMV BLD AUTO: 9.4 FL (ref 6–12)
POTASSIUM SERPL-SCNC: 4.4 MMOL/L (ref 3.5–5.2)
PROT SERPL-MCNC: 6.5 G/DL (ref 6–8.5)
PROT UR QL STRIP: NEGATIVE
RBC # BLD AUTO: 4.09 10*6/MM3 (ref 3.77–5.28)
SODIUM SERPL-SCNC: 138 MMOL/L (ref 136–145)
SP GR UR STRIP: 1.02 (ref 1–1.03)
UROBILINOGEN UR QL STRIP: ABNORMAL
WBC NRBC COR # BLD AUTO: 6.35 10*3/MM3 (ref 3.4–10.8)

## 2025-07-29 PROCEDURE — 96376 TX/PRO/DX INJ SAME DRUG ADON: CPT

## 2025-07-29 PROCEDURE — 96375 TX/PRO/DX INJ NEW DRUG ADDON: CPT

## 2025-07-29 PROCEDURE — 80053 COMPREHEN METABOLIC PANEL: CPT | Performed by: STUDENT IN AN ORGANIZED HEALTH CARE EDUCATION/TRAINING PROGRAM

## 2025-07-29 PROCEDURE — 74176 CT ABD & PELVIS W/O CONTRAST: CPT

## 2025-07-29 PROCEDURE — 99284 EMERGENCY DEPT VISIT MOD MDM: CPT

## 2025-07-29 PROCEDURE — 83690 ASSAY OF LIPASE: CPT | Performed by: STUDENT IN AN ORGANIZED HEALTH CARE EDUCATION/TRAINING PROGRAM

## 2025-07-29 PROCEDURE — 25010000002 KETOROLAC TROMETHAMINE PER 15 MG: Performed by: STUDENT IN AN ORGANIZED HEALTH CARE EDUCATION/TRAINING PROGRAM

## 2025-07-29 PROCEDURE — 25010000002 MORPHINE PER 10 MG: Performed by: STUDENT IN AN ORGANIZED HEALTH CARE EDUCATION/TRAINING PROGRAM

## 2025-07-29 PROCEDURE — 85025 COMPLETE CBC W/AUTO DIFF WBC: CPT | Performed by: STUDENT IN AN ORGANIZED HEALTH CARE EDUCATION/TRAINING PROGRAM

## 2025-07-29 PROCEDURE — 96374 THER/PROPH/DIAG INJ IV PUSH: CPT

## 2025-07-29 PROCEDURE — 25010000002 ONDANSETRON PER 1 MG: Performed by: STUDENT IN AN ORGANIZED HEALTH CARE EDUCATION/TRAINING PROGRAM

## 2025-07-29 PROCEDURE — 81003 URINALYSIS AUTO W/O SCOPE: CPT | Performed by: STUDENT IN AN ORGANIZED HEALTH CARE EDUCATION/TRAINING PROGRAM

## 2025-07-29 RX ORDER — MORPHINE SULFATE 4 MG/ML
4 INJECTION, SOLUTION INTRAMUSCULAR; INTRAVENOUS ONCE
Status: COMPLETED | OUTPATIENT
Start: 2025-07-29 | End: 2025-07-29

## 2025-07-29 RX ORDER — CYCLOBENZAPRINE HCL 10 MG
10 TABLET ORAL 3 TIMES DAILY PRN
Qty: 15 TABLET | Refills: 0 | Status: SHIPPED | OUTPATIENT
Start: 2025-07-29 | End: 2025-08-03

## 2025-07-29 RX ORDER — ONDANSETRON 2 MG/ML
4 INJECTION INTRAMUSCULAR; INTRAVENOUS ONCE
Status: COMPLETED | OUTPATIENT
Start: 2025-07-29 | End: 2025-07-29

## 2025-07-29 RX ORDER — KETOROLAC TROMETHAMINE 15 MG/ML
15 INJECTION, SOLUTION INTRAMUSCULAR; INTRAVENOUS ONCE
Status: COMPLETED | OUTPATIENT
Start: 2025-07-29 | End: 2025-07-29

## 2025-07-29 RX ADMIN — MORPHINE SULFATE 4 MG: 4 INJECTION, SOLUTION INTRAMUSCULAR; INTRAVENOUS at 17:36

## 2025-07-29 RX ADMIN — ONDANSETRON 4 MG: 2 INJECTION, SOLUTION INTRAMUSCULAR; INTRAVENOUS at 16:32

## 2025-07-29 RX ADMIN — KETOROLAC TROMETHAMINE 15 MG: 15 INJECTION INTRAMUSCULAR at 17:36

## 2025-07-29 RX ADMIN — MORPHINE SULFATE 4 MG: 4 INJECTION, SOLUTION INTRAMUSCULAR; INTRAVENOUS at 16:32

## 2025-07-29 NOTE — FSED PROVIDER NOTE
Subjective   History of Present Illness  Pt is a 35 y.o. female with PMH as listed who presents for   Chief Complaint   Patient presents with    Flank Pain       Patient is a 35-year-old female presents for left flank pain for the past 3 days.  States that the pain has been increasing and is present even at rest.  No chest pain shortness of breath no fevers no hematuria or dysuria.  Has history of renal stones but states this does not feel similar to prior episodes that she is experienced.  Was recently seen in June for abdominal pain and then at that time she was diagnosed with colitis, took her full antibiotic course and has follow-up with GI and is been doing well since discharge.    Review of Systems    Past Medical History:   Diagnosis Date    Abnormal computed tomography angiography (CTA) of neck 12/21/2021    Abnormal gluteal crease     ADHD (attention deficit hyperactivity disorder) 2020    Allergic 01/2013    Anemia 2019    iron defiency anemia    Anxiety 2007    Benign paroxysmal positional vertigo of right ear 12/01/2021    BRBPR (bright red blood per rectum) 06/02/2021    Dr. Nancy Santana at [facility]    Breast anomaly     Bronchitis, chronic     Bruises easily     RESOLVED    Cervical adenopathy     Cervical lymphadenopathy     Left side.    Change in bowel habit 06/02/2021    Dr. Nancy Santana at [facility]    Cholestasis during pregnancy in third trimester 2019    Chronic allergic rhinitis     Chronic pain disorder 2022    Fibro    Chronic recurrent major depressive disorder     In partial remission.    Cold intolerance     Constipation     CTS (carpal tunnel syndrome) 2019    ROULA    Decreased sex drive     Depression     History of PPD    Diarrhea     Diverticulosis 6/2025    Colitis/ Diverticulitis per CT scan    Dizziness 08/06/2021    LEONELA, Temi Mcrae, Cardiologist office.    Dysmenorrhea 12/04/2019    Surgery: Laparoscopic assisted vaginal hysterectomy, Surgeon Dr. Hillary Nunn.    Dysphoric mood      Dyspnea on exertion 08/06/2021    APRPIAN, Temi Mcrae, Cardiologist office.    Endometriosis 12/2019    Hysterectomy in 2019 cured    Environmental and seasonal allergies 09/21/2020    Epigastric abdominal pain 11/07/2018    [facility].    Epiploic appendagitis 01/10/2023    Extremity pain 2022    TOS diagnosed in August    Fat necrosis 07/29/2021    [facility].    Fatigue     Fibromyalgia, primary 2021    Fissure, anal     history    Folic acid deficiency     GERD (gastroesophageal reflux disease) 2023    Gestational diabetes [id number]    I had it with my 1st pregnancy    H/O TB skin testing 02/20/2018    Negative result at [facility].    HA (headache)     Headache, tension-type     Hearing loss     WEARS HEARING AIDS    Hemorrhoid     History of gestational diabetes     with first baby    History of Holter monitoring 11/07/2019    24 hour.    History of kidney stones 2006/2009    History of miscarriage     History of vasectomy 2019    Spouse Vasectomy.    Hypocalcemia 03/2022    Hypoglycemia     Irritable bowel syndrome 1994    IBS with both constipation/diarrhea, followed by GI Dr. Moreland.    Joint pain     Fibro / TOS    Joint stiffness     Labral tear of shoulder     RIGHT    Lightheadedness 08/06/2021    APRN, Temi Mcrae, Cardiologist office.    Low back pain     Lower back/middle    Low serum potassium level     Low serum vitamin B12     Lower extremity myoclonus 02/28/2022    ADMITTED TO [facility]    Malaise and fatigue 11/12/2019    Cardiologist Dr. Benji Ugalde.    Mass of left parotid gland 12/2021    Migraines 10/2019    Multinodular non-toxic goiter     Near syncope 08/06/2021    APRN, Temi Mcrae, Cardiologist office.    Neck pain     OCD (obsessive compulsive disorder)     Ovarian cyst 2010    Surgery removed.    Palpitations 10/28/2019    Pneumonia 2023    PVC's (premature ventricular contractions)     Rapid heart rate 11/12/2019    Cardiologist   Benji Ugaled.    Rectal bleeding 06/02/2021    [facility]ana at [facility]    Renal stones     RLS (restless legs syndrome)     RUQ abdominal mass [id number]    [facility].    Scoliosis 2003    Sleep apnea     CPAP    Sleep disturbance     SOB (shortness of breath) 10/28/2019    Tachycardia 10/28/2019    Thrombocytopenia 03/2022    Thyroid nodule 10/28/2019    1 cm Left Submandibular node, [facility], Dr. Kevan HUTCHINSON Forwith.    Thyroid nodule 10/28/2019    0.3 cm Right side of neck, [facility], Dr. Kevan HUTCHINSON Forlatrell.    Thyromegaly 03/04/2016    Boarderline Thyromegaly, Findings via X-ray at [facility], ordering provider Dr. Nancy Santana.    Transaminitis 11/07/2018    [facility].    Trigger index finger of right hand 01/2022    Vaginal delivery 01/2019    Baby girl.       Allergies   Allergen Reactions    Hydrocodone Itching and Rash    Tramadol Nausea And Vomiting       Past Surgical History:   Procedure Laterality Date    ADENOIDECTOMY  2006    CHOLECYSTECTOMY  2019    CHOLECYSTECTOMY WITH INTRAOPERATIVE CHOLANGIOGRAM N/A 11/09/2018    Procedure: Laparoscopic cholecystectomy;  Surgeon: Khanh Lassiter MD;  Location: [facility] OR;  Service: General    COLONOSCOPY N/A 08/2019    CYSTOSCOPY URETEROSCOPY LASER LITHOTRIPSY N/A 10/31/2024    Procedure: CYSTOSCOPY, LEFT URETEROSCOPY, STONE BASKET EXTRACTION, LEFT STENT PLACEMENT;  Surgeon: Williams Ferguson MD;  Location: [facility] OR;  Service: Urology;  Laterality: N/A;    DILATATION AND CURETTAGE N/A 10/2015    MISCARRIAGE.    ENDOSCOPY N/A     HEMORRHOIDECTOMY N/A 06/16/2022    Procedure: HEMORRHOIDECTOMY;  Surgeon: Linda Sanchez MD;  Location: [facility] OR;  Service: General;  Laterality: N/A;    LAPAROSCOPIC ASSISTED VAGINAL HYSTERECTOMY Bilateral 12/04/2019    Procedure: LAPAROSCOPIC ASSISTED VAGINAL HYSTERECTOMY, BILATERAL SALPINGECTOMY;  Surgeon: Hillary Nunn MD;  Location: [facility];  Service: Obstetrics/Gynecology     OVARIAN CYST SURGERY  06/2009    Laparoscopic ovarian cyst resection, no other abdominal surgery.    SHOULDER ARTHROSCOPY W/ SUPERIOR LABRAL ANTERIOR POSTERIOR REPAIR Right 05/13/2025    Procedure: RIGHT SHOULDER ARTHROSCOPY BICEPS TENODESIS, SUBACROMIAL BURSECTOMY;  Surgeon: Agustín Verdin MD;  Location: [facility] OR [facility];  Service: Orthopedics;  Laterality: Right;    SHOULDER SURGERY  5/13/25    Bicep anchored    TONSILLECTOMY AND ADENOIDECTOMY Bilateral 10/2006    WISDOM TOOTH EXTRACTION Bilateral        Family History   Problem Relation Age of Onset    COPD Mother     Depression Mother     Hypertension Mother     Heart disease Mother     Hyperlipidemia Mother     Other Mother         Diverticulitis    Sleep apnea Mother     Asthma Father     COPD Father     Heart disease Father     Alcohol abuse Father     Hearing loss Father     Cancer Sister     Miscarriages / Stillbirths Sister         1 miscarriage    Depression Sister     Obesity Sister     Depression Sister     Cancer Sister     Miscarriages / Stillbirths Sister         Miscarriage & stillbirth    Obesity Sister     Cancer Maternal Grandmother         Breast    Diabetes Maternal Grandmother     Depression Maternal Grandmother     Breast cancer Maternal Grandmother     Cancer Paternal Grandmother         Skin (ear)    Bleeding Disorder Paternal Grandmother         Factor V    COPD Paternal Grandmother     Asthma Paternal Grandmother     Osteoarthritis Paternal Grandmother     Arthritis Paternal Grandmother     Clotting disorder Paternal Grandmother         Factor V    Osteoporosis Paternal Grandmother     Kidney disease Paternal Grandmother     Other Paternal Grandmother         Stage 5 kidney failure    Alcohol abuse Paternal Grandfather     Anxiety disorder Sister     Depression Sister     Miscarriages / Stillbirths Sister         Miscarriage & stillbirth    Obesity Sister     Malig Hyperthermia Neg Hx     Colon cancer Neg Hx        Social History      Socioeconomic History    Marital status:    Tobacco Use    Smoking status: Never    Smokeless tobacco: Never    Tobacco comments:     Vape on oçcasion   Vaping Use    Vaping status: Former    Substances: Flavoring   Substance and Sexual Activity    Alcohol use: Yes     Alcohol/week: 2.0 - 4.0 standard drinks of alcohol     Types: 1 - 2 Glasses of wine, 1 - 2 Shots of liquor per week     Comment: Weekly    Drug use: Never    Sexual activity: Yes     Partners: Male     Birth control/protection: Hysterectomy     Comment: .           Objective   Physical Exam  Constitutional:       Appearance: Normal appearance.   HENT:      Head: Normocephalic and atraumatic.      Mouth/Throat:      Mouth: Mucous membranes are moist.      Pharynx: Oropharynx is clear.   Eyes:      Conjunctiva/sclera: Conjunctivae normal.   Cardiovascular:      Rate and Rhythm: Normal rate and regular rhythm.   Pulmonary:      Effort: Pulmonary effort is normal.   Abdominal:      General: Abdomen is flat.      Palpations: Abdomen is soft.      Tenderness: There is no abdominal tenderness. There is left CVA tenderness. There is no right CVA tenderness.   Musculoskeletal:      Cervical back: Neck supple.   Skin:     General: Skin is warm and dry.   Neurological:      Mental Status: She is alert.   Psychiatric:         Mood and Affect: Mood normal.         Procedures           ED Course  ED Course as of 07/29/25 1745   Tue Jul 29, 2025   5537 Patient is a 35-year-old female presents for left flank pain for the past 3 days.  States that the pain has been increasing and is present even at rest.  No chest pain shortness of breath no fevers no hematuria or dysuria.  Has history of renal stones but states this does not feel similar to prior episodes that she is experienced.  Was recently seen in June for abdominal pain and then at that time she was diagnosed with colitis, took her full antibiotic course and has follow-up with GI and is been  doing well since discharge.  She has history of rectal bleeding several years ago Toradol not recommended will treat with morphine and Zofran obtain CBC CMP lipase UA and CT. [JF]   1743 Urine negative all lab work unremarkable for any acute findings CT shows no acute findings sclerotic bone lesion is stable and is not new today.  Patient resting comfortably.  Discussed with patient plan for discharge with PCP follow-up and will treat as musculoskeletal pain with Flexeril sent to patient's pharmacy.  Instructed not to drive or operate machinery while taking this medication.  Patient understands and agrees, all questions answered. [JF]      ED Course User Index  [JF] To Cuevas MD                                           Medical Decision Making  My differential diagnosis for back pain includes but is not limited to:  Musculoskeletal strain, contusion, retroperitoneal hematoma, disc protrusion, vertebral fracture, transverse process fracture, rib fracture, facet syndrome, sacroiliac joint strain, sciatica, renal injury, splenic injury, pancreatic injury, osteoarthritis, lumbar spondylosis, spinal stenosis, ankylosing spondylitis, sacroiliac joint inflammation, pancreatitis, perforated peptic ulcer, diverticulitis, endometriosis, chronic PID, epidural abscess, osteomyelitis, retroperitoneal abscess, pyelonephritis, pneumonia, subphrenic abscess, tuberculosis, neurofibroma, meningioma, multiple myeloma, lymphoma, metastatic cancer, primary cancer, AAA, aortic dissection, spinal ischemia, referred pain, ureterolithiasis      Problems Addressed:  Flank pain: complicated acute illness or injury    Amount and/or Complexity of Data Reviewed  Labs: ordered. Decision-making details documented in ED Course.  Radiology: ordered and independent interpretation performed. Decision-making details documented in ED Course.    Risk  Prescription drug management.        Final diagnoses:   Flank pain       ED Disposition  ED  Disposition       ED Disposition   Discharge    Condition   Stable    Comment   --               Temi Jones, APRN  [address]  [address] [address] [phone]    Schedule an appointment as soon as possible for a visit in 2 days  For re-evaluation         Medication List        New Prescriptions      cyclobenzaprine 10 MG tablet  Commonly known as: FLEXERIL  Take 1 tablet by mouth 3 (Three) Times a Day As Needed for Muscle Spasms for up to 5 days.               Where to Get Your Medications        These medications were sent to [address] - [address] [facility] [phone]  - [phone]   [address] [facility] [address]      Phone: [phone]   cyclobenzaprine 10 MG tablet

## 2025-08-10 ENCOUNTER — APPOINTMENT (OUTPATIENT)
Dept: MRI IMAGING | Facility: HOSPITAL | Age: 35
End: 2025-08-10
Payer: COMMERCIAL

## 2025-08-10 ENCOUNTER — HOSPITAL ENCOUNTER (OUTPATIENT)
Facility: HOSPITAL | Age: 35
Setting detail: OBSERVATION
Discharge: HOME | End: 2025-08-11
Attending: EMERGENCY MEDICINE | Admitting: EMERGENCY MEDICINE
Payer: COMMERCIAL

## 2025-08-10 DIAGNOSIS — M54.50 ACUTE LEFT-SIDED LOW BACK PAIN WITHOUT SCIATICA: Primary | ICD-10-CM

## 2025-08-10 DIAGNOSIS — M46.1 SACROILIITIS: ICD-10-CM

## 2025-08-10 PROBLEM — M54.9 BACK PAIN: Status: ACTIVE | Noted: 2025-08-10

## 2025-08-10 LAB
ANION GAP SERPL CALCULATED.3IONS-SCNC: 13 MMOL/L (ref 5–15)
BILIRUB UR QL STRIP: NEGATIVE
BUN SERPL-MCNC: 9 MG/DL (ref 6–20)
BUN/CREAT SERPL: 10.1 (ref 7–25)
CALCIUM SPEC-SCNC: 9.4 MG/DL (ref 8.6–10.5)
CHLORIDE SERPL-SCNC: 106 MMOL/L (ref 98–107)
CLARITY UR: CLEAR
CO2 SERPL-SCNC: 22 MMOL/L (ref 22–29)
COLOR UR: YELLOW
CREAT SERPL-MCNC: 0.89 MG/DL (ref 0.57–1)
DEPRECATED RDW RBC AUTO: 45.3 FL (ref 37–54)
EGFRCR SERPLBLD CKD-EPI 2021: 86.3 ML/MIN/1.73
ERYTHROCYTE [DISTWIDTH] IN BLOOD BY AUTOMATED COUNT: 12.8 % (ref 12.3–15.4)
GLUCOSE SERPL-MCNC: 155 MG/DL (ref 65–99)
GLUCOSE UR STRIP-MCNC: NEGATIVE MG/DL
HCT VFR BLD AUTO: 41.1 % (ref 34–46.6)
HGB BLD-MCNC: 13.6 G/DL (ref 12–15.9)
HGB UR QL STRIP.AUTO: NEGATIVE
KETONES UR QL STRIP: NEGATIVE
LEUKOCYTE ESTERASE UR QL STRIP.AUTO: NEGATIVE
MCH RBC QN AUTO: 31.7 PG (ref 26.6–33)
MCHC RBC AUTO-ENTMCNC: 33.1 G/DL (ref 31.5–35.7)
MCV RBC AUTO: 95.8 FL (ref 79–97)
NITRITE UR QL STRIP: NEGATIVE
PH UR STRIP.AUTO: 6.5 [PH] (ref 5–8)
PLATELET # BLD AUTO: 184 10*3/MM3 (ref 140–450)
PMV BLD AUTO: 9.5 FL (ref 6–12)
POTASSIUM SERPL-SCNC: 4.2 MMOL/L (ref 3.5–5.2)
PROT UR QL STRIP: NEGATIVE
RBC # BLD AUTO: 4.29 10*6/MM3 (ref 3.77–5.28)
SODIUM SERPL-SCNC: 141 MMOL/L (ref 136–145)
SP GR UR STRIP: 1.01 (ref 1–1.03)
UROBILINOGEN UR QL STRIP: NORMAL
WBC NRBC COR # BLD AUTO: 6.35 10*3/MM3 (ref 3.4–10.8)

## 2025-08-10 PROCEDURE — 25010000002 MORPHINE PER 10 MG: Performed by: EMERGENCY MEDICINE

## 2025-08-10 PROCEDURE — 81003 URINALYSIS AUTO W/O SCOPE: CPT | Performed by: EMERGENCY MEDICINE

## 2025-08-10 PROCEDURE — 96375 TX/PRO/DX INJ NEW DRUG ADDON: CPT

## 2025-08-10 PROCEDURE — 87086 URINE CULTURE/COLONY COUNT: CPT | Performed by: NURSE PRACTITIONER

## 2025-08-10 PROCEDURE — G0378 HOSPITAL OBSERVATION PER HR: HCPCS

## 2025-08-10 PROCEDURE — 25010000002 ONDANSETRON PER 1 MG: Performed by: EMERGENCY MEDICINE

## 2025-08-10 PROCEDURE — 25010000002 DEXAMETHASONE PER 1 MG: Performed by: EMERGENCY MEDICINE

## 2025-08-10 PROCEDURE — 25010000002 DEXAMETHASONE SODIUM PHOSPHATE 20 MG/5ML SOLUTION: Performed by: NURSE PRACTITIONER

## 2025-08-10 PROCEDURE — 25010000002 KETOROLAC TROMETHAMINE PER 15 MG: Performed by: NURSE PRACTITIONER

## 2025-08-10 PROCEDURE — 99285 EMERGENCY DEPT VISIT HI MDM: CPT

## 2025-08-10 PROCEDURE — 25010000002 KETOROLAC TROMETHAMINE PER 15 MG: Performed by: EMERGENCY MEDICINE

## 2025-08-10 PROCEDURE — 96376 TX/PRO/DX INJ SAME DRUG ADON: CPT

## 2025-08-10 PROCEDURE — 85027 COMPLETE CBC AUTOMATED: CPT | Performed by: PHYSICIAN ASSISTANT

## 2025-08-10 PROCEDURE — 96374 THER/PROPH/DIAG INJ IV PUSH: CPT

## 2025-08-10 PROCEDURE — 80048 BASIC METABOLIC PNL TOTAL CA: CPT | Performed by: PHYSICIAN ASSISTANT

## 2025-08-10 PROCEDURE — 72148 MRI LUMBAR SPINE W/O DYE: CPT

## 2025-08-10 RX ORDER — SODIUM CHLORIDE 9 MG/ML
40 INJECTION, SOLUTION INTRAVENOUS AS NEEDED
Status: DISCONTINUED | OUTPATIENT
Start: 2025-08-10 | End: 2025-08-11 | Stop reason: HOSPADM

## 2025-08-10 RX ORDER — BISACODYL 10 MG
10 SUPPOSITORY, RECTAL RECTAL DAILY PRN
Status: DISCONTINUED | OUTPATIENT
Start: 2025-08-10 | End: 2025-08-11 | Stop reason: HOSPADM

## 2025-08-10 RX ORDER — PROPRANOLOL HCL 20 MG
10 TABLET ORAL NIGHTLY
Status: DISCONTINUED | OUTPATIENT
Start: 2025-08-10 | End: 2025-08-11 | Stop reason: HOSPADM

## 2025-08-10 RX ORDER — PANTOPRAZOLE SODIUM 40 MG/1
40 TABLET, DELAYED RELEASE ORAL 2 TIMES DAILY
Status: DISCONTINUED | OUTPATIENT
Start: 2025-08-10 | End: 2025-08-11 | Stop reason: HOSPADM

## 2025-08-10 RX ORDER — KETOROLAC TROMETHAMINE 15 MG/ML
15 INJECTION, SOLUTION INTRAMUSCULAR; INTRAVENOUS ONCE
Status: COMPLETED | OUTPATIENT
Start: 2025-08-10 | End: 2025-08-10

## 2025-08-10 RX ORDER — POLYETHYLENE GLYCOL 3350 17 G/17G
17 POWDER, FOR SOLUTION ORAL DAILY PRN
Status: DISCONTINUED | OUTPATIENT
Start: 2025-08-10 | End: 2025-08-11 | Stop reason: HOSPADM

## 2025-08-10 RX ORDER — AMOXICILLIN 250 MG
2 CAPSULE ORAL 2 TIMES DAILY PRN
Status: DISCONTINUED | OUTPATIENT
Start: 2025-08-10 | End: 2025-08-11 | Stop reason: HOSPADM

## 2025-08-10 RX ORDER — ALLOPURINOL 300 MG/1
300 TABLET ORAL NIGHTLY
Status: DISCONTINUED | OUTPATIENT
Start: 2025-08-10 | End: 2025-08-11 | Stop reason: HOSPADM

## 2025-08-10 RX ORDER — CETIRIZINE HYDROCHLORIDE 10 MG/1
10 TABLET ORAL NIGHTLY
Status: DISCONTINUED | OUTPATIENT
Start: 2025-08-10 | End: 2025-08-11 | Stop reason: HOSPADM

## 2025-08-10 RX ORDER — MORPHINE SULFATE 2 MG/ML
2 INJECTION, SOLUTION INTRAMUSCULAR; INTRAVENOUS ONCE
Status: COMPLETED | OUTPATIENT
Start: 2025-08-10 | End: 2025-08-10

## 2025-08-10 RX ORDER — SODIUM CHLORIDE 0.9 % (FLUSH) 0.9 %
10 SYRINGE (ML) INJECTION EVERY 12 HOURS SCHEDULED
Status: DISCONTINUED | OUTPATIENT
Start: 2025-08-10 | End: 2025-08-11 | Stop reason: HOSPADM

## 2025-08-10 RX ORDER — METHOCARBAMOL 500 MG/1
500 TABLET, FILM COATED ORAL 4 TIMES DAILY
Status: DISCONTINUED | OUTPATIENT
Start: 2025-08-10 | End: 2025-08-11 | Stop reason: HOSPADM

## 2025-08-10 RX ORDER — DEXAMETHASONE SODIUM PHOSPHATE 4 MG/ML
4 INJECTION, SOLUTION INTRA-ARTICULAR; INTRALESIONAL; INTRAMUSCULAR; INTRAVENOUS; SOFT TISSUE EVERY 8 HOURS
Status: DISCONTINUED | OUTPATIENT
Start: 2025-08-10 | End: 2025-08-11 | Stop reason: HOSPADM

## 2025-08-10 RX ORDER — DIAZEPAM 5 MG/1
5 TABLET ORAL ONCE
Status: COMPLETED | OUTPATIENT
Start: 2025-08-10 | End: 2025-08-10

## 2025-08-10 RX ORDER — ONDANSETRON 4 MG/1
4 TABLET, ORALLY DISINTEGRATING ORAL EVERY 8 HOURS PRN
Status: DISCONTINUED | OUTPATIENT
Start: 2025-08-10 | End: 2025-08-11 | Stop reason: HOSPADM

## 2025-08-10 RX ORDER — OXYCODONE AND ACETAMINOPHEN 5; 325 MG/1; MG/1
1 TABLET ORAL EVERY 4 HOURS PRN
Refills: 0 | Status: DISCONTINUED | OUTPATIENT
Start: 2025-08-10 | End: 2025-08-11 | Stop reason: HOSPADM

## 2025-08-10 RX ORDER — SODIUM CHLORIDE 0.9 % (FLUSH) 0.9 %
10 SYRINGE (ML) INJECTION AS NEEDED
Status: DISCONTINUED | OUTPATIENT
Start: 2025-08-10 | End: 2025-08-11 | Stop reason: HOSPADM

## 2025-08-10 RX ORDER — ONDANSETRON 2 MG/ML
4 INJECTION INTRAMUSCULAR; INTRAVENOUS ONCE
Status: COMPLETED | OUTPATIENT
Start: 2025-08-10 | End: 2025-08-10

## 2025-08-10 RX ORDER — DESVENLAFAXINE 50 MG/1
100 TABLET, FILM COATED, EXTENDED RELEASE ORAL DAILY
Status: DISCONTINUED | OUTPATIENT
Start: 2025-08-10 | End: 2025-08-11 | Stop reason: HOSPADM

## 2025-08-10 RX ORDER — BISACODYL 5 MG/1
5 TABLET, DELAYED RELEASE ORAL DAILY PRN
Status: DISCONTINUED | OUTPATIENT
Start: 2025-08-10 | End: 2025-08-11 | Stop reason: HOSPADM

## 2025-08-10 RX ORDER — DIAZEPAM 5 MG/1
5 TABLET ORAL ONCE AS NEEDED
Status: COMPLETED | OUTPATIENT
Start: 2025-08-10 | End: 2025-08-10

## 2025-08-10 RX ORDER — DEXAMETHASONE SODIUM PHOSPHATE 10 MG/ML
6 INJECTION, SOLUTION INTRA-ARTICULAR; INTRALESIONAL; INTRAMUSCULAR; INTRAVENOUS; SOFT TISSUE ONCE
Status: COMPLETED | OUTPATIENT
Start: 2025-08-10 | End: 2025-08-10

## 2025-08-10 RX ADMIN — DEXAMETHASONE SODIUM PHOSPHATE 4 MG: 4 INJECTION, SOLUTION INTRAMUSCULAR; INTRAVENOUS at 21:52

## 2025-08-10 RX ADMIN — MORPHINE SULFATE 2 MG: 2 INJECTION, SOLUTION INTRAMUSCULAR; INTRAVENOUS at 14:29

## 2025-08-10 RX ADMIN — ONDANSETRON 4 MG: 2 INJECTION, SOLUTION INTRAMUSCULAR; INTRAVENOUS at 14:29

## 2025-08-10 RX ADMIN — DIAZEPAM 5 MG: 5 TABLET ORAL at 18:20

## 2025-08-10 RX ADMIN — OXYCODONE AND ACETAMINOPHEN 1 TABLET: 5; 325 TABLET ORAL at 17:03

## 2025-08-10 RX ADMIN — OXYCODONE AND ACETAMINOPHEN 1 TABLET: 5; 325 TABLET ORAL at 22:01

## 2025-08-10 RX ADMIN — KETOROLAC TROMETHAMINE 15 MG: 15 INJECTION, SOLUTION INTRAMUSCULAR; INTRAVENOUS at 14:29

## 2025-08-10 RX ADMIN — PROPRANOLOL HYDROCHLORIDE 10 MG: 20 TABLET ORAL at 20:41

## 2025-08-10 RX ADMIN — Medication 10 ML: at 17:04

## 2025-08-10 RX ADMIN — METHOCARBAMOL TABLETS 500 MG: 500 TABLET, COATED ORAL at 17:03

## 2025-08-10 RX ADMIN — METHOCARBAMOL TABLETS 500 MG: 500 TABLET, COATED ORAL at 20:41

## 2025-08-10 RX ADMIN — CETIRIZINE HYDROCHLORIDE 10 MG: 10 TABLET, FILM COATED ORAL at 20:42

## 2025-08-10 RX ADMIN — DEXAMETHASONE SODIUM PHOSPHATE 6 MG: 10 INJECTION INTRAMUSCULAR; INTRAVENOUS at 14:29

## 2025-08-10 RX ADMIN — Medication 10 ML: at 20:46

## 2025-08-10 RX ADMIN — PANTOPRAZOLE SODIUM 40 MG: 40 TABLET, DELAYED RELEASE ORAL at 20:42

## 2025-08-10 RX ADMIN — DIAZEPAM 5 MG: 5 TABLET ORAL at 20:34

## 2025-08-10 RX ADMIN — ALLOPURINOL 300 MG: 300 TABLET ORAL at 20:41

## 2025-08-10 RX ADMIN — KETOROLAC TROMETHAMINE 15 MG: 15 INJECTION, SOLUTION INTRAMUSCULAR; INTRAVENOUS at 18:20

## 2025-08-11 ENCOUNTER — ANESTHESIA (OUTPATIENT)
Dept: PAIN MEDICINE | Facility: HOSPITAL | Age: 35
End: 2025-08-11
Payer: COMMERCIAL

## 2025-08-11 ENCOUNTER — ANESTHESIA EVENT (OUTPATIENT)
Dept: PAIN MEDICINE | Facility: HOSPITAL | Age: 35
End: 2025-08-11
Payer: COMMERCIAL

## 2025-08-11 ENCOUNTER — APPOINTMENT (OUTPATIENT)
Dept: GENERAL RADIOLOGY | Facility: HOSPITAL | Age: 35
End: 2025-08-11
Payer: COMMERCIAL

## 2025-08-11 ENCOUNTER — APPOINTMENT (OUTPATIENT)
Dept: PAIN MEDICINE | Facility: HOSPITAL | Age: 35
End: 2025-08-11
Payer: COMMERCIAL

## 2025-08-11 ENCOUNTER — READMISSION MANAGEMENT (OUTPATIENT)
Dept: CALL CENTER | Facility: HOSPITAL | Age: 35
End: 2025-08-11
Payer: COMMERCIAL

## 2025-08-11 VITALS
HEIGHT: 64 IN | RESPIRATION RATE: 16 BRPM | DIASTOLIC BLOOD PRESSURE: 83 MMHG | TEMPERATURE: 97.5 F | BODY MASS INDEX: 27.31 KG/M2 | HEART RATE: 88 BPM | SYSTOLIC BLOOD PRESSURE: 110 MMHG | WEIGHT: 160 LBS | OXYGEN SATURATION: 99 %

## 2025-08-11 PROBLEM — M46.1 INFLAMMATION OF LEFT SACROILIAC JOINT: Status: ACTIVE | Noted: 2025-08-11

## 2025-08-11 LAB
ANION GAP SERPL CALCULATED.3IONS-SCNC: 11 MMOL/L (ref 5–15)
BUN SERPL-MCNC: 11 MG/DL (ref 6–20)
BUN/CREAT SERPL: 14.1 (ref 7–25)
CALCIUM SPEC-SCNC: 9.5 MG/DL (ref 8.6–10.5)
CHLORIDE SERPL-SCNC: 106 MMOL/L (ref 98–107)
CO2 SERPL-SCNC: 22 MMOL/L (ref 22–29)
CREAT SERPL-MCNC: 0.78 MG/DL (ref 0.57–1)
DEPRECATED RDW RBC AUTO: 43.7 FL (ref 37–54)
EGFRCR SERPLBLD CKD-EPI 2021: 101.1 ML/MIN/1.73
ERYTHROCYTE [DISTWIDTH] IN BLOOD BY AUTOMATED COUNT: 12.7 % (ref 12.3–15.4)
GLUCOSE SERPL-MCNC: 127 MG/DL (ref 65–99)
HCT VFR BLD AUTO: 39.3 % (ref 34–46.6)
HGB BLD-MCNC: 13.3 G/DL (ref 12–15.9)
MCH RBC QN AUTO: 31.9 PG (ref 26.6–33)
MCHC RBC AUTO-ENTMCNC: 33.8 G/DL (ref 31.5–35.7)
MCV RBC AUTO: 94.2 FL (ref 79–97)
PLATELET # BLD AUTO: 199 10*3/MM3 (ref 140–450)
PMV BLD AUTO: 9.7 FL (ref 6–12)
POTASSIUM SERPL-SCNC: 4.3 MMOL/L (ref 3.5–5.2)
RBC # BLD AUTO: 4.17 10*6/MM3 (ref 3.77–5.28)
SODIUM SERPL-SCNC: 139 MMOL/L (ref 136–145)
WBC NRBC COR # BLD AUTO: 6.24 10*3/MM3 (ref 3.4–10.8)

## 2025-08-11 PROCEDURE — 25010000002 METHYLPREDNISOLONE PER 80 MG: Performed by: ANESTHESIOLOGY

## 2025-08-11 PROCEDURE — 25010000002 MIDAZOLAM PER 1 MG: Performed by: ANESTHESIOLOGY

## 2025-08-11 PROCEDURE — G0378 HOSPITAL OBSERVATION PER HR: HCPCS

## 2025-08-11 PROCEDURE — 85027 COMPLETE CBC AUTOMATED: CPT | Performed by: NURSE PRACTITIONER

## 2025-08-11 PROCEDURE — 80048 BASIC METABOLIC PNL TOTAL CA: CPT | Performed by: NURSE PRACTITIONER

## 2025-08-11 PROCEDURE — 25010000002 DEXAMETHASONE SODIUM PHOSPHATE 20 MG/5ML SOLUTION: Performed by: NURSE PRACTITIONER

## 2025-08-11 PROCEDURE — 96376 TX/PRO/DX INJ SAME DRUG ADON: CPT

## 2025-08-11 PROCEDURE — 25810000003 SODIUM CHLORIDE 0.9 % SOLUTION: Performed by: NURSE PRACTITIONER

## 2025-08-11 PROCEDURE — 25010000002 LIDOCAINE 1 % SOLUTION: Performed by: ANESTHESIOLOGY

## 2025-08-11 RX ORDER — METHYLPREDNISOLONE ACETATE 80 MG/ML
80 INJECTION, SUSPENSION INTRA-ARTICULAR; INTRALESIONAL; INTRAMUSCULAR; SOFT TISSUE ONCE
Status: COMPLETED | OUTPATIENT
Start: 2025-08-11 | End: 2025-08-11

## 2025-08-11 RX ORDER — MIDAZOLAM HYDROCHLORIDE 1 MG/ML
1 INJECTION, SOLUTION INTRAMUSCULAR; INTRAVENOUS ONCE AS NEEDED
Status: COMPLETED | OUTPATIENT
Start: 2025-08-11 | End: 2025-08-11

## 2025-08-11 RX ORDER — METHYLPREDNISOLONE 4 MG/1
TABLET ORAL
Qty: 21 TABLET | Refills: 0 | Status: SHIPPED | OUTPATIENT
Start: 2025-08-11

## 2025-08-11 RX ORDER — FENTANYL CITRATE 50 UG/ML
25 INJECTION, SOLUTION INTRAMUSCULAR; INTRAVENOUS AS NEEDED
Refills: 0 | Status: DISCONTINUED | OUTPATIENT
Start: 2025-08-11 | End: 2025-08-11

## 2025-08-11 RX ORDER — OXYCODONE AND ACETAMINOPHEN 5; 325 MG/1; MG/1
1 TABLET ORAL EVERY 4 HOURS PRN
Qty: 10 TABLET | Refills: 0 | Status: SHIPPED | OUTPATIENT
Start: 2025-08-11 | End: 2025-08-17

## 2025-08-11 RX ORDER — DIAZEPAM 5 MG/1
5 TABLET ORAL ONCE
Status: COMPLETED | OUTPATIENT
Start: 2025-08-11 | End: 2025-08-11

## 2025-08-11 RX ORDER — SODIUM CHLORIDE 9 MG/ML
100 INJECTION, SOLUTION INTRAVENOUS CONTINUOUS
Status: DISCONTINUED | OUTPATIENT
Start: 2025-08-11 | End: 2025-08-11 | Stop reason: HOSPADM

## 2025-08-11 RX ORDER — DIAZEPAM 5 MG/1
5 TABLET ORAL EVERY 6 HOURS PRN
Qty: 10 TABLET | Refills: 0 | Status: SHIPPED | OUTPATIENT
Start: 2025-08-11

## 2025-08-11 RX ORDER — LIDOCAINE HYDROCHLORIDE 10 MG/ML
1 INJECTION, SOLUTION INFILTRATION; PERINEURAL ONCE
Status: COMPLETED | OUTPATIENT
Start: 2025-08-11 | End: 2025-08-11

## 2025-08-11 RX ORDER — POLYETHYLENE GLYCOL 3350 17 G/17G
17 POWDER, FOR SOLUTION ORAL DAILY
Status: DISCONTINUED | OUTPATIENT
Start: 2025-08-11 | End: 2025-08-11 | Stop reason: HOSPADM

## 2025-08-11 RX ORDER — HYDROXYZINE HYDROCHLORIDE 25 MG/1
25 TABLET, FILM COATED ORAL ONCE
Status: COMPLETED | OUTPATIENT
Start: 2025-08-11 | End: 2025-08-11

## 2025-08-11 RX ORDER — BUPIVACAINE HYDROCHLORIDE AND EPINEPHRINE 2.5; 5 MG/ML; UG/ML
10 INJECTION, SOLUTION EPIDURAL; INFILTRATION; INTRACAUDAL; PERINEURAL ONCE
Status: COMPLETED | OUTPATIENT
Start: 2025-08-11 | End: 2025-08-11

## 2025-08-11 RX ORDER — SODIUM CHLORIDE 9 MG/ML
100 INJECTION, SOLUTION INTRAVENOUS CONTINUOUS
Status: DISCONTINUED | OUTPATIENT
Start: 2025-08-11 | End: 2025-08-11

## 2025-08-11 RX ADMIN — DESVENLAFAXINE 100 MG: 50 TABLET, EXTENDED RELEASE ORAL at 09:21

## 2025-08-11 RX ADMIN — MIDAZOLAM 2 MG: 1 INJECTION INTRAMUSCULAR; INTRAVENOUS at 12:58

## 2025-08-11 RX ADMIN — HYDROXYZINE HYDROCHLORIDE 25 MG: 25 TABLET ORAL at 02:56

## 2025-08-11 RX ADMIN — DIAZEPAM 5 MG: 5 TABLET ORAL at 09:21

## 2025-08-11 RX ADMIN — METHOCARBAMOL TABLETS 500 MG: 500 TABLET, COATED ORAL at 09:22

## 2025-08-11 RX ADMIN — PANTOPRAZOLE SODIUM 40 MG: 40 TABLET, DELAYED RELEASE ORAL at 09:21

## 2025-08-11 RX ADMIN — Medication 10 ML: at 09:23

## 2025-08-11 RX ADMIN — BUPIVACAINE HYDROCHLORIDE AND EPINEPHRINE BITARTRATE 10 ML: 2.5; .005 INJECTION, SOLUTION EPIDURAL; INFILTRATION; INTRACAUDAL; PERINEURAL at 13:00

## 2025-08-11 RX ADMIN — LIDOCAINE HYDROCHLORIDE 1 ML: 10 INJECTION, SOLUTION INFILTRATION; PERINEURAL at 13:00

## 2025-08-11 RX ADMIN — SODIUM CHLORIDE 100 ML/HR: 9 INJECTION, SOLUTION INTRAVENOUS at 10:00

## 2025-08-11 RX ADMIN — METHYLPREDNISOLONE ACETATE 80 MG: 80 INJECTION, SUSPENSION INTRA-ARTICULAR; INTRALESIONAL; INTRAMUSCULAR; SOFT TISSUE at 13:00

## 2025-08-11 RX ADMIN — DEXAMETHASONE SODIUM PHOSPHATE 4 MG: 4 INJECTION, SOLUTION INTRAMUSCULAR; INTRAVENOUS at 06:23

## 2025-08-12 ENCOUNTER — TRANSITIONAL CARE MANAGEMENT TELEPHONE ENCOUNTER (OUTPATIENT)
Dept: CALL CENTER | Facility: HOSPITAL | Age: 35
End: 2025-08-12
Payer: COMMERCIAL

## 2025-08-12 LAB — BACTERIA SPEC AEROBE CULT: NO GROWTH

## 2025-08-13 DIAGNOSIS — G43.109 MIGRAINE WITH AURA AND WITHOUT STATUS MIGRAINOSUS, NOT INTRACTABLE: ICD-10-CM

## 2025-08-13 RX ORDER — GALCANEZUMAB 120 MG/ML
120 INJECTION, SOLUTION SUBCUTANEOUS
Qty: 3 ML | Refills: 2 | Status: SHIPPED | OUTPATIENT
Start: 2025-08-13

## 2025-08-19 ENCOUNTER — TELEPHONE (OUTPATIENT)
Dept: INTERNAL MEDICINE | Facility: CLINIC | Age: 35
End: 2025-08-19
Payer: COMMERCIAL

## (undated) DEVICE — ADHS SKIN DERMABOND TOP ADVANCED

## (undated) DEVICE — GLV SURG TRIUMPH CLASSIC PF LTX 7 STRL

## (undated) DEVICE — PENCL ES MEGADINE EZ/CLEAN BUTN W/HOLSTR 10FT

## (undated) DEVICE — PATIENT RETURN ELECTRODE, SINGLE-USE, CONTACT QUALITY MONITORING, ADULT, WITH 9FT CORD, FOR PATIENTS WEIGING OVER 33LBS. (15KG): Brand: MEGADYNE

## (undated) DEVICE — ENDOPATH XCEL UNIVERSAL TROCAR STABLILITY SLEEVES: Brand: ENDOPATH XCEL

## (undated) DEVICE — DRP C/ARM 41X74IN

## (undated) DEVICE — CANN TRPL DAM 7X7MM NO VLV

## (undated) DEVICE — PK ARTHSCP SHLDR TOWER 40

## (undated) DEVICE — APPL CHLORAPREP HI/LITE 26ML ORNG

## (undated) DEVICE — DRSNG WND GZ CURAD OIL EMULSION 3X3IN STRL

## (undated) DEVICE — VIOLET BRAIDED (POLYGLACTIN 910), SYNTHETIC ABSORBABLE SUTURE: Brand: COATED VICRYL

## (undated) DEVICE — ENDOPATH XCEL BLADELESS TROCARS WITH STABILITY SLEEVES: Brand: ENDOPATH XCEL

## (undated) DEVICE — ENDOPATH PNEUMONEEDLE INSUFFLATION NEEDLES WITH LUER LOCK CONNECTORS 120MM: Brand: ENDOPATH

## (undated) DEVICE — SUT VIC 0 CT2 CR8 18IN DYED J727D

## (undated) DEVICE — 3M™ STERI-STRIP™ REINFORCED ADHESIVE SKIN CLOSURES, R1547, 1/2 IN X 4 IN (12 MM X 100 MM), 6 STRIPS/ENVELOPE: Brand: 3M™ STERI-STRIP™

## (undated) DEVICE — TIDISHIELD UROLOGY DRAIN BAGS FROSTY VINYL STERILE FITS SIEMENS UROSKOP ACCESS 20 PER CASE: Brand: TIDISHIELD

## (undated) DEVICE — SPNG GZ WOVN 4X4IN 12PLY 10/BX STRL

## (undated) DEVICE — 3M™ STERI-STRIP™ COMPOUND BENZOIN TINCTURE 40 BAGS/CARTON 4 CARTONS/CASE C1544: Brand: 3M™ STERI-STRIP™

## (undated) DEVICE — EXTENSION SET, MALE LUER LOCK ADAPTER WITH RETRACTABLE COLLAR

## (undated) DEVICE — ENSEAL TRIO TEMPERATURE CONTOLLED TISSUE SEALING TECHNOLOGY DISPOSABLE TISSUE SEALING DEVICE TAPTRONIC TRIGGER ACTIVATED POWER 3MM CURVED JAW: Brand: ENSEAL

## (undated) DEVICE — KTTNER ENDO BLNT DISSCT

## (undated) DEVICE — SUT VIC 0 TIES 18IN J912G

## (undated) DEVICE — LOU MINOR PROCEDURE: Brand: MEDLINE INDUSTRIES, INC.

## (undated) DEVICE — SKIN PREP TRAY W/CHG: Brand: MEDLINE INDUSTRIES, INC.

## (undated) DEVICE — NDL HYPO ECLPS SFTY 22G 1 1/2IN

## (undated) DEVICE — 40585 XL ADVANCED TRENDELENBURG POSITIONING KIT: Brand: 40585 XL ADVANCED TRENDELENBURG POSITIONING KIT

## (undated) DEVICE — GLV SURG BIOGEL LTX PF 6 1/2

## (undated) DEVICE — SOL NACL 0.9PCT 1000ML

## (undated) DEVICE — IMMOB SHLDR PAD2 UNIV SM

## (undated) DEVICE — GOWN,SIRUS,NON REINFRCD,LARGE,SET IN SL: Brand: MEDLINE

## (undated) DEVICE — ABL ASP APOLLORF I90 90DEG

## (undated) DEVICE — TRAP FLD MINIVAC MEGADYNE 100ML

## (undated) DEVICE — GLV SURG SIGNATURE ESSENTIAL PF LTX SZ8.5

## (undated) DEVICE — ENDOPATH XCEL BLUNT TIP TROCARS WITH SMOOTH SLEEVES: Brand: ENDOPATH XCEL

## (undated) DEVICE — SUT VIC 2/0 CT2 27IN J269H

## (undated) DEVICE — CATH IV INSYTE AUTOGARD 14G 1 1/2IN ORNG

## (undated) DEVICE — STERILE LATEX POWDER-FREE SURGICAL GLOVESWITH NITRILE COATING: Brand: PROTEXIS

## (undated) DEVICE — SOL ISO/ALC 70PCT 4OZ

## (undated) DEVICE — PENCL E/S HNDSWCH ROCKR CB

## (undated) DEVICE — SPNG LAP 18X18IN LF STRL PK/5

## (undated) DEVICE — CONTAINER,SPECIMEN,OR STERILE,4OZ: Brand: MEDLINE

## (undated) DEVICE — GLV SURG SENSICARE PI LF PF 7.5 GRN STRL

## (undated) DEVICE — DRAPE,U/ SHT,SPLIT,PLAS,STERIL: Brand: MEDLINE

## (undated) DEVICE — UNDYED BRAIDED (POLYGLACTIN 910), SYNTHETIC ABSORBABLE SUTURE: Brand: COATED VICRYL

## (undated) DEVICE — POSTN ARMSLV LAT/TRACTION DISP

## (undated) DEVICE — ENDOPOUCH RETRIEVER SPECIMEN RETRIEVAL BAGS: Brand: ENDOPOUCH RETRIEVER

## (undated) DEVICE — PREP SOL POVIDONE/IODINE BT 4OZ

## (undated) DEVICE — ENDOCUT SCISSOR TIP, DISPOSABLE: Brand: RENEW

## (undated) DEVICE — APPL CHLORAPREP W/TINT 26ML ORNG

## (undated) DEVICE — PK LAVH TOWER 40

## (undated) DEVICE — LOU LAP CHOLE: Brand: MEDLINE INDUSTRIES, INC.

## (undated) DEVICE — DRAPE,REIN 53X77,STERILE: Brand: MEDLINE

## (undated) DEVICE — GOWN,SIRUS,NONRNF,SETINSLV,2XL,18/CS: Brand: MEDLINE

## (undated) DEVICE — STPCK 3WY D201 DISCOFIX

## (undated) DEVICE — BLD SHAVER BONECUTTER 4MM 13CM

## (undated) DEVICE — GLV SURG BIOGEL LTX PF 8 1/2

## (undated) DEVICE — SMOKE EVACUATION TUBING WITH 8 IN INTEGRAL WAND AND SPONGE GUARD: Brand: BUFFALO FILTER

## (undated) DEVICE — GLV SURG SENSICARE PI PF LF 7 GRN STRL

## (undated) DEVICE — PANTY KNIT WASHABLE LG/XL BRN/GRN LF

## (undated) DEVICE — STONE RETRIEVAL BASKET: Brand: SEGURA HEMISPHERE

## (undated) DEVICE — URETERAL DILATATION SYSTEM

## (undated) DEVICE — TUBING SET, GRAVITY, 4-SPIKE: Brand: CONMED

## (undated) DEVICE — SUT ETHLN 3/0 PS1 18IN 1663H

## (undated) DEVICE — PK URETSCP 40

## (undated) DEVICE — GLV SURG SENSICARE PI MIC PF SZ7 LF STRL

## (undated) DEVICE — SUT GUT CHRM 0 CT2 27IN 884H

## (undated) DEVICE — DRP SURG UTIL W/TPE 2/LAYR 15X26IN DISP

## (undated) DEVICE — GLV SURG BIOGEL LTX PF 7

## (undated) DEVICE — REAMR PILOTED HD 7MM

## (undated) DEVICE — VISUALIZATION SYSTEM: Brand: CLEARIFY

## (undated) DEVICE — GOWN SURG AERO CHROME XL

## (undated) DEVICE — CATH CHOLANG 4.5F18IN BRGNDY

## (undated) DEVICE — SUT VIC 0 TN 27IN DYED JTN0G

## (undated) DEVICE — ANTIBACTERIAL UNDYED BRAIDED (POLYGLACTIN 910), SYNTHETIC ABSORBABLE SUTURE: Brand: COATED VICRYL

## (undated) DEVICE — SUT MNCRYL 4/0 PS2 18 IN